# Patient Record
Sex: MALE | Race: WHITE | Employment: OTHER | URBAN - METROPOLITAN AREA
[De-identification: names, ages, dates, MRNs, and addresses within clinical notes are randomized per-mention and may not be internally consistent; named-entity substitution may affect disease eponyms.]

---

## 2017-01-17 ENCOUNTER — MYC MEDICAL ADVICE (OUTPATIENT)
Dept: FAMILY MEDICINE | Facility: CLINIC | Age: 62
End: 2017-01-17

## 2017-01-17 ENCOUNTER — TELEPHONE (OUTPATIENT)
Dept: FAMILY MEDICINE | Facility: CLINIC | Age: 62
End: 2017-01-17

## 2017-01-17 DIAGNOSIS — I10 ESSENTIAL HYPERTENSION, BENIGN: Primary | ICD-10-CM

## 2017-01-17 DIAGNOSIS — E78.2 MIXED HYPERLIPIDEMIA: ICD-10-CM

## 2017-01-17 RX ORDER — PROPRANOLOL HYDROCHLORIDE 20 MG/1
TABLET ORAL
Qty: 30 TABLET | Refills: 0 | COMMUNITY
Start: 2017-01-17 | End: 2017-02-16

## 2017-01-17 RX ORDER — FENOFIBRATE 54 MG/1
54 TABLET ORAL DAILY
Qty: 30 TABLET | Refills: 0 | COMMUNITY
Start: 2017-01-17 | End: 2017-02-16

## 2017-01-17 NOTE — TELEPHONE ENCOUNTER
Patient called just to clarify the process of the 30 day refills with a request for fasting labs.  I informed him that depending on the medication and how well controlled the condition plays in to how long they will approve medications for and how long in between fasting lab appointments.  He understands and will call us as soon as he knows his schedule to schedule that fasting med check appointment with Dr. Cintron.    Carolee Cantu CMA

## 2017-01-17 NOTE — TELEPHONE ENCOUNTER
Per our refill protocol I faxed in an ok for #30 on the following 2 medications.    Signed Prescriptions:                        Disp   Refills    propranolol (INDERAL) 20 MG tablet         30 tab*0        Sig: TAKE 1 TABLET (20 MG) BY MOUTH DAILY AS NEEDED  Authorizing Provider: BRENDA GIBSON  Ordering User: NAVI GIL    fenofibrate 54 MG tablet                   30 tab*0        Sig: Take 1 tablet (54 mg) by mouth daily  Authorizing Provider: BRENDA GIBSON  Ordering User: NAVI GIL    Faxed - also sent patient a my chart msg

## 2017-01-18 DIAGNOSIS — F41.1 ANXIETY STATE: Primary | ICD-10-CM

## 2017-01-18 RX ORDER — SERTRALINE HYDROCHLORIDE 100 MG/1
100 TABLET, FILM COATED ORAL DAILY
Qty: 30 TABLET | Refills: 0 | COMMUNITY
Start: 2017-01-18 | End: 2017-02-16

## 2017-01-18 NOTE — TELEPHONE ENCOUNTER
Received a refill request from [facility] for patient's Sertraline.   I spoke with patient yesterday regarding the reasoning for only the 30 day extensions and the need for fasting labs.  He understands that he needs to schedule an appointment and no further extensions will be granted.    Signed Prescriptions:                        Disp   Refills    sertraline (ZOLOFT) 100 MG tablet          30 tab*0        Sig: Take 1 tablet (100 mg) by mouth daily  Authorizing Provider: BRENDA GIBSON  Ordering User: MAEGAN PISANO    Last 30 day extension, no further refills until patient comes in for Fasting OV.    Maegan Pisano, [facility]

## 2017-02-14 ENCOUNTER — TELEPHONE (OUTPATIENT)
Dept: FAMILY MEDICINE | Facility: CLINIC | Age: 62
End: 2017-02-14

## 2017-02-14 NOTE — TELEPHONE ENCOUNTER
Patient requested a refill of Propanolol and Fenofibrate.    Denied patient already given a 30 day, needs ov.    Faxed back to the pharmacy listed.

## 2017-02-16 ENCOUNTER — OFFICE VISIT (OUTPATIENT)
Dept: FAMILY MEDICINE | Facility: CLINIC | Age: 62
End: 2017-02-16

## 2017-02-16 VITALS
BODY MASS INDEX: 32.8 KG/M2 | WEIGHT: 209 LBS | HEIGHT: 67 IN | DIASTOLIC BLOOD PRESSURE: 84 MMHG | TEMPERATURE: 100.1 F | OXYGEN SATURATION: 99 % | SYSTOLIC BLOOD PRESSURE: 124 MMHG | HEART RATE: 76 BPM | RESPIRATION RATE: 16 BRPM

## 2017-02-16 DIAGNOSIS — J10.1 INFLUENZA A: Primary | ICD-10-CM

## 2017-02-16 DIAGNOSIS — F41.1 ANXIETY STATE: ICD-10-CM

## 2017-02-16 DIAGNOSIS — I10 ESSENTIAL HYPERTENSION, BENIGN: ICD-10-CM

## 2017-02-16 DIAGNOSIS — Z76.0 ENCOUNTER FOR MEDICATION REFILL: ICD-10-CM

## 2017-02-16 DIAGNOSIS — K22.719 BARRETT'S ESOPHAGUS WITH DYSPLASIA: ICD-10-CM

## 2017-02-16 DIAGNOSIS — E78.2 MIXED HYPERLIPIDEMIA: ICD-10-CM

## 2017-02-16 LAB
% GRANULOCYTES: 80.1 %
FLUAV AG UPPER RESP QL IA.RAPID: ABNORMAL
FLUBV AG UPPER RESP QL IA.RAPID: ABNORMAL
HCT VFR BLD AUTO: 42.7 % (ref 40–53)
HEMOGLOBIN: 13.7 G/DL (ref 13.3–17.7)
LYMPHOCYTES NFR BLD AUTO: 9.8 %
MCH RBC QN AUTO: 31.5 PG (ref 26–33)
MCHC RBC AUTO-ENTMCNC: 32.1 G/DL (ref 31–36)
MCV RBC AUTO: 98.1 FL (ref 78–100)
MONOCYTES NFR BLD AUTO: 10.1 %
PLATELET COUNT - QUEST: 241 10^9/L (ref 150–375)
RBC # BLD AUTO: 4.35 10*12/L (ref 4.4–5.9)
WBC # BLD AUTO: 11.2 10*9/L (ref 4–11)

## 2017-02-16 PROCEDURE — 85025 COMPLETE CBC W/AUTO DIFF WBC: CPT | Performed by: FAMILY MEDICINE

## 2017-02-16 PROCEDURE — 80061 LIPID PANEL: CPT | Mod: 90 | Performed by: FAMILY MEDICINE

## 2017-02-16 PROCEDURE — 99215 OFFICE O/P EST HI 40 MIN: CPT | Performed by: FAMILY MEDICINE

## 2017-02-16 PROCEDURE — 87804 INFLUENZA ASSAY W/OPTIC: CPT | Performed by: FAMILY MEDICINE

## 2017-02-16 PROCEDURE — 80053 COMPREHEN METABOLIC PANEL: CPT | Mod: 90 | Performed by: FAMILY MEDICINE

## 2017-02-16 PROCEDURE — 36415 COLL VENOUS BLD VENIPUNCTURE: CPT | Performed by: FAMILY MEDICINE

## 2017-02-16 RX ORDER — OSELTAMIVIR PHOSPHATE 75 MG/1
75 CAPSULE ORAL 2 TIMES DAILY
Qty: 10 CAPSULE | Refills: 0 | Status: SHIPPED | OUTPATIENT
Start: 2017-02-16 | End: 2017-03-02

## 2017-02-16 RX ORDER — FENOFIBRATE 54 MG/1
54 TABLET ORAL DAILY
Qty: 90 TABLET | Refills: 1 | Status: SHIPPED | OUTPATIENT
Start: 2017-02-16 | End: 2017-08-18

## 2017-02-16 RX ORDER — SERTRALINE HYDROCHLORIDE 100 MG/1
100 TABLET, FILM COATED ORAL DAILY
Qty: 90 TABLET | Refills: 3 | Status: SHIPPED | OUTPATIENT
Start: 2017-02-16 | End: 2017-11-17

## 2017-02-16 RX ORDER — PROPRANOLOL HYDROCHLORIDE 20 MG/1
TABLET ORAL
Qty: 90 TABLET | Refills: 1 | Status: SHIPPED | OUTPATIENT
Start: 2017-02-16 | End: 2017-09-18

## 2017-02-16 RX ORDER — ATORVASTATIN CALCIUM 80 MG/1
40 TABLET, FILM COATED ORAL DAILY
Qty: 45 TABLET | Refills: 1 | Status: SHIPPED | OUTPATIENT
Start: 2017-02-16 | End: 2017-11-17

## 2017-02-16 ASSESSMENT — PATIENT HEALTH QUESTIONNAIRE - PHQ9: 5. POOR APPETITE OR OVEREATING: SEVERAL DAYS

## 2017-02-16 ASSESSMENT — ANXIETY QUESTIONNAIRES
6. BECOMING EASILY ANNOYED OR IRRITABLE: NOT AT ALL
IF YOU CHECKED OFF ANY PROBLEMS ON THIS QUESTIONNAIRE, HOW DIFFICULT HAVE THESE PROBLEMS MADE IT FOR YOU TO DO YOUR WORK, TAKE CARE OF THINGS AT HOME, OR GET ALONG WITH OTHER PEOPLE: NOT DIFFICULT AT ALL
5. BEING SO RESTLESS THAT IT IS HARD TO SIT STILL: NOT AT ALL
2. NOT BEING ABLE TO STOP OR CONTROL WORRYING: SEVERAL DAYS
3. WORRYING TOO MUCH ABOUT DIFFERENT THINGS: SEVERAL DAYS
GAD7 TOTAL SCORE: 3
7. FEELING AFRAID AS IF SOMETHING AWFUL MIGHT HAPPEN: NOT AT ALL
1. FEELING NERVOUS, ANXIOUS, OR ON EDGE: NOT AT ALL

## 2017-02-16 NOTE — NURSING NOTE
Patient is here for for a med check - he is fasting for the blood work.  On Monday he started to get ill on Monday and is just getting worse - cough, chest congestion and fever  Pre-Visit Screening :  Immunizations : up to date  Colon Screening : is up to date  Asthma Action Test/Plan : na  PHQ9/GAD7 :  GAD7  BP done on the right arm, with a lg sized cuff.  Pulse - regular  My Chart - accepts    CLASSIFICATION OF OVERWEIGHT AND OBESITY BY BMI                         Obesity Class           BMI(kg/m2)  Underweight                                    < 18.5  Normal                                         18.5-24.9  Overweight                                     25.0-29.9  OBESITY                     I                  30.0-34.9                              II                 35.0-39.9  EXTREME OBESITY             III                >40                             Patient's  BMI Body mass index is 32.49 kg/(m^2).  http://hin.nhlbi.nih.gov/menuplanner/menu.cgi  Questioned patient about current smoking habits.  Pt. has never smoked.

## 2017-02-16 NOTE — PATIENT INSTRUCTIONS
Follow flu handout. Contagious until fever gone. Potential medication side effects were discussed with the patient; let me know if any occur.    I've explained to him that drugs of the SSRI class can have side effects such as weight gain, sexual dysfunction, insomnia, headache, nausea. These medications are generally effective at alleviating symptoms of anxiety and/or depression. Let me know if significant side effects do occur.  Refilled one year    1)  Medication: continue current medication regimen unchanged  2)  Low fat, low cholesterol diet and low salt  3)  Regular aerobic exercise and weight loss  4)  Recheck in 6 months, sooner should new symptoms or   problems arise.    Patient Education: Reviewed risks of elevated lipids and principles   of treatment.

## 2017-02-16 NOTE — PROGRESS NOTES
3 issues: see 3 separate notes    1.SUBJECTIVE: 62 year old male complaining of sore throat with chills and body aches for 2 day(s).   The patient describes nasal congestion, sinus pressure and deep cough. Fever to 103  The patient denies a history of SOB, Gi symptoms or rash.   Smoking history: No.   Relevant past medical history: positive for traveling.    OBJECTIVE: The patient appears healthy, alert, no distress, cooperative and over weight.   EARS: negative  NOSE/SINUS: positive findings: mucosa erythematous and swollen, clear rhinorrhea   THROAT: normal and post nasal drainage   NECK:Neck supple. No adenopathy. Thyroid symmetric, normal size,, Carotids without bruits.   CHEST: Clear    FLU: positive A    ASSESSMENT: (J10.1) Influenza A  (primary encounter diagnosis)  Plan: Influenza A and B (BFP), oseltamivir (TAMIFLU)         75 MG capsule        Read handout/ symptomatic care. Potential medication side effects were discussed with the patient; let me know if any occur.      (E78.2) Mixed hyperlipidemia  Plan: fenofibrate 54 MG tablet, atorvastatin         (LIPITOR) 80 MG tablet, Lipid Profile (QUEST),         VENOUS COLLECTION        1)  Medication: continue current medication regimen unchanged  2)  Low fat, low cholesterol diet  3)  Regular aerobic exercise  4)  Recheck in 6 months, sooner should new symptoms or   problems arise.    Patient Education: Reviewed risks of elevated lipids and principles   of treatment.        (I10) Essential hypertension, benign (HTN)  Plan: propranolol (INDERAL) 20 MG tablet,         COMPREHENSIVE METABOLIC PANEL (QUEST) XCMP,         VENOUS COLLECTION        1)  Medication: continue current medication regimen unchanged  2)  Dietary sodium restriction  3)  Regular aerobic exercise  4)  Recheck in 6 months, sooner should new symptoms or   problems arise.    Patient Education: Reviewed risks of hypertension and principles of   treatment.        (F41.1) Anxiety state  Plan:  sertraline (ZOLOFT) 100 MG tablet        I've explained to him that drugs of the SSRI class can have side effects such as weight gain, sexual dysfunction, insomnia, headache, nausea. These medications are generally effective at alleviating symptoms of anxiety and/or depression. Let me know if significant side effects do occur.  Recheck one year    (K22.489) Natarajan's esophagus with dysplasia  Plan: VENOUS COLLECTION, CL AFF HEMOGRAM/PLATE/DIFF         (BFP)        I have reviewed the patient's medical history in detail and updated the computerized patient record.      (Z76.0) Encounter for medication refill  Plan: COMPREHENSIVE METABOLIC PANEL (QUEST) XCMP,         Lipid Profile (QUEST), VENOUS COLLECTION, CL         AFF HEMOGRAM/PLATE/DIFF (BFP)                      2.   SUBJECTIVE:  Edvin Norton is an 62 year old male who presents for evaluation of   hyperlipidemia. He has been diagnosed in the past as having   combined hyperlipidemia. He indicates that he is feeling well   and denies any symptoms of cardiovascular disease. Specifically   denies chest pain, palpitations, dyspnea, orthopnea, PND,   claudication or peripheral edema. Treatment modalities employed to   this point include diet, regular aerobic exercise, Lipitor and TRICOR. Current medication   regimen is as listed below. Patient denies any side effects of   medication.    Family history: positive for hypertension, cardiovascular disease and thyroid  Age at diagnosis of hyperlipidemia: 40's and hypertension age 51  Cardiovascular risk factors: family history, lipids and hypertension    Current Outpatient Prescriptions   Medication     sertraline (ZOLOFT) 100 MG tablet     propranolol (INDERAL) 20 MG tablet     fenofibrate 54 MG tablet     atorvastatin (LIPITOR) 80 MG tablet     Omega-3 Fatty Acids (OMEGA-3 FISH OIL PO)     ASPIRIN PO     Omeprazole (PRILOSEC PO)     No current facility-administered medications for this visit.      No Known  "Allergies    Social History   Substance Use Topics     Smoking status: Never Smoker     Smokeless tobacco: Former User     Alcohol use 9.0 oz/week     15 Standard drinks or equivalent per week       OBJECTIVE:  /84 (BP Location: Right arm, Patient Position: Chair, Cuff Size: Adult Large)  Pulse 76  Temp 98  F (36.7  C) (Oral)  Resp 16  Ht 1.708 m (5' 7.25\")  Wt 94.8 kg (209 lb)  SpO2 99%  BMI 32.49 kg/m2  Repeat BP R arm seated = 124/84 with regular size cuff.  Skin: negative  Fundi: deferred  Lungs: negative, Percussion normal. Good diaphragmatic excursion. Lungs clear  Heart: negative, PMI normal. No lifts, heaves, or thrills. RRR. No murmurs, clicks gallops or rub  Peripheral pulses: radial=4/4, femoral=4/4, popliteal=4/4, dorsalis pedis=4/4,    3.   SUBJECTIVE:  Edvin Norton is an 62 year old male who presents for follow-up   of anxiety with mild depressive symptoms.  Initially evaluated 2013.  Notes from that visit reviewed.  Current symptoms include none. Symptoms that have subjectively improved include depressed mood, hopelessness, diminished interest or pleasure in activities, insomnia, feelings of guilt, anxiety, irritablility, sleep disturbance, difficulty falling asleep.  Previous and current treatment modalities   employed include individual therapy and medication(s) Zoloft (sertraline).     Organic causes of depression present: strong family history    Current Outpatient Prescriptions   Medication     sertraline (ZOLOFT) 100 MG tablet     propranolol (INDERAL) 20 MG tablet     fenofibrate 54 MG tablet     atorvastatin (LIPITOR) 80 MG tablet     Omega-3 Fatty Acids (OMEGA-3 FISH OIL PO)     ASPIRIN PO     Omeprazole (PRILOSEC PO)     No current facility-administered medications for this visit.      No Known Allergies  Side effects of medication: none    Social History   Substance Use Topics     Smoking status: Never Smoker     Smokeless tobacco: Former User     Alcohol use 9.0 oz/week     15 " "Standard drinks or equivalent per week         Neurologic: negative  Psychiatric: excessive stress-work  Endocrine: negative    OBJECTIVE:  /84 (BP Location: Right arm, Patient Position: Chair, Cuff Size: Adult Large)  Pulse 76  Temp 98  F (36.7  C) (Oral)  Resp 16  Ht 1.708 m (5' 7.25\")  Wt 94.8 kg (209 lb)  SpO2 99%  BMI 32.49 kg/m2  Mental Status Examination  Posture and motor behavior: negative  Dress, grooming, personal hygiene: negative  Facial expression: negative  Speech: negative  Mood: negative  Coherency and relevance of thought: negative  Thought content: negative  Perceptions: negative  Orientation: negative  Attention and concentration: negative  Memory: : negative  Information: negative  Vocabulary: negative  Abstract reasoning: negative  Judgment: negative    General appearance: healthy, alert, no distress, cooperative and over weight  Eyes: conjunctivae/corneas clear. PERRL, EOM's intact. Fundi benign  Ears: negative  Oropharynx: Lips, mucosa, and tongue normal. Teeth and gums normal.  Neck: Neck supple. No adenopathy. Thyroid symmetric, normal size,, Carotids without bruits.  Lungs: negative, Percussion normal. Good diaphragmatic excursion. Lungs clear  Heart: negative, PMI normal. No lifts, heaves, or thrills. RRR. No murmurs, clicks gallops or rub  Abdomen: Abdomen soft, non-tender. BS normal. No masses, organomegaly  Neuro: Gait normal. Reflexes normal and symmetric. Sensation grossly WNL.      "

## 2017-02-16 NOTE — MR AVS SNAPSHOT
After Visit Summary   2/16/2017    Edvin Norton    MRN: [id number]           Patient Information     Date Of Birth          1955        Visit Information        Provider Department      2/16/2017 12:15 PM Tammy Sinha MD [facility]        Today's Diagnoses     Influenza A    -  1    Mixed hyperlipidemia        Essential hypertension, benign (HTN)        Anxiety state        Natarajan's esophagus with dysplasia        Encounter for medication refill          Care Instructions    Follow flu handout. Contagious until fever gone. Potential medication side effects were discussed with the patient; let me know if any occur.    I've explained to him that drugs of the SSRI class can have side effects such as weight gain, sexual dysfunction, insomnia, headache, nausea. These medications are generally effective at alleviating symptoms of anxiety and/or depression. Let me know if significant side effects do occur.  Refilled one year    1)  Medication: continue current medication regimen unchanged  2)  Low fat, low cholesterol diet and low salt  3)  Regular aerobic exercise and weight loss  4)  Recheck in 6 months, sooner should new symptoms or   problems arise.    Patient Education: Reviewed risks of elevated lipids and principles   of treatment.            Follow-ups after your visit        Follow-up notes from your care team     Return in about 6 months (around 8/16/2017).      Who to contact     If you have questions or need follow up information about today's clinic visit or your schedule please contact [facility] directly at [phone].  Normal or non-critical lab and imaging results will be communicated to you by MyChart, letter or phone within 4 business days after the clinic has received the results. If you do not hear from us within 7 days, please contact the clinic through MyChart or phone. If you have a critical or abnormal lab result, we will  "notify you by phone as soon as possible.  Submit refill requests through [employer] or call your pharmacy and they will forward the refill request to us. Please allow 3 business days for your refill to be completed.          Additional Information About Your Visit        [employer]WALLET SystemsharXMPie Information     [facility] gives you secure access to your electronic health record. If you see a primary care provider, you can also send messages to your care team and make appointments. If you have questions, please call your primary care clinic.  If you do not have a primary care provider, please call [phone] and they will assist you.        Care EveryWhere ID     This is your Care EveryWhere ID. This could be used by other organizations to access your [place] medical records  [id number]        Your Vitals Were     Pulse Temperature Respirations Height Pulse Oximetry BMI (Body Mass Index)    76 100.1  F (37.8  C) (Oral) 16 1.708 m (5' 7.25\") 99% 32.49 kg/m2       Blood Pressure from Last 3 Encounters:   02/16/17 124/84   09/30/16 (!) 142/98   08/01/16 120/80    Weight from Last 3 Encounters:   02/16/17 94.8 kg (209 lb)   09/30/16 92.4 kg (203 lb 9.6 oz)   08/01/16 93.4 kg (206 lb)              We Performed the Following     CL AFF HEMOGRAM/PLATE/DIFF (BFP)     COMPREHENSIVE METABOLIC PANEL (QUEST) XCMP     Influenza A and B (BFP)     Lipid Profile (QUEST)     VENOUS COLLECTION          Today's Medication Changes          These changes are accurate as of: 2/16/17  2:01 PM.  If you have any questions, ask your nurse or doctor.               Start taking these medicines.        Dose/Directions    oseltamivir 75 MG capsule   Commonly known as:  TAMIFLU   Used for:  Influenza A   Started by:  Tammy Sinha MD        Dose:  75 mg   Take 1 capsule (75 mg) by mouth 2 times daily   Quantity:  10 capsule   Refills:  0            Where to get your medicines      These medications were sent to [id number] - [facility] [address]  [address] [address] [facility] [address]     Phone:  [phone]     atorvastatin 80 MG tablet    fenofibrate 54 MG tablet    oseltamivir 75 MG capsule    propranolol 20 MG tablet    sertraline 100 MG tablet                Primary Care Provider Office Phone # Fax #    Woody Cintron [phone]       [facility] [address] [facility] [address]  [facility] [address]        Thank you!     Thank you for choosing [facility]  for your care. Our goal is always to provide you with excellent care. Hearing back from our patients is one way we can continue to improve our services. Please take a few minutes to complete the written survey that you may receive in the mail after your visit with us. Thank you!             Your Updated Medication List - Protect others around you: Learn how to safely use, store and throw away your medicines at www.disposemymeds.org.          This list is accurate as of: 2/16/17  2:01 PM.  Always use your most recent med list.                   Brand Name Dispense Instructions for use    ASPIRIN PO      Take  by mouth.       atorvastatin 80 MG tablet    LIPITOR    45 tablet    Take 0.5 tablets (40 mg) by mouth daily       fenofibrate 54 MG tablet     90 tablet    Take 1 tablet (54 mg) by mouth daily       OMEGA-3 FISH OIL PO      Take  by mouth.       oseltamivir 75 MG capsule    TAMIFLU    10 capsule    Take 1 capsule (75 mg) by mouth 2 times daily       PRILOSEC PO      Take  by mouth.       propranolol 20 MG tablet    INDERAL    90 tablet    TAKE 1 TABLET (20 MG) BY MOUTH DAILY AS NEEDED       sertraline 100 MG tablet    ZOLOFT    90 tablet    Take 1 tablet (100 mg) by mouth daily

## 2017-02-17 LAB
ALBUMIN SERPL-MCNC: 4.6 G/DL (ref 3.6–5.1)
ALBUMIN/GLOB SERPL: 1.8 (CALC) (ref 1–2.5)
ALP SERPL-CCNC: 55 U/L (ref 40–115)
ALT SERPL-CCNC: 48 U/L (ref 9–46)
AST SERPL-CCNC: 72 U/L (ref 10–35)
BILIRUB SERPL-MCNC: 1 MG/DL (ref 0.2–1.2)
BUN SERPL-MCNC: 14 MG/DL (ref 7–25)
BUN/CREATININE RATIO: ABNORMAL (CALC) (ref 6–22)
CALCIUM SERPL-MCNC: 9.4 MG/DL (ref 8.6–10.3)
CHLORIDE SERPLBLD-SCNC: 101 MMOL/L (ref 98–110)
CHOLEST SERPL-MCNC: 189 MG/DL (ref 125–200)
CHOLEST/HDLC SERPL: 3.1 (CALC)
CO2 SERPL-SCNC: 23 MMOL/L (ref 20–31)
CREAT SERPL-MCNC: 1.19 MG/DL (ref 0.7–1.25)
EGFR AFRICAN AMERICAN - QUEST: 76 ML/MIN/1.73M2
GFR SERPL CREATININE-BSD FRML MDRD: 66 ML/MIN/1.73M2
GLOBULIN, CALCULATED - QUEST: 2.6 G/DL (CALC) (ref 1.9–3.7)
GLUCOSE - QUEST: 116 MG/DL (ref 65–99)
HDLC SERPL-MCNC: 61 MG/DL
LDLC SERPL CALC-MCNC: 96 MG/DL (CALC)
NONHDLC SERPL-MCNC: 128 MG/DL (CALC)
POTASSIUM SERPL-SCNC: 4.1 MMOL/L (ref 3.5–5.3)
PROT SERPL-MCNC: 7.2 G/DL (ref 6.1–8.1)
SODIUM SERPL-SCNC: 137 MMOL/L (ref 135–146)
TRIGL SERPL-MCNC: 160 MG/DL

## 2017-02-17 ASSESSMENT — ANXIETY QUESTIONNAIRES: GAD7 TOTAL SCORE: 3

## 2017-03-02 ENCOUNTER — OFFICE VISIT (OUTPATIENT)
Dept: PODIATRY | Facility: CLINIC | Age: 62
End: 2017-03-02
Payer: COMMERCIAL

## 2017-03-02 VITALS
BODY MASS INDEX: 32.8 KG/M2 | SYSTOLIC BLOOD PRESSURE: 125 MMHG | DIASTOLIC BLOOD PRESSURE: 86 MMHG | WEIGHT: 209 LBS | HEART RATE: 79 BPM | HEIGHT: 67 IN

## 2017-03-02 DIAGNOSIS — L08.9 TOE INFECTION: Primary | ICD-10-CM

## 2017-03-02 LAB
CRP SERPL-MCNC: 6.9 MG/L (ref 0–8)
ERYTHROCYTE [DISTWIDTH] IN BLOOD BY AUTOMATED COUNT: 13.5 % (ref 10–15)
ERYTHROCYTE [SEDIMENTATION RATE] IN BLOOD BY WESTERGREN METHOD: 31 MM/H (ref 0–20)
HCT VFR BLD AUTO: 38.7 % (ref 40–53)
HGB BLD-MCNC: 12.6 G/DL (ref 13.3–17.7)
MCH RBC QN AUTO: 31.5 PG (ref 26.5–33)
MCHC RBC AUTO-ENTMCNC: 32.6 G/DL (ref 31.5–36.5)
MCV RBC AUTO: 97 FL (ref 78–100)
PLATELET # BLD AUTO: 329 10E9/L (ref 150–450)
RBC # BLD AUTO: 4 10E12/L (ref 4.4–5.9)
WBC # BLD AUTO: 7.9 10E9/L (ref 4–11)

## 2017-03-02 PROCEDURE — 99203 OFFICE O/P NEW LOW 30 MIN: CPT | Performed by: PODIATRIST

## 2017-03-02 PROCEDURE — 36415 COLL VENOUS BLD VENIPUNCTURE: CPT | Performed by: PODIATRIST

## 2017-03-02 PROCEDURE — 86140 C-REACTIVE PROTEIN: CPT | Performed by: PODIATRIST

## 2017-03-02 PROCEDURE — 85652 RBC SED RATE AUTOMATED: CPT | Performed by: PODIATRIST

## 2017-03-02 PROCEDURE — 85027 COMPLETE CBC AUTOMATED: CPT | Performed by: PODIATRIST

## 2017-03-02 NOTE — PROGRESS NOTES
"Foot & Ankle Surgery  March 2, 2017    CC: L hallux infection    I was asked to see Edvin Norton regarding the chief complaint by:  [facility]    HPI:  Pt is a 62 year old male who presents with above complaint.  Was in [place], doing some walking, and was then in [place], and noticed some redness/swelling L hallux.  Went to  this past Sunday, given Rx for keflex and bactrim.  No culture results obtained.  The following day, he wasn't much better, and was given IV/IM Vancomycin and Rocephin.  The toe is better but still swollen and mildly red.  He noticed drainage initially but no current drainage.      ROS:   Pos for CC.  The patient denies current nausea, vomiting, chills, fevers, belly pain, calf pain, chest pain or SOB.  Complete remainder of ROS is otherwise neg.    VITALS:    Vitals:    03/02/17 0938   BP: 125/86   BP Location: Left arm   Patient Position: Chair   Cuff Size: Adult Large   Pulse: 79   Weight: 209 lb (94.8 kg)   Height: 5' 7.25\" (1.708 m)       PMH:  No past medical history on file.    SXHX:    Past Surgical History   Procedure Laterality Date     Finger surgery       tendon     Cataract iol, rt/lt       Esophagus surgery       Halo procedure        MEDS:    Current Outpatient Prescriptions   Medication     Cephalexin (KEFLEX PO)     Sulfamethoxazole-Trimethoprim (BACTRIM DS PO)     sertraline (ZOLOFT) 100 MG tablet     propranolol (INDERAL) 20 MG tablet     fenofibrate 54 MG tablet     atorvastatin (LIPITOR) 80 MG tablet     Omega-3 Fatty Acids (OMEGA-3 FISH OIL PO)     ASPIRIN PO     Omeprazole (PRILOSEC PO)     No current facility-administered medications for this visit.        ALL:   No Known Allergies    FMH:    Family History   Problem Relation Age of Onset     Hypertension Mother      Psychotic Disorder Mother      anxiety     Psychotic Disorder Father      anxiety     CEREBROVASCULAR DISEASE Sister      Psychotic Disorder Sister      anxiety     Psychotic " Disorder Brother      anxiety     Thyroid Disease Sister      Thyroid Disease Brother      Thyroid Disease Sister      C.A.D. No family hx of      DIABETES No family hx of      Breast Cancer No family hx of      Cancer - colorectal No family hx of      Prostate Cancer No family hx of        SocHx:    Social History     Social History     Marital status:      Spouse name: Eileen     Number of children: N/A     Years of education: N/A     Occupational History      insurance [employer]     Social History Main Topics     Smoking status: Never Smoker     Smokeless tobacco: Former User     Alcohol use 9.0 oz/week     15 Standard drinks or equivalent per week     Drug use: No     Sexual activity: Yes     Partners: Female     Other Topics Concern      Service No     Blood Transfusions No     Caffeine Concern Yes     Occupational Exposure Yes     travels     Hobby Hazards No     Sleep Concern No     Stress Concern Yes     travels for work     Weight Concern Yes     Special Diet No     Exercise Yes     Seat Belt Yes     Social History Narrative           EXAMINATION:  Gen:   No apparent distress  Neuro:   A&Ox3, no deficits  Psych:    Answering questions appropriately for age and situation with normal affect  Head:    NCAT  Eye:    Visual scanning without deficit  Ear:    Response to auditory stimuli wnl  Lung:    Non-labored breathing on RA noted  Abd:    NTND per patient report  Lymph:  Edema/erythema L hallux, improved per patient/wife report  Vasc:    Pulses palpable, CFT minimally delayed  Neuro:    Light touch sensation intact to all sensory nerve distributions without paresthesias  Derm:   Superficial abrasion/eschar dorsal L hallux, but no drainage to culture  MSK:    ROM, strength wnl without limitation, no pain on palpation noted.  Calf:    Neg for redness, swelling or tenderness    Labs:  ESR, CRP, CBC ordered today    Assessment:  62 year old male with abrasioni/infection L hallux      Plan:   Discussed etiologies and options  1.  Infection L hallux  -labs as above, will call with very elevated results  -finish PO abx course  -daily cares - wash/dry, abx ointment/bandage, accommodative shoe    Follow up:  1 week.  Ok to cancel if resolved.  Consider MRI if no further improvement.      Patient's medical history was reviewed today    Body mass index is 32.49 kg/(m^2).  Weight management plan: Patient was referred to their PCP to discuss a diet and exercise plan.        Dwayne Garcia DPM   Podiatric Foot & Ankle Surgeon  [facility]  [phone]

## 2017-03-02 NOTE — MR AVS SNAPSHOT
After Visit Summary   3/2/2017    Edvin Norton    MRN: [id number]           Patient Information     Date Of Birth          1955        Visit Information        Provider Department      3/2/2017 9:45 AM Dwayne Garcia DPM [facility]'s Diagnoses     Toe infection    -  1      Care Instructions      Dr. Garcia's Clinic Locations:         Monday Tuesday   [facility]   [address] [facility] [address] [facility], [address]   [place] [address] [place] [address]   [phone]       Wednesday:  Surgery Day    Surgery Scheduling Line - [phone]       Thursday Morning Thursday Afternoon   [facility]   [address] [facility], [address]   [place] [address] [place], [place] [address] [phone]       Friday Morning To Schedule an Appointment    [facility] Call: [phone] [address] [place] [address]  [phone] PLEASE FAX ALL FORMS TO: [phone]     Follow up: in 1 week    Dr Garcia will call with the lab results    Body Mass Index (BMI)    Many things can cause foot and ankle problems. Foot structure, activity level, foot mechanics and injuries are common causes of pain.    One very important issue that often goes unmentioned, is body weight.  Extra weight can cause increased stress on muscles, ligaments, bones and tendons. Sometimes just a few extra pounds is all it takes to put one over her/his threshold. Without reducing that stress, it can be difficult to alleviate pain.      Some people are uncomfortable addressing this issue, but we feel it is important for you to think about it. As Foot & Ankle specialists, our job is addressing the lower extremity problem and possible causes.     Regarding extra body weight, we encourage patients to discuss diet and weight management plans with their  "primary care doctors. It is this team approach that gives you the best opportunity for pain relief and getting you back on your feet.                Follow-ups after your visit        Who to contact     If you have questions or need follow up information about today's clinic visit or your schedule please contact [facility] directly at [phone].  Normal or non-critical lab and imaging results will be communicated to you by MyChart, letter or phone within 4 business days after the clinic has received the results. If you do not hear from us within 7 days, please contact the clinic through Visionarityhart or phone. If you have a critical or abnormal lab result, we will notify you by phone as soon as possible.  Submit refill requests through GrabTaxi or call your pharmacy and they will forward the refill request to us. Please allow 3 business days for your refill to be completed.          Additional Information About Your Visit        MyChart Information     GrabTaxi gives you secure access to your electronic health record. If you see a primary care provider, you can also send messages to your care team and make appointments. If you have questions, please call your primary care clinic.  If you do not have a primary care provider, please call [phone] and they will assist you.        Care EveryWhere ID     This is your Care EveryWhere ID. This could be used by other organizations to access your [place] medical records  [id number]        Your Vitals Were     Pulse Height BMI (Body Mass Index)             79 5' 7.25\" (1.708 m) 32.49 kg/m2          Blood Pressure from Last 3 Encounters:   03/02/17 125/86   02/16/17 124/84   09/30/16 (!) 142/98    Weight from Last 3 Encounters:   03/02/17 209 lb (94.8 kg)   02/16/17 209 lb (94.8 kg)   09/30/16 203 lb 9.6 oz (92.4 kg)              We Performed the Following     CBC with platelets     CRP inflammation     Erythrocyte sedimentation rate auto        Primary Care " Provider Office Phone # Fax #    Woody Cintron [phone]       [facility] [address] [facility] [address]  [facility] [address]        Thank you!     Thank you for choosing [facility]  for your care. Our goal is always to provide you with excellent care. Hearing back from our patients is one way we can continue to improve our services. Please take a few minutes to complete the written survey that you may receive in the mail after your visit with us. Thank you!             Your Updated Medication List - Protect others around you: Learn how to safely use, store and throw away your medicines at www.disposemymeds.org.          This list is accurate as of: 3/2/17 10:08 AM.  Always use your most recent med list.                   Brand Name Dispense Instructions for use    ASPIRIN PO      Take 81 mg by mouth daily       atorvastatin 80 MG tablet    LIPITOR    45 tablet    Take 0.5 tablets (40 mg) by mouth daily       BACTRIM DS PO      Take 1 tablet by mouth 2 times daily       fenofibrate 54 MG tablet     90 tablet    Take 1 tablet (54 mg) by mouth daily       KEFLEX PO      Take 500 mg by mouth 4 times daily       OMEGA-3 FISH OIL PO      Take  by mouth.       PRILOSEC PO      Take  by mouth.       propranolol 20 MG tablet    INDERAL    90 tablet    TAKE 1 TABLET (20 MG) BY MOUTH DAILY AS NEEDED       sertraline 100 MG tablet    ZOLOFT    90 tablet    Take 1 tablet (100 mg) by mouth daily

## 2017-03-02 NOTE — PATIENT INSTRUCTIONS
Dr. Garcia's Clinic Locations:         Monday Tuesday   [facility] [place]   [address] [facility] [address] [facility], [address]   [place] [address] [place] [address]   [phone]       Wednesday:  Surgery Day    Surgery Scheduling Line - [phone]       Thursday Morning Thursday Afternoon   [facility]   [address] [facility], [address]   [place] [address] [place] [address]   [phone]       Friday Morning To Schedule an Appointment    [facility] Call: [phone] [address]    [place], [place] [address] [phone] PLEASE FAX ALL FORMS TO: [phone]     Follow up: in 1 week    Dr Garcia will call with the lab results    Body Mass Index (BMI)    Many things can cause foot and ankle problems. Foot structure, activity level, foot mechanics and injuries are common causes of pain.    One very important issue that often goes unmentioned, is body weight.  Extra weight can cause increased stress on muscles, ligaments, bones and tendons. Sometimes just a few extra pounds is all it takes to put one over her/his threshold. Without reducing that stress, it can be difficult to alleviate pain.      Some people are uncomfortable addressing this issue, but we feel it is important for you to think about it. As Foot & Ankle specialists, our job is addressing the lower extremity problem and possible causes.     Regarding extra body weight, we encourage patients to discuss diet and weight management plans with their primary care doctors. It is this team approach that gives you the best opportunity for pain relief and getting you back on your feet.

## 2017-03-02 NOTE — NURSING NOTE
"Chief Complaint   Patient presents with     Foot Problems     Left hallux - toe infection       Initial /86 (BP Location: Left arm, Patient Position: Chair, Cuff Size: Adult Large)  Pulse 79  Ht 5' 7.25\" (1.708 m)  Wt 209 lb (94.8 kg)  BMI 32.49 kg/m2 Estimated body mass index is 32.49 kg/(m^2) as calculated from the following:    Height as of this encounter: 5' 7.25\" (1.708 m).    Weight as of this encounter: 209 lb (94.8 kg).  Medication Reconciliation: complete    "

## 2017-03-31 ENCOUNTER — OFFICE VISIT (OUTPATIENT)
Dept: PODIATRY | Facility: CLINIC | Age: 62
End: 2017-03-31
Payer: COMMERCIAL

## 2017-03-31 ENCOUNTER — RADIANT APPOINTMENT (OUTPATIENT)
Dept: GENERAL RADIOLOGY | Facility: CLINIC | Age: 62
End: 2017-03-31
Attending: PODIATRIST
Payer: COMMERCIAL

## 2017-03-31 VITALS — BODY MASS INDEX: 32.8 KG/M2 | WEIGHT: 209 LBS | HEIGHT: 67 IN

## 2017-03-31 DIAGNOSIS — M79.675 PAIN OF TOE OF LEFT FOOT: Primary | ICD-10-CM

## 2017-03-31 DIAGNOSIS — M79.675 PAIN OF TOE OF LEFT FOOT: ICD-10-CM

## 2017-03-31 PROCEDURE — 36415 COLL VENOUS BLD VENIPUNCTURE: CPT | Performed by: PODIATRIST

## 2017-03-31 PROCEDURE — 84550 ASSAY OF BLOOD/URIC ACID: CPT | Performed by: PODIATRIST

## 2017-03-31 PROCEDURE — 99213 OFFICE O/P EST LOW 20 MIN: CPT | Performed by: PODIATRIST

## 2017-03-31 PROCEDURE — 73660 X-RAY EXAM OF TOE(S): CPT | Mod: LT

## 2017-03-31 NOTE — MR AVS SNAPSHOT
After Visit Summary   3/31/2017    Edvin Norton    MRN: [id number]           Patient Information     Date Of Birth          1955        Visit Information        Provider Department      3/31/2017 11:15 AM Dwayne Robison DPM [facility]        Today's Diagnoses     Pain of toe of left foot    -  1      Care Instructions      DR. ROBISON'S CLINIC LOCATIONS:       MONDAY - [place] TUESDAY - [place]   [address] [facility] [address] [place] [address]   [place] [address] [place] [address]   [phone]       WEDNESDAY - SURGERY THURSDAY AM - [place]   [phone] [address]    [place], [place] [address] [phone]       THURSDAY PM - [place] FRIDAY AM - [place]   [address] [facility] [address] [place] [address] [place], [place] [address] [phone]       APPT SCHEDULING: SEND FAXES TO:   [phone]     Follow Up:     [place] [facility] SCHEDULING  They should be calling you within 24 hours to schedule your scan.  If not, please call the location discussed at your appointment.    1) [facility]:       [phone]      [address] [place] [address]    2) [facility]:      [phone] [address] [place] [address]    3) [facility]:       [phone]      [facility] [place] [address]    * We will call you with the results. Please allow 24-48 hours for the results to be read and final.        BODY MASS INDEX (BMI)  Many things can cause foot and ankle problems. Foot structure, activity level, foot mechanics and injuries are common causes of pain.    One very important issue that often goes unmentioned, is body weight.  Extra weight can cause increased stress on muscles, ligaments, bones and tendons. Sometimes just a few extra pounds is all it takes to put one over her/his threshold. Without reducing that  stress, it can be difficult to alleviate pain.      Some people are uncomfortable addressing this issue, but we feel it is important for you to think about it. As Foot & Ankle specialists, our job is addressing the lower extremity problem and possible causes.     Regarding extra body weight, we encourage patients to discuss diet and weight management plans with their primary care doctors. It is this team approach that gives you the best opportunity for pain relief and getting you back on your feet.                Follow-ups after your visit        Future tests that were ordered for you today     Open Future Orders        Priority Expected Expires Ordered    XR Toe Left G/E 2 Views Routine 3/31/2017 3/31/2018 3/31/2017    MR Foot Left w/o Contrast Routine  3/31/2018 3/31/2017            Who to contact     If you have questions or need follow up information about today's clinic visit or your schedule please contact [facility] directly at [phone].  Normal or non-critical lab and imaging results will be communicated to you by Compliance Assurancehart, letter or phone within 4 business days after the clinic has received the results. If you do not hear from us within 7 days, please contact the clinic through Compliance Assurancehart or phone. If you have a critical or abnormal lab result, we will notify you by phone as soon as possible.  Submit refill requests through Trochet or call your pharmacy and they will forward the refill request to us. Please allow 3 business days for your refill to be completed.          Additional Information About Your Visit        Compliance AssuranceharKueski Information     Trochet gives you secure access to your electronic health record. If you see a primary care provider, you can also send messages to your care team and make appointments. If you have questions, please call your primary care clinic.  If you do not have a primary care provider, please call [phone] and they will assist you.        Care EveryWhere ID      "This is your Care EveryWhere ID. This could be used by other organizations to access your [place] medical records  [id number]        Your Vitals Were     Height BMI (Body Mass Index)                5' 7.25\" (1.708 m) 32.49 kg/m2           Blood Pressure from Last 3 Encounters:   03/02/17 125/86   02/16/17 124/84   09/30/16 (!) 142/98    Weight from Last 3 Encounters:   03/31/17 209 lb (94.8 kg)   03/02/17 209 lb (94.8 kg)   02/16/17 209 lb (94.8 kg)              We Performed the Following     Uric acid        Primary Care Provider Office Phone # Fax #    Woody Cintron [phone]       [facility] [address] [facility] [address]  [facility] [address]        Thank you!     Thank you for choosing [facility]  for your care. Our goal is always to provide you with excellent care. Hearing back from our patients is one way we can continue to improve our services. Please take a few minutes to complete the written survey that you may receive in the mail after your visit with us. Thank you!             Your Updated Medication List - Protect others around you: Learn how to safely use, store and throw away your medicines at www.disposemymeds.org.          This list is accurate as of: 3/31/17 11:32 AM.  Always use your most recent med list.                   Brand Name Dispense Instructions for use    ASPIRIN PO      Take 81 mg by mouth daily       atorvastatin 80 MG tablet    LIPITOR    45 tablet    Take 0.5 tablets (40 mg) by mouth daily       fenofibrate 54 MG tablet     90 tablet    Take 1 tablet (54 mg) by mouth daily       OMEGA-3 FISH OIL PO      Take  by mouth.       PRILOSEC PO      Take  by mouth.       propranolol 20 MG tablet    INDERAL    90 tablet    TAKE 1 TABLET (20 MG) BY MOUTH DAILY AS NEEDED       sertraline 100 MG tablet    ZOLOFT    90 tablet    Take 1 tablet (100 mg) by mouth daily         "

## 2017-03-31 NOTE — PROGRESS NOTES
"Foot & Ankle Surgery   March 31, 2017    S:  Pt is seen today for evaluation of L hallux redness/swelling.  No imrpovement noted.  Can be tender.  Previous labs, CRP and WBC unremarkable, ESR slightly elevated at 31.  Was previously on Rocephin and Vancomycin.    Vitals:    03/31/17 1111   Weight: 209 lb (94.8 kg)   Height: 5' 7.25\" (1.708 m)   '      ROS - Pos for CC.  Patient denies current nausea, vomiting, chills, fevers, belly pain, calf pain, chest pain or SOB.  Complete remainder of ROS it otherwise neg.      PE:  Gen:   No apparent distress  Neuro:   A&Ox3, no deficits  Psych:    Answering questions appropriately for age and situation with normal affect  Head:    NCAT  Eye:    Visual scanning without deficit  Ear:    Response to auditory stimuli wnl  Lung:    Non-labored breathing on RA noted  Abd:    NTND per patient report  Lymph:    L hallux is red and swollen  Vasc:    Pulses palpable, CFT minimally delayed  Neuro:    Light touch sensation intact to all sensory nerve distributions without paresthesias  Derm:    Small scab centrally over IPJ L hallux.  Peeled off, no underyling wound noted.    MSK:    Mild discomfort with PROM 1st MPJ without crepitus  Calf:    Neg for redness, swelling or tenderness    Imaging:  xrays of the toe and MRI of the foot ordered today; will call with MRI/xray results    Labs:    Component      Latest Ref Rng & Units 3/2/2017   WBC      4.0 - 11.0 10e9/L 7.9   RBC Count      4.4 - 5.9 10e12/L 4.00 (L)   Hemoglobin      13.3 - 17.7 g/dL 12.6 (L)   Hematocrit      40.0 - 53.0 % 38.7 (L)   MCV      78 - 100 fl 97   MCH      26.5 - 33.0 pg 31.5   MCHC      31.5 - 36.5 g/dL 32.6   RDW      10.0 - 15.0 % 13.5   Platelet Count      150 - 450 10e9/L 329   Sed Rate      0 - 20 mm/h 31 (H)   CRP Inflammation      0.0 - 8.0 mg/L 6.9       Cultures:  No cultures available      Assessment:  62 year old male with persistent redness/swelling L hallux      Plan:  Discussed " etiologies/options  1.  Persistent redness/swellign L hallux  -labs reviewed  -xray/MRI ordered, will call with results  -reviewed infection, trauma and inflammatory arthritic conditions.      Follow up:  Will call with results      Body mass index is 32.49 kg/(m^2).  Weight management plan: Patient was referred to their PCP to discuss a diet and exercise plan.         Dwayne Garcia DPM   Podiatric Foot & Ankle Surgeon  [facility]  [phone]

## 2017-03-31 NOTE — LETTER
"  3/31/2017       RE: Edvin Norton  [address] [place] [address]           Dear Colleague,    Thank you for referring your patient, Edvin Norton, to the [facility]. Please see a copy of my visit note below.    Foot & Ankle Surgery   March 31, 2017    S:  Pt is seen today for evaluation of L hallux redness/swelling.  No imrpovement noted.  Can be tender.  Previous labs, CRP and WBC unremarkable, ESR slightly elevated at 31.  Was previously on Rocephin and Vancomycin.    Vitals:    03/31/17 1111   Weight: 209 lb (94.8 kg)   Height: 5' 7.25\" (1.708 m)   '      ROS - Pos for CC.  Patient denies current nausea, vomiting, chills, fevers, belly pain, calf pain, chest pain or SOB.  Complete remainder of ROS it otherwise neg.      PE:  Gen:   No apparent distress  Neuro:   A&Ox3, no deficits  Psych:    Answering questions appropriately for age and situation with normal affect  Head:    NCAT  Eye:    Visual scanning without deficit  Ear:    Response to auditory stimuli wnl  Lung:    Non-labored breathing on RA noted  Abd:    NTND per patient report  Lymph:    L hallux is red and swollen  Vasc:    Pulses palpable, CFT minimally delayed  Neuro:    Light touch sensation intact to all sensory nerve distributions without paresthesias  Derm:    Small scab centrally over IPJ L hallux.  Peeled off, no underyling wound noted.    MSK:    Mild discomfort with PROM 1st MPJ without crepitus  Calf:    Neg for redness, swelling or tenderness    Imaging:  ***    Labs:  ***    Cultures:  ***      Assessment:  62 year old male with ***      Plan:  Discussed etiologies/options  1.  ***  -***    2.  ***  -***    Follow up:  ***      Body mass index is 32.49 kg/(m^2).  Weight management plan: Patient was referred to their PCP to discuss a diet and exercise plan.         Dwayne Garcia DPM   Podiatric Foot & Ankle Surgeon  [facility]  [phone]    Again, thank you for allowing me to " participate in the care of your patient.        Sincerely,              Dwayne Garcia DPM, [place]

## 2017-03-31 NOTE — NURSING NOTE
"Chief Complaint   Patient presents with     RECHECK     Left hallux infection, no change in last few weeks       Initial Ht 5' 7.25\" (1.708 m)  Wt 209 lb (94.8 kg)  BMI 32.49 kg/m2 Estimated body mass index is 32.49 kg/(m^2) as calculated from the following:    Height as of this encounter: 5' 7.25\" (1.708 m).    Weight as of this encounter: 209 lb (94.8 kg).  Medication Reconciliation: complete  "

## 2017-03-31 NOTE — PATIENT INSTRUCTIONS
DR. ROBISON'S CLINIC LOCATIONS:       MONDAY - [place]  TUESDAY - [place]   [address] [facility] [address]   [place] [address] [place] [address]   [phone]       WEDNESDAY - SURGERY THURSDAY AM - [place]   [phone] [address]    [place] [address]    [phone]       THURSDAY PM - [place] FRIDAY AM - [place]   [address] [facility] [address] [place] [address] [place] [address]   [phone]       APPT SCHEDULING: SEND FAXES TO:   [phone]     Follow Up:     [place] [facility] SCHEDULING  They should be calling you within 24 hours to schedule your scan.  If not, please call the location discussed at your appointment.    1) [facility]:       [phone]      [address] [place] [place] [address]    2) [facility]:      [phone]      [address] [place] [address]    3) [facility]:       [phone]      [facility] [address]    * We will call you with the results. Please allow 24-48 hours for the results to be read and final.        BODY MASS INDEX (BMI)  Many things can cause foot and ankle problems. Foot structure, activity level, foot mechanics and injuries are common causes of pain.    One very important issue that often goes unmentioned, is body weight.  Extra weight can cause increased stress on muscles, ligaments, bones and tendons. Sometimes just a few extra pounds is all it takes to put one over her/his threshold. Without reducing that stress, it can be difficult to alleviate pain.      Some people are uncomfortable addressing this issue, but we feel it is important for you to think about it. As Foot & Ankle specialists, our job is addressing the lower extremity problem and possible causes.     Regarding extra body weight, we encourage patients to discuss diet and weight management plans with their  primary care doctors. It is this team approach that gives you the best opportunity for pain relief and getting you back on your feet.

## 2017-04-01 LAB — URATE SERPL-MCNC: 5.9 MG/DL (ref 3.5–7.2)

## 2017-04-03 ENCOUNTER — HOSPITAL ENCOUNTER (OUTPATIENT)
Dept: MRI IMAGING | Facility: CLINIC | Age: 62
Discharge: HOME | End: 2017-04-03
Attending: PODIATRIST | Admitting: PODIATRIST
Payer: COMMERCIAL

## 2017-04-03 DIAGNOSIS — M79.675 PAIN OF TOE OF LEFT FOOT: ICD-10-CM

## 2017-04-03 PROCEDURE — 73718 MRI LOWER EXTREMITY W/O DYE: CPT | Mod: LT

## 2017-04-10 ENCOUNTER — TELEPHONE (OUTPATIENT)
Dept: PEDIATRICS | Facility: CLINIC | Age: 62
End: 2017-04-10

## 2017-04-10 DIAGNOSIS — L08.9 INFECTION OF TOE: Primary | ICD-10-CM

## 2017-04-10 DIAGNOSIS — M10.9 GOUT INVOLVING TOE OF LEFT FOOT, UNSPECIFIED CAUSE, UNSPECIFIED CHRONICITY: ICD-10-CM

## 2017-04-10 RX ORDER — INDOMETHACIN 25 MG/1
25 CAPSULE ORAL
Qty: 30 CAPSULE | Refills: 0 | Status: SHIPPED | OUTPATIENT
Start: 2017-04-10 | End: 2017-11-17

## 2017-04-10 NOTE — TELEPHONE ENCOUNTER
I called and discussed MRI results with Edvin:    IMPRESSION:  1. Nonspecific great toe and dorsal forefoot soft tissue edema.  2. At the great toe IP joint, soft tissue thickening is noted along  the dorsal medial aspect of the joint capsule. This could be related  to capsular sprain. Foreign body reaction might also have a similar  appearance if there has been a penetrating injury. However, there is  no apparent joint effusion and the medial collateral ligament/extensor  tendon appear to be grossly intact.  3. First metatarsophalangeal joint nonspecific effusion and small  focus of marrow edema or early subchondral cyst formation in the  distal medial aspect of the first metatarsal head. These findings are  nonspecific, but likely degenerative.    No MRI evidence of osteo, septic arthritis or infectious tenosynovitis.  Rx for augmentin and indocin sent to his pharmacy, follow up prn.  Advised he call with any further questions.      All questions answered, patient happy with plan      Dwayne Garcia DPM   Podiatric Foot & Ankle Surgeon  [facility]

## 2017-04-10 NOTE — TELEPHONE ENCOUNTER
Pt is requesting call back from  to go over the labs & MRI result. Please call him at the number below. Thanks.    Pt can be reached at [phone](OK to LM).    Ke, RN  Triage Nurse

## 2017-08-18 DIAGNOSIS — E78.2 MIXED HYPERLIPIDEMIA: ICD-10-CM

## 2017-08-18 NOTE — TELEPHONE ENCOUNTER
LES, the patient is due for an office visit. I have changed the quantity to #30 and put in a note that the patient is due for an ov.    Pending Prescriptions:                       Disp   Refills    fenofibrate 54 MG tablet                  30 tab*0            Sig: Take 1 tablet (54 mg) by mouth daily      Ready to be faxed when Rx is approved.    Please forward to the  so the can call the patient and help them set up an appointment.      Telephone Information:   Mobile [phone]         Thank-You,  Fadia

## 2017-08-19 RX ORDER — FENOFIBRATE 54 MG/1
54 TABLET ORAL DAILY
Qty: 30 TABLET | Refills: 0 | Status: SHIPPED | OUTPATIENT
Start: 2017-08-19 | End: 2017-11-17

## 2017-09-18 DIAGNOSIS — I10 ESSENTIAL HYPERTENSION, BENIGN: ICD-10-CM

## 2017-09-18 RX ORDER — PROPRANOLOL HYDROCHLORIDE 20 MG/1
TABLET ORAL
Qty: 30 TABLET | Refills: 0 | Status: SHIPPED | OUTPATIENT
Start: 2017-09-18 | End: 2017-11-17

## 2017-09-18 NOTE — TELEPHONE ENCOUNTER
LES, the patient is due for an office visit. I have changed the quantity to #30 and put in a note that the patient is due for an ov.    Pending Prescriptions:                       Disp   Refills    propranolol (INDERAL) 20 MG tablet        30 tab*0            Sig: TAKE 1 TABLET (20 MG) BY MOUTH DAILY AS NEEDED      Ready to be faxed when Rx is approved.    Please forward to the  so the can call the patient and help them set up an appointment.      Telephone Information:   Mobile [phone]         Thank-You,  Fadia

## 2017-11-17 ENCOUNTER — OFFICE VISIT (OUTPATIENT)
Dept: FAMILY MEDICINE | Facility: CLINIC | Age: 62
End: 2017-11-17

## 2017-11-17 VITALS
OXYGEN SATURATION: 96 % | SYSTOLIC BLOOD PRESSURE: 160 MMHG | DIASTOLIC BLOOD PRESSURE: 100 MMHG | HEIGHT: 67 IN | BODY MASS INDEX: 33.46 KG/M2 | HEART RATE: 84 BPM | TEMPERATURE: 98.8 F | WEIGHT: 213.2 LBS

## 2017-11-17 DIAGNOSIS — I10 ESSENTIAL HYPERTENSION, BENIGN: ICD-10-CM

## 2017-11-17 DIAGNOSIS — Z23 NEED FOR VACCINATION: ICD-10-CM

## 2017-11-17 DIAGNOSIS — F41.9 ANXIETY: Primary | ICD-10-CM

## 2017-11-17 DIAGNOSIS — E66.811 CLASS 1 OBESITY WITHOUT SERIOUS COMORBIDITY WITH BODY MASS INDEX (BMI) OF 33.0 TO 33.9 IN ADULT, UNSPECIFIED OBESITY TYPE: ICD-10-CM

## 2017-11-17 DIAGNOSIS — Z11.59 NEED FOR HEPATITIS C SCREENING TEST: ICD-10-CM

## 2017-11-17 DIAGNOSIS — E78.2 MIXED HYPERLIPIDEMIA: ICD-10-CM

## 2017-11-17 PROCEDURE — 36415 COLL VENOUS BLD VENIPUNCTURE: CPT | Performed by: PHYSICIAN ASSISTANT

## 2017-11-17 PROCEDURE — 86803 HEPATITIS C AB TEST: CPT | Mod: 90 | Performed by: PHYSICIAN ASSISTANT

## 2017-11-17 PROCEDURE — 80053 COMPREHEN METABOLIC PANEL: CPT | Mod: 90 | Performed by: PHYSICIAN ASSISTANT

## 2017-11-17 PROCEDURE — 90736 HZV VACCINE LIVE SUBQ: CPT | Performed by: PHYSICIAN ASSISTANT

## 2017-11-17 PROCEDURE — 90471 IMMUNIZATION ADMIN: CPT | Performed by: PHYSICIAN ASSISTANT

## 2017-11-17 PROCEDURE — 99213 OFFICE O/P EST LOW 20 MIN: CPT | Mod: 25 | Performed by: PHYSICIAN ASSISTANT

## 2017-11-17 PROCEDURE — 80061 LIPID PANEL: CPT | Mod: 90 | Performed by: PHYSICIAN ASSISTANT

## 2017-11-17 RX ORDER — PROPRANOLOL HYDROCHLORIDE 20 MG/1
TABLET ORAL
Qty: 90 TABLET | Refills: 1 | Status: SHIPPED | OUTPATIENT
Start: 2017-11-17 | End: 2018-05-10

## 2017-11-17 RX ORDER — FENOFIBRATE 54 MG/1
54 TABLET ORAL DAILY
Qty: 90 TABLET | Refills: 1 | Status: SHIPPED | OUTPATIENT
Start: 2017-11-17 | End: 2018-05-10

## 2017-11-17 RX ORDER — ATORVASTATIN CALCIUM 80 MG/1
40 TABLET, FILM COATED ORAL DAILY
Qty: 45 TABLET | Refills: 1 | Status: CANCELLED | OUTPATIENT
Start: 2017-11-17

## 2017-11-17 RX ORDER — ATORVASTATIN CALCIUM 40 MG/1
40 TABLET, FILM COATED ORAL DAILY
Qty: 90 TABLET | Refills: 1 | Status: SHIPPED | OUTPATIENT
Start: 2017-11-17 | End: 2018-05-10

## 2017-11-17 RX ORDER — SERTRALINE HYDROCHLORIDE 100 MG/1
100 TABLET, FILM COATED ORAL DAILY
Qty: 90 TABLET | Refills: 1 | Status: SHIPPED | OUTPATIENT
Start: 2017-11-17 | End: 2018-06-12

## 2017-11-17 ASSESSMENT — ANXIETY QUESTIONNAIRES
3. WORRYING TOO MUCH ABOUT DIFFERENT THINGS: SEVERAL DAYS
2. NOT BEING ABLE TO STOP OR CONTROL WORRYING: SEVERAL DAYS
IF YOU CHECKED OFF ANY PROBLEMS ON THIS QUESTIONNAIRE, HOW DIFFICULT HAVE THESE PROBLEMS MADE IT FOR YOU TO DO YOUR WORK, TAKE CARE OF THINGS AT HOME, OR GET ALONG WITH OTHER PEOPLE: SOMEWHAT DIFFICULT
1. FEELING NERVOUS, ANXIOUS, OR ON EDGE: SEVERAL DAYS
GAD7 TOTAL SCORE: 3
6. BECOMING EASILY ANNOYED OR IRRITABLE: NOT AT ALL
7. FEELING AFRAID AS IF SOMETHING AWFUL MIGHT HAPPEN: NOT AT ALL
5. BEING SO RESTLESS THAT IT IS HARD TO SIT STILL: NOT AT ALL

## 2017-11-17 ASSESSMENT — PATIENT HEALTH QUESTIONNAIRE - PHQ9: 5. POOR APPETITE OR OVEREATING: NOT AT ALL

## 2017-11-17 NOTE — PROGRESS NOTES
"CC: Medication Check    History:   Essential hypertension, benign (HTN)  Has been out of medication for 3-4 weeks. Has not been exercising. Does not check BP at home. NO chest pain, palpitations, SOB, HA, dizziness, vision changes. Gets eye exams annually. Denies side effects.   - propranolol (INDERAL) 20 MG tablet; TAKE 1 TABLET (20 MG) BY MOUTH DAILY    Mixed hyperlipidemia  Has been out of atorvastatin for 3 weeks, and out of fenofibrate for even longer. Lipids levels last checked 2/2017 with some abnormalities. Mildly elevated liver enzymes at that time. Denies side effects. Used to take 80 mg but noticed side effects, but these have not been an issue on 40 mg.   - fenofibrate 54 MG tablet; Take 1 tablet (54 mg) by mouth daily  - atorvastatin (LIPITOR) 40 MG tablet; Take 1 tablet (40 mg) by mouth daily      Anxiety  Happy with the effects of the sertraline. Would like to stay on current dose. Denies side effects.   - sertraline (ZOLOFT) 100 MG tablet; Take 1 tablet (100 mg) by mouth daily  Dispense: 90 tablet; Refill: 1  - VENOUS COLLECTION      PMH, MEDICATIONS, ALLERGIES, SOCIAL AND FAMILY HISTORY in [facility] and reviewed by me personally.      ROS negative other than the symptoms noted above in the HPI.        Examination   BP (!) 160/100 (BP Location: Left arm, Patient Position: Chair, Cuff Size: Adult Large)  Pulse 84  Temp 98.8  F (37.1  C) (Oral)  Ht 1.708 m (5' 7.25\")  Wt 96.7 kg (213 lb 3.2 oz)  SpO2 96%  BMI 33.14 kg/m2       Constitutional: Sitting comfortably, in no acute distress. Vital signs noted  Eyes: pupils equal round reactive to light and accomodation, extra ocular movements intact  Neck:  no adenopathy, trachea midline and normal to palpation  Cardiovascular:  regular rate and rhythm, no murmurs, clicks, or gallops  Respiratory:  normal respiratory rate and rhythm, lungs clear to auscultation  SKIN: No jaundice/pallor/rash.   Psychiatric: mentation appears normal and affect " normal/bright        A/P    ICD-10-CM    1. Anxiety F41.9 sertraline (ZOLOFT) 100 MG tablet     VENOUS COLLECTION   2. Essential hypertension, benign (HTN) I10 propranolol (INDERAL) 20 MG tablet     Comprehensive metabolic panel     VENOUS COLLECTION   3. Mixed hyperlipidemia E78.2 fenofibrate 54 MG tablet     atorvastatin (LIPITOR) 40 MG tablet     Lipid Profile (QUEST)     Comprehensive metabolic panel     VENOUS COLLECTION   4. Need for hepatitis C screening test Z11.59 Hepatits C antibody (QUEST)   5. Need for vaccination Z23 ZOSTER VACC LIVE SUBQ NJX     VACCINE ADMINISTRATION, INITIAL   6. Class 1 obesity without serious comorbidity with body mass index (BMI) of 33.0 to 33.9 in adult, unspecified obesity type E66.9     Z68.33        DISCUSSION:  1. Essential hypertension, benign (HTN)  BP elevated today given that he has been off medication for several weeks. Recommended restart today, and monitor BP 1-2 x weekly in December and contact me if >140/90.   - propranolol (INDERAL) 20 MG tablet; TAKE 1 TABLET (20 MG) BY MOUTH DAILY AS NEEDED  Dispense: 90 tablet; Refill: 1  - Comprehensive metabolic panel  - VENOUS COLLECTION    2. Mixed hyperlipidemia  Has been off medication for 2-3 weeks, so will recheck labs to see if levels have worsened off medications. Will reorder 6 months, and needs to return in 6 months while on medication to recheck labs.   - fenofibrate 54 MG tablet; Take 1 tablet (54 mg) by mouth daily  Dispense: 90 tablet; Refill: 1  - atorvastatin (LIPITOR) 40 MG tablet; Take 1 tablet (40 mg) by mouth daily  Dispense: 90 tablet; Refill: 1  - Lipid Profile (QUEST)  - Comprehensive metabolic panel  - VENOUS COLLECTION    3. Anxiety  Doing well on medication. No concerns. Will refill for 6 months so on same schedule as other medications.   - sertraline (ZOLOFT) 100 MG tablet; Take 1 tablet (100 mg) by mouth daily  Dispense: 90 tablet; Refill: 1  - VENOUS COLLECTION    4. Need for hepatitis C screening  test  - Hepatits C antibody (QUEST)    5. Need for vaccination  - ZOSTER VACC LIVE SUBQ NJX  - VACCINE ADMINISTRATION, INITIAL    6. Obesity  Work on diet and exercise, with goal of weight loss.       follow up visit: 6 months, May 2018    Rossana Bailey PA-C  [facility]

## 2017-11-17 NOTE — MR AVS SNAPSHOT
After Visit Summary   11/17/2017    Edvin Norton    MRN: [id number]           Patient Information     Date Of Birth          1955        Visit Information        Provider Department      11/17/2017 10:00 AM Rossana Bailey PA-C [facility]        Today's Diagnoses     Anxiety    -  1    Essential hypertension, benign (HTN)        Mixed hyperlipidemia        Need for hepatitis C screening test        Need for vaccination        Class 1 obesity without serious comorbidity with body mass index (BMI) of 33.0 to 33.9 in adult, unspecified obesity type           Follow-ups after your visit        Follow-up notes from your care team     Return in about 6 months (around 5/17/2018) for Routine Visit, Lab Work, BP Recheck.      Who to contact     If you have questions or need follow up information about today's clinic visit or your schedule please contact [facility] directly at [phone].  Normal or non-critical lab and imaging results will be communicated to you by MyChart, letter or phone within 4 business days after the clinic has received the results. If you do not hear from us within 7 days, please contact the clinic through [employer] or phone. If you have a critical or abnormal lab result, we will notify you by phone as soon as possible.  Submit refill requests through [employer] or call your pharmacy and they will forward the refill request to us. Please allow 3 business days for your refill to be completed.          Additional Information About Your Visit        MyChart Information     [employer] gives you secure access to your electronic health record. If you see a primary care provider, you can also send messages to your care team and make appointments. If you have questions, please call your primary care clinic.  If you do not have a primary care provider, please call [phone] and they will assist you.        Care EveryWhere ID     This is  "your Care EveryWhere ID. This could be used by other organizations to access your [place] medical records  [id number]        Your Vitals Were     Pulse Temperature Height Pulse Oximetry BMI (Body Mass Index)       84 98.8  F (37.1  C) (Oral) 1.708 m (5' 7.25\") 96% 33.14 kg/m2        Blood Pressure from Last 3 Encounters:   11/17/17 (!) 160/100   03/02/17 125/86   02/16/17 124/84    Weight from Last 3 Encounters:   11/17/17 96.7 kg (213 lb 3.2 oz)   03/31/17 94.8 kg (209 lb)   03/02/17 94.8 kg (209 lb)              We Performed the Following     Comprehensive metabolic panel     Hepatits C antibody (QUEST)     Lipid Profile (QUEST)     VACCINE ADMINISTRATION, INITIAL     VENOUS COLLECTION     ZOSTER VACC LIVE SUBQ NJX          Today's Medication Changes          These changes are accurate as of: 11/17/17 11:11 AM.  If you have any questions, ask your nurse or doctor.               Start taking these medicines.        Dose/Directions    atorvastatin 40 MG tablet   Commonly known as:  LIPITOR   Used for:  Mixed hyperlipidemia   Started by:  Rossana Bailey PA-C        Dose:  40 mg   Take 1 tablet (40 mg) by mouth daily   Quantity:  90 tablet   Refills:  1       sertraline 100 MG tablet   Commonly known as:  ZOLOFT   Used for:  Anxiety   Started by:  Rossana Bailey PA-C        Dose:  100 mg   Take 1 tablet (100 mg) by mouth daily   Quantity:  90 tablet   Refills:  1            Where to get your medicines      These medications were sent to [facility]/pharmacy #[id number] - [facility] [address] [address] [facility]  [address] [facility] [address]     Phone:  [phone]     atorvastatin 40 MG tablet    fenofibrate 54 MG tablet    propranolol 20 MG tablet    sertraline 100 MG tablet                Primary Care Provider Office Phone # Fax #    Woody Cintron [phone] [address] [address]        Equal Access to Services     HELADIO LEBRON AH: Hadii estuardo harris " dominick Russell, wakathrynda daryadaha, qaybta kanoe oliva, merrick bridgettein hayaan rosariolakshmi belgicajeff lasotoshakira matt. So [facility] [phone].    ATENCIÓN: Si habla español, tiene a laureano disposición servicios gratuitos de asistencia lingüística. [place] al [phone].    We comply with applicable federal civil rights laws and [place] laws. We do not discriminate on the basis of race, color, national origin, age, disability, sex, sexual orientation, or gender identity.            Thank you!     Thank you for choosing [facility]  for your care. Our goal is always to provide you with excellent care. Hearing back from our patients is one way we can continue to improve our services. Please take a few minutes to complete the written survey that you may receive in the mail after your visit with us. Thank you!             Your Updated Medication List - Protect others around you: Learn how to safely use, store and throw away your medicines at www.disposemymeds.org.          This list is accurate as of: 11/17/17 11:11 AM.  Always use your most recent med list.                   Brand Name Dispense Instructions for use Diagnosis    ASPIRIN PO      Take 81 mg by mouth daily        atorvastatin 40 MG tablet    LIPITOR    90 tablet    Take 1 tablet (40 mg) by mouth daily    Mixed hyperlipidemia       fenofibrate 54 MG tablet     90 tablet    Take 1 tablet (54 mg) by mouth daily    Mixed hyperlipidemia       OMEGA-3 FISH OIL PO      Take  by mouth.        PRILOSEC PO      Take  by mouth.        propranolol 20 MG tablet    INDERAL    90 tablet    TAKE 1 TABLET (20 MG) BY MOUTH DAILY AS NEEDED    Essential hypertension, benign       sertraline 100 MG tablet    ZOLOFT    90 tablet    Take 1 tablet (100 mg) by mouth daily    Anxiety

## 2017-11-17 NOTE — NURSING NOTE
Edvin is here for a medication recheck.     Pre-Visit Screening :  Immunizations : up to date  Colon Screening : is up to date  Asthma Action Test/Plan : NA  PHQ9/GAD7 :  Done today      Pulse - regular  My Chart - accepts    CLASSIFICATION OF OVERWEIGHT AND OBESITY BY BMI                         Obesity Class           BMI(kg/m2)  Underweight                                    < 18.5  Normal                                         18.5-24.9  Overweight                                     25.0-29.9  OBESITY                     I                  30.0-34.9                              II                 35.0-39.9  EXTREME OBESITY             III                >40                             Patient's  BMI Body mass index is 33.14 kg/(m^2).  http://hin.nhlbi.nih.gov/menuplanner/menu.cgi  Questioned patient about current smoking habits.  Pt. has never smoked.  The patient has verbalized that it is ok to leave a detailed voice message on the patient's cell phone with results/recommendations from this visit.   Verified Cell number.     MIKEY Kelly ([facility])

## 2017-11-18 LAB
ALBUMIN SERPL-MCNC: 4.5 G/DL (ref 3.6–5.1)
ALBUMIN/GLOB SERPL: 1.7 (CALC) (ref 1–2.5)
ALP SERPL-CCNC: 72 U/L (ref 40–115)
ALT SERPL-CCNC: 115 U/L (ref 9–46)
AST SERPL-CCNC: 109 U/L (ref 10–35)
BILIRUB SERPL-MCNC: 0.5 MG/DL (ref 0.2–1.2)
BUN SERPL-MCNC: 17 MG/DL (ref 7–25)
BUN/CREATININE RATIO: ABNORMAL (CALC) (ref 6–22)
CALCIUM SERPL-MCNC: 9.6 MG/DL (ref 8.6–10.3)
CHLORIDE SERPLBLD-SCNC: 106 MMOL/L (ref 98–110)
CHOLEST SERPL-MCNC: 348 MG/DL
CHOLEST/HDLC SERPL: 6.8 (CALC)
CO2 SERPL-SCNC: 21 MMOL/L (ref 20–31)
CREAT SERPL-MCNC: 0.87 MG/DL (ref 0.7–1.25)
EGFR AFRICAN AMERICAN - QUEST: 108 ML/MIN/1.73M2
GFR SERPL CREATININE-BSD FRML MDRD: 93 ML/MIN/1.73M2
GLOBULIN, CALCULATED - QUEST: 2.6 G/DL (CALC) (ref 1.9–3.7)
GLUCOSE - QUEST: 120 MG/DL (ref 65–99)
HCV AB - QUEST: NORMAL
HDLC SERPL-MCNC: 51 MG/DL
LDLC SERPL CALC-MCNC: ABNORMAL MG/DL (CALC)
NONHDLC SERPL-MCNC: 297 MG/DL (CALC)
POTASSIUM SERPL-SCNC: 4.9 MMOL/L (ref 3.5–5.3)
PROT SERPL-MCNC: 7.1 G/DL (ref 6.1–8.1)
SIGNAL TO CUT OFF - QUEST: 0.02
SODIUM SERPL-SCNC: 140 MMOL/L (ref 135–146)
TRIGL SERPL-MCNC: 444 MG/DL

## 2017-11-18 ASSESSMENT — ANXIETY QUESTIONNAIRES: GAD7 TOTAL SCORE: 3

## 2017-12-26 ENCOUNTER — OFFICE VISIT (OUTPATIENT)
Dept: FAMILY MEDICINE | Facility: CLINIC | Age: 62
End: 2017-12-26

## 2017-12-26 VITALS
TEMPERATURE: 98.6 F | SYSTOLIC BLOOD PRESSURE: 138 MMHG | HEART RATE: 95 BPM | DIASTOLIC BLOOD PRESSURE: 84 MMHG | RESPIRATION RATE: 24 BRPM | OXYGEN SATURATION: 96 %

## 2017-12-26 DIAGNOSIS — R05.9 COUGH: Primary | ICD-10-CM

## 2017-12-26 PROCEDURE — 71020 XR CHEST 2 VW: CPT | Performed by: FAMILY MEDICINE

## 2017-12-26 PROCEDURE — 99213 OFFICE O/P EST LOW 20 MIN: CPT | Performed by: FAMILY MEDICINE

## 2017-12-26 RX ORDER — AZITHROMYCIN 250 MG/1
TABLET, FILM COATED ORAL
Qty: 6 TABLET | Refills: 0 | Status: SHIPPED | OUTPATIENT
Start: 2017-12-26 | End: 2018-06-01

## 2017-12-26 NOTE — PROGRESS NOTES
SUBJECTIVE:   Edvin Norton is a 62 year old male who complains of nasal congestion, cough, chest tightness and fatigue for 5 days. He denies a history of sweats, chills and chest pain and denies a history of asthma. Patient does not smoke cigarettes.    Pt had cold and cough 5 weeks ago-lasted 3 weeks, seemed a bit better but back and worsening in last 5 days-this is worst of the full 5 weeks    Pt has not tried cough meds    Patient Active Problem List   Diagnosis     Health Care Home     Natarajan esophagus     Mixed hyperlipidemia     Vitamin D deficiency     Performance anxiety     Anxiety state     Esophageal reflux     ACP (advance care planning)     Essential hypertension, benign (HTN)     Obesity due to excess calories, unspecified obesity severity     Natarajan's esophagus with dysplasia     No past medical history on file.  Family History   Problem Relation Age of Onset     Hypertension Mother      Psychotic Disorder Mother      anxiety     Psychotic Disorder Father      anxiety     CEREBROVASCULAR DISEASE Sister      Psychotic Disorder Sister      anxiety     Psychotic Disorder Brother      anxiety     Thyroid Disease Sister      Thyroid Disease Brother      Thyroid Disease Sister      C.A.D. No family hx of      DIABETES No family hx of      Breast Cancer No family hx of      Cancer - colorectal No family hx of      Prostate Cancer No family hx of      Social History     Social History     Marital status:      Spouse name: Eileen     Number of children: N/A     Years of education: N/A     Occupational History      insurance [employer]     Social History Main Topics     Smoking status: Never Smoker     Smokeless tobacco: Former User     Alcohol use 9.0 oz/week     15 Standard drinks or equivalent per week     Drug use: No     Sexual activity: Yes     Partners: Female     Other Topics Concern      Service No     Blood Transfusions No     Caffeine Concern Yes     Occupational Exposure  Yes     travels     Hobby Hazards No     Sleep Concern No     Stress Concern Yes     travels for work     Weight Concern Yes     Special Diet No     Exercise Yes     Seat Belt Yes     Social History Narrative     Past Surgical History:   Procedure Laterality Date     CATARACT IOL, RT/LT       ESOPHAGUS SURGERY      Halo procedure     FINGER SURGERY      tendon       Current Outpatient Prescriptions on File Prior to Visit:  propranolol (INDERAL) 20 MG tablet TAKE 1 TABLET (20 MG) BY MOUTH DAILY AS NEEDED   fenofibrate 54 MG tablet Take 1 tablet (54 mg) by mouth daily   atorvastatin (LIPITOR) 40 MG tablet Take 1 tablet (40 mg) by mouth daily   sertraline (ZOLOFT) 100 MG tablet Take 1 tablet (100 mg) by mouth daily   Omega-3 Fatty Acids (OMEGA-3 FISH OIL PO) Take  by mouth.   ASPIRIN PO Take 81 mg by mouth daily    Omeprazole (PRILOSEC PO) Take  by mouth.     No current facility-administered medications on file prior to visit.      Allergies: Review of patient's allergies indicates no known allergies.    Immunization History   Administered Date(s) Administered     Influenza Vaccine IM 3yrs+ 4 Valent IIV4 09/26/2013, 09/29/2014, 08/31/2015     TDAP Vaccine (Boostrix) 06/23/2014     Zoster vaccine, live 11/17/2017         OBJECTIVE:/84 (BP Location: Right arm, Patient Position: Chair, Cuff Size: Adult Large)  Pulse 95  Temp 98.6  F (37  C) (Oral)  Resp 24  SpO2 96%   He appears mildly ill/fatigued- constant bronchial sounding cough vital signs are as noted by the nurse. Ears normal.  Throat and pharynx normal.  Neck supple. No adenopathy in the neck. Nose is congested. Sinuses non tender. The chest is mainly clear, without wheezes or rales. Coarse upper airway sounds are noted that clear to some extent with cough.     CXR: negative for infiltrate or nodules ,   Will await radiology over-read and contact patient if any discrepancies     ASSESSMENT:   Cough-sound bronchial in room, has been coughing for 5 weeks  now and worse of late-discussed options-recommend abx given length of symptoms to cover potential mycoplasma, also trial breo for symptoms     PLAN:Michael Duran, I reviewed the risks, benefits, and possible side effects of the medication.  The patient had an opportunity to ask any questions regarding the treatment plan. The patient was encouraged to call my office if any problems.   Symptomatic therapy suggested: push fluids, rest and use cough suppressant of choice as needed. Call or return to clinic prn if these symptoms worsen or fail to improve as anticipated.

## 2017-12-26 NOTE — NURSING NOTE
Edvin Norton is here for a cough and fatigue for the past few days. Had a cough a few weeks ago, started to get better. A few days ago cough came back and feeling worse.    Questioned patient about current smoking habits.  Pt. has never smoked.  PULSE regular  My Chart: active  CLASSIFICATION OF OVERWEIGHT AND OBESITY BY BMI                        Obesity Class           BMI(kg/m2)  Underweight                                    < 18.5  Normal                                         18.5-24.9  Overweight                                     25.0-29.9  OBESITY                     I                  30.0-34.9                             II                 35.0-39.9  EXTREME OBESITY             III                >40                            Patient's  BMI There is no height or weight on file to calculate BMI.  http://hin.nhlbi.nih.gov/menuplanner/menu.cgi  Pre-visit planning  Immunizations - up to date  Colonoscopy - is up to date  Mammogram -   Asthma -   PHQ9 -    JROGE-7 -

## 2017-12-26 NOTE — MR AVS SNAPSHOT
After Visit Summary   12/26/2017    Edvin Norton    MRN: [id number]           Patient Information     Date Of Birth          1955        Visit Information        Provider Department      12/26/2017 12:15 PM Woody Cintron MD [facility]        Today's Diagnoses     Cough    -  1       Follow-ups after your visit        Who to contact     If you have questions or need follow up information about today's clinic visit or your schedule please contact [facility] directly at [phone].  Normal or non-critical lab and imaging results will be communicated to you by [employer], letter or phone within 4 business days after the clinic has received the results. If you do not hear from us within 7 days, please contact the clinic through [employer] or phone. If you have a critical or abnormal lab result, we will notify you by phone as soon as possible.  Submit refill requests through [employer] or call your pharmacy and they will forward the refill request to us. Please allow 3 business days for your refill to be completed.          Additional Information About Your Visit        MyChart Information     [employer] [facility] gives you secure access to your electronic health record. If you see a primary care provider, you can also send messages to your care team and make appointments. If you have questions, please call your primary care clinic.  If you do not have a primary care provider, please call [phone] and they will assist you.        Care EveryWhere ID     This is your Care EveryWhere ID. This could be used by other organizations to access your [place] medical records  [id number]        Your Vitals Were     Pulse Temperature Respirations Pulse Oximetry          95 98.6  F (37  C) (Oral) 24 96%         Blood Pressure from Last 3 Encounters:   12/26/17 138/84   11/17/17 (!) 160/100   03/02/17 125/86    Weight from Last 3 Encounters:   11/17/17 96.7 kg (213 lb  3.2 oz)   03/31/17 94.8 kg (209 lb)   03/02/17 94.8 kg (209 lb)              We Performed the Following     XR Chest 2 Views          Today's Medication Changes          These changes are accurate as of: 12/26/17 12:36 PM.  If you have any questions, ask your nurse or doctor.               Start taking these medicines.        Dose/Directions    azithromycin 250 MG tablet   Commonly known as:  ZITHROMAX   Used for:  Cough   Started by:  Woody Cintron MD        Two tablets first day, then one tablet daily for four days.   Quantity:  6 tablet   Refills:  0       fluticasone-vilanterol 100-25 MCG/INH oral inhaler   Commonly known as:  BREO ELLIPTA   Used for:  Cough   Started by:  Woody Cintron MD        Dose:  1 puff   Inhale 1 puff into the lungs daily   Quantity:  1 Inhaler   Refills:  0            Where to get your medicines      These medications were sent to [facility]/pharmacy #[id number] - [facility] [address] [address]  [address] [facility] [address]     Phone:  [phone]     azithromycin 250 MG tablet         Some of these will need a paper prescription and others can be bought over the counter.  Ask your nurse if you have questions.     You don't need a prescription for these medications     fluticasone-vilanterol 100-25 MCG/INH oral inhaler                Primary Care Provider Office Phone # Fax #    Woody Cintron -[phone] [address] [facility] [address]  [facility] [address]        Equal Access to Services     [facility] AH: Hadii estuardo ku hadasho Soomaali, waaxda luqadaha, qaybta kaalmada adeegyada, merrick conley . So [facility] [phone].    ATENCIÓN: Si habla español, tiene a laureano disposición servicios gratuitos de asistencia lingüística. [place] al [phone].    We comply with applicable federal civil rights laws and [place] laws. We do not discriminate on the basis of race, color, national origin, age, disability, sex,  sexual orientation, or gender identity.            Thank you!     Thank you for choosing [facility]  for your care. Our goal is always to provide you with excellent care. Hearing back from our patients is one way we can continue to improve our services. Please take a few minutes to complete the written survey that you may receive in the mail after your visit with us. Thank you!             Your Updated Medication List - Protect others around you: Learn how to safely use, store and throw away your medicines at www.disposemymeds.org.          This list is accurate as of: 12/26/17 12:36 PM.  Always use your most recent med list.                   Brand Name Dispense Instructions for use Diagnosis    ASPIRIN PO      Take 81 mg by mouth daily        atorvastatin 40 MG tablet    LIPITOR    90 tablet    Take 1 tablet (40 mg) by mouth daily    Mixed hyperlipidemia       azithromycin 250 MG tablet    ZITHROMAX    6 tablet    Two tablets first day, then one tablet daily for four days.    Cough       fenofibrate 54 MG tablet     90 tablet    Take 1 tablet (54 mg) by mouth daily    Mixed hyperlipidemia       fluticasone-vilanterol 100-25 MCG/INH oral inhaler    BREO ELLIPTA    1 Inhaler    Inhale 1 puff into the lungs daily    Cough       OMEGA-3 FISH OIL PO      Take  by mouth.        PRILOSEC PO      Take  by mouth.        propranolol 20 MG tablet    INDERAL    90 tablet    TAKE 1 TABLET (20 MG) BY MOUTH DAILY AS NEEDED    Essential hypertension, benign       sertraline 100 MG tablet    ZOLOFT    90 tablet    Take 1 tablet (100 mg) by mouth daily    Anxiety

## 2018-05-10 ENCOUNTER — TELEPHONE (OUTPATIENT)
Dept: FAMILY MEDICINE | Facility: CLINIC | Age: 63
End: 2018-05-10

## 2018-05-10 DIAGNOSIS — I10 ESSENTIAL HYPERTENSION, BENIGN: ICD-10-CM

## 2018-05-10 DIAGNOSIS — E78.2 MIXED HYPERLIPIDEMIA: ICD-10-CM

## 2018-05-10 RX ORDER — FENOFIBRATE 54 MG/1
54 TABLET ORAL DAILY
Qty: 30 TABLET | Refills: 0 | COMMUNITY
Start: 2018-05-10 | End: 2018-06-12

## 2018-05-10 RX ORDER — ATORVASTATIN CALCIUM 40 MG/1
40 TABLET, FILM COATED ORAL DAILY
Qty: 30 TABLET | Refills: 0 | COMMUNITY
Start: 2018-05-10 | End: 2018-06-12

## 2018-05-10 RX ORDER — PROPRANOLOL HYDROCHLORIDE 20 MG/1
TABLET ORAL
Qty: 30 TABLET | Refills: 0 | COMMUNITY
Start: 2018-05-10 | End: 2018-06-12

## 2018-05-10 NOTE — TELEPHONE ENCOUNTER
Ok refill of propranolol, atorvastatin and fenofibrate for one month only called into [facility]. Pt needs fasting OV for further refills.     Thanks,Gita    [phone]

## 2018-06-01 ENCOUNTER — OFFICE VISIT (OUTPATIENT)
Dept: FAMILY MEDICINE | Facility: CLINIC | Age: 63
End: 2018-06-01

## 2018-06-01 VITALS
RESPIRATION RATE: 20 BRPM | BODY MASS INDEX: 33.43 KG/M2 | WEIGHT: 213 LBS | SYSTOLIC BLOOD PRESSURE: 142 MMHG | HEIGHT: 67 IN | DIASTOLIC BLOOD PRESSURE: 88 MMHG | TEMPERATURE: 99.6 F | HEART RATE: 84 BPM

## 2018-06-01 DIAGNOSIS — J30.1 ACUTE SEASONAL ALLERGIC RHINITIS DUE TO POLLEN: Primary | ICD-10-CM

## 2018-06-01 DIAGNOSIS — R09.82 POST-NASAL DRAINAGE: ICD-10-CM

## 2018-06-01 DIAGNOSIS — R05.9 COUGH: ICD-10-CM

## 2018-06-01 PROCEDURE — 99213 OFFICE O/P EST LOW 20 MIN: CPT | Performed by: FAMILY MEDICINE

## 2018-06-01 RX ORDER — MONTELUKAST SODIUM 10 MG/1
10 TABLET ORAL AT BEDTIME
Qty: 30 TABLET | Refills: 0 | Status: SHIPPED | OUTPATIENT
Start: 2018-06-01 | End: 2018-06-12

## 2018-06-01 RX ORDER — FLUTICASONE PROPIONATE 50 MCG
1-2 SPRAY, SUSPENSION (ML) NASAL DAILY
Qty: 1 BOTTLE | Refills: 11 | Status: SHIPPED | OUTPATIENT
Start: 2018-06-01 | End: 2019-09-03

## 2018-06-01 NOTE — PROGRESS NOTES
SUBJECTIVE: 63 year old male complaining of sneezing, clear drainage and coughing for 6 week(s).   The patient describes comes and goes. Cough is dry. Nose runs like a faucet  The patient denies a history of fever, SOB or Gi symptoms.   Smoking history: No.   Relevant past medical history: positive for elevated cholesterol, anxiety and previous asthma possible diagnosis/ GERD.     ROS: 10 point ROS neg other than the symptoms noted above in the HPI.  Patient Active Problem List   Diagnosis     Health Care Home     Natarajan esophagus     Mixed hyperlipidemia     Vitamin D deficiency     Performance anxiety     Anxiety state     Esophageal reflux     ACP (advance care planning)     Essential hypertension, benign (HTN)     Obesity due to excess calories, unspecified obesity severity     Antarajan's esophagus with dysplasia         OBJECTIVE: The patient appears healthy, alert, no distress, smiling and over weight.   EARS: negative  NOSE/SINUS: positive findings: mucosa swollen, pale, and boggy, clear rhinorrhea   THROAT: normal and post nasal drainage   NECK:Neck supple. No adenopathy. Thyroid symmetric, normal size,, Carotids without bruits.   CHEST: Clear with dry cough    ASSESSMENT: (J30.1) Acute seasonal allergic rhinitis due to pollen  (primary encounter diagnosis)  Comment: Potential medication side effects were discussed with the patient; let me know if any occur.    Plan: montelukast (SINGULAIR) 10 MG tablet,         fluticasone (FLONASE) 50 MCG/ACT spray        Monitor response/ add Claritin or allegra as needed  Recheck 2-3 weeks fasting for medication and update your progress.      (R09.82) Post-nasal drainage  Plan: montelukast (SINGULAIR) 10 MG tablet            (R05) Cough  Plan: montelukast (SINGULAIR) 10 MG tablet

## 2018-06-01 NOTE — NURSING NOTE
Questioned patient about current smoking habits.  Pt. has never smoked.  PULSE regular  My Chart: active  CLASSIFICATION OF OVERWEIGHT AND OBESITY BY BMI                        Obesity Class           BMI(kg/m2)  Underweight                                    < 18.5  Normal                                         18.5-24.9  Overweight                                     25.0-29.9  OBESITY                     I                  30.0-34.9                             II                 35.0-39.9  EXTREME OBESITY             III                >40                            Patient's  BMI Body mass index is 33.36 kg/(m^2).  http://hin.nhlbi.nih.gov/menuplanner/menu.cgi  Pre-visit planning  Immunizations - up to date  Colonoscopy - is up to date  Mammogram -   Asthma -   PHQ9 -    JORGE-7 -

## 2018-06-01 NOTE — MR AVS SNAPSHOT
After Visit Summary   6/1/2018    Edvin Norton    MRN: [id number]           Patient Information     Date Of Birth          1955        Visit Information        Provider Department      6/1/2018 12:15 PM Tammy Sinha MD [facility]        Today's Diagnoses     Acute seasonal allergic rhinitis due to pollen    -  1    Post-nasal drainage        Cough          Care Instructions    Acute seasonal allergic rhinitis due to pollen  (primary encounter diagnosis)  Comment: Potential medication side effects were discussed with the patient; let me know if any occur.    Plan: montelukast (SINGULAIR) 10 MG tablet,         fluticasone (FLONASE) 50 MCG/ACT spray        Monitor response/ add Claritin or allegra as needed  Recheck 2-3 weeks fasting for medication and update your progress.              Follow-ups after your visit        Follow-up notes from your care team     Return in about 3 weeks (around 6/22/2018), or if symptoms worsen or fail to improve.      Who to contact     If you have questions or need follow up information about today's clinic visit or your schedule please contact [facility] directly at [phone].  Normal or non-critical lab and imaging results will be communicated to you by Zero9hart, letter or phone within 4 business days after the clinic has received the results. If you do not hear from us within 7 days, please contact the clinic through Zero9hart or phone. If you have a critical or abnormal lab result, we will notify you by phone as soon as possible.  Submit refill requests through [employer] or call your pharmacy and they will forward the refill request to us. Please allow 3 business days for your refill to be completed.          Additional Information About Your Visit        Zero9hart Information     [employer] gives you secure access to your electronic health record. If you see a primary care provider, you can also send messages to  "your care team and make appointments. If you have questions, please call your primary care clinic.  If you do not have a primary care provider, please call [phone] and they will assist you.        Care EveryWhere ID     This is your Care EveryWhere ID. This could be used by other organizations to access your [place] medical records  [id number]        Your Vitals Were     Pulse Temperature Respirations Height BMI (Body Mass Index)       84 99.6  F (37.6  C) (Oral) 20 1.702 m (5' 7\") 33.36 kg/m2        Blood Pressure from Last 3 Encounters:   06/01/18 142/88   12/26/17 138/84   11/17/17 (!) 160/100    Weight from Last 3 Encounters:   06/01/18 96.6 kg (213 lb)   11/17/17 96.7 kg (213 lb 3.2 oz)   03/31/17 94.8 kg (209 lb)              Today, you had the following     No orders found for display         Today's Medication Changes          These changes are accurate as of 6/1/18 12:47 PM.  If you have any questions, ask your nurse or doctor.               Start taking these medicines.        Dose/Directions    fluticasone 50 MCG/ACT spray   Commonly known as:  FLONASE   Used for:  Acute seasonal allergic rhinitis due to pollen   Started by:  Tammy Sinha MD        Dose:  1-2 spray   Spray 1-2 sprays into both nostrils daily   Quantity:  1 Bottle   Refills:  11       montelukast 10 MG tablet   Commonly known as:  SINGULAIR   Used for:  Acute seasonal allergic rhinitis due to pollen, Post-nasal drainage, Cough   Started by:  Tammy Sinha MD        Dose:  10 mg   Take 1 tablet (10 mg) by mouth At Bedtime   Quantity:  30 tablet   Refills:  0            Where to get your medicines      These medications were sent to [facility]/pharmacy #[id number] - [facility] [address]  [address] [facility] [address]     Phone:  [phone]     fluticasone 50 MCG/ACT spray    montelukast 10 MG tablet                Primary Care Provider Office Phone # Fax #    Woody Cintron [phone] "       [address] [facility] [address]  [facility] [address]        Equal Access to Services     SANDYJARETT BERNARDINO : Hadii estuardo harris hadlelando Soasherali, waaxda luqadaha, qaybta kajordynbell oliva, merrick lindobhavanaantonino conley . So [facility] [phone].    ATENCIÓN: Si habla español, tiene a laureano disposición servicios gratuitos de asistencia lingüística. Llame al [phone].    We comply with applicable federal civil rights laws and [place] laws. We do not discriminate on the basis of race, color, national origin, age, disability, sex, sexual orientation, or gender identity.            Thank you!     Thank you for choosing [facility]  for your care. Our goal is always to provide you with excellent care. Hearing back from our patients is one way we can continue to improve our services. Please take a few minutes to complete the written survey that you may receive in the mail after your visit with us. Thank you!             Your Updated Medication List - Protect others around you: Learn how to safely use, store and throw away your medicines at www.disposemymeds.org.          This list is accurate as of 6/1/18 12:47 PM.  Always use your most recent med list.                   Brand Name Dispense Instructions for use Diagnosis    ASPIRIN PO      Take 81 mg by mouth daily        fenofibrate 54 MG tablet     30 tablet    Take 1 tablet (54 mg) by mouth daily    Mixed hyperlipidemia       fluticasone 50 MCG/ACT spray    FLONASE    1 Bottle    Spray 1-2 sprays into both nostrils daily    Acute seasonal allergic rhinitis due to pollen       LIPITOR 40 MG tablet   Generic drug:  atorvastatin     30 tablet    Take 1 tablet (40 mg) by mouth daily    Mixed hyperlipidemia       montelukast 10 MG tablet    SINGULAIR    30 tablet    Take 1 tablet (10 mg) by mouth At Bedtime    Acute seasonal allergic rhinitis due to pollen, Post-nasal drainage, Cough       OMEGA-3 FISH OIL PO      Take  by mouth.        PRILOSEC PO       Take  by mouth.        propranolol 20 MG tablet    INDERAL    30 tablet    TAKE 1 TABLET (20 MG) BY MOUTH DAILY AS NEEDED    Essential hypertension, benign       sertraline 100 MG tablet    ZOLOFT    90 tablet    Take 1 tablet (100 mg) by mouth daily    Anxiety

## 2018-06-01 NOTE — PATIENT INSTRUCTIONS
Acute seasonal allergic rhinitis due to pollen  (primary encounter diagnosis)  Comment: Potential medication side effects were discussed with the patient; let me know if any occur.    Plan: montelukast (SINGULAIR) 10 MG tablet,         fluticasone (FLONASE) 50 MCG/ACT spray        Monitor response/ add Claritin or allegra as needed  Recheck 2-3 weeks fasting for medication and update your progress.

## 2018-06-05 ENCOUNTER — TRANSFERRED RECORDS (OUTPATIENT)
Dept: FAMILY MEDICINE | Facility: CLINIC | Age: 63
End: 2018-06-05

## 2018-06-11 DIAGNOSIS — I10 ESSENTIAL HYPERTENSION, BENIGN: ICD-10-CM

## 2018-06-11 DIAGNOSIS — E78.2 MIXED HYPERLIPIDEMIA: ICD-10-CM

## 2018-06-11 RX ORDER — FENOFIBRATE 54 MG/1
54 TABLET ORAL DAILY
Qty: 30 TABLET | OUTPATIENT
Start: 2018-06-11

## 2018-06-11 RX ORDER — PROPRANOLOL HYDROCHLORIDE 20 MG/1
TABLET ORAL
Qty: 30 TABLET | Refills: 0 | OUTPATIENT
Start: 2018-06-11

## 2018-06-11 NOTE — TELEPHONE ENCOUNTER
Last OV for medication was 11/17/17. 30 day given on 5/10/18. Pt does not have an OV    Please advise    Edvin Norton is requesting a refill of:    Pending Prescriptions:                       Disp   Refills    propranolol (INDERAL) 20 MG tablet        30 tab*0            Sig: TAKE 1 TABLET (20 MG) BY MOUTH DAILY AS NEEDED    fenofibrate 54 MG tablet                  30 tab*             Sig: Take 1 tablet (54 mg) by mouth daily

## 2018-06-12 ENCOUNTER — OFFICE VISIT (OUTPATIENT)
Dept: FAMILY MEDICINE | Facility: CLINIC | Age: 63
End: 2018-06-12

## 2018-06-12 VITALS
BODY MASS INDEX: 33.46 KG/M2 | SYSTOLIC BLOOD PRESSURE: 126 MMHG | HEIGHT: 67 IN | OXYGEN SATURATION: 95 % | WEIGHT: 213.2 LBS | HEART RATE: 85 BPM | TEMPERATURE: 98.7 F | DIASTOLIC BLOOD PRESSURE: 86 MMHG

## 2018-06-12 DIAGNOSIS — R09.82 POST-NASAL DRAINAGE: ICD-10-CM

## 2018-06-12 DIAGNOSIS — R05.9 COUGH: ICD-10-CM

## 2018-06-12 DIAGNOSIS — E78.2 MIXED HYPERLIPIDEMIA: Primary | ICD-10-CM

## 2018-06-12 DIAGNOSIS — I10 ESSENTIAL HYPERTENSION, BENIGN: ICD-10-CM

## 2018-06-12 DIAGNOSIS — Z12.5 SPECIAL SCREENING FOR MALIGNANT NEOPLASM OF PROSTATE: ICD-10-CM

## 2018-06-12 DIAGNOSIS — F41.9 ANXIETY: ICD-10-CM

## 2018-06-12 DIAGNOSIS — J31.0 NONALLERGIC RHINITIS: ICD-10-CM

## 2018-06-12 PROCEDURE — 80061 LIPID PANEL: CPT | Mod: 90 | Performed by: FAMILY MEDICINE

## 2018-06-12 PROCEDURE — 80053 COMPREHEN METABOLIC PANEL: CPT | Mod: 90 | Performed by: FAMILY MEDICINE

## 2018-06-12 PROCEDURE — 84153 ASSAY OF PSA TOTAL: CPT | Mod: 90 | Performed by: FAMILY MEDICINE

## 2018-06-12 PROCEDURE — 36415 COLL VENOUS BLD VENIPUNCTURE: CPT | Performed by: FAMILY MEDICINE

## 2018-06-12 PROCEDURE — 99214 OFFICE O/P EST MOD 30 MIN: CPT | Performed by: FAMILY MEDICINE

## 2018-06-12 RX ORDER — PROPRANOLOL HYDROCHLORIDE 20 MG/1
TABLET ORAL
Qty: 90 TABLET | Refills: 1 | Status: SHIPPED | OUTPATIENT
Start: 2018-06-12 | End: 2018-12-10

## 2018-06-12 RX ORDER — ATORVASTATIN CALCIUM 40 MG/1
40 TABLET, FILM COATED ORAL DAILY
Qty: 90 TABLET | Refills: 1 | Status: SHIPPED | OUTPATIENT
Start: 2018-06-12 | End: 2018-12-10

## 2018-06-12 RX ORDER — SERTRALINE HYDROCHLORIDE 100 MG/1
100 TABLET, FILM COATED ORAL DAILY
Qty: 90 TABLET | Refills: 1 | Status: SHIPPED | OUTPATIENT
Start: 2018-06-12 | End: 2018-12-18

## 2018-06-12 RX ORDER — FENOFIBRATE 54 MG/1
54 TABLET ORAL DAILY
Qty: 90 TABLET | Refills: 1 | Status: SHIPPED | OUTPATIENT
Start: 2018-06-12 | End: 2018-12-10

## 2018-06-12 RX ORDER — MONTELUKAST SODIUM 10 MG/1
10 TABLET ORAL AT BEDTIME
Qty: 90 TABLET | Refills: 1 | Status: SHIPPED | OUTPATIENT
Start: 2018-06-12 | End: 2019-09-03

## 2018-06-12 ASSESSMENT — ANXIETY QUESTIONNAIRES
6. BECOMING EASILY ANNOYED OR IRRITABLE: NOT AT ALL
3. WORRYING TOO MUCH ABOUT DIFFERENT THINGS: NOT AT ALL
5. BEING SO RESTLESS THAT IT IS HARD TO SIT STILL: NOT AT ALL
IF YOU CHECKED OFF ANY PROBLEMS ON THIS QUESTIONNAIRE, HOW DIFFICULT HAVE THESE PROBLEMS MADE IT FOR YOU TO DO YOUR WORK, TAKE CARE OF THINGS AT HOME, OR GET ALONG WITH OTHER PEOPLE: NOT DIFFICULT AT ALL
1. FEELING NERVOUS, ANXIOUS, OR ON EDGE: SEVERAL DAYS
2. NOT BEING ABLE TO STOP OR CONTROL WORRYING: NOT AT ALL
GAD7 TOTAL SCORE: 1
7. FEELING AFRAID AS IF SOMETHING AWFUL MIGHT HAPPEN: NOT AT ALL

## 2018-06-12 ASSESSMENT — PATIENT HEALTH QUESTIONNAIRE - PHQ9: 5. POOR APPETITE OR OVEREATING: NOT AT ALL

## 2018-06-12 NOTE — MR AVS SNAPSHOT
After Visit Summary   6/12/2018    Edvin Norton    MRN: [id number]           Patient Information     Date Of Birth          1955        Visit Information        Provider Department      6/12/2018 10:30 AM Woody Cintron MD [facility]        Today's Diagnoses     Mixed hyperlipidemia    -  1    Essential hypertension, benign (HTN)        Post-nasal drainage        Cough        Anxiety        Nonallergic rhinitis        Special screening for malignant neoplasm of prostate           Follow-ups after your visit        Follow-up notes from your care team     Return in about 6 months (around 12/12/2018).      Who to contact     If you have questions or need follow up information about today's clinic visit or your schedule please contact [facility] directly at [phone].  Normal or non-critical lab and imaging results will be communicated to you by MyChart, letter or phone within 4 business days after the clinic has received the results. If you do not hear from us within 7 days, please contact the clinic through hakuhart or phone. If you have a critical or abnormal lab result, we will notify you by phone as soon as possible.  Submit refill requests through [address] or call your pharmacy and they will forward the refill request to us. Please allow 3 business days for your refill to be completed.          Additional Information About Your Visit        MyChart Information     [address] gives you secure access to your electronic health record. If you see a primary care provider, you can also send messages to your care team and make appointments. If you have questions, please call your primary care clinic.  If you do not have a primary care provider, please call [phone] and they will assist you.        Care EveryWhere ID     This is your Care EveryWhere ID. This could be used by other organizations to access your [facility]  "records  [id number]        Your Vitals Were     Pulse Temperature Height Pulse Oximetry BMI (Body Mass Index)       85 98.7  F (37.1  C) (Oral) 1.702 m (5' 7\") 95% 33.39 kg/m2        Blood Pressure from Last 3 Encounters:   06/12/18 126/86   06/01/18 142/88   12/26/17 138/84    Weight from Last 3 Encounters:   06/12/18 96.7 kg (213 lb 3.2 oz)   06/01/18 96.6 kg (213 lb)   11/17/17 96.7 kg (213 lb 3.2 oz)              We Performed the Following     COMPREHENSIVE METABOLIC PANEL (QUEST) XCMP     HCL PSA, SCREENING (QUEST)     Lipid Profile (QUEST)     VENOUS COLLECTION          Where to get your medicines      These medications were sent to [facility]/pharmacy #[id number] - [place], [place] - [address]  [address] [place], [facility] [address]     Phone:  [phone]     atorvastatin 40 MG tablet    fenofibrate 54 MG tablet    montelukast 10 MG tablet    propranolol 20 MG tablet    sertraline 100 MG tablet          Primary Care Provider Office Phone # Fax #    Woody Cintron [phone] [address] [facility] [address]  [facility] [address]        Equal Access to Services     HELADIO LEBRON AH: Hadii estuardo ku hadasho Soomaali, waaxda luqadaha, qaybta kaalmada adeegyada, waxay brandin hayscot conley . So [facility] [phone].    ATENCIÓN: Si habla español, tiene a laureano disposición servicios gratuitos de asistencia lingüística. Llame al [phone].    We comply with applicable federal civil rights laws and [place] laws. We do not discriminate on the basis of race, color, national origin, age, disability, sex, sexual orientation, or gender identity.            Thank you!     Thank you for choosing [facility]  for your care. Our goal is always to provide you with excellent care. Hearing back from our patients is one way we can continue to improve our services. Please take a few minutes to complete the written survey that you may receive in the mail after your visit with " us. Thank you!             Your Updated Medication List - Protect others around you: Learn how to safely use, store and throw away your medicines at www.disposemymeds.org.          This list is accurate as of 6/12/18 11:09 AM.  Always use your most recent med list.                   Brand Name Dispense Instructions for use Diagnosis    ASPIRIN PO      Take 81 mg by mouth daily        atorvastatin 40 MG tablet    LIPITOR    90 tablet    Take 1 tablet (40 mg) by mouth daily    Mixed hyperlipidemia       fenofibrate 54 MG tablet     90 tablet    Take 1 tablet (54 mg) by mouth daily    Mixed hyperlipidemia       fluticasone 50 MCG/ACT spray    FLONASE    1 Bottle    Spray 1-2 sprays into both nostrils daily    Acute seasonal allergic rhinitis due to pollen       montelukast 10 MG tablet    SINGULAIR    90 tablet    Take 1 tablet (10 mg) by mouth At Bedtime    Post-nasal drainage, Cough, Nonallergic rhinitis       OMEGA-3 FISH OIL PO      Take  by mouth.        PRILOSEC PO      Take  by mouth.        propranolol 20 MG tablet    INDERAL    90 tablet    TAKE 1 TABLET (20 MG) BY MOUTH DAILY AS NEEDED    Essential hypertension, benign       sertraline 100 MG tablet    ZOLOFT    90 tablet    Take 1 tablet (100 mg) by mouth daily    Anxiety

## 2018-06-12 NOTE — NURSING NOTE
Edvin is here for a fasting med check    Pre-Visit Screening :  Immunizations : up to date  Colon Screening : is up to date  Asthma Action Test/Plan : emily  PHQ9/GAD7 :  Na    Pulse - regular  My Chart - accepts    CLASSIFICATION OF OVERWEIGHT AND OBESITY BY BMI                         Obesity Class           BMI(kg/m2)  Underweight                                    < 18.5  Normal                                         18.5-24.9  Overweight                                     25.0-29.9  OBESITY                     I                  30.0-34.9                              II                 35.0-39.9  EXTREME OBESITY             III                >40                             Patient's  BMI Body mass index is 22.15 kg/(m^2).  http://hin.nhlbi.nih.gov/menuplanner/menu.cgi  Questioned patient about current smoking habits.  Pt. has never smoked.  The patient has verbalized that it is ok to leave a detailed voice message on the patient's cell phone with results/recommendations from this visit.       Verified [phone]  phone number:

## 2018-06-12 NOTE — PROGRESS NOTES
SUBJECTIVE:                                                    Edvin Norton is a 63 year old male who presents to clinic today for the following health issues:      Hyperlipidemia Follow-Up      Rate your low fat/cholesterol diet?: good    Taking statin?  Yes, no muscle aches from statin    Other lipid medications/supplements?:  fibrate    Hypertension Follow-up      Outpatient blood pressures are not being checked.    Low Salt Diet: not monitoring salt    Anxiety Follow-Up    Status since last visit: No change    Other associated symptoms:None    Complicating factors:   Significant life event: No   Current substance abuse: None  Depression symptoms: No  JORGE-7 SCORE 3/24/2016 2/16/2017 11/17/2017   Total Score - - -   Total Score 0 3 3       JORGE-7    Amount of exercise or physical activity: 4-5 days/week for an average of 30-45 minutes    Problems taking medications regularly: No    Medication side effects: none    Diet: regular (no restrictions)        Allergies well controlled now after allergy eval-dx as nonallergic rhinitis    Problem list and histories reviewed & adjusted, as indicated.  Additional history: as documented    Patient Active Problem List   Diagnosis     Health Care Home     Natarajan esophagus     Mixed hyperlipidemia     Vitamin D deficiency     Performance anxiety     Anxiety state     Esophageal reflux     ACP (advance care planning)     Essential hypertension, benign (HTN)     Obesity due to excess calories, unspecified obesity severity     Natarajan's esophagus with dysplasia     Nonallergic rhinitis     Past Surgical History:   Procedure Laterality Date     CATARACT IOL, RT/LT       ESOPHAGUS SURGERY      Halo procedure     FINGER SURGERY      tendon       Social History   Substance Use Topics     Smoking status: Never Smoker     Smokeless tobacco: Former User     Alcohol use 9.0 oz/week     15 Standard drinks or equivalent per week     Family History   Problem Relation Age of Onset      Hypertension Mother      Psychotic Disorder Mother      anxiety     Psychotic Disorder Father      anxiety     CEREBROVASCULAR DISEASE Sister      Psychotic Disorder Sister      anxiety     Psychotic Disorder Brother      anxiety     Thyroid Disease Sister      Thyroid Disease Brother      Thyroid Disease Sister      C.A.D. No family hx of      DIABETES No family hx of      Breast Cancer No family hx of      Cancer - colorectal No family hx of      Prostate Cancer No family hx of          Current Outpatient Prescriptions   Medication Sig Dispense Refill     ASPIRIN PO Take 81 mg by mouth daily        atorvastatin (LIPITOR) 40 MG tablet Take 1 tablet (40 mg) by mouth daily 90 tablet 1     fenofibrate 54 MG tablet Take 1 tablet (54 mg) by mouth daily 90 tablet 1     fluticasone (FLONASE) 50 MCG/ACT spray Spray 1-2 sprays into both nostrils daily 1 Bottle 11     montelukast (SINGULAIR) 10 MG tablet Take 1 tablet (10 mg) by mouth At Bedtime 90 tablet 1     Omega-3 Fatty Acids (OMEGA-3 FISH OIL PO) Take  by mouth.       Omeprazole (PRILOSEC PO) Take  by mouth.       propranolol (INDERAL) 20 MG tablet TAKE 1 TABLET (20 MG) BY MOUTH DAILY AS NEEDED 90 tablet 1     sertraline (ZOLOFT) 100 MG tablet Take 1 tablet (100 mg) by mouth daily 90 tablet 1     [DISCONTINUED] atorvastatin (LIPITOR) 40 MG tablet Take 1 tablet (40 mg) by mouth daily 30 tablet 0     [DISCONTINUED] fenofibrate 54 MG tablet Take 1 tablet (54 mg) by mouth daily 30 tablet 0     [DISCONTINUED] montelukast (SINGULAIR) 10 MG tablet Take 1 tablet (10 mg) by mouth At Bedtime 30 tablet 0     [DISCONTINUED] propranolol (INDERAL) 20 MG tablet TAKE 1 TABLET (20 MG) BY MOUTH DAILY AS NEEDED 30 tablet 0     [DISCONTINUED] sertraline (ZOLOFT) 100 MG tablet Take 1 tablet (100 mg) by mouth daily 90 tablet 1     No Known Allergies  Recent Labs   Lab Test  11/17/17   1033  02/16/17   1343  08/01/16   0817  05/31/12   LDL  SEE COMMENT  96  94   < >  66   HDL  51  61  44   <  ">  52   TRIG  444*  160*  367*   < >  319*   ALT  115*  48*  32   < >  47   CR  0.87  1.19  1.08   < >  1.08   GFRESTIMATED  93  66  74   < >   --    POTASSIUM  4.9  4.1  4.6   < >  5.0   TSH   --    --   2.71   --   3.31    < > = values in this interval not displayed.      BP Readings from Last 3 Encounters:   06/12/18 126/86   06/01/18 142/88   12/26/17 138/84    Wt Readings from Last 3 Encounters:   06/12/18 96.7 kg (213 lb 3.2 oz)   06/01/18 96.6 kg (213 lb)   11/17/17 96.7 kg (213 lb 3.2 oz)                    ROS:  Constitutional, HEENT, cardiovascular, pulmonary, gi and gu systems are negative, except as otherwise noted.    OBJECTIVE:     /86 (BP Location: Left arm, Patient Position: Chair, Cuff Size: Adult Large)  Pulse 85  Temp 98.7  F (37.1  C) (Oral)  Ht 1.702 m (5' 7\")  Wt 96.7 kg (213 lb 3.2 oz)  SpO2 95%  BMI 33.39 kg/m2  Body mass index is 33.39 kg/(m^2).   GENERAL: healthy, alert and no distress  EYES: Eyes grossly normal to inspection, PERRL and conjunctivae and sclerae normal  HENT: ear canals and TM's normal, nose and mouth without ulcers or lesions  NECK: no adenopathy, no asymmetry, masses, or scars and thyroid normal to palpation  RESP: lungs clear to auscultation - no rales, rhonchi or wheezes  CV: regular rate and rhythm, normal S1 S2, no S3 or S4, no murmur, click or rub, no peripheral edema and peripheral pulses strong  ABDOMEN: soft, nontender, no hepatosplenomegaly, no masses and bowel sounds normal  MS: no gross musculoskeletal defects noted, no edema  NEURO: Normal strength and tone, mentation intact and speech normal  PSYCH: mentation appears normal, affect normal/bright        ASSESSMENT:       PLAN:   (E78.2) Mixed hyperlipidemia  (primary encounter diagnosis)  Comment: controlled  Plan: atorvastatin (LIPITOR) 40 MG tablet,         fenofibrate 54 MG tablet, Lipid Profile         (QUEST), VENOUS COLLECTION        continue current medications at current doses " "pendinglabs    (I10) Essential hypertension, benign (HTN)  Comment: well controlled  Plan: propranolol (INDERAL) 20 MG tablet,         COMPREHENSIVE METABOLIC PANEL (QUEST) XCMP,         VENOUS COLLECTION        continue current medications at current doses     (R09.82) Post-nasal drainage  Comment: resolved  Plan: montelukast (SINGULAIR) 10 MG tablet            (R05) Cough  Comment: resolved-dx with nonallergic rhinitis at allergy  Plan: montelukast (SINGULAIR) 10 MG tablet            (F41.9) Anxiety  Comment: well controlled  Plan: sertraline (ZOLOFT) 100 MG tablet        continue current medications at current doses     (J31.0) Nonallergic rhinitis  Comment: new diagnosis after allergy eval-doing well  Plan: montelukast (SINGULAIR) 10 MG tablet            (Z12.5) Special screening for malignant neoplasm of prostate  Comment:   Plan: HCL PSA, SCREENING (QUEST), VENOUS COLLECTION              BMI:   Estimated body mass index is 33.39 kg/(m^2) as calculated from the following:    Height as of this encounter: 1.702 m (5' 7\").    Weight as of this encounter: 96.7 kg (213 lb 3.2 oz).   Weight management plan: Discussed healthy diet and exercise guidelines and patient will follow up in 6 months in clinic to re-evaluate.      FUTURE APPOINTMENTS:       - Follow-up visit in 6 mo  Work on weight loss  Regular exercise    Woody Cintron MD  [facility]      "

## 2018-06-13 LAB
ABBOTT PSA - QUEST: 0.7 NG/ML
ALBUMIN SERPL-MCNC: 4.4 G/DL (ref 3.6–5.1)
ALBUMIN/GLOB SERPL: 1.8 (CALC) (ref 1–2.5)
ALP SERPL-CCNC: 59 U/L (ref 40–115)
ALT SERPL-CCNC: 45 U/L (ref 9–46)
AST SERPL-CCNC: 57 U/L (ref 10–35)
BILIRUB SERPL-MCNC: 0.5 MG/DL (ref 0.2–1.2)
BUN SERPL-MCNC: 25 MG/DL (ref 7–25)
BUN/CREATININE RATIO: ABNORMAL (CALC) (ref 6–22)
CALCIUM SERPL-MCNC: 9.2 MG/DL (ref 8.6–10.3)
CHLORIDE SERPLBLD-SCNC: 106 MMOL/L (ref 98–110)
CHOLEST SERPL-MCNC: 203 MG/DL
CHOLEST/HDLC SERPL: 3.8 (CALC)
CO2 SERPL-SCNC: 21 MMOL/L (ref 20–31)
CREAT SERPL-MCNC: 1.06 MG/DL (ref 0.7–1.25)
EGFR AFRICAN AMERICAN - QUEST: 87 ML/MIN/1.73M2
GFR SERPL CREATININE-BSD FRML MDRD: 75 ML/MIN/1.73M2
GLOBULIN, CALCULATED - QUEST: 2.5 G/DL (CALC) (ref 1.9–3.7)
GLUCOSE - QUEST: 97 MG/DL (ref 65–99)
HDLC SERPL-MCNC: 54 MG/DL
LDLC SERPL CALC-MCNC: ABNORMAL MG/DL (CALC)
NONHDLC SERPL-MCNC: 149 MG/DL (CALC)
POTASSIUM SERPL-SCNC: 4.2 MMOL/L (ref 3.5–5.3)
PROT SERPL-MCNC: 6.9 G/DL (ref 6.1–8.1)
SODIUM SERPL-SCNC: 140 MMOL/L (ref 135–146)
TRIGL SERPL-MCNC: 420 MG/DL

## 2018-06-13 ASSESSMENT — ANXIETY QUESTIONNAIRES: GAD7 TOTAL SCORE: 1

## 2018-07-17 ENCOUNTER — TRANSFERRED RECORDS (OUTPATIENT)
Dept: FAMILY MEDICINE | Facility: CLINIC | Age: 63
End: 2018-07-17

## 2018-07-26 ENCOUNTER — MYC MEDICAL ADVICE (OUTPATIENT)
Dept: FAMILY MEDICINE | Facility: CLINIC | Age: 63
End: 2018-07-26

## 2018-07-26 DIAGNOSIS — N52.9 ERECTILE DYSFUNCTION, UNSPECIFIED ERECTILE DYSFUNCTION TYPE: Primary | ICD-10-CM

## 2018-07-26 NOTE — TELEPHONE ENCOUNTER
From: Edvin Norton  To: Woody Cintron MD  Sent: 7/26/2018 8:57 AM CDT  Subject: Question about medications    Dr. Cintron,  Can I get a prescription for Livitra (refill)?  I'm going to [place] and was hoping to get for a lot less $$.  Can this be sent electronically? Or I can stop by and get it.  How large a quanity can I get?    Thanks    isidra

## 2018-07-27 RX ORDER — SILDENAFIL CITRATE 20 MG/1
TABLET ORAL
Qty: 30 TABLET | Refills: 0 | Status: SHIPPED | OUTPATIENT
Start: 2018-07-27 | End: 2018-12-18

## 2018-07-27 RX ORDER — VARDENAFIL HYDROCHLORIDE 20 MG/1
20 TABLET ORAL DAILY PRN
Qty: 30 TABLET | Refills: 0 | Status: SHIPPED | OUTPATIENT
Start: 2018-07-27 | End: 2018-12-18

## 2018-07-27 NOTE — TELEPHONE ENCOUNTER
Pt stopped in clinic.    I educated the patient that Revatio is also sildenafil but in 20 mg tablets. We discussed the FDA has not approved Revatio for ED.  We discussed that he will be using Revatio off-label for ED.  He is informed that sildenafil prescribed as Revatio is usually not covered by insurance.  The side effects of possible headache, flushing, and sweating were discussed. Start 20 mg 30-60 min prior to sexual activity - may take up to 100 mg or 5 of the 20 mg sildenafil tablets.  I have counseled him that taking sildenafil with nitrates of any form can cause death. Additionally, Viagra serum concentrations can be increased by antifungals. This patient does not take these drugs, but I have counseled him to avoid sildenafil if he does take any of these.    Gave him Good Rx coupon for this at [facility] $16 for 30 tablets.       I did also give him rx for Levitra in case the Revatio doesn't work - DO NOT TAKE THESE TOGETHER    Marcia Christine PA-C  7/27/2018

## 2018-09-12 ENCOUNTER — ALLIED HEALTH/NURSE VISIT (OUTPATIENT)
Dept: FAMILY MEDICINE | Facility: CLINIC | Age: 63
End: 2018-09-12

## 2018-09-12 DIAGNOSIS — Z23 NEED FOR VACCINATION: Primary | ICD-10-CM

## 2018-09-12 PROCEDURE — 90471 IMMUNIZATION ADMIN: CPT | Performed by: FAMILY MEDICINE

## 2018-09-12 PROCEDURE — 90686 IIV4 VACC NO PRSV 0.5 ML IM: CPT | Performed by: FAMILY MEDICINE

## 2018-11-13 ENCOUNTER — OFFICE VISIT (OUTPATIENT)
Dept: FAMILY MEDICINE | Facility: CLINIC | Age: 63
End: 2018-11-13

## 2018-11-13 VITALS
OXYGEN SATURATION: 94 % | SYSTOLIC BLOOD PRESSURE: 144 MMHG | HEART RATE: 81 BPM | TEMPERATURE: 99 F | DIASTOLIC BLOOD PRESSURE: 88 MMHG

## 2018-11-13 DIAGNOSIS — R05.9 COUGH: Primary | ICD-10-CM

## 2018-11-13 PROCEDURE — 99213 OFFICE O/P EST LOW 20 MIN: CPT | Performed by: FAMILY MEDICINE

## 2018-11-13 PROCEDURE — 71046 X-RAY EXAM CHEST 2 VIEWS: CPT | Performed by: FAMILY MEDICINE

## 2018-11-13 RX ORDER — AZITHROMYCIN 250 MG/1
TABLET, FILM COATED ORAL
Qty: 6 TABLET | Refills: 0 | Status: SHIPPED | OUTPATIENT
Start: 2018-11-13 | End: 2018-12-18

## 2018-11-13 RX ORDER — CODEINE PHOSPHATE AND GUAIFENESIN 10; 100 MG/5ML; MG/5ML
1 SOLUTION ORAL EVERY 4 HOURS PRN
Qty: 120 ML | Refills: 0 | Status: SHIPPED | OUTPATIENT
Start: 2018-11-13 | End: 2018-12-18

## 2018-11-13 NOTE — MR AVS SNAPSHOT
After Visit Summary   11/13/2018    Edvin Norton    MRN: [id number]           Patient Information     Date Of Birth          1955        Visit Information        Provider Department      11/13/2018 12:00 PM Woody Cintron MD [facility]        Today's Diagnoses     Cough    -  1       Follow-ups after your visit        Who to contact     If you have questions or need follow up information about today's clinic visit or your schedule please contact [facility] directly at [phone].  Normal or non-critical lab and imaging results will be communicated to you by Mixpanelhart, letter or phone within 4 business days after the clinic has received the results. If you do not hear from us within 7 days, please contact the clinic through VocalIQt or phone. If you have a critical or abnormal lab result, we will notify you by phone as soon as possible.  Submit refill requests through Power Liens or call your pharmacy and they will forward the refill request to us. Please allow 3 business days for your refill to be completed.          Additional Information About Your Visit        MyChart Information     Power Liens gives you secure access to your electronic health record. If you see a primary care provider, you can also send messages to your care team and make appointments. If you have questions, please call your primary care clinic.  If you do not have a primary care provider, please call [phone] and they will assist you.        Care EveryWhere ID     This is your Care EveryWhere ID. This could be used by other organizations to access your [facility] medical records  [id number]        Your Vitals Were     Pulse Temperature Pulse Oximetry             81 99  F (37.2  C) (Oral) 94%          Blood Pressure from Last 3 Encounters:   11/13/18 144/88   06/12/18 126/86   06/01/18 142/88    Weight from Last 3 Encounters:   06/12/18 96.7 kg (213 lb 3.2 oz)    06/01/18 96.6 kg (213 lb)   11/17/17 96.7 kg (213 lb 3.2 oz)              We Performed the Following     HC XRAY CHEST, 2 VIEWS          Today's Medication Changes          These changes are accurate as of 11/13/18 12:36 PM.  If you have any questions, ask your nurse or doctor.               Start taking these medicines.        Dose/Directions    azithromycin 250 MG tablet   Commonly known as:  ZITHROMAX   Used for:  Cough   Started by:  Woody Cintron MD        Two tablets first day, then one tablet daily for four days.   Quantity:  6 tablet   Refills:  0       guaiFENesin-codeine 100-10 MG/5ML Soln solution   Commonly known as:  ROBITUSSIN AC   Used for:  Cough   Started by:  Woody Cintron MD        Dose:  1 tsp.   Take 5 mLs by mouth every 4 hours as needed for cough   Quantity:  120 mL   Refills:  0            Where to get your medicines      These medications were sent to [facility]/pharmacy #[id number] - [facility] [address] [address]  [address] [address] [address]     Phone:  [phone]     azithromycin 250 MG tablet         Some of these will need a paper prescription and others can be bought over the counter.  Ask your nurse if you have questions.     Bring a paper prescription for each of these medications     guaiFENesin-codeine 100-10 MG/5ML Soln solution                Primary Care Provider Office Phone # Fax #    Woody Cintron [phone] [address] [facility] [address]  [facility] [address]        Equal Access to Services     [facility] Center[facility]ESUS : Hadii aad ku hadasho Soomaali, waaxda luqadaha, qaybta kaalmada adeegyada, waxay idiin haysimonen rodolfo kharaantonino conley . So [facility] [phone].    ATENCIÓN: Si habla español, tiene a laureano disposición servicios gratuitos de asistencia lingüística. [place] al [phone].    We comply with applicable federal civil rights laws and [place] laws. We do not discriminate on the basis of race, color, national origin,  age, disability, sex, sexual orientation, or gender identity.            Thank you!     Thank you for choosing [facility]  for your care. Our goal is always to provide you with excellent care. Hearing back from our patients is one way we can continue to improve our services. Please take a few minutes to complete the written survey that you may receive in the mail after your visit with us. Thank you!             Your Updated Medication List - Protect others around you: Learn how to safely use, store and throw away your medicines at www.disposemymeds.org.          This list is accurate as of 11/13/18 12:36 PM.  Always use your most recent med list.                   Brand Name Dispense Instructions for use Diagnosis    ASPIRIN PO      Take 81 mg by mouth daily        atorvastatin 40 MG tablet    LIPITOR    90 tablet    Take 1 tablet (40 mg) by mouth daily    Mixed hyperlipidemia       azithromycin 250 MG tablet    ZITHROMAX    6 tablet    Two tablets first day, then one tablet daily for four days.    Cough       fenofibrate 54 MG tablet     90 tablet    Take 1 tablet (54 mg) by mouth daily    Mixed hyperlipidemia       fluticasone 50 MCG/ACT spray    FLONASE    1 Bottle    Spray 1-2 sprays into both nostrils daily    Acute seasonal allergic rhinitis due to pollen       guaiFENesin-codeine 100-10 MG/5ML Soln solution    ROBITUSSIN AC    120 mL    Take 5 mLs by mouth every 4 hours as needed for cough    Cough       montelukast 10 MG tablet    SINGULAIR    90 tablet    Take 1 tablet (10 mg) by mouth At Bedtime    Post-nasal drainage, Cough, Nonallergic rhinitis       OMEGA-3 FISH OIL PO      Take  by mouth.        PRILOSEC PO      Take  by mouth.        propranolol 20 MG tablet    INDERAL    90 tablet    TAKE 1 TABLET (20 MG) BY MOUTH DAILY AS NEEDED    Essential hypertension, benign       sertraline 100 MG tablet    ZOLOFT    90 tablet    Take 1 tablet (100 mg) by mouth daily    Anxiety        sildenafil 20 MG tablet    REVATIO    30 tablet    Take 1-3 tablets 30 min prior to intercourse.   Never use with nitroglycerin, terazosin or doxazosin.    Erectile dysfunction, unspecified erectile dysfunction type       vardenafil 20 MG tablet    LEVITRA    30 tablet    Take 1 tablet (20 mg) by mouth daily as needed 60 min prior to sex. Do not use with nitroglycerin, terazosin or doxazosin.    Erectile dysfunction, unspecified erectile dysfunction type

## 2018-11-13 NOTE — NURSING NOTE
Edvin is here for a chest cold and cough for two weeks          Pre-visit Screening:  Immunizations:  up to date  Colonoscopy:  is up to date  Mammogram: NA  Asthma Action Test/Plan:  NA  PHQ9:  none  GAD7:  none  Questioned patient about current smoking habits Pt. has never smoked.  Ok to leave detailed message on voice mail for today's visit only Yes, phone # [phone]

## 2018-11-13 NOTE — PROGRESS NOTES
SUBJECTIVE:   Edvin Norton is a 63 year old male who complains of productive cough, chest congestion and fatigue for 14 days. He denies a history of sweats, myalgias and chest pain and denies a history of asthma. Patient does not smoke cigarettes.    Pt has allergies but is taking nasal flonase and ipratropium    Pt does not tolerate decongestants    Patient Active Problem List   Diagnosis     Health Care Home     Natarajan esophagus     Mixed hyperlipidemia     Vitamin D deficiency     Performance anxiety     Anxiety state     Esophageal reflux     ACP (advance care planning)     Essential hypertension, benign (HTN)     Obesity due to excess calories, unspecified obesity severity     Natarajan's esophagus with dysplasia     Nonallergic rhinitis     Past Medical History:   Diagnosis Date     Anxiety state 9/26/2013     Problem list name updated by automated process. 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     Essential hypertension, benign (HTN) 10/27/2014     Obesity due to excess calories, unspecified obesity severity 3/24/2016     Family History   Problem Relation Age of Onset     Hypertension Mother      Psychotic Disorder Mother      anxiety     Psychotic Disorder Father      anxiety     Cerebrovascular Disease Sister      Psychotic Disorder Sister      anxiety     Psychotic Disorder Brother      anxiety     Thyroid Disease Sister      Thyroid Disease Brother      Thyroid Disease Sister      C.A.D. 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     Social History Main Topics     Smoking status: Never Smoker     Smokeless tobacco: Former User     Alcohol use 9.0 oz/week     15  Standard drinks or equivalent per week     Drug use: No     Sexual activity: Yes     Partners: Female     Birth control/ protection: Post-menopausal     Other Topics Concern      Service No     Blood Transfusions No     Caffeine Concern Yes     Occupational Exposure Yes     travels     Hobby Hazards No     Sleep Concern No     Stress Concern Yes     travels for work     Weight Concern Yes     Special Diet No     Exercise Yes     Seat Belt Yes     Social History Narrative     Past Surgical History:   Procedure Laterality Date     CATARACT IOL, RT/LT       ESOPHAGUS SURGERY      Halo procedure     FINGER SURGERY      tendon       Current Outpatient Prescriptions on File Prior to Visit:  ASPIRIN PO Take 81 mg by mouth daily    atorvastatin (LIPITOR) 40 MG tablet Take 1 tablet (40 mg) by mouth daily   fenofibrate 54 MG tablet Take 1 tablet (54 mg) by mouth daily   fluticasone (FLONASE) 50 MCG/ACT spray Spray 1-2 sprays into both nostrils daily   montelukast (SINGULAIR) 10 MG tablet Take 1 tablet (10 mg) by mouth At Bedtime   Omega-3 Fatty Acids (OMEGA-3 FISH OIL PO) Take  by mouth.   Omeprazole (PRILOSEC PO) Take  by mouth.   propranolol (INDERAL) 20 MG tablet TAKE 1 TABLET (20 MG) BY MOUTH DAILY AS NEEDED   sertraline (ZOLOFT) 100 MG tablet Take 1 tablet (100 mg) by mouth daily   sildenafil (REVATIO) 20 MG tablet Take 1-3 tablets 30 min prior to intercourse.   Never use with nitroglycerin, terazosin or doxazosin.   vardenafil (LEVITRA) 20 MG tablet Take 1 tablet (20 mg) by mouth daily as needed 60 min prior to sex. Do not use with nitroglycerin, terazosin or doxazosin.     No current facility-administered medications on file prior to visit.      Allergies: Review of patient's allergies indicates no known allergies.    Immunization History   Administered Date(s) Administered     Influenza Vaccine IM 3yrs+ 4 Valent IIV4 09/26/2013, 09/29/2014, 08/31/2015, 09/12/2018     TDAP Vaccine (Boostrix) 06/23/2014      "Zoster vaccine, live 11/17/2017         OBJECTIVE:/88 (BP Location: Left arm, Patient Position: Sitting, Cuff Size: Adult Large)  Pulse 81  Temp 99  F (37.2  C) (Oral)  SpO2 94%   He appears well, vital signs are as noted by the nurse. Ears normal.  Throat and pharynx normal.  Neck supple. No adenopathy in the neck. Nose is congested. Sinuses non tender. The chest reveals some scattered rhonchi and possible rales right lower, without wheezes    CXR-neg,   Will await radiology over-read and contact patient if any discrepancies     ASSESSMENT:   Bronchitis-likely viral but over 2 weeks- Discussed viral vs. bacterial infections and need to avoid unnecessary prescribing of antibiotics to ensure that no resistance will develop. Will give prescription for antibiotic (see orders) to fill is symptoms do not improve in the next 2-3 days.     Pt most bothered by nightime cough and does not tolerate Nyquil or decongestants as they make him more \"jazzed\"-we can try Robitussin AC, I reviewed the risks, benefits, and possible side effects of the medication.  The patient had an opportunity to ask any questions regarding the treatment plan. The patient was encouraged to call my office if any problems.     PLAN:  Symptomatic therapy suggested: push fluids, rest and use cough suppressant of choice as needed. Call or return to clinic prn if these symptoms worsen or fail to improve as anticipated.   "

## 2018-12-10 ENCOUNTER — TELEPHONE (OUTPATIENT)
Dept: FAMILY MEDICINE | Facility: CLINIC | Age: 63
End: 2018-12-10

## 2018-12-10 DIAGNOSIS — E78.2 MIXED HYPERLIPIDEMIA: ICD-10-CM

## 2018-12-10 DIAGNOSIS — I10 ESSENTIAL HYPERTENSION, BENIGN: ICD-10-CM

## 2018-12-10 RX ORDER — ATORVASTATIN CALCIUM 40 MG/1
40 TABLET, FILM COATED ORAL DAILY
Qty: 30 TABLET | Refills: 0 | COMMUNITY
Start: 2018-12-10 | End: 2018-12-18

## 2018-12-10 RX ORDER — PROPRANOLOL HYDROCHLORIDE 20 MG/1
TABLET ORAL
Qty: 30 TABLET | Refills: 0 | COMMUNITY
Start: 2018-12-10 | End: 2018-12-18

## 2018-12-10 RX ORDER — FENOFIBRATE 54 MG/1
54 TABLET ORAL DAILY
Qty: 30 TABLET | Refills: 0 | COMMUNITY
Start: 2018-12-10 | End: 2018-12-18

## 2018-12-10 NOTE — TELEPHONE ENCOUNTER
Ok refill of propranolol, atorvastatin and fenofibrate for one month only called into [facility]. Pt needs fasting OV for further refills.     Thanks,Gita    [phone]

## 2018-12-18 ENCOUNTER — OFFICE VISIT (OUTPATIENT)
Dept: FAMILY MEDICINE | Facility: CLINIC | Age: 63
End: 2018-12-18

## 2018-12-18 VITALS
TEMPERATURE: 98.7 F | HEIGHT: 67 IN | WEIGHT: 204.8 LBS | SYSTOLIC BLOOD PRESSURE: 134 MMHG | RESPIRATION RATE: 20 BRPM | DIASTOLIC BLOOD PRESSURE: 88 MMHG | HEART RATE: 64 BPM | BODY MASS INDEX: 32.15 KG/M2

## 2018-12-18 DIAGNOSIS — R20.2 PARESTHESIAS: ICD-10-CM

## 2018-12-18 DIAGNOSIS — I10 ESSENTIAL HYPERTENSION, BENIGN: ICD-10-CM

## 2018-12-18 DIAGNOSIS — K22.719 BARRETT'S ESOPHAGUS WITH DYSPLASIA: Primary | ICD-10-CM

## 2018-12-18 DIAGNOSIS — E78.2 MIXED HYPERLIPIDEMIA: ICD-10-CM

## 2018-12-18 DIAGNOSIS — F41.9 ANXIETY: ICD-10-CM

## 2018-12-18 PROCEDURE — 80061 LIPID PANEL: CPT | Mod: 90 | Performed by: FAMILY MEDICINE

## 2018-12-18 PROCEDURE — 80053 COMPREHEN METABOLIC PANEL: CPT | Mod: 90 | Performed by: FAMILY MEDICINE

## 2018-12-18 PROCEDURE — 36415 COLL VENOUS BLD VENIPUNCTURE: CPT | Performed by: FAMILY MEDICINE

## 2018-12-18 PROCEDURE — 99214 OFFICE O/P EST MOD 30 MIN: CPT | Performed by: FAMILY MEDICINE

## 2018-12-18 RX ORDER — ATORVASTATIN CALCIUM 40 MG/1
40 TABLET, FILM COATED ORAL DAILY
Qty: 90 TABLET | Refills: 1 | Status: SHIPPED | OUTPATIENT
Start: 2018-12-18 | End: 2019-07-16

## 2018-12-18 RX ORDER — FENOFIBRATE 54 MG/1
54 TABLET ORAL DAILY
Qty: 90 TABLET | Refills: 1 | Status: SHIPPED | OUTPATIENT
Start: 2018-12-18 | End: 2019-07-16

## 2018-12-18 RX ORDER — SERTRALINE HYDROCHLORIDE 100 MG/1
100 TABLET, FILM COATED ORAL DAILY
Qty: 90 TABLET | Refills: 1 | Status: SHIPPED | OUTPATIENT
Start: 2018-12-18 | End: 2019-07-16

## 2018-12-18 RX ORDER — PROPRANOLOL HYDROCHLORIDE 20 MG/1
TABLET ORAL
Qty: 90 TABLET | Refills: 1 | Status: SHIPPED | OUTPATIENT
Start: 2018-12-18 | End: 2019-07-16

## 2018-12-18 ASSESSMENT — ANXIETY QUESTIONNAIRES
7. FEELING AFRAID AS IF SOMETHING AWFUL MIGHT HAPPEN: NOT AT ALL
5. BEING SO RESTLESS THAT IT IS HARD TO SIT STILL: NOT AT ALL
GAD7 TOTAL SCORE: 1
3. WORRYING TOO MUCH ABOUT DIFFERENT THINGS: NOT AT ALL
1. FEELING NERVOUS, ANXIOUS, OR ON EDGE: SEVERAL DAYS
2. NOT BEING ABLE TO STOP OR CONTROL WORRYING: NOT AT ALL
6. BECOMING EASILY ANNOYED OR IRRITABLE: NOT AT ALL
IF YOU CHECKED OFF ANY PROBLEMS ON THIS QUESTIONNAIRE, HOW DIFFICULT HAVE THESE PROBLEMS MADE IT FOR YOU TO DO YOUR WORK, TAKE CARE OF THINGS AT HOME, OR GET ALONG WITH OTHER PEOPLE: NOT DIFFICULT AT ALL

## 2018-12-18 ASSESSMENT — PATIENT HEALTH QUESTIONNAIRE - PHQ9: 5. POOR APPETITE OR OVEREATING: NOT AT ALL

## 2018-12-18 ASSESSMENT — MIFFLIN-ST. JEOR: SCORE: 1687.6

## 2018-12-18 NOTE — PROGRESS NOTES
SUBJECTIVE:   Edvin Norton is a 63 year old male who presents to clinic today for the following health issues:      Hyperlipidemia Follow-Up      Rate your low fat/cholesterol diet?: good    Taking statin?  Yes, no muscle aches from statin    Other lipid medications/supplements?:  none    Hypertension Follow-up      Outpatient blood pressures are not being checked.    Low Salt Diet: no added salt    Anxiety Follow-Up    Status since last visit: No change    Other associated symptoms:None    Complicating factors:   Significant life event: No   Current substance abuse: None  Depression symptoms: No  JORGE-7 SCORE 2/16/2017 11/17/2017 6/12/2018   Total Score - - -   Total Score 3 3 1       JORGE-7    Amount of exercise or physical activity: 4-5 days/week for an average of 30-45 minutes    Problems taking medications regularly: No    Medication side effects: none    Diet: regular (no restrictions)        GERD, hx Barretts-using daily PPI and no symptoms , had EGD 4/18    Numbness in feet-seems a bit worse    Problem list and histories reviewed & adjusted, as indicated.  Additional history: as documented    Patient Active Problem List   Diagnosis     Health Care Home     Natarajan esophagus     Mixed hyperlipidemia     Vitamin D deficiency     Performance anxiety     Anxiety state     Esophageal reflux     ACP (advance care planning)     Essential hypertension, benign (HTN)     Obesity due to excess calories, unspecified obesity severity     Natarajan's esophagus with dysplasia     Nonallergic rhinitis     Past Surgical History:   Procedure Laterality Date     CATARACT IOL, RT/LT       ESOPHAGUS SURGERY      Halo procedure     FINGER SURGERY      tendon       Social History     Tobacco Use     Smoking status: Never Smoker     Smokeless tobacco: Former User   Substance Use Topics     Alcohol use: Yes     Alcohol/week: 9.0 oz     Types: 15 Standard drinks or equivalent per week     Family History   Problem Relation Age of  Onset     Hypertension Mother      Psychotic Disorder Mother         anxiety     Psychotic Disorder Father         anxiety     Cerebrovascular Disease Sister      Psychotic Disorder Sister         anxiety     Psychotic Disorder Brother         anxiety     Thyroid Disease Sister      Thyroid Disease Brother      Thyroid Disease Sister      C.A.D. No family hx of      Diabetes No family hx of      Breast Cancer No family hx of      Cancer - colorectal No family hx of      Prostate Cancer No family hx of          Current Outpatient Medications   Medication Sig Dispense Refill     ASPIRIN PO Take 81 mg by mouth daily        atorvastatin (LIPITOR) 40 MG tablet Take 1 tablet (40 mg) by mouth daily 90 tablet 1     fenofibrate (LOFIBRA) 54 MG tablet Take 1 tablet (54 mg) by mouth daily 90 tablet 1     fluticasone (FLONASE) 50 MCG/ACT spray Spray 1-2 sprays into both nostrils daily 1 Bottle 11     Omega-3 Fatty Acids (OMEGA-3 FISH OIL PO) Take  by mouth.       Omeprazole (PRILOSEC PO) Take  by mouth.       propranolol (INDERAL) 20 MG tablet TAKE 1 TABLET (20 MG) BY MOUTH DAILY AS NEEDED 90 tablet 1     sertraline (ZOLOFT) 100 MG tablet Take 1 tablet (100 mg) by mouth daily 90 tablet 1     montelukast (SINGULAIR) 10 MG tablet Take 1 tablet (10 mg) by mouth At Bedtime 90 tablet 1     No Known Allergies  Recent Labs   Lab Test 06/12/18  1126 11/17/17  1033 02/16/17  1343 08/01/16  0817  05/31/12   LDL SEE COMMENT SEE COMMENT 96 94   < > 66   HDL 54 51 61 44   < > 52   TRIG 420* 444* 160* 367*   < > 319*   ALT 45 115* 48* 32   < > 47   CR 1.06 0.87 1.19 1.08   < > 1.08   GFRESTIMATED 75 93 66 74   < >  --    POTASSIUM 4.2 4.9 4.1 4.6   < > 5.0   TSH  --   --   --  2.71  --  3.31    < > = values in this interval not displayed.      BP Readings from Last 3 Encounters:   12/18/18 134/88   11/13/18 144/88   06/12/18 126/86    Wt Readings from Last 3 Encounters:   12/18/18 92.9 kg (204 lb 12.8 oz)   06/12/18 96.7 kg (213 lb 3.2 oz)  "  06/01/18 96.6 kg (213 lb)                    Reviewed and updated as needed this visit by clinical staff  Tobacco       Reviewed and updated as needed this visit by Provider         ROS:  Constitutional, HEENT, cardiovascular, pulmonary, gi and gu systems are negative, except as otherwise noted.    OBJECTIVE:     /88 (BP Location: Left arm, Patient Position: Chair, Cuff Size: Adult Large)   Pulse 64   Temp 98.7  F (37.1  C) (Oral)   Resp 20   Ht 1.702 m (5' 7\")   Wt 92.9 kg (204 lb 12.8 oz)   BMI 32.08 kg/m    Body mass index is 32.08 kg/m .   GENERAL: healthy, alert and no distress  EYES: Eyes grossly normal to inspection, PERRL and conjunctivae and sclerae normal  HENT: ear canals and TM's normal, nose and mouth without ulcers or lesions  NECK: no adenopathy, no asymmetry, masses, or scars and thyroid normal to palpation  RESP: lungs clear to auscultation - no rales, rhonchi or wheezes  CV: regular rate and rhythm, normal S1 S2, no S3 or S4, no murmur, click or rub, no peripheral edema and peripheral pulses strong  ABDOMEN: soft, nontender, no hepatosplenomegaly, no masses and bowel sounds normal  MS: no gross musculoskeletal defects noted, no edema  PSYCH: mentation appears normal, affect normal/bright        ASSESSMENT:       PLAN:   (K22.719) Natarajan's esophagus with dysplasia  (primary encounter diagnosis)  Comment: stable, recheck egd 18 months  Plan: continue current medications at current doses     (F41.9) Anxiety  Comment: stable and well controlled  Plan: sertraline (ZOLOFT) 100 MG tablet        continue current medications at current doses     (I10) Essential hypertension, benign (HTN)  Comment: well controlled  Plan: propranolol (INDERAL) 20 MG tablet, VENOUS         COLLECTION, COMPREHENSIVE METABOLIC PANEL         ([facility]) XCMP        continue current medications at current doses     (E78.2) Mixed hyperlipidemia  Comment: control uncertain  Plan: fenofibrate (LOFIBRA) 54 MG tablet,       " "  atorvastatin (LIPITOR) 40 MG tablet, VENOUS         COLLECTION, COMPREHENSIVE METABOLIC PANEL         ([facility]) XCMP, Lipid Profile ([facility])        continue current medications at current doses pending labs    (R20.2) Paresthesias  Comment: present in feet for years-getting worse  Plan: NEUROLOGY ADULT REFERRAL              BMI:   Estimated body mass index is 32.08 kg/m  as calculated from the following:    Height as of this encounter: 1.702 m (5' 7\").    Weight as of this encounter: 92.9 kg (204 lb 12.8 oz).   Weight management plan: Discussed healthy diet and exercise guidelines      Regular exercise    Recheck 6 mo    Woody Cintron MD  [facility]      "

## 2018-12-18 NOTE — NURSING NOTE
Edvin Norton is here for a medication check and refill.  Questioned patient about current smoking habits.  Pt. has never smoked.  PULSE regular  My Chart: active  CLASSIFICATION OF OVERWEIGHT AND OBESITY BY BMI                        Obesity Class           BMI(kg/m2)  Underweight                                    < 18.5  Normal                                         18.5-24.9  Overweight                                     25.0-29.9  OBESITY                     I                  30.0-34.9                             II                 35.0-39.9  EXTREME OBESITY             III                >40                            Patient's  BMI Body mass index is 32.08 kg/m .  http://hin.nhlbi.nih.gov/menuplanner/menu.cgi  Pre-visit planning  Immunizations - up to date  Colonoscopy - is up to date  Mammogram - na  Asthma -   PHQ9 -    JORGE-7 -

## 2018-12-19 LAB
ALBUMIN SERPL-MCNC: 4.7 G/DL (ref 3.6–5.1)
ALBUMIN/GLOB SERPL: 1.9 (CALC) (ref 1–2.5)
ALP SERPL-CCNC: 50 U/L (ref 40–115)
ALT SERPL-CCNC: 29 U/L (ref 9–46)
AST SERPL-CCNC: 33 U/L (ref 10–35)
BILIRUB SERPL-MCNC: 0.6 MG/DL (ref 0.2–1.2)
BUN SERPL-MCNC: 22 MG/DL (ref 7–25)
BUN/CREATININE RATIO: NORMAL (CALC) (ref 6–22)
CALCIUM SERPL-MCNC: 9.7 MG/DL (ref 8.6–10.3)
CHLORIDE SERPLBLD-SCNC: 101 MMOL/L (ref 98–110)
CHOLEST SERPL-MCNC: 233 MG/DL
CHOLEST/HDLC SERPL: 5 (CALC)
CO2 SERPL-SCNC: 26 MMOL/L (ref 20–32)
CREAT SERPL-MCNC: 0.98 MG/DL (ref 0.7–1.25)
EGFR AFRICAN AMERICAN - QUEST: 95 ML/MIN/1.73M2
GFR SERPL CREATININE-BSD FRML MDRD: 82 ML/MIN/1.73M2
GLOBULIN, CALCULATED - QUEST: 2.5 G/DL (CALC) (ref 1.9–3.7)
GLUCOSE - QUEST: 93 MG/DL (ref 65–99)
HDLC SERPL-MCNC: 47 MG/DL
LDLC SERPL CALC-MCNC: ABNORMAL MG/DL (CALC)
NONHDLC SERPL-MCNC: 186 MG/DL (CALC)
POTASSIUM SERPL-SCNC: 5 MMOL/L (ref 3.5–5.3)
PROT SERPL-MCNC: 7.2 G/DL (ref 6.1–8.1)
SODIUM SERPL-SCNC: 137 MMOL/L (ref 135–146)
TRIGL SERPL-MCNC: 436 MG/DL

## 2018-12-19 ASSESSMENT — ANXIETY QUESTIONNAIRES: GAD7 TOTAL SCORE: 1

## 2019-01-09 ENCOUNTER — TRANSFERRED RECORDS (OUTPATIENT)
Dept: HEALTH INFORMATION MANAGEMENT | Facility: CLINIC | Age: 64
End: 2019-01-09

## 2019-01-18 ENCOUNTER — TRANSFERRED RECORDS (OUTPATIENT)
Dept: FAMILY MEDICINE | Facility: CLINIC | Age: 64
End: 2019-01-18

## 2019-02-08 ENCOUNTER — TRANSFERRED RECORDS (OUTPATIENT)
Dept: HEALTH INFORMATION MANAGEMENT | Facility: CLINIC | Age: 64
End: 2019-02-08

## 2019-02-11 ENCOUNTER — MEDICAL CORRESPONDENCE (OUTPATIENT)
Dept: HEALTH INFORMATION MANAGEMENT | Facility: CLINIC | Age: 64
End: 2019-02-11

## 2019-02-13 ENCOUNTER — OFFICE VISIT (OUTPATIENT)
Dept: OPHTHALMOLOGY | Facility: CLINIC | Age: 64
End: 2019-02-13
Attending: OPHTHALMOLOGY
Payer: COMMERCIAL

## 2019-02-13 DIAGNOSIS — H53.10 SUBJECTIVE VISUAL DISTURBANCE: Primary | ICD-10-CM

## 2019-02-13 DIAGNOSIS — H53.40 VISUAL FIELD DEFECT: ICD-10-CM

## 2019-02-13 PROCEDURE — G0463 HOSPITAL OUTPT CLINIC VISIT: HCPCS | Mod: ZF | Performed by: TECHNICIAN/TECHNOLOGIST

## 2019-02-13 PROCEDURE — 92133 CPTRZD OPH DX IMG PST SGM ON: CPT | Mod: ZF | Performed by: OPHTHALMOLOGY

## 2019-02-13 PROCEDURE — 92083 EXTENDED VISUAL FIELD XM: CPT | Mod: ZF | Performed by: OPHTHALMOLOGY

## 2019-02-13 ASSESSMENT — CONF VISUAL FIELD
METHOD: COUNTING FINGERS
OS_NORMAL: 1
OD_NORMAL: 1

## 2019-02-13 ASSESSMENT — REFRACTION_WEARINGRX
OD_SPHERE: +0.25
SPECS_TYPE: PAL
OD_AXIS: 025
OS_CYLINDER: SPHERE
OD_CYLINDER: +0.25
OS_SPHERE: -0.50

## 2019-02-13 ASSESSMENT — VISUAL ACUITY
OS_CC+: -2
METHOD: SNELLEN - LINEAR
OS_CC: 20/20
OD_CC: 20/20
CORRECTION_TYPE: GLASSES

## 2019-02-13 ASSESSMENT — TONOMETRY
IOP_METHOD: ICARE
OD_IOP_MMHG: 12
OS_IOP_MMHG: 11

## 2019-02-13 ASSESSMENT — SLIT LAMP EXAM - LIDS
COMMENTS: NORMAL
COMMENTS: NORMAL

## 2019-02-13 ASSESSMENT — EXTERNAL EXAM - LEFT EYE: OS_EXAM: NORMAL

## 2019-02-13 ASSESSMENT — CUP TO DISC RATIO
OD_RATIO: 0.2
OS_RATIO: 0.2

## 2019-02-13 ASSESSMENT — EXTERNAL EXAM - RIGHT EYE: OD_EXAM: NORMAL

## 2019-02-13 NOTE — PROGRESS NOTES
1. Cataract extraction both eyes  2. S/P YAG capsulotomy both eyes  3. Glaucoma, both eyes    Edvin Norton is a 64 year old male who presents with complaints of vision changes in both eyes. Reports photosensitivity to head lights and lamps. He saw his eye doctor who thought it was cataract and underwent cataract extraction (2012) with intraocular lens placement in both eyes. Reports visual symptoms improved after cataract surgery for a while but decreased again. He went back to his eye doctor who did lasers in both eyes but that did not resolve his symptoms. Patient denies any changes to his vision other wise. Reports he is able to read and see distance without issues. Patient denies any flashes, floaters, pain, redness, discharge, diplopia. Reports no significant photophobia during daylight hours. Patient was recently diagnosed with glaucoma and is Latanoprost. Patient denies any headache, numbness, weakness. Patient has a past medical history significant for hyperlipidemia and hypertension. History of head trauma with a golf ball injury to the right brow medially. FH of esophageal cancer (two brothers). Patient is s/p treatment of pre-cancerous esophageos cells.    Patient denies any past head or orbit imaging.    On exam patient is

## 2019-02-13 NOTE — LETTER
"2019    RE: Edvin Norton  : 1955  MRN: [id number]    Dear Dr. Helm,    Thank you for referring your patient, Edvin Norton, to my neuro-ophthalmology clinic recently.  After a thorough neuro-ophthalmic history and examination, I came to the following conclusions:      1. Cataract extraction both eyes    2. S/P YAG capsulotomy both eyes    3. Thinning of retinal nerve fiber layer in both eyes on OCT- seen by referring provider Dr. Helm.  Exam today shows similar findings.  On exam I felt the patient had normal appearing optic nerve heads without pallor or swelling.  Inquiring with the patient it sounds as if he was very myopic prior to cataract extraction and patient's who are more myopic than -5.00 sph often have artifactual retinal nerve fiber layer thinning on OCT due simply to their myopia.  I'd suggest stopping intraocular pressure lowering treatment given his optic nerve heads do not show much cupping.  Then I'd suggest observing him with serial exams every 6-12 months.  If there is increased thinning consistently on serial OCT or a deterioration in vision then revisit diagnosis but I suspect this is all myopia related retinal nerve fiber layer \"thinning artifact.\"  The patient has intact visual acuity and color vision which argues against nutritional optic neuropathy.  No indication for neuro-imaging on exam today.      Edvin Norton is a 64 year old male who presents with complaints of vision changes in both eyes. Reports photosensitivity to head lights and lamps. He saw his eye doctor who thought it was cataract and underwent cataract extraction () with intraocular lens placement in both eyes. Reports visual symptoms improved after cataract surgery for a while but increased again. He went back to his eye doctor who did lasers in both eyes but that did not resolve his symptoms. Patient denies any changes to his vision other wise. Reports he is able to read and see " distance without issues. Patient denies any flashes, floaters, pain, redness, discharge, diplopia. Reports no significant photophobia during daylight hours. Patient was recently diagnosed with glaucoma and is Latanoprost. Patient denies any headache, numbness, weakness. Patient has a past medical history significant for hyperlipidemia and hypertension. History of head trauma with a golf ball injury to the right brow medially. FH of esophageal cancer (two brothers). Patient is s/p treatment of pre-cancerous esophagus cells.    Patient denies any past head or orbit imaging.    On exam patient is 20/20 both eyes with correction. Pupil exam is normal without APD. Intraocular pressure is 12 right 11 left. Color exam is 10/11 in both eyes. Slit lamp exam is significant for a well centered poster chamber intra ocular lens. Dilated fundus exam is significant for peripapillary atrophy and tilted disc but the optic nerve head has normal appearance (of the neuro-retinal rim). There is no optic disc edema. There is a dot blot heme in the inferotemporal quadrant of the right eye periphery  Left eye macular and peripheral exam is normal.    Visual field is significant for nonspecific scattered deficits in both eyes without a definite pattern. RNFL oct shows mild thinning diffusely in both eyes.    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  Recommend observation with serial OCT and visual fields.  If there is a gradual decline then reconsider work-up but I agree that I dont think this patient has glaucoma given his intraocular pressure and cup-to-disc ratio.    I did not make a follow-up appointment, but I would be happy to see the patient back in the future should any new neuro-ophthalmic concern arise. Patient should follow-up with Dr. Helm for eye care.    Again, thank you for  trusting me with the care of your patient.  For further exam details, please feel free to contact our office for additional records.  If you wish to contact me regarding this patient please email me at [email or web address] or give my clinic a call to arrange a phone conversation.    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

## 2019-02-13 NOTE — PROGRESS NOTES
"   1. Cataract extraction both eyes    2. S/P YAG capsulotomy both eyes    3. Thinning of retinal nerve fiber layer in both eyes on OCT- seen by referring provider Dr. Helm.  Exam today shows similar findings.  On exam I felt the patient had normal appearing optic nerve heads without pallor or swelling.  Inquiring with the patient it sounds as if he was very myopic prior to cataract extraction and patient's who are more myopic than -5.00 sph often have artifactual retinal nerve fiber layer thinning on OCT due simply to their myopia.  I'd suggest stopping intraocular pressure lowering treatment given his optic nerve heads do not show much cupping.  Then I'd suggest observing him with serial exams every 6-12 months.  If there is increased thinning consistently on serial OCT or a deterioration in vision then revisit diagnosis but I suspect this is all myopia related retinal nerve fiber layer \"thinning artifact.\"  The patient has intact visual acuity and color vision which argues against nutritional optic neuropathy.  No indication for neuro-imaging on exam today.      Edvin Norton is a 64 year old male who presents with complaints of vision changes in both eyes. Reports photosensitivity to head lights and lamps. He saw his eye doctor who thought it was cataract and underwent cataract extraction (2012) with intraocular lens placement in both eyes. Reports visual symptoms improved after cataract surgery for a while but increased again. He went back to his eye doctor who did lasers in both eyes but that did not resolve his symptoms. Patient denies any changes to his vision other wise. Reports he is able to read and see distance without issues. Patient denies any flashes, floaters, pain, redness, discharge, diplopia. Reports no significant photophobia during daylight hours. Patient was recently diagnosed with glaucoma and is Latanoprost. Patient denies any headache, numbness, weakness. Patient has a past medical " history significant for hyperlipidemia and hypertension. History of head trauma with a golf ball injury to the right brow medially. FH of esophageal cancer (two brothers). Patient is s/p treatment of pre-cancerous esophagus cells.    Patient denies any past head or orbit imaging.    On exam patient is 20/20 both eyes with correction. Pupil exam is normal without APD. Intraocular pressure is 12 right 11 left. Color exam is 10/11 in both eyes. Slit lamp exam is significant for a well centered poster chamber intra ocular lens. Dilated fundus exam is significant for peripapillary atrophy and tilted disc but the optic nerve head has normal appearance (of the neuro-retinal rim). There is no optic disc edema. There is a dot blot heme in the inferotemporal quadrant of the right eye periphery  Left eye macular and peripheral exam is normal.    Visual field is significant for nonspecific scattered deficits in both eyes without a definite pattern. RNFL oct shows mild thinning diffusely in both eyes.    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  Recommend observation with serial OCT and visual fields.  If there is a gradual decline then reconsider work-up but I agree that I dont think this patient has glaucoma given his intraocular pressure and cup-to-disc ratio.    I did not make a follow-up appointment, but I would be happy to see the patient back in the future should any new neuro-ophthalmic concern arise. Patient should follow-up with Dr. Helm for eye care.     Complete documentation of historical and exam elements from today's encounter can be found in the full encounter summary report (not reduplicated in this progress note).  I personally obtained the chief complaint(s) and history of present illness.  I confirmed and edited as necessary the review of systems, past  medical/surgical history, family history, social history, and examination findings as documented by others; and I examined the patient myself.  I personally reviewed the relevant tests, images, and reports as documented above.  I formulated and edited as necessary the assessment and plan and discussed the findings and management plan with the patient and family.  I personally reviewed the ophthalmic test(s) associated with this encounter, agree with the interpretation(s) as documented by the resident/fellow, and have edited the corresponding report(s) as necessary.     MD uTng Davila MD  PGY-3 Ophthalmology Resident  [phone]

## 2019-02-13 NOTE — NURSING NOTE
Chief Complaint(s) and History of Present Illness(es)     New Patient     In both eyes (Pre referral notes: significantly reduced RNFL with corresponding visual field defects, however has a healthy neuroretinal rim. Suspect retinopathy at play, possible nutritional deficiency).  Charactertized as  blurred vision and change in color vision.  Associated symptoms include glare and haloes.  Negative for flashes and floaters.              Comments     History of cataract extraction 11-26-12 left eye and 12-18-12 right eye. YAG each eye 3-21-18 by Dr. Gallagher.     Blurry vision each eye one year prior to cataract surgery. After cataract surgery c/o blurry vision persists in each eye 2012. YAG each eye (2018), no improvement, vision still blurry.    +glare/haloes at night when driving.    No MRI head or brain.     Nedra Quintero, CO 2/13/2019 2:25 PM

## 2019-04-23 ENCOUNTER — OFFICE VISIT (OUTPATIENT)
Dept: FAMILY MEDICINE | Facility: CLINIC | Age: 64
End: 2019-04-23

## 2019-04-23 VITALS
DIASTOLIC BLOOD PRESSURE: 88 MMHG | BODY MASS INDEX: 32.49 KG/M2 | OXYGEN SATURATION: 96 % | WEIGHT: 207 LBS | TEMPERATURE: 98.2 F | SYSTOLIC BLOOD PRESSURE: 144 MMHG | HEIGHT: 67 IN | HEART RATE: 66 BPM

## 2019-04-23 DIAGNOSIS — A69.20 ERYTHEMA MIGRANS (LYME DISEASE): Primary | ICD-10-CM

## 2019-04-23 PROCEDURE — 99213 OFFICE O/P EST LOW 20 MIN: CPT | Performed by: PHYSICIAN ASSISTANT

## 2019-04-23 RX ORDER — DOXYCYCLINE HYCLATE 100 MG
100 TABLET ORAL 2 TIMES DAILY
Qty: 28 TABLET | Refills: 0 | Status: SHIPPED | OUTPATIENT
Start: 2019-04-23 | End: 2019-09-03

## 2019-04-23 ASSESSMENT — MIFFLIN-ST. JEOR: SCORE: 1692.58

## 2019-04-23 NOTE — NURSING NOTE
Edvin is here today for a tick on his thigh.    Pre-visit Screening:  Immunizations:  up to date  Colonoscopy:  is up to date  Mammogram: NA  Asthma Action Test/Plan:  AMADA  PHQ9:  NA  GAD7:  NA  Questioned patient about current smoking habits Pt. has never smoked.  Ok to leave detailed message on voice mail for today's visit only Yes, phone # [phone]       no

## 2019-04-23 NOTE — PROGRESS NOTES
"CC: Tick    History:  Edvin was in [place] over the weekend. Yesterday got home, and felt itching sensation in upper right thigh/groin. When he looked down he started to notice local redness, and now overnight seems to be worsened where redness is spreading. No longer itching. Not painful. Did try to remove a black area in the center last night with tweezers, with only a small piece removed.    PMH, MEDICATIONS, ALLERGIES, SOCIAL AND FAMILY HISTORY in [facility] and reviewed by me personally.      ROS negative other than the symptoms noted above in the HPI.        Examination   /88 (BP Location: Right arm, Patient Position: Sitting, Cuff Size: Adult Large)   Pulse 66   Temp 98.2  F (36.8  C) (Oral)   Ht 1.702 m (5' 7\")   Wt 93.9 kg (207 lb)   SpO2 96%   BMI 32.42 kg/m         Constitutional: Sitting comfortably, in no acute distress. Vital signs noted  SKIN: No jaundice/pallor. Erythematous annular patch with central clearing approximately 12 cm in diameter.   Psychiatric: mentation appears normal and affect normal/bright    Procedure:  Cleansed lesion with alcohol swab. Made small incision with 11 blade. 2 small pieces of black debris removed. Applied thin layer of bacitracin ointment. Covered with bandage.       A/P    ICD-10-CM    1. Erythema migrans (Lyme disease) A69.20 doxycycline hyclate (VIBRA-TABS) 100 MG tablet       DISCUSSION:  Suspicious for erythema migrans. Wound site appears to be clear of any tick debris, but suspect this was a tick. Recommended Edvin complete 14 day course of doxycycline. Warned of side effects. Take with food. Monitor closely for fever, sweats, chills, headaches, body aches, joint pains. Contact me in 10-12 days if not significantly better, or sooner if worsening.     follow up visit: As needed    Rossana Bailey PA-C  [facility]    "

## 2019-04-24 ENCOUNTER — OFFICE VISIT (OUTPATIENT)
Dept: FAMILY MEDICINE | Facility: CLINIC | Age: 64
End: 2019-04-24

## 2019-04-24 VITALS
WEIGHT: 207 LBS | TEMPERATURE: 99.1 F | HEIGHT: 67 IN | DIASTOLIC BLOOD PRESSURE: 80 MMHG | BODY MASS INDEX: 32.49 KG/M2 | HEART RATE: 84 BPM | SYSTOLIC BLOOD PRESSURE: 142 MMHG | RESPIRATION RATE: 20 BRPM
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
## 2019-04-24 DIAGNOSIS — S30.860A TICK BITE OF BUTTOCK, INITIAL ENCOUNTER: Primary | ICD-10-CM

## 2019-04-24 PROCEDURE — 10120 INC&RMVL FB SUBQ TISS SMPL: CPT | Performed by: FAMILY MEDICINE

## 2019-04-24 SDOH — ECONOMIC STABILITY: INCOME INSECURITY: HOW HARD IS IT FOR YOU TO PAY FOR THE VERY BASICS LIKE FOOD, HOUSING, MEDICAL CARE, AND HEATING?: NOT HARD AT ALL

## 2019-04-24 SDOH — ECONOMIC STABILITY: FOOD INSECURITY: WITHIN THE PAST 12 MONTHS, YOU WORRIED THAT YOUR FOOD WOULD RUN OUT BEFORE YOU GOT MONEY TO BUY MORE.: NEVER TRUE

## 2019-04-24 SDOH — ECONOMIC STABILITY: TRANSPORTATION INSECURITY
IN THE PAST 12 MONTHS, HAS LACK OF TRANSPORTATION KEPT YOU FROM MEETINGS, WORK, OR FROM GETTING THINGS NEEDED FOR DAILY LIVING?: NO

## 2019-04-24 SDOH — ECONOMIC STABILITY: TRANSPORTATION INSECURITY
IN THE PAST 12 MONTHS, HAS THE LACK OF TRANSPORTATION KEPT YOU FROM MEDICAL APPOINTMENTS OR FROM GETTING MEDICATIONS?: NO

## 2019-04-24 ASSESSMENT — MIFFLIN-ST. JEOR: SCORE: 1692.58

## 2019-04-24 NOTE — NURSING NOTE
Edvin Norton is here for a possible tick.    Questioned patient about current smoking habits.  Pt. has never smoked.  PULSE regular  My Chart: active  CLASSIFICATION OF OVERWEIGHT AND OBESITY BY BMI                        Obesity Class           BMI(kg/m2)  Underweight                                    < 18.5  Normal                                         18.5-24.9  Overweight                                     25.0-29.9  OBESITY                     I                  30.0-34.9                             II                 35.0-39.9  EXTREME OBESITY             III                >40                            Patient's  BMI Body mass index is 32.42 kg/m .  http://hin.nhlbi.nih.gov/menuplanner/menu.cgi  Pre-visit planning  Immunizations - up to date  Colonoscopy - is up to date  Mammogram -   Asthma -   PHQ9 -    JORGE-7 -

## 2019-04-24 NOTE — PATIENT INSTRUCTIONS
Tick bite of buttock, initial encounter  (primary encounter diagnosis)  Comment: simple gentle skin care/ bacitracin ointment daily after bathing  Plan: REMOVE FOREIGN BODY SIMPLE        Monitor for resolution of all symptoms and skin changes.  Potential medication side effects were discussed with the patient; let me know if any occur.

## 2019-04-24 NOTE — PROGRESS NOTES
SUBJECTIVE: 64 year old male complaining of finding a second tick on his left buttock this morning. See last visit.  Traveled to [facility] with spring underway this past weekend.  Noted an area of redness yesterday and tried to remove the tick. Last night tried to remove this area which is not symptomatic. He has been on doxycycline for 1 day(s).   The patient describes no side effects of his medications.   The patient denies a history of fever, pain or muscle aches/ chills.   Smoking history: No.   Relevant past medical history: positive for elevated cholesterol, allergies and hypertension/ well controlled anxiety.    Current Outpatient Medications   Medication     ASPIRIN PO     atorvastatin (LIPITOR) 40 MG tablet     doxycycline hyclate (VIBRA-TABS) 100 MG tablet     fenofibrate (LOFIBRA) 54 MG tablet     fluticasone (FLONASE) 50 MCG/ACT spray     montelukast (SINGULAIR) 10 MG tablet     Omega-3 Fatty Acids (OMEGA-3 FISH OIL PO)     Omeprazole (PRILOSEC PO)     propranolol (INDERAL) 20 MG tablet     sertraline (ZOLOFT) 100 MG tablet     No current facility-administered medications for this visit.          OBJECTIVE: The patient appears healthy, alert, no distress, cooperative and smiling.   EXT: The lower extremities are normal and reveal no sign of DVT. Calves and thighs are soft and non tender, color is normal, no swelling or redness. Kelly's sign is negative.  Pedal pulses are normal.  Left lateral buttock puncture wound with black foreign object and minimal ring of surrounding erythema.    Cleaned and removed with pick ups and teasing.  Bacitracin ointment and Band-Aid applied.    Tetanus up to date    ASSESSMENT: (S30.860A,  W57.XXXA) Tick bite of buttock, initial encounter  (primary encounter diagnosis)  Comment: simple gentle skin care/ bacitracin ointment daily after bathing  Plan: REMOVE FOREIGN BODY SIMPLE        Monitor for resolution of all symptoms and skin changes.  Potential medication side  effects were discussed with the patient; let me know if any occur.

## 2019-05-20 ENCOUNTER — TELEPHONE (OUTPATIENT)
Dept: FAMILY MEDICINE | Facility: CLINIC | Age: 64
End: 2019-05-20

## 2019-05-20 ENCOUNTER — OFFICE VISIT (OUTPATIENT)
Dept: FAMILY MEDICINE | Facility: CLINIC | Age: 64
End: 2019-05-20

## 2019-05-20 VITALS
TEMPERATURE: 98.1 F | HEART RATE: 80 BPM | BODY MASS INDEX: 32.42 KG/M2 | WEIGHT: 207 LBS | DIASTOLIC BLOOD PRESSURE: 90 MMHG | OXYGEN SATURATION: 96 % | SYSTOLIC BLOOD PRESSURE: 138 MMHG

## 2019-05-20 DIAGNOSIS — G62.9 PERIPHERAL POLYNEUROPATHY: Primary | ICD-10-CM

## 2019-05-20 PROCEDURE — 99213 OFFICE O/P EST LOW 20 MIN: CPT | Performed by: FAMILY MEDICINE

## 2019-05-20 RX ORDER — OMEPRAZOLE 40 MG/1
40 CAPSULE, DELAYED RELEASE ORAL EVERY MORNING
Refills: 0 | COMMUNITY
Start: 2019-05-03 | End: 2020-09-25

## 2019-05-20 NOTE — PROGRESS NOTES
SUBJECTIVE:  Edvin Norton, a 64 year old male scheduled an appointment to discuss the following issues:  Peripheral polyneuropathy     Pt states he was seen at neurology a few months ago for his bilateral forefoot and large toe pains.  He states he was diagnosed with peripheral neuropathy but really does not know if testing done or any treatment recommended.  I do not have any records from this visit.  He was referred to [facility].    Pt here for plan-states pain occurs every day, seems worse when he is tired.  NO numbness or weakness but he feels this is affecting his balance        Medical, social, surgical, and family histories reviewed.    ROS:  CONSTITUTIONAL: NEGATIVE for fever, chills  EYES: NEGATIVE for vision changes   RESP: NEGATIVE for significant cough or SOB  CV: NEGATIVE for chest pain, palpitations   GI: NEGATIVE for nausea, abdominal pain, heartburn, or change in bowel habits  : NEGATIVE for frequency, dysuria, or hematuria    OBJECTIVE:  /90 (BP Location: Right arm, Patient Position: Sitting, Cuff Size: Adult Large)   Pulse 80   Temp 98.1  F (36.7  C) (Oral)   Wt 93.9 kg (207 lb)   SpO2 96%   BMI 32.42 kg/m    EXAM:  GENERAL APPEARANCE: healthy, alert and no distress  CV: regular rates and rhythm, normal S1 S2, no S3 or S4 and no murmur, click or rub -    ASSESSMENT/PLAN:  (G62.9) Peripheral polyneuropathy  (primary encounter diagnosis)  Comment: pt with apparent neuropathy although no evidence present here that any w/u was done or treatment recommended  Plan: will sign ANGEL and get notes-decide if further eval needed or treatment, mentioned gabapentin

## 2019-05-20 NOTE — NURSING NOTE
Edvin is here for neuropathy in feet     Pre-visit Screening:  Immunizations:  up to date  Colonoscopy:  is up to date  Mammogram: NA  Asthma Action Test/Plan:  AMADA  PHQ9:  NA  GAD7:  NA  Questioned patient about current smoking habits Pt. has never smoked.  Ok to leave detailed message on voice mail for today's visit only Yes, phone # [phone]

## 2019-05-20 NOTE — TELEPHONE ENCOUNTER
Medical records  request to [facility]. Request faxed/scan 5/20/19. Waiting on records

## 2019-06-10 ENCOUNTER — TELEPHONE (OUTPATIENT)
Dept: FAMILY MEDICINE | Facility: CLINIC | Age: 64
End: 2019-06-10

## 2019-06-13 ENCOUNTER — TRANSFERRED RECORDS (OUTPATIENT)
Dept: FAMILY MEDICINE | Facility: CLINIC | Age: 64
End: 2019-06-13

## 2019-06-17 ENCOUNTER — TRANSFERRED RECORDS (OUTPATIENT)
Dept: FAMILY MEDICINE | Facility: CLINIC | Age: 64
End: 2019-06-17

## 2019-06-18 ENCOUNTER — TRANSFERRED RECORDS (OUTPATIENT)
Dept: FAMILY MEDICINE | Facility: CLINIC | Age: 64
End: 2019-06-18

## 2019-06-19 ENCOUNTER — HOSPITAL ENCOUNTER (OUTPATIENT)
Dept: ULTRASOUND IMAGING | Facility: CLINIC | Age: 64
Discharge: HOME | End: 2019-06-19
Attending: INTERNAL MEDICINE | Admitting: INTERNAL MEDICINE
Payer: COMMERCIAL

## 2019-06-19 DIAGNOSIS — Z86.0100 PERSONAL HISTORY OF COLONIC POLYPS: ICD-10-CM

## 2019-06-19 DIAGNOSIS — R10.13 EPIGASTRIC DISCOMFORT: ICD-10-CM

## 2019-06-19 DIAGNOSIS — K22.719 BARRETT'S ESOPHAGUS WITH DYSPLASIA, UNSPECIFIED: ICD-10-CM

## 2019-06-19 PROCEDURE — 76700 US EXAM ABDOM COMPLETE: CPT

## 2019-07-09 ENCOUNTER — TELEPHONE (OUTPATIENT)
Dept: FAMILY MEDICINE | Facility: CLINIC | Age: 64
End: 2019-07-09

## 2019-07-09 NOTE — TELEPHONE ENCOUNTER
Edvin is calling as he has called 5 times back and forth trying to know what the report from the neurologist said so he can find out what to do next and how to proceed as he is still having numbness and terrible balance issues.  He wants to wait to talk to Dr Cintron when he returns next Monday.    Please call pt [phone] when you return Monday

## 2019-07-16 ENCOUNTER — TELEPHONE (OUTPATIENT)
Dept: FAMILY MEDICINE | Facility: CLINIC | Age: 64
End: 2019-07-16

## 2019-07-16 DIAGNOSIS — I10 ESSENTIAL HYPERTENSION, BENIGN: ICD-10-CM

## 2019-07-16 DIAGNOSIS — E78.2 MIXED HYPERLIPIDEMIA: ICD-10-CM

## 2019-07-16 DIAGNOSIS — F41.9 ANXIETY: ICD-10-CM

## 2019-07-16 RX ORDER — ATORVASTATIN CALCIUM 40 MG/1
TABLET, FILM COATED ORAL
Qty: 30 TABLET | Refills: 0 | Status: SHIPPED | OUTPATIENT
Start: 2019-07-16 | End: 2019-09-03

## 2019-07-16 RX ORDER — PROPRANOLOL HYDROCHLORIDE 20 MG/1
TABLET ORAL
Qty: 30 TABLET | Refills: 0 | Status: SHIPPED | OUTPATIENT
Start: 2019-07-16 | End: 2019-08-21

## 2019-07-16 RX ORDER — FENOFIBRATE 54 MG/1
54 TABLET ORAL DAILY
Qty: 30 TABLET | Refills: 0 | Status: SHIPPED | OUTPATIENT
Start: 2019-07-16 | End: 2019-08-21

## 2019-07-16 RX ORDER — SERTRALINE HYDROCHLORIDE 100 MG/1
TABLET, FILM COATED ORAL
Qty: 30 TABLET | Refills: 0 | Status: SHIPPED | OUTPATIENT
Start: 2019-07-16 | End: 2019-07-17

## 2019-07-16 NOTE — TELEPHONE ENCOUNTER
Pending Prescriptions:                       Disp   Refills    fenofibrate (LOFIBRA) 54 MG tablet [Pharm*30 tab*0            Sig: TAKE 1 TABLET (54 MG) BY MOUTH DAILY    propranolol (INDERAL) 20 MG tablet [Pharm*30 tab*0            Sig: TAKE 1 TABLET (20 MG) BY MOUTH DAILY AS NEEDED    sertraline (ZOLOFT) 100 MG tablet [Pharma*30 tab*0            Sig: TAKE 1 TABLET BY MOUTH EVERY DAY    atorvastatin (LIPITOR) 40 MG tablet [Phar*30 tab*0            Sig: TAKE 1 TABLET BY MOUTH EVERY DAY    Pt is due for a fasting six month ov  Fax and send to [facility]  [phone]

## 2019-07-17 ENCOUNTER — OFFICE VISIT (OUTPATIENT)
Dept: FAMILY MEDICINE | Facility: CLINIC | Age: 64
End: 2019-07-17

## 2019-07-17 VITALS
HEART RATE: 85 BPM | DIASTOLIC BLOOD PRESSURE: 88 MMHG | BODY MASS INDEX: 31.58 KG/M2 | WEIGHT: 201.6 LBS | OXYGEN SATURATION: 97 % | TEMPERATURE: 98.8 F | SYSTOLIC BLOOD PRESSURE: 142 MMHG

## 2019-07-17 DIAGNOSIS — R25.8 NOCTURNAL LEG MOVEMENTS: ICD-10-CM

## 2019-07-17 DIAGNOSIS — G60.9 IDIOPATHIC POLYNEUROPATHY: Primary | ICD-10-CM

## 2019-07-17 DIAGNOSIS — R26.89 BALANCE PROBLEMS: ICD-10-CM

## 2019-07-17 DIAGNOSIS — F41.1 GAD (GENERALIZED ANXIETY DISORDER): ICD-10-CM

## 2019-07-17 PROCEDURE — 99214 OFFICE O/P EST MOD 30 MIN: CPT | Performed by: FAMILY MEDICINE

## 2019-07-17 RX ORDER — SERTRALINE HYDROCHLORIDE 100 MG/1
150 TABLET, FILM COATED ORAL DAILY
Qty: 135 TABLET | Refills: 0 | Status: SHIPPED | OUTPATIENT
Start: 2019-07-17 | End: 2019-09-03

## 2019-07-17 NOTE — NURSING NOTE
Edvin is here for recheck of neuroathy    Pre-visit Screening:  Immunizations:  up to date  Colonoscopy:  is up to date  Mammogram: NA  Asthma Action Test/Plan:  NA  PHQ9:  None  GAD7:  None  Questioned patient about current smoking habits Pt. has never smoked.  Ok to leave detailed message on voice mail for today's visit only Yes, phone # [phone]

## 2019-07-17 NOTE — PROGRESS NOTES
SUBJECTIVE:  Edvin Norton, a 64 year old male scheduled an appointment to discuss the following issues:     Idiopathic polyneuropathy  Nocturnal leg movements  JORGE (generalized anxiety disorder)  Balance problems  Pt is here with worsening symptoms of apparent neuropathy in toes- feels an almost constant pain and burning-worsening.  He was seen at [facility] 1/19 and diagnosed with idiopathic polyneuropathy based on symptoms -no testing done.  He was told at that time that treatment with medication likely not helpful due to mild nature of symptoms. They are worse now.    He also is having more and more balance difficulty despite exercising for an hour on a treadmill and 30 minutes of weights daily.  This symptom is very concerning for him.  He denies shuffling gait or tremors.    His wife has noted lots of leg movements when pt sleeps, they keep her from sleeping but he does notice them. He denies an uncontrollable urge to move legs    Pt does have anxiety treated reasonably well with sertraline but he does feel this is worse and would like to consider increasing dose.    Medical, social, surgical, and family histories reviewed.    Patient Active Problem List   Diagnosis     Health Care Home     Natarajan esophagus     Mixed hyperlipidemia     Vitamin D deficiency     Performance anxiety     Anxiety state     Esophageal reflux     ACP (advance care planning)     Essential hypertension, benign (HTN)     Obesity due to excess calories, unspecified obesity severity     Natarajan's esophagus with dysplasia     Nonallergic rhinitis     Past Medical History:   Diagnosis Date     Anxiety state 9/26/2013     Problem list name updated by automated process. 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     Essential hypertension, benign (HTN) 10/27/2014     Obesity due to excess calories, unspecified obesity severity 3/24/2016     Family History   Problem Relation Age of Onset     Hypertension Mother      Psychotic Disorder  Mother         anxiety     Psychotic Disorder Father         anxiety     Cerebrovascular Disease Sister      Psychotic Disorder Sister         anxiety     Psychotic Disorder Brother         anxiety     Thyroid Disease Sister      Thyroid Disease Brother      Thyroid Disease Sister      C.A.D. No family hx of      Diabetes No family hx of      Breast Cancer No family hx of      Cancer - colorectal No family hx of      Prostate Cancer No family hx of      Social History     Socioeconomic History     Marital status:      Spouse name: Eileen     Number of children: Not on file     Years of education: Not on file     Highest education level: Not on file   Occupational History     Occupation: [employer]     Employer: [employer]     Comment: retired   Social Needs     Financial resource strain: Not hard at all     Food insecurity:     Worry: Never true     Inability: Not on file     Transportation needs:     Medical: No     Non-medical: No   Tobacco Use     Smoking status: Never Smoker     Smokeless tobacco: Former User   Substance and Sexual Activity     Alcohol use: Yes     Alcohol/week: 9.0 oz     Types: 15 Standard drinks or equivalent per week     Drug use: No     Sexual activity: Yes     Partners: Female     Birth control/protection: Post-menopausal   Lifestyle     Physical activity:     Days per week: Not on file     Minutes per session: Not on file     Stress: Not on file   Relationships     Social connections:     Talks on phone: Not on file     Gets together: Not on file     Attends Yazdanism service: Not on file     Active member of club or organization: Not on file     Attends meetings of clubs or organizations: Not on file     Relationship status: Not on file     Intimate partner violence:     Fear of current or ex partner: Not on file     Emotionally abused: Not on file     Physically abused: Not on file     Forced sexual activity: Not on file   Other Topics Concern      Service No      Blood Transfusions No     Caffeine Concern Yes     Occupational Exposure Yes     Comment: travels     Hobby Hazards No     Sleep Concern No     Stress Concern Yes     Comment: travels for work     Weight Concern Yes     Special Diet No     Back Care Not Asked     Exercise Yes     Bike Helmet Not Asked     Seat Belt Yes     Self-Exams Not Asked   Social History Narrative     Not on file     Past Surgical History:   Procedure Laterality Date     CATARACT IOL, RT/LT       ESOPHAGUS SURGERY      Halo procedure     FINGER SURGERY      tendon       Current Outpatient Medications on File Prior to Visit:  ASPIRIN PO Take 81 mg by mouth daily    atorvastatin (LIPITOR) 40 MG tablet TAKE 1 TABLET BY MOUTH EVERY DAY   fenofibrate (LOFIBRA) 54 MG tablet TAKE 1 TABLET (54 MG) BY MOUTH DAILY   Omega-3 Fatty Acids (OMEGA-3 FISH OIL PO) Take  by mouth.   omeprazole (PRILOSEC) 40 MG DR capsule TAKE 1 CAPSULE (40MG) BY ORAL ROUTE EVERY DAY BEFORE A MEAL BEFORE A MEAL   propranolol (INDERAL) 20 MG tablet TAKE 1 TABLET (20 MG) BY MOUTH DAILY AS NEEDED   [] doxycycline hyclate (VIBRA-TABS) 100 MG tablet Take 1 tablet (100 mg) by mouth 2 times daily for 14 days   fluticasone (FLONASE) 50 MCG/ACT spray Spray 1-2 sprays into both nostrils daily   montelukast (SINGULAIR) 10 MG tablet Take 1 tablet (10 mg) by mouth At Bedtime     No current facility-administered medications on file prior to visit.      Allergies: Patient has no known allergies.    Immunization History   Administered Date(s) Administered     Influenza Vaccine IM 3yrs+ 4 Valent IIV4 2013, 2014, 2015, 2018     TDAP Vaccine (Boostrix) 2014     Zoster vaccine recombinant adjuvanted (SHINGRIX) 2019     Zoster vaccine, live 2017        ROS:  CONSTITUTIONAL: NEGATIVE for fever, chills  INTEGUMENTARY/SKIN: NEGATIVE for worrisome rashes, moles or lesions  EYES: NEGATIVE for vision changes   ENT/MOUTH: NEGATIVE for ear, mouth and throat  problems  RESP: NEGATIVE for significant cough or SOB  CV: NEGATIVE for chest pain, palpitations   GI: NEGATIVE for nausea, abdominal pain, heartburn, or change in bowel habits  : NEGATIVE for frequency, dysuria, or hematuria  ENDOCRINE: NEGATIVE for temperature intolerance, skin/hair changes    OBJECTIVE:  /88 (BP Location: Right arm, Patient Position: Sitting, Cuff Size: Adult Large)   Pulse 85   Temp 98.8  F (37.1  C) (Oral)   Wt 91.4 kg (201 lb 9.6 oz)   SpO2 97%   BMI 31.58 kg/m    EXAM:  GENERAL APPEARANCE: healthy, alert and no distress  EYES: EOMI,  PERRL  HENT: ear canals and TM's normal and nose and mouth without ulcers or lesions  NECK: no adenopathy, no asymmetry, masses, or scars and thyroid normal to palpation  RESP: lungs clear to auscultation - no rales, rhonchi or wheezes  CV: regular rates and rhythm, normal S1 S2, no S3 or S4 and no murmur, click or rub -  ABDOMEN:  soft, nontender, no HSM or masses and bowel sounds normal  MS: extremities normal- no gross deformities noted, no evidence of inflammation in joints, FROM in all extremities.  SKIN: no suspicious lesions or rashes  NEURO: Normal strength and tone, sensory exam abnormal to light touch in bilateral toes, no tremor, no shuffling gait  PSYCH: mentation appears normal and affect normal/bright    ASSESSMENT/PLAN:  (G60.9) Idiopathic polyneuropathy  (primary encounter diagnosis)  Comment: diagnosis likely but worsening-we discussed option to do PT for balance , start medication such as gabapentin or Lyrica vs another neurology opinion as pt was not satisfied with previous encounter-he prefers to get another opinion before trying treatments  Plan: NEUROLOGY ADULT REFERRAL            (R25.8) Nocturnal leg movements  Comment: does not sound like RLS  Plan: NEUROLOGY ADULT REFERRAL        Will d/w neurology    (F41.1) JORGE (generalized anxiety disorder)  Comment: discussed options  Plan: will increase to 150 mg, I reviewed the risks,  benefits, and possible side effects of the medication.  The patient had an opportunity to ask any questions regarding the treatment plan. The patient was encouraged to call my office if any problems.    (R26.44) Balance problems  Comment: as above  Plan: NEUROLOGY ADULT REFERRAL           m

## 2019-07-26 DIAGNOSIS — E78.2 MIXED HYPERLIPIDEMIA: ICD-10-CM

## 2019-07-26 RX ORDER — ATORVASTATIN CALCIUM 40 MG/1
TABLET, FILM COATED ORAL
Qty: 30 TABLET | Refills: 5 | COMMUNITY
Start: 2019-07-26

## 2019-07-26 NOTE — TELEPHONE ENCOUNTER
Edvin Norton is requesting a refill of:    Refused Prescriptions:                       Disp   Refills    atorvastatin (LIPITOR) 40 MG tablet [Pharm*30 tab*5        Sig: TAKE 1 TABLET BY MOUTH EVERY DAY  Refused By: LIBRADO MURGUIA  Reason for Refusal: Patient needs appointment

## 2019-08-21 DIAGNOSIS — E78.2 MIXED HYPERLIPIDEMIA: ICD-10-CM

## 2019-08-21 DIAGNOSIS — I10 ESSENTIAL HYPERTENSION, BENIGN: ICD-10-CM

## 2019-08-21 RX ORDER — FENOFIBRATE 54 MG/1
54 TABLET ORAL DAILY
Qty: 90 TABLET | Refills: 0 | Status: SHIPPED | OUTPATIENT
Start: 2019-08-21 | End: 2019-09-03

## 2019-08-21 RX ORDER — PROPRANOLOL HYDROCHLORIDE 20 MG/1
TABLET ORAL
Qty: 90 TABLET | Refills: 0 | Status: SHIPPED | OUTPATIENT
Start: 2019-08-21 | End: 2019-11-13

## 2019-08-21 NOTE — TELEPHONE ENCOUNTER
Pending Prescriptions:                       Disp   Refills    fenofibrate (LOFIBRA) 54 MG tablet [Pharm*90 tab*             Sig: TAKE 1 TABLET (54 MG) BY MOUTH DAILY    propranolol (INDERAL) 20 MG tablet [Pharm*90 tab*             Sig: TAKE 1 TABLET (20 MG) BY MOUTH DAILY AS NEEDED    JCC please review     Pt is due for a fasting blood work  Received a 30 day on 7-16  Had a ov 7-17 but no blood work done  Last med check was 12- rtc in six months  CHANGE qty fax deny or send to FD   For a FASTING OV  Tracy  [phone]

## 2019-09-03 ENCOUNTER — OFFICE VISIT (OUTPATIENT)
Dept: FAMILY MEDICINE | Facility: CLINIC | Age: 64
End: 2019-09-03

## 2019-09-03 VITALS
HEIGHT: 67 IN | BODY MASS INDEX: 32.18 KG/M2 | TEMPERATURE: 98.6 F | OXYGEN SATURATION: 97 % | DIASTOLIC BLOOD PRESSURE: 82 MMHG | SYSTOLIC BLOOD PRESSURE: 136 MMHG | WEIGHT: 205 LBS | HEART RATE: 70 BPM

## 2019-09-03 DIAGNOSIS — E78.2 MIXED HYPERLIPIDEMIA: ICD-10-CM

## 2019-09-03 DIAGNOSIS — F41.1 GAD (GENERALIZED ANXIETY DISORDER): ICD-10-CM

## 2019-09-03 LAB
ALBUMIN SERPL-MCNC: 4.6 G/DL (ref 3.6–5.1)
ALBUMIN/GLOB SERPL: 1.8 {RATIO} (ref 1–2.5)
ALP SERPL-CCNC: 67 U/L (ref 33–130)
ALT 1742-6: 54 U/L (ref 5–30)
AST 1920-8: 73 U/L (ref 7–31)
BILIRUB SERPL-MCNC: 0.7 MG/DL (ref 0.2–1.2)
BUN SERPL-MCNC: 25 MG/DL (ref 7–25)
BUN/CREATININE RATIO: 23.4 (ref 6–22)
CALCIUM SERPL-MCNC: 9.5 MG/DL (ref 8.6–10.3)
CHLORIDE SERPLBLD-SCNC: 105.5 MMOL/L (ref 98–110)
CHOLEST SERPL-MCNC: 254 MG/DL (ref 0–199)
CHOLEST/HDLC SERPL: 4 {RATIO} (ref 0–5)
CO2 SERPL-SCNC: 26.1 MMOL/L (ref 20–32)
CREAT SERPL-MCNC: 1.07 MG/DL (ref 0.7–1.18)
GLOBULIN, CALCULATED - QUEST: 2.6 (ref 1.9–3.7)
GLUCOSE SERPL-MCNC: 97 MG/DL (ref 60–99)
HDLC SERPL-MCNC: 59 MG/DL (ref 40–150)
LDLC SERPL CALC-MCNC: 89 MG/DL (ref 0–130)
POTASSIUM SERPL-SCNC: 5.11 MMOL/L (ref 3.5–5.3)
PROT SERPL-MCNC: 7.2 G/DL (ref 6.1–8.1)
SODIUM SERPL-SCNC: 142.7 MMOL/L (ref 135–146)
TRIGL SERPL-MCNC: 531 MG/DL (ref 0–149)

## 2019-09-03 PROCEDURE — 80053 COMPREHEN METABOLIC PANEL: CPT | Performed by: PHYSICIAN ASSISTANT

## 2019-09-03 PROCEDURE — 99214 OFFICE O/P EST MOD 30 MIN: CPT | Performed by: PHYSICIAN ASSISTANT

## 2019-09-03 PROCEDURE — 36415 COLL VENOUS BLD VENIPUNCTURE: CPT | Performed by: PHYSICIAN ASSISTANT

## 2019-09-03 PROCEDURE — 80061 LIPID PANEL: CPT | Performed by: PHYSICIAN ASSISTANT

## 2019-09-03 RX ORDER — FENOFIBRATE 54 MG/1
54 TABLET ORAL DAILY
Qty: 90 TABLET | Refills: 0 | Status: SHIPPED | OUTPATIENT
Start: 2019-09-03 | End: 2019-09-04 | Stop reason: DRUGHIGH

## 2019-09-03 RX ORDER — SERTRALINE HYDROCHLORIDE 100 MG/1
150 TABLET, FILM COATED ORAL DAILY
Qty: 135 TABLET | Refills: 0 | Status: SHIPPED | OUTPATIENT
Start: 2019-09-03 | End: 2020-01-08

## 2019-09-03 RX ORDER — ATORVASTATIN CALCIUM 40 MG/1
40 TABLET, FILM COATED ORAL DAILY
Qty: 90 TABLET | Refills: 1 | Status: SHIPPED | OUTPATIENT
Start: 2019-09-03 | End: 2020-03-26

## 2019-09-03 ASSESSMENT — PATIENT HEALTH QUESTIONNAIRE - PHQ9
5. POOR APPETITE OR OVEREATING: NOT AT ALL
SUM OF ALL RESPONSES TO PHQ QUESTIONS 1-9: 0

## 2019-09-03 ASSESSMENT — ANXIETY QUESTIONNAIRES
5. BEING SO RESTLESS THAT IT IS HARD TO SIT STILL: NOT AT ALL
GAD7 TOTAL SCORE: 0
3. WORRYING TOO MUCH ABOUT DIFFERENT THINGS: NOT AT ALL
2. NOT BEING ABLE TO STOP OR CONTROL WORRYING: NOT AT ALL
7. FEELING AFRAID AS IF SOMETHING AWFUL MIGHT HAPPEN: NOT AT ALL
6. BECOMING EASILY ANNOYED OR IRRITABLE: NOT AT ALL
1. FEELING NERVOUS, ANXIOUS, OR ON EDGE: NOT AT ALL
IF YOU CHECKED OFF ANY PROBLEMS ON THIS QUESTIONNAIRE, HOW DIFFICULT HAVE THESE PROBLEMS MADE IT FOR YOU TO DO YOUR WORK, TAKE CARE OF THINGS AT HOME, OR GET ALONG WITH OTHER PEOPLE: NOT DIFFICULT AT ALL

## 2019-09-03 ASSESSMENT — MIFFLIN-ST. JEOR: SCORE: 1687.46

## 2019-09-03 NOTE — PROGRESS NOTES
"CC: Medication Check    History:  JORGE: Takes sertraline 150 mg daily. This was increased from 100 mg daily 8/2019, and he has seen benefit from this change, and denies any side effects. He even feels like the increased dose is helping with his lower extremity neuropathy that he is seeing neurology for on 10/8/2019.     Mixed hyperlipidemia:   Taking atorvastatin and fenofibrate. He is here fasting, so we can recheck fasting labs. Denies any side effects.     PMH, MEDICATIONS, ALLERGIES, SOCIAL AND FAMILY HISTORY in [facility] and reviewed by me personally.    ROS negative other than the symptoms noted above in the HPI.    Examination   /82 (BP Location: Left arm, Patient Position: Sitting, Cuff Size: Adult Large)   Pulse 70   Temp 98.6  F (37  C) (Oral)   Ht 1.708 m (5' 7.25\")   Wt 93 kg (205 lb)   SpO2 97%   BMI 31.87 kg/m       Constitutional: Sitting comfortably, in no acute distress. Vital signs noted  Neck:  trachea midline and normal to palpation, no jugular venous distention  Cardiovascular:  regular rate and rhythm, no murmurs, clicks, or gallops  Respiratory:  normal respiratory rate and rhythm, lungs clear to auscultation  SKIN: No jaundice/pallor/rash.   Psychiatric: mentation appears normal and affect normal/bright        A/P    ICD-10-CM    1. Mixed hyperlipidemia E78.2 atorvastatin (LIPITOR) 40 MG tablet     VENOUS COLLECTION     Lipid Panel (BFP)     Comprehensive Metobolic Panel (BFP)     fenofibrate (LOFIBRA) 54 MG tablet   2. JORGE (generalized anxiety disorder) F41.1 sertraline (ZOLOFT) 100 MG tablet       DISCUSSION:  1. Mixed hyperlipidemia  [facility] reche[facility] fasting labs today[facility] Refilled at current doses for 6 months, accounting for previous orders that are still open.  *Update* triglycerides still severely elevated- will increase fenofibrate dose to 160 mg daily. Recheck CMP in 3 months.  - atorvastatin (LIPITOR) 40 MG tablet; Take 1 tablet (40 mg) " by mouth daily  Dispense: 90 tablet; Refill: 1  - VENOUS COLLECTION  - Lipid Panel (BFP)  - Comprehensive Metobolic Panel (BFP)  - fenofibrate (LOFIBRA) 54 MG tablet; Take 1 tablet (54 mg) by mouth daily  Dispense: 90 tablet; Refill: 0    2. JORGE (generalized anxiety disorder)  Given that he is doing well on dose change after 2-3 weeks, did approve of longer term extension.   - sertraline (ZOLOFT) 100 MG tablet; Take 1.5 tablets (150 mg) by mouth daily  Dispense: 135 tablet; Refill: 0    follow up visit: 6 months, fasting med check    Rossana Bailey PA-C  [facility]

## 2019-09-03 NOTE — NURSING NOTE
Devin is here for a fasting med check.          Pre-visit Screening:  Immunizations:  up to date  Colonoscopy:  is up to date  Mammogram: NA  Asthma Action Test/Plan:  NA  PHQ9:  Done today  GAD7:  Done today  Questioned patient about current smoking habits Pt. has never smoked.  Ok to leave detailed message on voice mail for today's visit only Yes, phone # [phone]

## 2019-09-04 RX ORDER — FENOFIBRATE 160 MG/1
160 TABLET ORAL DAILY
Qty: 90 TABLET | Refills: 0 | Status: SHIPPED | OUTPATIENT
Start: 2019-09-04 | End: 2019-11-27

## 2019-09-04 ASSESSMENT — ANXIETY QUESTIONNAIRES: GAD7 TOTAL SCORE: 0

## 2019-10-01 ENCOUNTER — HEALTH MAINTENANCE LETTER (OUTPATIENT)
Age: 64
End: 2019-10-01

## 2019-10-08 ENCOUNTER — TRANSFERRED RECORDS (OUTPATIENT)
Dept: FAMILY MEDICINE | Facility: CLINIC | Age: 64
End: 2019-10-08

## 2019-10-15 ENCOUNTER — TRANSFERRED RECORDS (OUTPATIENT)
Dept: HEALTH INFORMATION MANAGEMENT | Facility: CLINIC | Age: 64
End: 2019-10-15

## 2019-10-24 ENCOUNTER — OFFICE VISIT (OUTPATIENT)
Dept: FAMILY MEDICINE | Facility: CLINIC | Age: 64
End: 2019-10-24

## 2019-10-24 VITALS
WEIGHT: 204 LBS | HEART RATE: 78 BPM | HEIGHT: 67 IN | BODY MASS INDEX: 32.02 KG/M2 | TEMPERATURE: 98.4 F | SYSTOLIC BLOOD PRESSURE: 132 MMHG | OXYGEN SATURATION: 97 % | DIASTOLIC BLOOD PRESSURE: 82 MMHG

## 2019-10-24 DIAGNOSIS — E72.12 METHYLENETETRAHYDROFOLATE REDUCTASE (MTHFR) DEFICIENCY (H): ICD-10-CM

## 2019-10-24 DIAGNOSIS — R26.0 ATAXIC GAIT: ICD-10-CM

## 2019-10-24 DIAGNOSIS — R79.89 ELEVATED HOMOCYSTEINE: ICD-10-CM

## 2019-10-24 DIAGNOSIS — G57.53 TARSAL TUNNEL SYNDROME OF BOTH LOWER EXTREMITIES: Primary | ICD-10-CM

## 2019-10-24 DIAGNOSIS — M48.02 CERVICAL STENOSIS OF SPINAL CANAL: ICD-10-CM

## 2019-10-24 PROCEDURE — 99213 OFFICE O/P EST LOW 20 MIN: CPT | Performed by: FAMILY MEDICINE

## 2019-10-24 PROCEDURE — 86618 LYME DISEASE ANTIBODY: CPT | Mod: 90 | Performed by: FAMILY MEDICINE

## 2019-10-24 PROCEDURE — 36415 COLL VENOUS BLD VENIPUNCTURE: CPT | Performed by: FAMILY MEDICINE

## 2019-10-24 ASSESSMENT — MIFFLIN-ST. JEOR: SCORE: 1682.93

## 2019-10-24 NOTE — PROGRESS NOTES
SUBJECTIVE:  Edvin Norton, a 64 year old male scheduled an appointment to discuss the following issues:     Tarsal tunnel syndrome of both lower extremities  Cervical stenosis of spinal canal  Methylenetetrahydrofolate reductase (MTHFR) deficiency (H)  Elevated homocysteine (H)  Pt here to f/u labs and tests done with Dr William Cervantes at [facility]- he had an EMG/ENG showing no neuropathy in feet-rather tarsal tunnel.  Pt has been referred to DR Moore for therapeutic injections     PT had C spine MRI looking into some cervical sensory deficits and gait ataxia-found to have significant cervical stenosis and disc displacement    Pt was also found to have deficiency of methylenetetrahydrofolate and high homocysteine levels, B12 and folate normal.  Pt has started some oral B12 per his report but he was told to f/u here regarding results.    In reviewing records I have it appears DR Cervantes had intended to have pt follow up with him as well-maybe regarding C spine issues only-it is unclear as I do not have notes regarding results etc    Medical, social, surgical, and family histories reviewed.    Patient Active Problem List   Diagnosis     Health Care Home     Natarajan esophagus     Mixed hyperlipidemia     Vitamin D deficiency     Performance anxiety     Anxiety state     Esophageal reflux     ACP (advance care planning)     Essential hypertension, benign (HTN)     Obesity due to excess calories, unspecified obesity severity     Natarajan's esophagus with dysplasia     Nonallergic rhinitis     Past Medical History:   Diagnosis Date     Anxiety state 9/26/2013     Problem list name updated by automated process. 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     Essential hypertension, benign (HTN) 10/27/2014     Obesity due to excess calories, unspecified obesity severity 3/24/2016     Family History   Problem Relation Age of Onset     Hypertension Mother      Psychotic Disorder Mother         anxiety     Psychotic Disorder  Father         anxiety     Cerebrovascular Disease Sister      Psychotic Disorder Sister         anxiety     Psychotic Disorder Brother         anxiety     Thyroid Disease Sister      Thyroid Disease Brother      Thyroid Disease Sister      C.A.D. No family hx of      Diabetes No family hx of      Breast Cancer No family hx of      Cancer - colorectal No family hx of      Prostate Cancer No family hx of      Social History     Socioeconomic History     Marital status:      Spouse name: Eileen     Number of children: Not on file     Years of education: Not on file     Highest education level: Not on file   Occupational History     Occupation: [employer]     Employer: [employer]     Comment: retired   Social Needs     Financial resource strain: Not hard at all     Food insecurity:     Worry: Never true     Inability: Not on file     Transportation needs:     Medical: No     Non-medical: No   Tobacco Use     Smoking status: Never Smoker     Smokeless tobacco: Former User   Substance and Sexual Activity     Alcohol use: Yes     Alcohol/week: 15.0 standard drinks     Types: 15 Standard drinks or equivalent per week     Drug use: No     Sexual activity: Yes     Partners: Female     Birth control/protection: Post-menopausal   Lifestyle     Physical activity:     Days per week: Not on file     Minutes per session: Not on file     Stress: Not on file   Relationships     Social connections:     Talks on phone: Not on file     Gets together: Not on file     Attends Islam service: Not on file     Active member of club or organization: Not on file     Attends meetings of clubs or organizations: Not on file     Relationship status: Not on file     Intimate partner violence:     Fear of current or ex partner: Not on file     Emotionally abused: Not on file     Physically abused: Not on file     Forced sexual activity: Not on file   Other Topics Concern      Service No     Blood Transfusions No      "Caffeine Concern Yes     Occupational Exposure Yes     Comment: travels     Hobby Hazards No     Sleep Concern No     Stress Concern Yes     Comment: travels for work     Weight Concern Yes     Special Diet No     Back Care Not Asked     Exercise Yes     Bike Helmet Not Asked     Seat Belt Yes     Self-Exams Not Asked   Social History Narrative     Not on file     Past Surgical History:   Procedure Laterality Date     CATARACT IOL, RT/LT       ESOPHAGUS SURGERY      Halo procedure     FINGER SURGERY      tendon     ASPIRIN PO, Take 81 mg by mouth daily   atorvastatin (LIPITOR) 40 MG tablet, Take 1 tablet (40 mg) by mouth daily  fenofibrate (TRIGLIDE/LOFIBRA) 160 MG tablet, Take 1 tablet (160 mg) by mouth daily  Omega-3 Fatty Acids (OMEGA-3 FISH OIL PO), Take  by mouth.  omeprazole (PRILOSEC) 40 MG DR capsule, TAKE 1 CAPSULE (40MG) BY ORAL ROUTE EVERY DAY BEFORE A MEAL BEFORE A MEAL  propranolol (INDERAL) 20 MG tablet, TAKE 1 TABLET (20 MG) BY MOUTH DAILY AS NEEDED  sertraline (ZOLOFT) 100 MG tablet, Take 1.5 tablets (150 mg) by mouth daily    No current facility-administered medications on file prior to visit.        Allergies: Patient has no known allergies.    Immunization History   Administered Date(s) Administered     Influenza Vaccine IM > 6 months Valent IIV4 09/26/2013, 09/29/2014, 08/31/2015, 09/12/2018, 08/30/2019     TDAP Vaccine (Boostrix) 06/23/2014     Zoster vaccine recombinant adjuvanted (SHINGRIX) 02/22/2019, 08/30/2019     Zoster vaccine, live 11/17/2017        ROS:  CONSTITUTIONAL: NEGATIVE for fever, chills  EYES: NEGATIVE for vision changes   RESP: NEGATIVE for significant cough or SOB  CV: NEGATIVE for chest pain, palpitations   GI: NEGATIVE for nausea, abdominal pain, heartburn, or change in bowel habits    OBJECTIVE:  /82 (BP Location: Left arm, Patient Position: Sitting, Cuff Size: Adult Large)   Pulse 78   Temp 98.4  F (36.9  C) (Oral)   Ht 1.708 m (5' 7.25\")   Wt 92.5 kg (204 lb)  "  SpO2 97%   BMI 31.71 kg/m    EXAM:  GENERAL APPEARANCE: healthy, alert and no distress  EYES: EOMI,  PERRL  HENT: ear canals and TM's normal and nose and mouth without ulcers or lesions  RESP: lungs clear to auscultation - no rales, rhonchi or wheezes  CV: regular rates and rhythm, normal S1 S2, no S3 or S4 and no murmur, click or rub -    ASSESSMENT/PLAN:      (G57.53) Tarsal tunnel syndrome of both lower extremities  (primary encounter diagnosis)  Comment: pt will be seeing Dr Moore for injections  Plan:     (M48.02) Cervical stenosis of spinal canal  Comment: Dr Cervantes states he will contact pt to manage this  Plan: possible injections vs surgery    (E72.12) Methylenetetrahydrofolate reductase (MTHFR) deficiency (H)  Comment: I did call Dr Cervantes to clarify plans-he recommend I start Foltx supplement to help homocystiene (CV risk if high) and recheck in 3-4 months-if still high needs genetic testing  Plan:     (E72.11) Elevated homocysteine (H)  Comment: as above  Plan:

## 2019-10-24 NOTE — NURSING NOTE
Edvin is here today to discuss results from  his neurologist.    Pre-visit Screening:  Immunizations:  up to date  Colonoscopy:  is up to date  Mammogram: NA  Asthma Action Test/Plan:  NA  PHQ9:  NA  GAD7:  NA  Questioned patient about current smoking habits Pt. has never smoked.  Ok to leave detailed message on voice mail for today's visit only Yes, phone # [phone]

## 2019-10-25 LAB — LYME SCREEN IGG AND IGM: <0.9 INDEX

## 2019-10-25 RX ORDER — B12/LEVOMEFOLATE CALCIUM/B-6 2-1.13-25
1 TABLET ORAL DAILY
Qty: 90 TABLET | Refills: 0 | Status: SHIPPED | OUTPATIENT
Start: 2019-10-25 | End: 2020-08-17

## 2019-10-28 ENCOUNTER — TELEPHONE (OUTPATIENT)
Dept: FAMILY MEDICINE | Facility: CLINIC | Age: 64
End: 2019-10-28

## 2019-10-28 ENCOUNTER — TRANSFERRED RECORDS (OUTPATIENT)
Dept: FAMILY MEDICINE | Facility: CLINIC | Age: 64
End: 2019-10-28

## 2019-10-28 DIAGNOSIS — E72.12 METHYLENETETRAHYDROFOLATE REDUCTASE (MTHFR) DEFICIENCY (H): Primary | ICD-10-CM

## 2019-10-28 DIAGNOSIS — R79.89 ELEVATED HOMOCYSTEINE: ICD-10-CM

## 2019-10-28 NOTE — TELEPHONE ENCOUNTER
Edvin L Enerson called the clinic support line with the following:    States that the Foltx is not covered by his insurance. He is wondering if there is an alternative.    $155.00 on [facility] for 3 months.    Please advise  Thanks,Gita    [phone]

## 2019-10-29 ENCOUNTER — TELEPHONE (OUTPATIENT)
Dept: FAMILY MEDICINE | Facility: CLINIC | Age: 64
End: 2019-10-29

## 2019-10-29 NOTE — TELEPHONE ENCOUNTER
Edvin's daughter, Jocelynn Stevenson, left a voicemail to inform Dr. Cintron that Edvin fell over the weekend and she is concerned for him. He gets numbness in his feet and she hopes that he has shared that with Dr. Cintron. He was seen by Dr. Cintron on 10/24/19 and fell on 10/26/19.      She stated she understands she may not have permission to his medical info but wanted to inform Dr. Cintron.        I called her back and left a message thanking her for informing us and I will let Dr. Cintron know.        His daughter's phone # [phone]

## 2019-11-13 DIAGNOSIS — I10 ESSENTIAL HYPERTENSION, BENIGN: ICD-10-CM

## 2019-11-13 RX ORDER — PROPRANOLOL HYDROCHLORIDE 20 MG/1
TABLET ORAL
Qty: 90 TABLET | Refills: 1 | Status: SHIPPED | OUTPATIENT
Start: 2019-11-13 | End: 2020-03-26

## 2019-11-13 NOTE — TELEPHONE ENCOUNTER
Pending Prescriptions:                       Disp   Refills    propranolol (INDERAL) 20 MG tablet [Pharm*90 tab*             Sig: TAKE 1 TABLET BY MOUTH DAILY AS NEEDED      JCC please review:      Pt here for a med check on 9-3-2019 rtc in 6 months fasting   Last refill was 8-  Fax and close encounter  Tracy  [phone] (home) [phone] (work)

## 2019-11-27 DIAGNOSIS — E78.2 MIXED HYPERLIPIDEMIA: ICD-10-CM

## 2019-11-27 RX ORDER — FENOFIBRATE 160 MG/1
160 TABLET ORAL DAILY
Qty: 90 TABLET | Refills: 0 | Status: SHIPPED | OUTPATIENT
Start: 2019-11-27 | End: 2020-03-26

## 2019-11-27 NOTE — TELEPHONE ENCOUNTER
Pt saw you on 09/03/19. Advised to return in 6 months. Pt will be 3 months short of medication. Please advise.    Edvin Norton is requesting a refill of:    Pending Prescriptions:                       Disp   Refills    fenofibrate (TRIGLIDE/LOFIBRA) 160 MG tab*90 tab*0            Sig: Take 1 tablet (160 mg) by mouth daily

## 2019-12-16 ENCOUNTER — TRANSFERRED RECORDS (OUTPATIENT)
Dept: FAMILY MEDICINE | Facility: CLINIC | Age: 64
End: 2019-12-16

## 2019-12-30 DIAGNOSIS — E78.2 MIXED HYPERLIPIDEMIA: ICD-10-CM

## 2020-01-06 ENCOUNTER — OFFICE VISIT (OUTPATIENT)
Dept: FAMILY MEDICINE | Facility: CLINIC | Age: 65
End: 2020-01-06

## 2020-01-06 VITALS
DIASTOLIC BLOOD PRESSURE: 86 MMHG | HEIGHT: 67 IN | BODY MASS INDEX: 34.37 KG/M2 | TEMPERATURE: 98.5 F | OXYGEN SATURATION: 96 % | SYSTOLIC BLOOD PRESSURE: 128 MMHG | WEIGHT: 219 LBS | HEART RATE: 81 BPM

## 2020-01-06 DIAGNOSIS — H61.22 IMPACTED CERUMEN OF LEFT EAR: Primary | ICD-10-CM

## 2020-01-06 DIAGNOSIS — R26.81 GAIT INSTABILITY: ICD-10-CM

## 2020-01-06 PROCEDURE — 69209 REMOVE IMPACTED EAR WAX UNI: CPT | Performed by: PHYSICIAN ASSISTANT

## 2020-01-06 PROCEDURE — 99213 OFFICE O/P EST LOW 20 MIN: CPT | Mod: 25 | Performed by: PHYSICIAN ASSISTANT

## 2020-01-06 ASSESSMENT — MIFFLIN-ST. JEOR: SCORE: 1745.97

## 2020-01-06 NOTE — PROGRESS NOTES
SUBJECTIVE:                                                    Edvin Norton is a 65 year old male who presents to clinic today for the following health issues:    Chief Complaint   Patient presents with     Ear Problem     both ears feel plugged, more so the left one and it is painful.         Plugged ear for 5 days , has hx of cerumen impaction    Pt does also have concerns about gait instability  Had DJD cervical spine  Seeing Neurologist for Follow-up on Wednesday          BP Readings from Last 3 Encounters:   01/06/20 128/86   10/24/19 132/82   09/03/19 136/82    Wt Readings from Last 3 Encounters:   01/06/20 99.3 kg (219 lb)   10/24/19 92.5 kg (204 lb)   09/03/19 93 kg (205 lb)            Patient Active Problem List   Diagnosis     Health Care Home     Natarajan esophagus     Mixed hyperlipidemia     Vitamin D deficiency     Performance anxiety     Anxiety state     Esophageal reflux     ACP (advance care planning)     Essential hypertension, benign (HTN)     Obesity due to excess calories, unspecified obesity severity     Natarajan's esophagus with dysplasia     Nonallergic rhinitis     Past Surgical History:   Procedure Laterality Date     CATARACT IOL, RT/LT       ESOPHAGUS SURGERY      Halo procedure     FINGER SURGERY      tendon       Social History     Tobacco Use     Smoking status: Never Smoker     Smokeless tobacco: Former User   Substance Use Topics     Alcohol use: Yes     Alcohol/week: 15.0 standard drinks     Types: 15 Standard drinks or equivalent per week     Family History   Problem Relation Age of Onset     Hypertension Mother      Psychotic Disorder Mother         anxiety     Psychotic Disorder Father         anxiety     Cerebrovascular Disease Sister      Psychotic Disorder Sister         anxiety     Psychotic Disorder Brother         anxiety     Thyroid Disease Sister      Thyroid Disease Brother      Thyroid Disease Sister      C.A.D. No family hx of      Diabetes No family hx of       "Breast Cancer No family hx of      Cancer - colorectal No family hx of      Prostate Cancer No family hx of          Current Outpatient Medications   Medication Sig Dispense Refill     ASPIRIN PO Take 81 mg by mouth daily        atorvastatin (LIPITOR) 40 MG tablet Take 1 tablet (40 mg) by mouth daily 90 tablet 1     B Complex-Folic Acid (SUPER B COMPLEX MAXI) TABS Take 1 capsule by mouth daily 90 tablet 0     fenofibrate (TRIGLIDE/LOFIBRA) 160 MG tablet Take 1 tablet (160 mg) by mouth daily 90 tablet 0     L-Methylfolate-B6-B12 (FOLTX) 1.13-25-2 MG TABS Take 1 Dose by mouth daily 90 tablet 0     Omega-3 Fatty Acids (OMEGA-3 FISH OIL PO) Take  by mouth.       omeprazole (PRILOSEC) 40 MG DR capsule TAKE 1 CAPSULE (40MG) BY ORAL ROUTE EVERY DAY BEFORE A MEAL BEFORE A MEAL  0     propranolol (INDERAL) 20 MG tablet TAKE 1 TABLET BY MOUTH DAILY AS NEEDED 90 tablet 1     sertraline (ZOLOFT) 100 MG tablet Take 1.5 tablets (150 mg) by mouth daily 135 tablet 0       No Known Allergies    OBJECTIVE:                                                    /86 (BP Location: Left arm, Patient Position: Sitting, Cuff Size: Adult Large)   Pulse 81   Temp 98.5  F (36.9  C) (Oral)   Ht 1.708 m (5' 7.25\")   Wt 99.3 kg (219 lb)   SpO2 96%   BMI 34.05 kg/m   Body mass index is 34.05 kg/m .     GENERAL: alert and no distress  HEAD: Normocephalic, atraumatic  EYES: Eyes grossly normal to inspection, extraocular movements - intact  EARS:   Right: External ear and canal normal, TM normal  Left: canal impacted  Flushed by MA  Left: External ear normal  After exam there is mild erythema of canal, TM nl    Neuro: CN II - XII intact  Gait:  Unsteady. Able to heel/toe walk  Test strength in the following muscles bilaterally: biceps, Triceps, hip flexors, knee flexor/extensors, ankle dorsiflexors and plantarflexors are all normal.   Normal:  Test finger tapping, nose to finger, and heel-knee-shin performance.   Test for pronator drift is " normal, however pt has difficulty with standing with eyes closed.   Pupils are round, reactive to light, EOM intact in all directions.                     ASSESSMENT/PLAN:                                                      1. Impacted cerumen of left ear  Cerumenosis is noted.  Wax is removed by syringing and manual debridement. Instructions for home care to prevent wax buildup are given.      2. Gait instability  Advised discussion with neurologist - consider PT      Marcia Christine PA-C  [facility]

## 2020-01-06 NOTE — NURSING NOTE
Edvin is here because both ears feel plugged, more so the left one and it is painful.        Pre-visit Screening:  Immunizations:  up to date  Colonoscopy:  is up to date  Mammogram: NA  Asthma Action Test/Plan:  NA  PHQ9:  None  GAD7:  None  Questioned patient about current smoking habits Pt. has never smoked.  Ok to leave detailed message on voice mail for today's visit only Yes, phone # [phone]

## 2020-01-08 ENCOUNTER — TELEPHONE (OUTPATIENT)
Dept: FAMILY MEDICINE | Facility: CLINIC | Age: 65
End: 2020-01-08

## 2020-01-08 DIAGNOSIS — F41.1 GAD (GENERALIZED ANXIETY DISORDER): ICD-10-CM

## 2020-01-08 RX ORDER — SERTRALINE HYDROCHLORIDE 100 MG/1
150 TABLET, FILM COATED ORAL DAILY
Qty: 135 TABLET | Refills: 0 | COMMUNITY
Start: 2020-01-08 | End: 2020-03-26

## 2020-01-08 NOTE — TELEPHONE ENCOUNTER
Pt seen on 10/24/19,advised to return in 6 months, only 3 months sent in. Received refill request for pt's Sertraline 100MG, called in 135 tab 0 refills to [facility] [place]. Enough for pt to get to 04/24/19 when due back.

## 2020-01-15 ENCOUNTER — TRANSFERRED RECORDS (OUTPATIENT)
Dept: FAMILY MEDICINE | Facility: CLINIC | Age: 65
End: 2020-01-15

## 2020-01-25 DIAGNOSIS — E72.12 METHYLENETETRAHYDROFOLATE REDUCTASE (MTHFR) DEFICIENCY (H): ICD-10-CM

## 2020-01-25 DIAGNOSIS — R79.89 ELEVATED HOMOCYSTEINE: ICD-10-CM

## 2020-01-25 NOTE — TELEPHONE ENCOUNTER
Edvin Norton is requesting a refill of:    Pending Prescriptions:                       Disp   Refills    B Complex-Folic Acid (SUPER B COMPLEX MAX*30 tab*2            Sig: Take one tablet by mouth daily    Please close encounter if RX was sent. Thanks, Gita

## 2020-01-29 ENCOUNTER — TRANSFERRED RECORDS (OUTPATIENT)
Dept: HEALTH INFORMATION MANAGEMENT | Facility: CLINIC | Age: 65
End: 2020-01-29

## 2020-02-07 ENCOUNTER — TRANSCRIBE ORDERS (OUTPATIENT)
Dept: OTHER | Age: 65
End: 2020-02-07

## 2020-02-07 ENCOUNTER — PRE VISIT (OUTPATIENT)
Dept: ORTHOPEDICS | Facility: CLINIC | Age: 65
End: 2020-02-07

## 2020-02-07 DIAGNOSIS — G95.9 CERVICAL MYELOPATHY (H): Primary | ICD-10-CM

## 2020-02-07 NOTE — TELEPHONE ENCOUNTER
INTAKE QUESTIONS FOR SPINE AND NECK                                                                     REASON FOR VISIT: New Spine-Cervical Myeolpathy-eval for decompression surgery     APPOINTMENT DATE: 2/20/20   HAVE YOU HAD PREVIOUS SURGERIES ON YOUR NECK OR SPINE: Unknown  WHERE?     WHEN?    HAVE YOU HAD RELATED IMAGING?   (BONE SCANS, XRAYS, CAT SCANS, MRIS) Yes, MRI's     WHERE? Unknown    WHEN? Unknown   HAVE YOU HAD INJECTIONS TO THE SPINE? Unknown    WHERE?     WHEN?   HAVE YOU HAD RELATED PHYSICAL THERAPY IN THE LAST YEAR? [facility]    WHERE?    DO YOU HAVE ANY RELATED IMPLANTS OR HARDWARE? [facility]   DO YOU HAVE ANY PATHOLOGY REPORTS RELATED TO YOUR SPINE OR NECK? [facility]     CSS NOTES STATUS DETAILS   OFFICE NOTE from referring provider In process    OFFICE NOTE from other specialist In process    PHYSICAL THERAPY (WITHIN LAST YEAR) In process    DISCHARGE SUMMARY from hospital In process    DISCHARGE REPORT from the ER In process    OPERATIVE REPORT In process    MEDICATION LIST In process    LABS/CBC/DIFF In process    CULTURES In process    IMPLANT RECORD/STICKER In process    IMAGING     INJECTIONS DONE IN RADIOLOGY In process    MRI In process    CT SCAN In process    XRAYS (IMAGES & REPORTS) In process    TUMOR     PATHOLOGY  Slides & report In process

## 2020-02-10 ENCOUNTER — TRANSFERRED RECORDS (OUTPATIENT)
Dept: FAMILY MEDICINE | Facility: CLINIC | Age: 65
End: 2020-02-10

## 2020-02-18 ASSESSMENT — ENCOUNTER SYMPTOMS
NUMBNESS: 1
TINGLING: 0
SEIZURES: 0
MEMORY LOSS: 0
SPEECH CHANGE: 0
WEAKNESS: 0
TREMORS: 0
PARALYSIS: 0
LOSS OF CONSCIOUSNESS: 0
DIZZINESS: 1
HEADACHES: 0

## 2020-02-19 DIAGNOSIS — M54.2 NECK PAIN: Primary | ICD-10-CM

## 2020-02-20 ENCOUNTER — ANCILLARY PROCEDURE (OUTPATIENT)
Dept: GENERAL RADIOLOGY | Facility: CLINIC | Age: 65
End: 2020-02-20
Attending: ORTHOPAEDIC SURGERY
Payer: COMMERCIAL

## 2020-02-20 ENCOUNTER — OFFICE VISIT (OUTPATIENT)
Dept: ORTHOPEDICS | Facility: CLINIC | Age: 65
End: 2020-02-20
Attending: PSYCHIATRY & NEUROLOGY
Payer: COMMERCIAL

## 2020-02-20 ENCOUNTER — MYC MEDICAL ADVICE (OUTPATIENT)
Dept: FAMILY MEDICINE | Facility: CLINIC | Age: 65
End: 2020-02-20

## 2020-02-20 VITALS — BODY MASS INDEX: 28.79 KG/M2 | HEIGHT: 68 IN | WEIGHT: 190 LBS

## 2020-02-20 DIAGNOSIS — M40.12 OTHER SECONDARY KYPHOSIS, CERVICAL REGION: ICD-10-CM

## 2020-02-20 DIAGNOSIS — R26.81 GAIT INSTABILITY: Primary | ICD-10-CM

## 2020-02-20 DIAGNOSIS — M48.02 CERVICAL STENOSIS OF SPINE: ICD-10-CM

## 2020-02-20 DIAGNOSIS — M54.2 NECK PAIN: ICD-10-CM

## 2020-02-20 DIAGNOSIS — M47.12 CERVICAL SPONDYLOSIS WITH MYELOPATHY: Primary | ICD-10-CM

## 2020-02-20 ASSESSMENT — MIFFLIN-ST. JEOR: SCORE: 1626.33

## 2020-02-20 NOTE — NURSING NOTE
"Reason For Visit:   Chief Complaint   Patient presents with     Consult     cervical myelopathy. started with pain in feet. seen and evaluated for neuropathy and was found to have cervical issues.        Primary MD: Woody Cintron      ? No  Occupation Not working  Date of injury: None  Date of surgery: None  Smoker: No    Ht 1.727 m (5' 8\")   Wt 86.2 kg (190 lb)   BMI 28.89 kg/m      Pain Assessment  Patient Currently in Pain: Yes  0-10 Pain Scale: 8  Primary Pain Location: Neck  Pain Descriptors: Discomfort      Neck Disability Index (NDI) Questionnaire    Neck Disability Index (NDI) 2/18/2020   Neck Disability Index: Count 9   NDI: Total Score = SUM (points for all 10 findings) 13   Neck Disability in Percent = (Total Score) / 50 * 100 28.88 (%)   Some recent data might be hidden              Visual Analog Pain Scale  Neck Pain Scale 0-10: 8  Right arm pain: 0  Left arm pain: 0    Promis 10 Assessment    PROMIS 10 2/18/2020   In general, would you say your health is: Very good   In general, would you say your quality of life is: Very good   In general, how would you rate your physical health? Good   In general, how would you rate your mental health, including your mood and your ability to think? Very good   In general, how would you rate your satisfaction with your social activities and relationships? Excellent   In general, please rate how well you carry out your usual social activities and roles Very good   To what extent are you able to carry out your everyday physical activities such as walking, climbing stairs, carrying groceries, or moving a chair? Mostly   How often have you been bothered by emotional problems such as feeling anxious, depressed or irritable? Sometimes   How would you rate your fatigue on average? Mild   How would you rate your pain on average?   0 = No Pain  to  10 = Worst Imaginable Pain 8   In general, would you say your health is: 4   In general, would you say " your quality of life is: 4   In general, how would you rate your physical health? 3   In general, how would you rate your mental health, including your mood and your ability to think? 4   In general, how would you rate your satisfaction with your social activities and relationships? 5   In general, please rate how well you carry out your usual social activities and roles. (This includes activities at home, at work and in your community, and responsibilities as a parent, child, spouse, employee, friend, etc.) 4   To what extent are you able to carry out your everyday physical activities such as walking, climbing stairs, carrying groceries, or moving a chair? 4   In the past 7 days, how often have you been bothered by emotional problems such as feeling anxious, depressed, or irritable? 3   In the past 7 days, how would you rate your fatigue on average? 2   In the past 7 days, how would you rate your pain on average, where 0 means no pain, and 10 means worst imaginable pain? 8   Global Mental Health Score 16   Global Physical Health Score 13   PROMIS TOTAL - SUBSCORES 29   Some recent data might be hidden                Angélica Souza LPN

## 2020-02-20 NOTE — PROGRESS NOTES
REASON FOR CONSULTATION: Consult (cervical myelopathy. started with pain in feet. seen and evaluated for neuropathy and was found to have cervical issues. )     REFERRING PHYSICIAN: William Cervantes   PCP:Woody Cintron    History of Present Illness:  Edvin is a 65 year old RHD man with ~ 2 years of of significant gait instability that has resulted in multiple falls. He first started noticing issues in early 2018 after being worked up for numbness/ tingling of his b/l feet. His balance has gotten progressively worse over the last 2 years. He is now having falls and he is very unsteady on his feet. He denies any issues with weakness, he has no changes in ability to use his hands and no clumsiness. No changes with hand writing or fine motor skills. No problems with bowel/bladder control.  He does endorse some neck pain. This is located in the midline and is described as sharp. It will briefly cause a sharp pain that intermittently comes and goes. Nothing seems to bring the pain on or make it worse.     Neck 100% pain  Worse:  Will randomly start    Previous treament:   12/10/2019: C5 nerve root injection-  with no significant changes in his gait noted.   Oral prednisone taper through January 2020 without improvement with gait issues     Neck Disability Index (NDI) Questionnaire  Neck Disability Index (NDI) 2/18/2020   Neck Disability Index: Count 9   NDI: Total Score = SUM (points for all 10 findings) 13   Neck Disability in Percent = (Total Score) / 50 * 100 28.88 (%)   Some recent data might be hidden      PROMIS-10 Scores  Global Mental Health Score: (P) 16  Global Physical Health Score: (P) 13  PROMIS TOTAL - SUBSCORES: (P) 29    ROS: A 12-point review of systems was completed and is negative except for otherwise noted above in the history of present illness.    Med Hx:  Past Medical History:   Diagnosis Date     Anxiety state 9/26/2013     Problem list name updated by automated process. Provider to  "review     Natarajan esophagus 9/26/2013     Essential hypertension, benign (HTN) 10/27/2014     Obesity due to excess calories, unspecified obesity severity 3/24/2016       Surg Hx:  Past Surgical History:   Procedure Laterality Date     CATARACT IOL, RT/LT       ESOPHAGUS SURGERY      Halo procedure     FINGER SURGERY      tendon       Allergies:  No Known Allergies    Meds:  Current Outpatient Medications   Medication     ASPIRIN PO     atorvastatin (LIPITOR) 40 MG tablet     B Complex-Folic Acid (SUPER B COMPLEX MAXI) TABS     fenofibrate (TRIGLIDE/LOFIBRA) 160 MG tablet     Omega-3 Fatty Acids (OMEGA-3 FISH OIL PO)     omeprazole (PRILOSEC) 40 MG DR capsule     propranolol (INDERAL) 20 MG tablet     sertraline (ZOLOFT) 100 MG tablet     L-Methylfolate-B6-B12 (FOLTX) 1.13-25-2 MG TABS     No current facility-administered medications for this visit.        Fam Hx:  Family History   Problem Relation Age of Onset     Hypertension Mother      Psychotic Disorder Mother         anxiety     Psychotic Disorder Father         anxiety     Cerebrovascular Disease Sister      Psychotic Disorder Sister         anxiety     Psychotic Disorder Brother         anxiety     Thyroid Disease Sister      Thyroid Disease Brother      Thyroid Disease Sister      C.A.D. No family hx of      Diabetes No family hx of      Breast Cancer No family hx of      Cancer - colorectal No family hx of      Prostate Cancer No family hx of        P/S Hx:  Social History     Tobacco Use     Smoking status: Never Smoker     Smokeless tobacco: Former User   Substance Use Topics     Alcohol use: Yes     Alcohol/week: 15.0 standard drinks     Types: 15 Standard drinks or equivalent per week         Physical Exam:  Very pleasant, healthy appearing, alert, oriented x 3, cooperative.  Normal mood and affect.  Not in cardiorespiratory distress.  Ht 1.727 m (5' 8\")   Wt 86.2 kg (190 lb)   BMI 28.89 kg/m    Normal upright posture.    Wide based, unsteady " gait  Cannot attempt to perform a tandem gait without falling  Difficulty with rising from a chair without bracing with arms   Romberg's reveals significant unsteadiness with his eyes closed, but he did not fall. He was not unsteady with eyes open    Neck: normal neck posture, able to maintain horizontal gaze.  No deformity, no skin lesions or surgical scars.  Localizes pain at midline from occiput to T2/3  No neck tenderness.  ROM:   Extremely limited. In both flexion/extension and rotation  (-) Spurling's bilat.  (-) Lhermitte's.     Neuro Exam:  Motor: 5/5 strength for all muscle groups in both UE's and LE's  Sensory:  Intact to light touch in both UE's and LE's    Reflexes:      Biceps 1+ bilat.      Brachioradialis 1+ bilat.      Triceps 1+ bilat.     Patella 2+    (-) Finn bilat.    Upper extremity:  Full pulses, pink nailbeds, good capillary / refill.  (-) atrophy / asymmetry.        Imaging:   Thoracic spine MRI 1/29/2020:      Cervical MRI   - C4/5, C5/6, C6/7 with mild/moderate spinal stenosis  - Spondylolisthesis of C3/4 and C7/T1  - Cervical Kyphosis     Cervical ap-lat x-rays today show normal T1 slope (26 degrees), straightened cervical lordosis (4 degrees), and increased anterior offset or cervical sagittal imbalance (C2 SVA = +5.2cm).  Measurements:  T1 slope 26  C2-T1 lord 4  C2 SVA +5.2cm      Impression:   1.  Multilevel cervical spondylosis with stenosis C4-T1, with myelopathy (significant gait imbalance).  2.  Cervical spondylolisthesis C3-4, C7-T1.  3.  Cervical Kyphosis     Plan:   65/m, with longstanding balance problems, but seems to be getting even worse in recent months.  Previously thought to be 2' to neuropathy.  However, was evaluated by Dr. William Cervantes, neurologist, who did not think that patient had neuropathy, and whose impression was that his balance issues was mainly 2' to cervical myelopathy.  Cervical MRI does show multilevel spondylosis (degeneration) with accompanying  stenosis and loss of lordosis.  However, no myelomalacia appreciated.    While patient's main complaint is imbalance, he also has neck pain.  However, has not arm symptoms (radiculopathy).  More oddly, however, no complaint of clumsiness, ataxia, difficulty with fine motor tasks, etc.  He says he is still able to use small tools, button shirts and  coins with ease, no change in handwriting ('as bad as it's ever been').  Also, no hyperreflexia, negative Finn's.  This absence of other signs and symptoms, in the presence of very severe gait disturbance (markedly wide based gait, cannot perform tandem gait at all) is unusual, and brings up the question as to whether his imbalance may be coming from something else other than his c-spine.  I told him that balance is multifactorial and that many different things may cause balance problems; presumably, however, Dr. eCrvantes had also checked on those, and still felt that his cervical spine is the most likely candidate.    - Repeat Cervical MRI (previous one had poor image quality 2' to motion artifact).  - Cervical CT scan     RTC after above, with EOS full spine ap-lat and cervical flex-ext x-rays, to review and discuss options.  Long discussion regarding why this is not a straight forward case of cervical myelopathy given his lack of upper extremity symptoms.     All questions and concerns were answered to the patient's apparent satisfaction before leaving the clinic.     Total visit time > 45 mins, > 50% counseling and coordination of care.    Respectfully,    Bipin Woodward MD  Orthopedic Surgery PGY1  [phone]    Attestation:  I (Dr. Dane Anderson - Spine Surgeon) have personally evaluated patient with PGY-1 Dr. Woodward, and agree with findings and plan outlined in the note, which I also edited.  I discussed at length with the patient/family, explained the nature of spinal condition, and formulated workup and/or treatment plan together.  All questions were  answered to the best of my ability and to patient's apparent satisfaction.      Dane Anderson MD    Orthopaedic Spine Surgery  Dept Orthopaedic Surgery, [facility]  [phone] office, [phone] pager  [email or web address]       Answers for HPI/ROS submitted by the patient on 2/18/2020   General Symptoms: No  Skin Symptoms: No  HENT Symptoms: No  EYE SYMPTOMS: No  HEART SYMPTOMS: No  LUNG SYMPTOMS: No  INTESTINAL SYMPTOMS: No  URINARY SYMPTOMS: No  REPRODUCTIVE SYMPTOMS: No  SKELETAL SYMPTOMS: No  BLOOD SYMPTOMS: No  NERVOUS SYSTEM SYMPTOMS: Yes  MENTAL HEALTH SYMPTOMS: No  Dizziness or trouble with balance: Yes  Fainting or black-out spells: No  Memory loss: No  Headache: No  Seizures: No  Speech problems: No  Tingling: No  Tremor: No  Weakness: No  Difficulty walking: Yes  Paralysis: No  Numbness: Yes

## 2020-02-20 NOTE — LETTER
2/20/2020     RE: Edvin Norton  [address] [place] [address]     Dear Colleague,    Thank you for referring your patient, Edvin Norton, to the [facility] at [facility]. Please see a copy of my visit note below.    REASON FOR CONSULTATION: Consult (cervical myelopathy. started with pain in feet. seen and evaluated for neuropathy and was found to have cervical issues. )     REFERRING PHYSICIAN: William Cervantes   PCP:Woody Cintron    History of Present Illness:  Edvin is a 65 year old RHD man with ~ 2 years of of significant gait instability that has resulted in multiple falls. He first started noticing issues in early 2018 after being worked up for numbness/ tingling of his b/l feet. His balance has gotten progressively worse over the last 2 years. He is now having falls and he is very unsteady on his feet. He denies any issues with weakness, he has no changes in ability to use his hands and no clumsiness. No changes with hand writing or fine motor skills. No problems with bowel/bladder control.  He does endorse some neck pain. This is located in the midline and is described as sharp. It will briefly cause a sharp pain that intermittently comes and goes. Nothing seems to bring the pain on or make it worse.     Neck 100% pain  Worse:  Will randomly start    Previous treament:   12/10/2019: C5 nerve root injection-  with no significant changes in his gait noted.   Oral prednisone taper through January 2020 without improvement with gait issues     Neck Disability Index (NDI) Questionnaire  Neck Disability Index (NDI) 2/18/2020   Neck Disability Index: Count 9   NDI: Total Score = SUM (points for all 10 findings) 13   Neck Disability in Percent = (Total Score) / 50 * 100 28.88 (%)   Some recent data might be hidden      PROMIS-10 Scores  Global Mental Health Score: (P) 16  Global Physical Health Score: (P) 13  PROMIS TOTAL -  SUBSCORES: (P) 29    ROS: A 12-point review of systems was completed and is negative except for otherwise noted above in the history of present illness.    Med Hx:  Past Medical History:   Diagnosis Date     Anxiety state 9/26/2013     Problem list name updated by automated process. 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     Essential hypertension, benign (HTN) 10/27/2014     Obesity due to excess calories, unspecified obesity severity 3/24/2016       Surg Hx:  Past Surgical History:   Procedure Laterality Date     CATARACT IOL, RT/LT       ESOPHAGUS SURGERY      Halo procedure     FINGER SURGERY      tendon       Allergies:  No Known Allergies    Meds:  Current Outpatient Medications   Medication     ASPIRIN PO     atorvastatin (LIPITOR) 40 MG tablet     B Complex-Folic Acid (SUPER B COMPLEX MAXI) TABS     fenofibrate (TRIGLIDE/LOFIBRA) 160 MG tablet     Omega-3 Fatty Acids (OMEGA-3 FISH OIL PO)     omeprazole (PRILOSEC) 40 MG DR capsule     propranolol (INDERAL) 20 MG tablet     sertraline (ZOLOFT) 100 MG tablet     L-Methylfolate-B6-B12 (FOLTX) 1.13-25-2 MG TABS     No current facility-administered medications for this visit.        Fam Hx:  Family History   Problem Relation Age of Onset     Hypertension Mother      Psychotic Disorder Mother         anxiety     Psychotic Disorder Father         anxiety     Cerebrovascular Disease Sister      Psychotic Disorder Sister         anxiety     Psychotic Disorder Brother         anxiety     Thyroid Disease Sister      Thyroid Disease Brother      Thyroid Disease Sister      C.A.D. No family hx of      Diabetes No family hx of      Breast Cancer No family hx of      Cancer - colorectal No family hx of      Prostate Cancer No family hx of        P/S Hx:  Social History     Tobacco Use     Smoking status: Never Smoker     Smokeless tobacco: Former User   Substance Use Topics     Alcohol use: Yes     Alcohol/week: 15.0 standard drinks     Types: 15 Standard  "drinks or equivalent per week         Physical Exam:  Very pleasant, healthy appearing, alert, oriented x 3, cooperative.  Normal mood and affect.  Not in cardiorespiratory distress.  Ht 1.727 m (5' 8\")   Wt 86.2 kg (190 lb)   BMI 28.89 kg/m     Normal upright posture.    Wide based, unsteady gait  Cannot attempt to perform a tandem gait without falling  Difficulty with rising from a chair without bracing with arms   Romberg's reveals significant unsteadiness with his eyes closed, but he did not fall. He was not unsteady with eyes open    Neck: normal neck posture, able to maintain horizontal gaze.  No deformity, no skin lesions or surgical scars.  Localizes pain at midline from occiput to T2/3  No neck tenderness.  ROM:   Extremely limited. In both flexion/extension and rotation  (-) Spurling's bilat.  (-) Lhermitte's.     Neuro Exam:  Motor: 5/5 strength for all muscle groups in both UE's and LE's  Sensory:  Intact to light touch in both UE's and LE's    Reflexes:      Biceps 1+ bilat.      Brachioradialis 1+ bilat.      Triceps 1+ bilat.     Patella 2+    (-) Finn bilat.    Upper extremity:  Full pulses, pink nailbeds, good capillary / refill.  (-) atrophy / asymmetry.        Imaging:   Thoracic spine MRI 1/29/2020:      Cervical MRI   - C4/5, C5/6, C6/7 with mild/moderate spinal stenosis  - Spondylolisthesis of C3/4 and C7/T1  - Cervical Kyphosis     Cervical ap-lat x-rays today show normal T1 slope (26 degrees), straightened cervical lordosis (4 degrees), and increased anterior offset or cervical sagittal imbalance (C2 SVA = +5.2cm).  Measurements:  T1 slope 26  C2-T1 lord 4  C2 SVA +5.2cm      Impression:   1.  Multilevel cervical spondylosis with stenosis C4-T1, with myelopathy (significant gait imbalance).  2.  Cervical spondylolisthesis C3-4, C7-T1.  3.  Cervical Kyphosis     Plan:   65/m, with longstanding balance problems, but seems to be getting even worse in recent months.  Previously thought to be " 2' to neuropathy.  However, was evaluated by Dr. William Cervantes, neurologist, who did not think that patient had neuropathy, and whose impression was that his balance issues was mainly 2' to cervical myelopathy.  Cervical MRI does show multilevel spondylosis (degeneration) with accompanying stenosis and loss of lordosis.  However, no myelomalacia appreciated.    While patient's main complaint is imbalance, he also has neck pain.  However, has not arm symptoms (radiculopathy).  More oddly, however, no complaint of clumsiness, ataxia, difficulty with fine motor tasks, etc.  He says he is still able to use small tools, button shirts and  coins with ease, no change in handwriting ('as bad as it's ever been').  Also, no hyperreflexia, negative Finn's.  This absence of other signs and symptoms, in the presence of very severe gait disturbance (markedly wide based gait, cannot perform tandem gait at all) is unusual, and brings up the question as to whether his imbalance may be coming from something else other than his c-spine.  I told him that balance is multifactorial and that many different things may cause balance problems; presumably, however, Dr. Cervantes had also checked on those, and still felt that his cervical spine is the most likely candidate.    - Repeat Cervical MRI (previous one had poor image quality 2' to motion artifact).  - Cervical CT scan     RTC after above, with EOS full spine ap-lat and cervical flex-ext x-rays, to review and discuss options.  Long discussion regarding why this is not a straight forward case of cervical myelopathy given his lack of upper extremity symptoms.     All questions and concerns were answered to the patient's apparent satisfaction before leaving the clinic.     Total visit time > 45 mins, > 50% counseling and coordination of care.    Respectfully,    Bipin Woodward MD  Orthopedic Surgery PGY1  [phone]    Attestation:  I (Dr. Dane Anderson - Spine Surgeon) have  personally evaluated patient with PGY-1 Dr. Woodward, and agree with findings and plan outlined in the note, which I also edited.  I discussed at length with the patient/family, explained the nature of spinal condition, and formulated workup and/or treatment plan together.  All questions were answered to the best of my ability and to patient's apparent satisfaction.      Dane Anderson MD    Orthopaedic Spine Surgery  Dept Orthopaedic Surgery, [facility]  [phone] office, [phone] pager  [email or web address]       Answers for HPI/ROS submitted by the patient on 2/18/2020   General Symptoms: No  Skin Symptoms: No  HENT Symptoms: No  EYE SYMPTOMS: No  HEART SYMPTOMS: No  LUNG SYMPTOMS: No  INTESTINAL SYMPTOMS: No  URINARY SYMPTOMS: No  REPRODUCTIVE SYMPTOMS: No  SKELETAL SYMPTOMS: No  BLOOD SYMPTOMS: No  NERVOUS SYSTEM SYMPTOMS: Yes  MENTAL HEALTH SYMPTOMS: No  Dizziness or trouble with balance: Yes  Fainting or black-out spells: No  Memory loss: No  Headache: No  Seizures: No  Speech problems: No  Tingling: No  Tremor: No  Weakness: No  Difficulty walking: Yes  Paralysis: No  Numbness: Yes    Again, thank you for allowing me to participate in the care of your patient.      Sincerely,    Dane Anderson MD

## 2020-02-21 NOTE — TELEPHONE ENCOUNTER
I called patient letting him know the ENT Referral has been faxed to     [facility]  [address]  [address] [place] [address] [id number] [phone] - appt line  [phone] - fax     Patient will call to make his appt.

## 2020-02-25 ENCOUNTER — TRANSFERRED RECORDS (OUTPATIENT)
Dept: FAMILY MEDICINE | Facility: CLINIC | Age: 65
End: 2020-02-25

## 2020-02-25 DIAGNOSIS — E78.2 MIXED HYPERLIPIDEMIA: ICD-10-CM

## 2020-02-25 RX ORDER — FENOFIBRATE 160 MG/1
160 TABLET ORAL DAILY
Qty: 90 TABLET | Refills: 0 | COMMUNITY
Start: 2020-02-25

## 2020-02-25 NOTE — TELEPHONE ENCOUNTER
Denied fenofibrate as patient last seen 9/2019 and needs a 6 month office visit in March.  [facility] pharmacy notified

## 2020-03-03 DIAGNOSIS — M48.02 CERVICAL STENOSIS OF SPINE: Primary | ICD-10-CM

## 2020-03-05 ENCOUNTER — ANCILLARY PROCEDURE (OUTPATIENT)
Dept: GENERAL RADIOLOGY | Facility: CLINIC | Age: 65
End: 2020-03-05
Attending: ORTHOPAEDIC SURGERY
Payer: COMMERCIAL

## 2020-03-05 ENCOUNTER — ANCILLARY PROCEDURE (OUTPATIENT)
Dept: MRI IMAGING | Facility: CLINIC | Age: 65
End: 2020-03-05
Attending: ORTHOPAEDIC SURGERY
Payer: COMMERCIAL

## 2020-03-05 ENCOUNTER — ANCILLARY PROCEDURE (OUTPATIENT)
Dept: CT IMAGING | Facility: CLINIC | Age: 65
End: 2020-03-05
Attending: ORTHOPAEDIC SURGERY
Payer: COMMERCIAL

## 2020-03-05 ENCOUNTER — OFFICE VISIT (OUTPATIENT)
Dept: ORTHOPEDICS | Facility: CLINIC | Age: 65
End: 2020-03-05
Payer: COMMERCIAL

## 2020-03-05 DIAGNOSIS — M48.02 CERVICAL STENOSIS OF SPINE: ICD-10-CM

## 2020-03-05 DIAGNOSIS — M43.12 ACQUIRED SPONDYLOLISTHESIS OF CERVICAL VERTEBRA: ICD-10-CM

## 2020-03-05 DIAGNOSIS — M47.12 CERVICAL SPONDYLOSIS WITH MYELOPATHY: Primary | ICD-10-CM

## 2020-03-05 NOTE — PROGRESS NOTES
Last Visit Date: 2/20/2020   Previous Impression:  Impression:   1.  Multilevel cervical spondylosis with stenosis C4-T1, with myelopathy (significant gait imbalance).  2.  Cervical spondylolisthesis C3-4, C7-T1.  3.  Cervical Kyphosis   Previous Plan:  - Repeat Cervical MRI (previous one had poor image quality 2' to motion artifact).  - Cervical CT scan      RTC after above, with EOS full spine ap-lat and cervical flex-ext x-rays, to review and discuss options.  Long discussion regarding why this is not a straight forward case of cervical myelopathy given his lack of upper extremity symptoms.     S>    65/m, last seen 2/20/2020 in consult for balance issues and neck pain.  Accompanied by wife.    The patient reports progressively worsening balance issues for approximately 2 years which has led to several falls.  This was previously worked up by Dr. Cervantes who ultimately felt his balance issues were at least partially due to myelopathy from cervical stenosis and cord compression.  More recently he has developed neck pain as well.  He denies pain radiating into his arms, weakness, numbness, and tingling.  He returns today to review his recent imaging.  Since last visit he has also been evaluated by ENT to see if his ears could be contributing.  He did have some hearing loss but workup was otherwise unremarkable.      Neck Disability Index (NDI) Questionnaire  Neck Disability Index (NDI) 2/18/2020   Neck Disability Index: Count 9   NDI: Total Score = SUM (points for all 10 findings) 13   Neck Disability in Percent = (Total Score) / 50 * 100 28.88 (%)   Some recent data might be hidden      PROMIS-10 Scores  Global Mental Health Score: (P) 15  Global Physical Health Score: (P) 12  PROMIS TOTAL - SUBSCORES: (P) 27    O>   Alert, oriented x 3, cooperative.  Not in CP distress.  Ambulates independently.   Grossly neurologically intact.  Detailed exam not performed today; please see previous note.    Imaging:   Cervical CT  scan obtained today shows multilevel advanced cervical spondylosis C4-C7 with loss of cervical lordosis, disc space narrowing and collapse, and anterior and posterior osteophyte formation.  There also seems to be autofusion or ankylosis at T1-2.  Spondylolisthesis at C3-4.    Cervical MRI obtained today shows multilevel cervical spondylosis with stenosis from C3-C7.  No significant spinal cord signal change or myelomalacia.      A>  1.  Multilevel cervical spondylosis with stenosis C4-T1, with myelopathy (significant gait imbalance).  2.  Cervical spondylolisthesis C3-4, C7-T1.  3.  Cervical Kyphosis     P>   Had good long discussion with patient regarding his ongoing symptoms, primarily balance issues, possible etiologies, his cervical spine findings, and treatment options.  He has significant pathology in his cervical spine with multilevel spondylosis or degenerative change, stiffness, loss of lordosis, spondylolisthesis with likely instability, etc.  These have led to narrowing of his spinal canal, with possible irritation or impingement of the spinal cord, and this may be contributing to his balance issues.  However, it is also possible that there are other conditions contributing to his imbalance, and thus there is a risk that even with surgery, his balance may not completely resolve.  We discussed the surgical procedure, risks, benefits and alternatives.  We discussed bone graft options; patient amenable to iliac crest bone graft harvest.    1. Surgical request for multilevel ACDF C4-C7, possible C3-4, anterior iliac bone harvest and graft  2. PAC referral for preoperative assessment    The risks and benefits of the available surgical and non-surgical treatment options were discussed with the patient. All of the patient's questions were answered and the operative procedure was explained. Specific risks addressed today include, but are not limited to, adverse effects of anesthesia, infection, bleeding, pain,  stiffness, blood clots, nerve and vessel damage, delayed healing, and re-injury. The possible restrictions during rehab were also discussed. The patient indicates good understanding and wishes to proceed with surgery. The patient will proceed with scheduling the surgery based on recommendations reviewed during today's visit and a pre-operative work up will be completed within 30 days of the procedure. Follow-up after surgery.    Questions answered.  TT > 25 mins, > 50% CC.    Dane Anderson MD    Orthopaedic Spine Surgery  Dept Orthopaedic Surgery, [facility]  [phone] office, [phone] pager  [email or web address]    Scribe Disclosure:   We, Loren Bean and Jennifer Reid, are serving as scribes to document services personally performed by Dane Anderson MD at this visit, based upon the provider's statements to us. All documentation has been reviewed by the aforementioned provider prior to being entered into the official medical record.     Portions of this medical record were completed by a scribe. UPON MY REVIEW AND AUTHENTICATION BY ELECTRONIC SIGNATURE, this confirms (a) I performed the applicable clinical services, and (b) the record is accurate.

## 2020-03-05 NOTE — NURSING NOTE
Reason For Visit:   Chief Complaint   Patient presents with     RECHECK     Same day CT and MRI for Neck pain      Primary MD: Woody Cintron        ? No  Occupation Not working  Date of injury: None  Date of surgery: None  Smoker: No    Pain Assessment  Patient Currently in Pain: Yes  0-10 Pain Scale: 7  Primary Pain Location: Neck  Pain Descriptors: Discomfort      Neck Disability Index (NDI) Questionnaire    Neck Disability Index (NDI) 3/5/2020   Neck Disability Index: Count 9   NDI: Total Score = SUM (points for all 10 findings) 14   Neck Disability in Percent = (Total Score) / 50 * 100 31.11 (%)   Some recent data might be hidden          Visual Analog Pain Scale  Neck Pain Scale 0-10: 7  Right arm pain: 0  Left arm pain: 0    Promis 10 Assessment    PROMIS 10 3/5/2020   In general, would you say your health is: Good   In general, would you say your quality of life is: Good   In general, how would you rate your physical health? Good   In general, how would you rate your mental health, including your mood and your ability to think? Very good   In general, how would you rate your satisfaction with your social activities and relationships? Very good   In general, please rate how well you carry out your usual social activities and roles Very good   To what extent are you able to carry out your everyday physical activities such as walking, climbing stairs, carrying groceries, or moving a chair? Moderately   How often have you been bothered by emotional problems such as feeling anxious, depressed or irritable? Rarely   How would you rate your fatigue on average? Mild   How would you rate your pain on average?   0 = No Pain  to  10 = Worst Imaginable Pain 7   In general, would you say your health is: 3   In general, would you say your quality of life is: 3   In general, how would you rate your physical health? 3   In general, how would you rate your mental health, including your mood and your  ability to think? 4   In general, how would you rate your satisfaction with your social activities and relationships? 4   In general, please rate how well you carry out your usual social activities and roles. (This includes activities at home, at work and in your community, and responsibilities as a parent, child, spouse, employee, friend, etc.) 4   To what extent are you able to carry out your everyday physical activities such as walking, climbing stairs, carrying groceries, or moving a chair? 3   In the past 7 days, how often have you been bothered by emotional problems such as feeling anxious, depressed, or irritable? 2   In the past 7 days, how would you rate your fatigue on average? 2   In the past 7 days, how would you rate your pain on average, where 0 means no pain, and 10 means worst imaginable pain? 7   Global Mental Health Score 15   Global Physical Health Score 12   PROMIS TOTAL - SUBSCORES 27   Some recent data might be hidden                Angélica Souza LPN

## 2020-03-05 NOTE — LETTER
3/5/2020       RE: Edvin Norton  [address] [place] [address]     Dear Colleague,    Thank you for referring your patient, Edvin Norton, to the [facility] at [facility]. Please see a copy of my visit note below.    Last Visit Date: 2/20/2020   Previous Impression:  Impression:   1.  Multilevel cervical spondylosis with stenosis C4-T1, with myelopathy (significant gait imbalance).  2.  Cervical spondylolisthesis C3-4, C7-T1.  3.  Cervical Kyphosis   Previous Plan:  - Repeat Cervical MRI (previous one had poor image quality 2' to motion artifact).  - Cervical CT scan      RTC after above, with EOS full spine ap-lat and cervical flex-ext x-rays, to review and discuss options.  Long discussion regarding why this is not a straight forward case of cervical myelopathy given his lack of upper extremity symptoms.     S>    65/m, last seen 2/20/2020 in consult for balance issues and neck pain.  Accompanied by wife.    The patient reports progressively worsening balance issues for approximately 2 years which has led to several falls.  This was previously worked up by Dr. Cervantes who ultimately felt his balance issues were at least partially due to myelopathy from cervical stenosis and cord compression.  More recently he has developed neck pain as well.  He denies pain radiating into his arms, weakness, numbness, and tingling.  He returns today to review his recent imaging.  Since last visit he has also been evaluated by ENT to see if his ears could be contributing.  He did have some hearing loss but workup was otherwise unremarkable.      Neck Disability Index (NDI) Questionnaire  Neck Disability Index (NDI) 2/18/2020   Neck Disability Index: Count 9   NDI: Total Score = SUM (points for all 10 findings) 13   Neck Disability in Percent = (Total Score) / 50 * 100 28.88 (%)   Some recent data might be hidden      PROMIS-10 Scores  Global Mental Health  Score: (P) 15  Global Physical Health Score: (P) 12  PROMIS TOTAL - SUBSCORES: (P) 27    O>   Alert, oriented x 3, cooperative.  Not in CP distress.  Ambulates independently.   Grossly neurologically intact.  Detailed exam not performed today; please see previous note.    Imaging:   Cervical CT scan obtained today shows multilevel advanced cervical spondylosis C4-C7 with loss of cervical lordosis, disc space narrowing and collapse, and anterior and posterior osteophyte formation.  There also seems to be autofusion or ankylosis at T1-2.  Spondylolisthesis at C3-4.    Cervical MRI obtained today shows multilevel cervical spondylosis with stenosis from C3-C7.  No significant spinal cord signal change or myelomalacia.      A>  1.  Multilevel cervical spondylosis with stenosis C4-T1, with myelopathy (significant gait imbalance).  2.  Cervical spondylolisthesis C3-4, C7-T1.  3.  Cervical Kyphosis     P>   Had good long discussion with patient regarding his ongoing symptoms, primarily balance issues, possible etiologies, his cervical spine findings, and treatment options.  He has significant pathology in his cervical spine with multilevel spondylosis or degenerative change, stiffness, loss of lordosis, spondylolisthesis with likely instability, etc.  These have led to narrowing of his spinal canal, with possible irritation or impingement of the spinal cord, and this may be contributing to his balance issues.  However, it is also possible that there are other conditions contributing to his imbalance, and thus there is a risk that even with surgery, his balance may not completely resolve.  We discussed the surgical procedure, risks, benefits and alternatives.  We discussed bone graft options; patient amenable to iliac crest bone graft harvest.    1. Surgical request for multilevel ACDF C4-C7, possible C3-4, anterior iliac bone harvest and graft  2. PAC referral for preoperative assessment    The risks and benefits of the  available surgical and non-surgical treatment options were discussed with the patient. All of the patient's questions were answered and the operative procedure was explained. Specific risks addressed today include, but are not limited to, adverse effects of anesthesia, infection, bleeding, pain, stiffness, blood clots, nerve and vessel damage, delayed healing, and re-injury. The possible restrictions during rehab were also discussed. The patient indicates good understanding and wishes to proceed with surgery. The patient will proceed with scheduling the surgery based on recommendations reviewed during today's visit and a pre-operative work up will be completed within 30 days of the procedure. Follow-up after surgery.    Questions answered.  TT > 25 mins, > 50% CC.    Dane Anderson MD    Orthopaedic Spine Surgery  Dept Orthopaedic Surgery, [facility]  [phone] office, [phone] pager  [email or web address]    Scribe Disclosure:   We, Loren Bean and Jennifer Reid, are serving as scribes to document services personally performed by Dane Anderson MD at this visit, based upon the provider's statements to us. All documentation has been reviewed by the aforementioned provider prior to being entered into the official medical record.     Portions of this medical record were completed by a scribe. UPON MY REVIEW AND AUTHENTICATION BY ELECTRONIC SIGNATURE, this confirms (a) I performed the applicable clinical services, and (b) the record is accurate.      Again, thank you for allowing me to participate in the care of your patient.      Sincerely,    Dane Anderson MD

## 2020-03-25 ENCOUNTER — MYC REFILL (OUTPATIENT)
Dept: FAMILY MEDICINE | Facility: CLINIC | Age: 65
End: 2020-03-25

## 2020-03-25 DIAGNOSIS — E78.2 MIXED HYPERLIPIDEMIA: ICD-10-CM

## 2020-03-25 RX ORDER — FENOFIBRATE 160 MG/1
160 TABLET ORAL DAILY
Qty: 90 TABLET | Refills: 0 | Status: CANCELLED | OUTPATIENT
Start: 2020-03-25

## 2020-03-25 NOTE — TELEPHONE ENCOUNTER
Patient is requesting a refill of  Pending Prescriptions:                       Disp   Refills    fenofibrate (TRIGLIDE/LOFIBRA) 160 MG tab*90 tab*0            Sig: Take 1 tablet (160 mg) by mouth daily    Patient last had a refill of this medication on 11/27/19 for a 90 day supply but patient is now due to be seen.

## 2020-03-26 ENCOUNTER — OFFICE VISIT (OUTPATIENT)
Dept: FAMILY MEDICINE | Facility: CLINIC | Age: 65
End: 2020-03-26

## 2020-03-26 VITALS
BODY MASS INDEX: 34.91 KG/M2 | SYSTOLIC BLOOD PRESSURE: 162 MMHG | HEART RATE: 89 BPM | DIASTOLIC BLOOD PRESSURE: 92 MMHG | OXYGEN SATURATION: 96 % | TEMPERATURE: 98.1 F | WEIGHT: 229.6 LBS

## 2020-03-26 DIAGNOSIS — K22.719 BARRETT'S ESOPHAGUS WITH DYSPLASIA: ICD-10-CM

## 2020-03-26 DIAGNOSIS — I10 ESSENTIAL HYPERTENSION, BENIGN: ICD-10-CM

## 2020-03-26 DIAGNOSIS — E78.2 MIXED HYPERLIPIDEMIA: ICD-10-CM

## 2020-03-26 DIAGNOSIS — M43.14 SPONDYLOLISTHESIS OF THORACIC REGION: ICD-10-CM

## 2020-03-26 DIAGNOSIS — M48.02 SPINAL STENOSIS IN CERVICAL REGION: ICD-10-CM

## 2020-03-26 DIAGNOSIS — F41.1 GAD (GENERALIZED ANXIETY DISORDER): Primary | ICD-10-CM

## 2020-03-26 PROCEDURE — 99214 OFFICE O/P EST MOD 30 MIN: CPT | Performed by: PHYSICIAN ASSISTANT

## 2020-03-26 RX ORDER — PROPRANOLOL HYDROCHLORIDE 20 MG/1
TABLET ORAL
Qty: 90 TABLET | Refills: 1 | Status: SHIPPED | OUTPATIENT
Start: 2020-03-26 | End: 2020-09-25

## 2020-03-26 RX ORDER — FENOFIBRATE 160 MG/1
160 TABLET ORAL DAILY
Qty: 90 TABLET | Refills: 1 | Status: SHIPPED | OUTPATIENT
Start: 2020-03-26 | End: 2020-09-25

## 2020-03-26 RX ORDER — SERTRALINE HYDROCHLORIDE 100 MG/1
150 TABLET, FILM COATED ORAL DAILY
Qty: 135 TABLET | Refills: 3 | Status: SHIPPED | OUTPATIENT
Start: 2020-03-26 | End: 2021-03-01

## 2020-03-26 RX ORDER — ATORVASTATIN CALCIUM 40 MG/1
40 TABLET, FILM COATED ORAL DAILY
Qty: 90 TABLET | Refills: 1 | Status: SHIPPED | OUTPATIENT
Start: 2020-03-26 | End: 2020-09-25

## 2020-03-26 ASSESSMENT — ANXIETY QUESTIONNAIRES
7. FEELING AFRAID AS IF SOMETHING AWFUL MIGHT HAPPEN: NOT AT ALL
IF YOU CHECKED OFF ANY PROBLEMS ON THIS QUESTIONNAIRE, HOW DIFFICULT HAVE THESE PROBLEMS MADE IT FOR YOU TO DO YOUR WORK, TAKE CARE OF THINGS AT HOME, OR GET ALONG WITH OTHER PEOPLE: NOT DIFFICULT AT ALL
5. BEING SO RESTLESS THAT IT IS HARD TO SIT STILL: NOT AT ALL
6. BECOMING EASILY ANNOYED OR IRRITABLE: NOT AT ALL
GAD7 TOTAL SCORE: 0
2. NOT BEING ABLE TO STOP OR CONTROL WORRYING: NOT AT ALL
1. FEELING NERVOUS, ANXIOUS, OR ON EDGE: NOT AT ALL
3. WORRYING TOO MUCH ABOUT DIFFERENT THINGS: NOT AT ALL

## 2020-03-26 ASSESSMENT — PATIENT HEALTH QUESTIONNAIRE - PHQ9
SUM OF ALL RESPONSES TO PHQ QUESTIONS 1-9: 2
5. POOR APPETITE OR OVEREATING: NOT AT ALL

## 2020-03-26 NOTE — PROGRESS NOTES
SUBJECTIVE:   Edvin Norton is a 65 year old male who presents to clinic today for the following health issues:        HTN; NOT CONTROLLED TODAY - in moderate pain due to back  Stenosis in back multiple levels  - surgery advised but considered elective and postponed due to COVID.         BP Readings from Last 6 Encounters:   03/26/20 (!) 162/92   01/06/20 128/86   10/24/19 132/82   09/03/19 136/82   07/17/19 142/88   05/20/19 138/90         Hyperlipidemia Follow-Up      Are you regularly taking any medication or supplement to lower your cholesterol?   Yes- statin and fibrate    Are you having muscle aches or other side effects that you think could be caused by your cholesterol lowering medication?  No    Hypertension Follow-up      Do you check your blood pressure regularly outside of the clinic? No     Are you following a low salt diet? No    Are your blood pressures ever more than 140 on the top number (systolic) OR more   than 90 on the bottom number (diastolic), for example 140/90? NA    Anxiety - stable.       Problem list and histories reviewed & adjusted, as indicated.  Additional history: as documented    Patient Active Problem List   Diagnosis     Health Care Home     Mixed hyperlipidemia     Vitamin D deficiency     Performance anxiety     JORGE (generalized anxiety disorder)     ACP (advance care planning)     Essential hypertension, benign (HTN)     Obesity due to excess calories, unspecified obesity severity     Natarajan's esophagus with dysplasia     Nonallergic rhinitis     Past Surgical History:   Procedure Laterality Date     CATARACT IOL, RT/LT       ESOPHAGUS SURGERY      Halo procedure     FINGER SURGERY      tendon       Social History     Tobacco Use     Smoking status: Never Smoker     Smokeless tobacco: Former User   Substance Use Topics     Alcohol use: Yes     Alcohol/week: 15.0 standard drinks     Family History   Problem Relation Age of Onset     Hypertension Mother         passed  age  96     Psychotic Disorder Mother         anxiety     Psychotic Disorder Father         anxiety     Cerebrovascular Disease Sister      Hyperlipidemia Sister         age 66     Psychotic Disorder Sister         anxiety     Psychotic Disorder Brother         anxiety     Thyroid Disease Sister      Thyroid Disease Brother      Thyroid Disease Sister      C.A.D. No family hx of      Diabetes No family hx of      Breast Cancer No family hx of      Cancer - colorectal No family hx of      Prostate Cancer No family hx of          Current Outpatient Medications   Medication Sig Dispense Refill     ASPIRIN PO Take 81 mg by mouth daily        atorvastatin (LIPITOR) 40 MG tablet Take 1 tablet (40 mg) by mouth daily 90 tablet 1     B Complex-Folic Acid (SUPER B COMPLEX MAXI) TABS Take one tablet by mouth daily 30 tablet 2     fenofibrate (TRIGLIDE/LOFIBRA) 160 MG tablet Take 1 tablet (160 mg) by mouth daily 90 tablet 1     L-Methylfolate-B6-B12 (FOLTX) 1.13-25-2 MG TABS Take 1 Dose by mouth daily 90 tablet 0     Omega-3 Fatty Acids (OMEGA-3 FISH OIL PO) Take  by mouth.       omeprazole (PRILOSEC) 40 MG DR capsule TAKE 1 CAPSULE (40MG) BY ORAL ROUTE EVERY DAY BEFORE A MEAL BEFORE A MEAL  0     propranolol (INDERAL) 20 MG tablet TAKE 1 TABLET BY MOUTH DAILY AS NEEDED 90 tablet 1     sertraline (ZOLOFT) 100 MG tablet Take 1.5 tablets (150 mg) by mouth daily 135 tablet 3     No Known Allergies  Recent Labs   Lab Test 09/03/19 12/18/18  1156 06/12/18  1126 11/17/17  1033  08/01/16  0817  05/31/12   LDL 89 SEE COMMENT SEE COMMENT SEE COMMENT   < > 94   < > 66   HDL 59 47 54 51   < > 44   < > 52   TRIG 531* 436* 420* 444*   < > 367*   < > 319*   ALT  --  29 45 115*   < > 32   < > 47   CR 1.07 0.98 1.06 0.87   < > 1.08   < > 1.08   GFRESTIMATED  --  82 75 93   < > 74   < >  --    POTASSIUM 5.11 5.0 4.2 4.9   < > 4.6   < > 5.0   TSH  --   --   --   --   --  2.71  --  3.31    < > = values in this interval not displayed.          BP  Readings from Last 3 Encounters:   03/26/20 (!) 162/92   01/06/20 128/86   10/24/19 132/82    Wt Readings from Last 3 Encounters:   03/26/20 104.1 kg (229 lb 9.6 oz)   02/20/20 86.2 kg (190 lb)   01/06/20 99.3 kg (219 lb)                  Labs reviewed in EPIC    Reviewed and updated as needed this visit by clinical staff  Tobacco  Allergies  Meds  Problems  Med Hx  Surg Hx       Reviewed and updated as needed this visit by Provider           ROS:  Constitutional: NEGATIVE for fever, chills, change in weight  Eyes: NEGATIVE for vision changes or irritation  Ears: NEGATIVE for pain, discharge, decreased hearing  Nose: NEGATIVE for rhinorrhea, epistaxis or congestion  Mouth: NEGATIVE for ulcerations, sore throat.   R: NEGATIVE for significant cough or SOB  CV: NEGATIVE for chest pain, palpitations or peripheral edema      OBJECTIVE:     BP (!) 162/92 (BP Location: Left arm, Patient Position: Sitting, Cuff Size: Adult Large)   Pulse 89   Temp 98.1  F (36.7  C) (Oral)   Wt 104.1 kg (229 lb 9.6 oz)   SpO2 96%   BMI 34.91 kg/m    Body mass index is 34.91 kg/m .  GENERAL: healthy, alert  And in mild pain   CV: regular rate and rhythm, normal S1 S2, no S3 or S4, no murmur, click or rub  RESP: lungs clear to auscultation bilaterally - no rales, rhonchi or wheezes      ASSESSMENT/PLAN:     1. JORGE (generalized anxiety disorder)  CONTROLLED  - sertraline (ZOLOFT) 100 MG tablet; Take 1.5 tablets (150 mg) by mouth daily  Dispense: 135 tablet; Refill: 3    2. Essential hypertension, benign (HTN)  NOT CONTROLLED - most likely due to pain.  Have advised OTC pain meds  Recheck with Dr. Cintron 1 week  - propranolol (INDERAL) 20 MG tablet; TAKE 1 TABLET BY MOUTH DAILY AS NEEDED  Dispense: 90 tablet; Refill: 1  - VENOUS COLLECTION; Standing  - Lipid Panel (BFP); Standing  - Comprehensive Metobolic Panel (BFP); Standing    3. Natarajan's esophagus with dysplasia  stable    4. Mixed hyperlipidemia    - atorvastatin (LIPITOR) 40 MG  tablet; Take 1 tablet (40 mg) by mouth daily  Dispense: 90 tablet; Refill: 1  - fenofibrate (TRIGLIDE/LOFIBRA) 160 MG tablet; Take 1 tablet (160 mg) by mouth daily  Dispense: 90 tablet; Refill: 1  - VENOUS COLLECTION; Standing  - Lipid Panel (BFP); Standing  - Comprehensive Metobolic Panel (BFP); Standing    5. Spondylolisthesis of thoracic region  6. Spinal stenosis in cervical region    Pt has mildy elevated LFTs  Advised caution with APAP  Schedule NSAIDs with food  Will see PCP to discuss HTN and pain mgmt next week.     See PCP 1 week recheck      Total visit time was  >=25 minutes, over half of which was spent counseling the patient and coordinating care.      Marcia Christine PA-C  3/26/2020

## 2020-03-26 NOTE — NURSING NOTE
Edvin is here for non fasting med check    Pre-visit Screening:  Immunizations:  up to date  Colonoscopy:  is up to date  Mammogram: NA  Asthma Action Test/Plan:  NA  PHQ9:  Done today  GAD7:  Done today  Questioned patient about current smoking habits Pt. has never smoked.  Ok to leave detailed message on voice mail for today's visit only Yes, phone # [phone]

## 2020-03-27 PROBLEM — M43.14: Status: ACTIVE | Noted: 2020-03-27

## 2020-03-27 PROBLEM — M48.02 SPINAL STENOSIS IN CERVICAL REGION: Status: ACTIVE | Noted: 2020-03-27

## 2020-03-27 ASSESSMENT — ANXIETY QUESTIONNAIRES: GAD7 TOTAL SCORE: 0

## 2020-04-02 ENCOUNTER — OFFICE VISIT (OUTPATIENT)
Dept: FAMILY MEDICINE | Facility: CLINIC | Age: 65
End: 2020-04-02

## 2020-04-02 VITALS
DIASTOLIC BLOOD PRESSURE: 84 MMHG | TEMPERATURE: 98.4 F | HEIGHT: 68 IN | RESPIRATION RATE: 20 BRPM | WEIGHT: 226.8 LBS | SYSTOLIC BLOOD PRESSURE: 118 MMHG | HEART RATE: 88 BPM | BODY MASS INDEX: 34.37 KG/M2

## 2020-04-02 DIAGNOSIS — E78.2 MIXED HYPERLIPIDEMIA: ICD-10-CM

## 2020-04-02 DIAGNOSIS — M48.02 SPINAL STENOSIS IN CERVICAL REGION: ICD-10-CM

## 2020-04-02 DIAGNOSIS — F41.1 GAD (GENERALIZED ANXIETY DISORDER): ICD-10-CM

## 2020-04-02 DIAGNOSIS — G57.53 TARSAL TUNNEL SYNDROME OF BOTH LOWER EXTREMITIES: ICD-10-CM

## 2020-04-02 DIAGNOSIS — I10 ESSENTIAL HYPERTENSION, BENIGN: Primary | ICD-10-CM

## 2020-04-02 LAB
ALBUMIN SERPL-MCNC: 4.8 G/DL (ref 3.6–5.1)
ALBUMIN/GLOB SERPL: 2 {RATIO} (ref 1–2.5)
ALP SERPL-CCNC: 50 U/L (ref 33–130)
ALT 1742-6: 22 U/L (ref 0–32)
AST 1920-8: 36 U/L (ref 0–35)
BILIRUB SERPL-MCNC: 0.8 MG/DL (ref 0.2–1.2)
BUN SERPL-MCNC: 21 MG/DL (ref 7–25)
BUN/CREATININE RATIO: 17.1 (ref 6–22)
CALCIUM SERPL-MCNC: 9.5 MG/DL (ref 8.6–10.3)
CHLORIDE SERPLBLD-SCNC: 99.9 MMOL/L (ref 98–110)
CHOLEST SERPL-MCNC: 274 MG/DL (ref 0–199)
CHOLEST/HDLC SERPL: 5 {RATIO} (ref 0–5)
CO2 SERPL-SCNC: 24.8 MMOL/L (ref 20–32)
CREAT SERPL-MCNC: 1.23 MG/DL (ref 0.7–1.18)
GFR SERPL CREATININE-BSD FRML MDRD: 62 ML/MIN/1.73M2
GLOBULIN, CALCULATED - QUEST: 2.4 (ref 1.9–3.7)
GLUCOSE SERPL-MCNC: 128 MG/DL (ref 60–99)
HDLC SERPL-MCNC: 50 MG/DL (ref 40–150)
LDLC SERPL CALC-MCNC: 163 MG/DL (ref 0–130)
POTASSIUM SERPL-SCNC: 3.85 MMOL/L (ref 3.5–5.3)
PROT SERPL-MCNC: 7.2 G/DL (ref 6.1–8.1)
SODIUM SERPL-SCNC: 137.3 MMOL/L (ref 135–146)
TRIGL SERPL-MCNC: 307 MG/DL (ref 0–149)

## 2020-04-02 PROCEDURE — 36415 COLL VENOUS BLD VENIPUNCTURE: CPT | Performed by: FAMILY MEDICINE

## 2020-04-02 PROCEDURE — 80061 LIPID PANEL: CPT | Performed by: FAMILY MEDICINE

## 2020-04-02 PROCEDURE — 99214 OFFICE O/P EST MOD 30 MIN: CPT | Performed by: FAMILY MEDICINE

## 2020-04-02 PROCEDURE — 80053 COMPREHEN METABOLIC PANEL: CPT | Performed by: FAMILY MEDICINE

## 2020-04-02 ASSESSMENT — MIFFLIN-ST. JEOR: SCORE: 1793.26

## 2020-04-02 NOTE — PROGRESS NOTES
Subjective     Edvin Norton is a 65 year old male who presents to clinic today for the following health issues:    HPI   Hyperlipidemia Follow-Up      Are you regularly taking any medication or supplement to lower your cholesterol?   Yes- atorvastatin    Are you having muscle aches or other side effects that you think could be caused by your cholesterol lowering medication?  No    Hypertension Ulnowi-xy-qrf high when here last week to see CER-told to f/u and get labs fasting      Do you check your blood pressure regularly outside of the clinic? Yes     Are you following a low salt diet? No    Are your blood pressures ever more than 140 on the top number (systolic) OR more   than 90 on the bottom number (diastolic), for example 140/90? No    Anxiety Follow-Up    How are you doing with your anxiety since your last visit? No change    Are you having other symptoms that might be associated with anxiety? No    Have you had a significant life event? Still dealing with spine issues and now covid 19     Are you feeling depressed? No    Do you have any concerns with your use of alcohol or other drugs? No    Social History     Tobacco Use     Smoking status: Never Smoker     Smokeless tobacco: Former User   Substance Use Topics     Alcohol use: Yes     Alcohol/week: 15.0 standard drinks     Drug use: No     JORGE-7 SCORE 12/18/2018 9/3/2019 3/26/2020   Total Score - - -   Total Score 1 0 0     PHQ 8/31/2015 9/3/2019 3/26/2020   PHQ-9 Total Score 2 0 2   Q9: Thoughts of better off dead/self-harm past 2 weeks Not at all Not at all Not at all     Last PHQ-9 3/26/2020   1.  Little interest or pleasure in doing things 0   2.  Feeling down, depressed, or hopeless 0   3.  Trouble falling or staying asleep, or sleeping too much 2   4.  Feeling tired or having little energy 0   5.  Poor appetite or overeating 0   6.  Feeling bad about yourself 0   7.  Trouble concentrating 0   8.  Moving slowly or restless 0   Q9: Thoughts of better  off dead/self-harm past 2 weeks 0   PHQ-9 Total Score 2   Difficulty at work, home, or with people Not difficult at all     JORGE-7  3/26/2020   1. Feeling nervous, anxious, or on edge 0   2. Not being able to stop or control worrying 0   3. Worrying too much about different things 0   4. Trouble relaxing 0   5. Being so restless that it is hard to sit still 0   6. Becoming easily annoyed or irritable 0   7. Feeling afraid, as if something awful might happen 0   JORGE-7 Total Score 0   If you checked any problems, how difficult have they made it for you to do your work, take care of things at home, or get along with other people? Not difficult at all       Cervical spinal stenosis- surgery recommended but now process on hold due to covid 19-pain bad but tolerable with ibuprofen 400 mg TID , sleep is biggest issue but he does not feel he needs meds at this time    Pt states Dr Cervantes has assured him he does not have peripheral neuropathy but rather tarsal tunnel syndrome and unspecified balance issues-plan s to work on both but this is also on hold due covid    How many servings of fruits and vegetables do you eat daily?  2-3    On average, how many sweetened beverages do you drink each day (Examples: soda, juice, sweet tea, etc.  Do NOT count diet or artificially sweetened beverages)?   1    How many days per week do you exercise enough to make your heart beat faster? 5    How many minutes a day do you exercise enough to make your heart beat faster? 20 - 29    How many days per week do you miss taking your medication? 0        Patient Active Problem List   Diagnosis     Health Care Home     Mixed hyperlipidemia     Vitamin D deficiency     Performance anxiety     JORGE (generalized anxiety disorder)     ACP (advance care planning)     Essential hypertension, benign (HTN)     Obesity due to excess calories, unspecified obesity severity     Natarajan's esophagus with dysplasia     Nonallergic rhinitis     Spondylolisthesis of  thoracic region     Spinal stenosis in cervical region     Past Surgical History:   Procedure Laterality Date     CATARACT IOL, RT/LT       ESOPHAGUS SURGERY      Halo procedure     FINGER SURGERY      tendon       Social History     Tobacco Use     Smoking status: Never Smoker     Smokeless tobacco: Former User   Substance Use Topics     Alcohol use: Yes     Alcohol/week: 15.0 standard drinks     Family History   Problem Relation Age of Onset     Hypertension Mother         passed  age 96     Psychotic Disorder Mother         anxiety     Psychotic Disorder Father         anxiety     Cerebrovascular Disease Sister      Hyperlipidemia Sister         age 66     Psychotic Disorder Sister         anxiety     Psychotic Disorder Brother         anxiety     Thyroid Disease Sister      Thyroid Disease Brother      Thyroid Disease Sister      C.A.D. No family hx of      Diabetes No family hx of      Breast Cancer No family hx of      Cancer - colorectal No family hx of      Prostate Cancer No family hx of          Current Outpatient Medications   Medication Sig Dispense Refill     ASPIRIN PO Take 81 mg by mouth daily        atorvastatin (LIPITOR) 40 MG tablet Take 1 tablet (40 mg) by mouth daily 90 tablet 1     B Complex-Folic Acid (SUPER B COMPLEX MAXI) TABS Take one tablet by mouth daily 30 tablet 2     fenofibrate (TRIGLIDE/LOFIBRA) 160 MG tablet Take 1 tablet (160 mg) by mouth daily 90 tablet 1     L-Methylfolate-B6-B12 (FOLTX) 1.13-25-2 MG TABS Take 1 Dose by mouth daily 90 tablet 0     Omega-3 Fatty Acids (OMEGA-3 FISH OIL PO) Take  by mouth.       omeprazole (PRILOSEC) 40 MG DR capsule TAKE 1 CAPSULE (40MG) BY ORAL ROUTE EVERY DAY BEFORE A MEAL BEFORE A MEAL  0     propranolol (INDERAL) 20 MG tablet TAKE 1 TABLET BY MOUTH DAILY AS NEEDED 90 tablet 1     sertraline (ZOLOFT) 100 MG tablet Take 1.5 tablets (150 mg) by mouth daily 135 tablet 3     No Known Allergies  Recent Labs   Lab Test 09/03/19 12/18/18  1151  "06/12/18  1126 11/17/17  1033  08/01/16  0817  05/31/12   LDL 89 SEE COMMENT SEE COMMENT SEE COMMENT   < > 94   < > 66   HDL 59 47 54 51   < > 44   < > 52   TRIG 531* 436* 420* 444*   < > 367*   < > 319*   ALT  --  29 45 115*   < > 32   < > 47   CR 1.07 0.98 1.06 0.87   < > 1.08   < > 1.08   GFRESTIMATED  --  82 75 93   < > 74   < >  --    POTASSIUM 5.11 5.0 4.2 4.9   < > 4.6   < > 5.0   TSH  --   --   --   --   --  2.71  --  3.31    < > = values in this interval not displayed.      BP Readings from Last 3 Encounters:   04/02/20 118/84   03/26/20 (!) 162/92   01/06/20 128/86    Wt Readings from Last 3 Encounters:   04/02/20 102.9 kg (226 lb 12.8 oz)   03/26/20 104.1 kg (229 lb 9.6 oz)   02/20/20 86.2 kg (190 lb)                      Reviewed and updated as needed this visit by Provider         Review of Systems   ROS COMP: Constitutional, HEENT, cardiovascular, pulmonary, gi and gu systems are negative, except as otherwise noted.      Objective    /84 (BP Location: Left arm, Patient Position: Chair, Cuff Size: Adult Large)   Pulse 88   Temp 98.4  F (36.9  C) (Oral)   Resp 20   Ht 1.727 m (5' 8\")   Wt 102.9 kg (226 lb 12.8 oz)   BMI 34.48 kg/m    Body mass index is 34.48 kg/m .  Physical Exam   GENERAL: healthy, alert and no distress  EYES: Eyes grossly normal to inspection, PERRL and conjunctivae and sclerae normal  HENT: ear canals and TM's normal, nose and mouth without ulcers or lesions  NECK: no adenopathy, no asymmetry, masses, or scars and thyroid normal to palpation  RESP: lungs clear to auscultation - no rales, rhonchi or wheezes  CV: regular rate and rhythm, normal S1 S2, no S3 or S4, no murmur, click or rub, no peripheral edema and peripheral pulses strong  ABDOMEN: soft, nontender, no hepatosplenomegaly, no masses and bowel sounds normal  MS: no gross musculoskeletal defects noted, no edema  SKIN: no suspicious lesions or rashes  PSYCH: mentation appears normal, affect " "normal/bright    Diagnostic Test Results:  Labs reviewed in Epic        Assessment & Plan   Assessment      Plan  (I10) Essential hypertension, benign (HTN)  (primary encounter diagnosis)  Comment: elevations have resolved, likely was pain induced  Plan: continue current medications at current doses     (E78.2) Mixed hyperlipidemia  Comment: control uncertain-labs today  Plan: continue current medications at current doses     (F41.1) JORGE (generalized anxiety disorder)  Comment: well controlled despite spine and covid 19 stressors  Plan: continue current medications at current doses     (M48.02) Spinal stenosis in cervical region  Comment: Patient is having persistent symptoms despite previous therapies. Pt planning surgery when allowed-does not need medication to help with sleep at this time but we did discuss possible muscle relaxant or very rare tramadol etc in future  Plan: continue nsaids for now    (G57.53) Tarsal tunnel syndrome of both lower extremities  Comment: needs to f/u with Dr Cervantes  Plan:     BMI:   Estimated body mass index is 34.48 kg/m  as calculated from the following:    Height as of this encounter: 1.727 m (5' 8\").    Weight as of this encounter: 102.9 kg (226 lb 12.8 oz).   Weight management plan: Discussed healthy diet and exercise guidelines        FUTURE APPOINTMENTS:       - Follow-up visit in 6 mo  Work on weight loss  Regular exercise    No follow-ups on file.    Woody Cintron MD  [facility]            "

## 2020-04-02 NOTE — NURSING NOTE
Edvin Norton is here for fasting blood work and medication refill.  Questioned patient about current smoking habits.  Pt. has never smoked.  Body mass index is 33.67 kg/(m^2).  PULSE regular  My Chart: active    Pre-visit planning  Immunizations - up to date  Colonoscopy - is up to date  Mammogram -   Asthma -   PHQ9  JORGE-7

## 2020-04-20 DIAGNOSIS — R79.89 ELEVATED HOMOCYSTEINE: ICD-10-CM

## 2020-04-20 DIAGNOSIS — E72.12 METHYLENETETRAHYDROFOLATE REDUCTASE (MTHFR) DEFICIENCY (H): ICD-10-CM

## 2020-04-20 NOTE — TELEPHONE ENCOUNTER
Edvin Norton is requesting a refill of:    Pending Prescriptions:                       Disp   Refills    B Complex-Folic Acid (SUPER B COMPLEX MAX*90 tab*0            Sig: TAKE 1 TABLET BY MOUTH EVERY DAY    Please close encounter if RX was sent. Thanks, Gita

## 2020-05-12 ENCOUNTER — TELEPHONE (OUTPATIENT)
Dept: ORTHOPEDICS | Facility: CLINIC | Age: 65
End: 2020-05-12

## 2020-05-12 NOTE — TELEPHONE ENCOUNTER
Called patient to discuss scheduling surgery as hospital is now opening for elective procedures.  No answer, left message for patient to call Rossana to discuss surgical scheduling.

## 2020-05-13 NOTE — TELEPHONE ENCOUNTER
Received voicemail message from patient stating he would like to move forward with surgery if possible. Patient can be reached at [phone].

## 2020-05-13 NOTE — TELEPHONE ENCOUNTER
Returned phone call to patient. We discussed that right now we are just trying to get a sense of who would like to move forward with surgery in the next 2-6 weeks and that currently we do not have a surgery date but once we do I will be in contact with him. Patient stated understanding and will await my phone call.

## 2020-05-18 RX ORDER — DULOXETIN HYDROCHLORIDE 60 MG/1
CAPSULE, DELAYED RELEASE ORAL
Qty: 60 CAPSULE | Refills: 5 | COMMUNITY
Start: 2020-05-18

## 2020-05-18 NOTE — TELEPHONE ENCOUNTER
Edvin Norton is requesting a refill of:    Refused Prescriptions:                       Disp   Refills    DULoxetine (CYMBALTA) 60 MG capsule [Pharm*60 cap*5        Sig: TAKE 1 CASPULE BY MOUTH IN THE MORNING AND 1 CAPSULE           WITH DINNER  Refused By: LIBRADO MURGUIA  Reason for Refusal: OTHER  Reason for Refusal Comment: We have never refilled this medication, pt on Sertraline

## 2020-05-29 ENCOUNTER — TRANSFERRED RECORDS (OUTPATIENT)
Dept: HEALTH INFORMATION MANAGEMENT | Facility: CLINIC | Age: 65
End: 2020-05-29

## 2020-06-01 ENCOUNTER — TELEPHONE (OUTPATIENT)
Dept: ORTHOPEDICS | Facility: CLINIC | Age: 65
End: 2020-06-01

## 2020-06-01 NOTE — TELEPHONE ENCOUNTER
Received voicemail from patient requesting to know when he can schedule surgery with Dr. Anderson. Attempted to return phone call to patient. Left detailed message explaining that we are only scheduling out 6 weeks at a time and that I currently do not have a surgery date for the patient at this time due to limited surgery time for providers. Informed patient that once I have more information for him I will be in contact with him. Left best call back number of [phone]

## 2020-06-02 ENCOUNTER — TELEPHONE (OUTPATIENT)
Dept: ORTHOPEDICS | Facility: CLINIC | Age: 65
End: 2020-06-02

## 2020-06-02 NOTE — TELEPHONE ENCOUNTER
Called and left message for patient about scheduling surgery with Dr. Anderson. Informed patient that Dr. Anderson had 6/15 available and asked patient to call back before 1 pm on Thursday (6/4) to confirm if that date will work or not. Gave [phone] as a call back number.

## 2020-06-04 NOTE — TELEPHONE ENCOUNTER
Attempted to reach out to patient to confirm 7/10 and to schedule PAC appointment. Left best call back number of [phone]

## 2020-06-04 NOTE — TELEPHONE ENCOUNTER
Received voicemail from patient stating that 6/15/20 would not work for a surgery date and requested a call back to discuss next available. Attempted to reach out to patient. Let detailed message that Dr. Anderson would have 7/10/20 and that currently this is my only other surgery date due to the OR only scheduling out 6 weeks at a time. Requested patient call me back and let me know if 6/15 or 7/10 would work otherwise I would have to add him on the reschedule list. Left best call back number of [phone]

## 2020-06-05 ENCOUNTER — TELEPHONE (OUTPATIENT)
Dept: ORTHOPEDICS | Facility: CLINIC | Age: 65
End: 2020-06-05

## 2020-06-05 DIAGNOSIS — Z11.59 ENCOUNTER FOR SCREENING FOR OTHER VIRAL DISEASES: Primary | ICD-10-CM

## 2020-06-05 PROBLEM — M48.02 CERVICAL STENOSIS OF SPINE: Status: ACTIVE | Noted: 2020-06-05

## 2020-06-05 PROBLEM — M43.12 ACQUIRED SPONDYLOLISTHESIS OF CERVICAL VERTEBRA: Status: ACTIVE | Noted: 2020-06-05

## 2020-06-05 PROBLEM — M47.12 CERVICAL SPONDYLOSIS WITH MYELOPATHY: Status: ACTIVE | Noted: 2020-06-05

## 2020-06-05 NOTE — TELEPHONE ENCOUNTER
Patient is scheduled for surgery with Dr. Anderson      Spoke or left message with: Spoke with Edvin    Date of Surgery: 7/10/20    Location: [place]    Informed patient they will need an adult  yes    Pre-op with surgeon (if applicable): n/a    H&P: Scheduled with PAC    Additional imaging/appointments: n/a    Surgery packet: Given in clinic     Additional comments: Patient will receive arrival time at PAC appointment

## 2020-06-08 NOTE — TELEPHONE ENCOUNTER
FUTURE VISIT INFORMATION      SURGERY INFORMATION:    Date: 7/10/20    [facility]    Surgeon:  Dane Anderson MD     Anesthesia Type:  general    Procedure: Anterior cervical diskectomy and fusion with anterior plate fixation cervical 4-7, possible cervical 3-4; Anterior iliac crest bone graft harvest     Consult: ov 3/5    RECORDS REQUESTED FROM:       Primary Care Provider: Woody Cintron MD - [place]    Pertinent Medical History: hypertension    Most recent ECHO: 5/6/2002

## 2020-06-16 ENCOUNTER — TELEPHONE (OUTPATIENT)
Dept: SURGERY | Facility: CLINIC | Age: 65
End: 2020-06-16

## 2020-06-16 NOTE — TELEPHONE ENCOUNTER
I left vm for pt to call back if he wants to do a video visit for his pre-op in PAC.    NEELA Arce LPN

## 2020-06-17 ENCOUNTER — TRANSFERRED RECORDS (OUTPATIENT)
Dept: FAMILY MEDICINE | Facility: CLINIC | Age: 65
End: 2020-06-17

## 2020-06-22 ENCOUNTER — PRE VISIT (OUTPATIENT)
Dept: SURGERY | Facility: CLINIC | Age: 65
End: 2020-06-22

## 2020-06-23 ENCOUNTER — ANESTHESIA EVENT (OUTPATIENT)
Dept: SURGERY | Facility: CLINIC | Age: 65
End: 2020-06-23

## 2020-06-23 ENCOUNTER — OFFICE VISIT (OUTPATIENT)
Dept: SURGERY | Facility: CLINIC | Age: 65
End: 2020-06-23
Payer: COMMERCIAL

## 2020-06-23 ENCOUNTER — MYC MEDICAL ADVICE (OUTPATIENT)
Dept: FAMILY MEDICINE | Facility: CLINIC | Age: 65
End: 2020-06-23

## 2020-06-23 VITALS
RESPIRATION RATE: 16 BRPM | WEIGHT: 221 LBS | BODY MASS INDEX: 33.49 KG/M2 | DIASTOLIC BLOOD PRESSURE: 102 MMHG | TEMPERATURE: 98.9 F | HEART RATE: 83 BPM | HEIGHT: 68 IN | SYSTOLIC BLOOD PRESSURE: 168 MMHG | OXYGEN SATURATION: 96 %

## 2020-06-23 DIAGNOSIS — M48.02 CERVICAL SPINAL STENOSIS: ICD-10-CM

## 2020-06-23 DIAGNOSIS — Z01.818 PREOP EXAMINATION: Primary | ICD-10-CM

## 2020-06-23 DIAGNOSIS — M47.12 CERVICAL SPONDYLOSIS WITH MYELOPATHY: ICD-10-CM

## 2020-06-23 DIAGNOSIS — Z01.818 PREOP EXAMINATION: ICD-10-CM

## 2020-06-23 LAB
ANION GAP SERPL CALCULATED.3IONS-SCNC: 5 MMOL/L (ref 3–14)
BUN SERPL-MCNC: 21 MG/DL (ref 7–30)
CALCIUM SERPL-MCNC: 9.2 MG/DL (ref 8.5–10.1)
CHLORIDE SERPL-SCNC: 106 MMOL/L (ref 94–109)
CO2 SERPL-SCNC: 27 MMOL/L (ref 20–32)
CREAT SERPL-MCNC: 0.96 MG/DL (ref 0.66–1.25)
ERYTHROCYTE [DISTWIDTH] IN BLOOD BY AUTOMATED COUNT: 13.7 % (ref 10–15)
GFR SERPL CREATININE-BSD FRML MDRD: 83 ML/MIN/{1.73_M2}
GLUCOSE SERPL-MCNC: 101 MG/DL (ref 70–99)
HCT VFR BLD AUTO: 44.4 % (ref 40–53)
HGB BLD-MCNC: 14.2 G/DL (ref 13.3–17.7)
MCH RBC QN AUTO: 30.9 PG (ref 26.5–33)
MCHC RBC AUTO-ENTMCNC: 32 G/DL (ref 31.5–36.5)
MCV RBC AUTO: 97 FL (ref 78–100)
NT-PROBNP SERPL-MCNC: 30 PG/ML (ref 0–125)
PLATELET # BLD AUTO: 200 10E9/L (ref 150–450)
POTASSIUM SERPL-SCNC: 4.2 MMOL/L (ref 3.4–5.3)
RBC # BLD AUTO: 4.59 10E12/L (ref 4.4–5.9)
SODIUM SERPL-SCNC: 138 MMOL/L (ref 133–144)
WBC # BLD AUTO: 7.3 10E9/L (ref 4–11)

## 2020-06-23 ASSESSMENT — MIFFLIN-ST. JEOR: SCORE: 1766.95

## 2020-06-23 ASSESSMENT — LIFESTYLE VARIABLES: TOBACCO_USE: 1

## 2020-06-23 ASSESSMENT — PAIN SCALES - GENERAL: PAINLEVEL: SEVERE PAIN (6)

## 2020-06-23 ASSESSMENT — COPD QUESTIONNAIRES: COPD: 0

## 2020-06-23 NOTE — ANESTHESIA PREPROCEDURE EVALUATION
"Anesthesia Pre-Procedure Evaluation    Patient: Edvin Norton   MRN:     [id number] Gender:   male   Age:    65 year old :      1955        Preoperative Diagnosis: * No surgery found *        LABS:  CBC:   Lab Results   Component Value Date    WBC 7.9 2017    WBC 11.2 (A) 2017    HGB 12.6 (L) 2017    HGB 13.7 2017    HCT 38.7 (L) 2017    HCT 42.7 2017     2017     2017     BMP:   Lab Results   Component Value Date    .3 2020    .7 2019    POTASSIUM 3.85 2020    POTASSIUM 5.11 2019    CHLORIDE 99.9 2020    CHLORIDE 105.5 2019    CO2 24.8 2020    CO2 26.1 2019    BUN 21 2020    BUN 17.1 2020    CR 1.23 (A) 2020    CR 1.07 2019     (A) 2020    GLC 97 2019     COAGS: No results found for: PTT, INR, FIBR  POC: No results found for: BGM, HCG, HCGS  OTHER:   Lab Results   Component Value Date    LILI 9.5 2020    MAG 1.89 2012    ALBUMIN 4.8 2020    PROTTOTAL 7.2 2020    ALT 29 2018    AST 33 2018    ALKPHOS 50 2020    BILITOTAL 0.8 2020    BILIDIRECT 0.2 10/15/2013    TSH 2.71 2016    CRP 6.9 2017    SED 31 (H) 2017        Preop Vitals    BP Readings from Last 3 Encounters:   20 (!) 162/102   20 118/84   20 (!) 162/92    Pulse Readings from Last 3 Encounters:   20 83   20 88   20 89      Resp Readings from Last 3 Encounters:   20 16   20 20   19 20    SpO2 Readings from Last 3 Encounters:   20 96%   20 96%   20 96%      Temp Readings from Last 1 Encounters:   20 98.9  F (37.2  C) (Oral)    Ht Readings from Last 1 Encounters:   20 1.727 m (5' 8\")      Wt Readings from Last 1 Encounters:   20 100.2 kg (221 lb)    Estimated body mass index is 33.6 kg/m  as calculated from the following:    Height as of " "this encounter: 1.727 m (5' 8\").    Weight as of this encounter: 100.2 kg (221 lb).     LDA:        Past Medical History:   Diagnosis Date     Anxiety state 9/26/2013     Problem list name updated by automated process. 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 Pt has had prior anesthetic. Type: General and MAC    No history of anesthetic complications          ROS/MED HX    ENT/Pulmonary:     (+)PADILLA risk factors snores loudly, hypertension, tobacco use (hx of chewing tobacco use, no smoking), Past use , . .   (-) asthma, COPD and recent URI   Neurologic:     (+)neuropathy - bilateral feet, other neuro poor balance    Cardiovascular:     (+) Dyslipidemia, hypertension----. Taking blood thinners Pt has received instructions: . . . :. . Previous cardiac testing date:results:date: results: date: results:Cath date: 2011 results:Conclusions:   No significant coronary atherosclerosis.   Right Dominant Circulation.   Widely patent coronary arteries without stenosis. Trivial soft plaque   noted in mid LAD.   No calcified plaques.   Suggest risk factor modification.           METS/Exercise Tolerance:  >4 METS   Hematologic:  - neg hematologic  ROS       Musculoskeletal:   (+)  other musculoskeletal- chronic neck pain      GI/Hepatic:     (+) Other GI/Hepatic solano's esophagus      Renal/Genitourinary:  - ROS Renal section negative       Endo:     (+) Obesity, .      Psychiatric:     (+) psychiatric history anxiety      Infectious Disease:  - neg infectious disease ROS       Malignancy:      - no malignancy   Other:  "   (+) H/O Chronic Pain,                       PHYSICAL EXAM:   Mental Status/Neuro: A/A/O; Age Appropriate   Airway: Facies: Feasible  Mallampati: II  Mouth/Opening: Full  TM distance: > 6 cm  Neck ROM: Limited   Respiratory: Auscultation: CTAB     Resp. Rate: Normal     Resp. Effort: Normal      CV: Rhythm: Regular  Rate: Age appropriate  Heart: Normal Sounds  Edema: None   Comments: Multiple caps     Dental: Details Habitus: Obesity               JZG FV AN PLAN NO PONV RULE       PAC Discussion and Assessment    ASA Classification: 3  Case is suitable for: [facility]  Anesthetic techniques and relevant risks discussed: GA  Invasive monitoring and risk discussed:   Types:   Possibility and Risk of blood transfusion discussed:   NPO instructions given:   Additional anesthetic preparation and risks discussed:   Needs early admission to pre-op area:   Other:     PAC Resident/NP Anesthesia Assessment:  Edvin Norton is a 65 year old male scheduled for an Anterior cervical diskectomy and fusion with anterior plate fixation cervical 4-7, possible cervical 3-4; Anterior iliac crest bone graft harvest on 7/10/20 by Dr. Anderson in treatment of cervical spondylosis with myelopathy, cervical stenosis and cervical spondylolisthesis.  PAC referral for risk assessment and optimization for anesthesia with comorbid conditions of: hypertension, hyperlipidemia, history of chewing tobacco, obesity, solano's esophagus and anxiety.      Pre-operative considerations:  1.  Cardiac:  Functional status- METS >4.  He has been walking 5-6 miles daily for exercise.  His blood pressure is quite elevated x 2 in clinic today (162/102 and 168/102).  He is on propranolol, but that is prescribed to him for anxiety, not for hypertension.  I have instructed him to get a blood pressure cuff for home if able to start monitoring his blood pressure and to also call his primary care provider to arrange an appointment for hypertension management prior to  surgery.  Please check with patient in pre-op to determine if he was prescribed any new blood pressure medications prior to surgery and add to med reconciliation.  He had a coronary angiogram in 2011 that was negative for CAD.   Intermediate risk surgery with 0.4% risk of major adverse cardiac event.   2.  Pulm:  Airway - neck ROM is significantly limited.  PADILLA risk: intermediate.  He reports that he had a sleep study before and it was not diagnostic of sleep apnea.    3.  GI:  Risk of PONV score = 2.  If > 2, anti-emetic intervention recommended.  He is on omeprazole for solano's esophagus and denies GERD.    4. Endo:  He is obese with a BMI >30.  5. Heme:  Hold aspirin 7 days prior to surgery.   6. Neuro:  He does have a history of falls secondary to impaired balance.  He uses walking sticks now when going on walks for exercise.  Consider fall risk precautions.      VTE risk: 1.8%    Patient is optimized and is acceptable candidate for the proposed procedure.  No further diagnostic evaluation is needed.       **For further details of assessment, testing, and physical exam please see H and P completed on same date.          Jocelynn Lawson DNP, RN, APRN      Reviewed and Signed by PAC Mid-Level Provider/Resident  Mid-Level Provider/Resident: Jocelynn Lawson DNP, RN, APRN  Date: 6/23/20  Time: 1443    Attending Anesthesiologist Anesthesia Assessment:        Anesthesiologist:   Date:   Time:   Pass/Fail:   Disposition:     PAC Pharmacist Assessment:        Pharmacist:   Date:   Time:    Jocelynn Lawson, LURDES CNP

## 2020-06-23 NOTE — PATIENT INSTRUCTIONS
Preparing for Your Surgery      Name:  Edvin Norton   MRN:  [id number]   :  1955   Today's Date:  2020     Arriving for surgery:  Surgery date:  7/10/20  Arrival time:  5:30 am    Surgical patients can have one visitor only during the preoperative phase. No visitors under the age of 18. Due to the COVID 19 crisis, we are trying to keep our patients safe from others who might have respiratory illnesses, if you have a respiratory illness or symptoms of COVID 19 please do not come into the hospital as a visitor.  Patient's with COVID 19 are not allowed to have visitors. Also, at this time  parking is not available.  Please come to:     [facility] [address]  [address] [place], [place] [address]. [place]  [address]    - parking is available in front of [facility] from 5:15AM to 8:00PM. If you prefer, park your car in the Green Lot.    -Proceed to the 3rd floor, check in at the Adult Surgery Waiting Lounge. [phone]    If an escort is needed stop at the Information Desk in the lobby. Inform the information person that you are here for surgery. An escort to the Adult Surgery Waiting Lounge will be provided.    What can I eat or drink?  -  You may have solid food or milk products until 8 hours prior to your surgery (11:30).  -  You may have water, apple juice or 7up/Sprite until 2 hours prior to your surgery (5:30 am).    Which medicines can I take?  Hold Aspirin, vitamins and supplements one week prior to surgery.  Hold Ibuprofen for 24 hours and/or Naproxen for 48 hours prior to surgery.     -  Please take these medications the day of surgery:  Atorvastatin (Lipitor)  Omeprazole (Prilosec)  Propranolol (Inderal)  Sertraline (Zoloft)    How do I prepare myself?  -  Take two showers: one the night before surgery; and one the morning of surgery.         Use Scrubcare or Hibiclens to wash from neck down, leave soap on your skin for up to one minute.  Do not get soap  in your eyes or ears.  You may use your own shampoo and conditioner; no other hair products.   -  Do NOT use lotion, powder, deodorant, or antiperspirant the day of your surgery.  -  Do NOT wear any jewelry.  -  Do not bring your own medications to the hospital.  -  Bring your ID and insurance card.    -If you are scheduled to go home the Same Day as surgery you must have a responsible adult as a  and to stay with you overnight the first 24 hours after surgery.     Questions or Concerns:  -If you are scheduled on the East or West campus and have questions or concerns regarding the day of surgery, please call Preadmission Nursing at [phone].       -If you have health changes between today and your surgery please call your surgeon. For questions after surgery please call your surgeons office.         AFTER YOUR SURGERY  Breathing exercises   Breathing exercises help you recover faster. Take deep breaths and let the air out slowly. This will:     Help you wake up after surgery.    Help prevent complications like pneumonia.  Preventing complications will help you go home sooner.   We may give you a breathing device (incentive spirometer) to encourage you to breathe deeply.   Nausea and vomiting   You may feel sick to your stomach after surgery; if so, let your nurse know.    Pain control:  After surgery, you may have pain. Our goal is to help you manage your pain. Pain medicine will help you feel comfortable enough to do activities that will help you heal.  These activities may include breathing exercises, walking and physical therapy.   To help your health care team treat your pain we will ask: 1) If you have pain  2) where it is located 3) describe your pain in your words  Methods of pain control include medications given by mouth, vein or by nerve block for some surgeries.  Sequential Compression Device (SCD):  You may need to wear SCD S (also called pneumo boots)on your legs or feet. These are wraps  connected to a machine that pumps in air and releases it. The repeated pumping helps prevent blood clots from forming.

## 2020-06-25 NOTE — RESULT ENCOUNTER NOTE
Leticia Pardo,    Your test results are attached.  All of your labs are good for surgery.         Jocelynn Lawson DNP, RN, ANP-C

## 2020-07-02 ENCOUNTER — OFFICE VISIT (OUTPATIENT)
Dept: FAMILY MEDICINE | Facility: CLINIC | Age: 65
End: 2020-07-02

## 2020-07-02 VITALS
SYSTOLIC BLOOD PRESSURE: 136 MMHG | WEIGHT: 218.2 LBS | BODY MASS INDEX: 33.07 KG/M2 | DIASTOLIC BLOOD PRESSURE: 72 MMHG | HEIGHT: 68 IN | RESPIRATION RATE: 18 BRPM | HEART RATE: 88 BPM | OXYGEN SATURATION: 95 % | TEMPERATURE: 98 F

## 2020-07-02 DIAGNOSIS — R03.0 ELEVATED BLOOD PRESSURE READING WITHOUT DIAGNOSIS OF HYPERTENSION: ICD-10-CM

## 2020-07-02 DIAGNOSIS — Z01.818 PRE-OP EXAM: Primary | ICD-10-CM

## 2020-07-02 PROCEDURE — 99212 OFFICE O/P EST SF 10 MIN: CPT | Performed by: FAMILY MEDICINE

## 2020-07-02 ASSESSMENT — MIFFLIN-ST. JEOR: SCORE: 1754.25

## 2020-07-02 NOTE — NURSING NOTE
Edvin is here today for a BP check.    Pre-visit Screening:  Immunizations:  up to date  Colonoscopy:  is up to date  Mammogram: NA  Asthma Action Test/Plan:  AMADA  PHQ9:  NA  GAD7:  NA  Questioned patient about current smoking habits Pt. has never smoked.  Ok to leave detailed message on voice mail for today's visit only Yes, phone # [phone]

## 2020-07-02 NOTE — PROGRESS NOTES
"SUBJECTIVE: 65 year old male complaining of elevated blood pressure at his pre-op/ wants a recheck,  Has been working hard to figure out feet numbness and pain, seen by neurology and finally by DR Cervantes at [facility] with EMG/ MRI of the neck cervical spine changes with stenosis.  The patient describes going to the  of [place]/ spinal surgery. Ready for surgery  The patient denies a history of weakness. balnace is very off due to sensation changes  Smoking history: No.   Relevant past medical history: positive for high cholesterol,anxiety,esophagus dysplasia.    OBJECTIVE: The patient appears healthy, alert, no distress, cooperative, smiling and over weight.   Vital signs:  Temp: 98  F (36.7  C) Temp src: Oral BP: 136/72 Pulse: 88   Resp: 18 SpO2: 95 %     Height: 172.7 cm (5' 8\") Weight: 99 kg (218 lb 3.2 oz)  Estimated body mass index is 33.18 kg/m  as calculated from the following:    Height as of this encounter: 1.727 m (5' 8\").    Weight as of this encounter: 99 kg (218 lb 3.2 oz).          ASSESSMENT: (Z01.818) Pre-op exam  (primary encounter diagnosis)  Plan: Noted for his upcoming surgery    (R03.0) Elevated blood pressure reading without diagnosis of hypertension  Plan: I have reviewed the patient's medical history in detail and updated the computerized patient record.        "

## 2020-07-02 NOTE — PATIENT INSTRUCTIONS
Pre-op exam  (primary encounter diagnosis)  Plan: Noted for his upcoming surgery    (R03.0) Elevated blood pressure reading without diagnosis of hypertension  Plan: I have reviewed the patient's medical history in detail and updated the computerized patient record.

## 2020-07-03 ENCOUNTER — TELEPHONE (OUTPATIENT)
Dept: ORTHOPEDICS | Facility: CLINIC | Age: 65
End: 2020-07-03

## 2020-07-03 NOTE — TELEPHONE ENCOUNTER
[facility] Call Center    Phone Message    May a detailed message be left on voicemail: yes     Reason for Call: Other:   Pt is calling with update on BP before surgery next wk.     07/02 at 3pm: 130/81    Action Taken: Other:  ortho    Travel Screening: Not Applicable

## 2020-07-03 NOTE — TELEPHONE ENCOUNTER
See phone message.  Spine surgery scheduled  7-10-20. See PAC H&P dictation 6-23-20 with elevated BP & pt. Was told to get recheck by primary MD.  See epic note from Primary MD yesterday 7-2-20 recheck was 136/72 stated No HTN cleared for surgery.  I called pt back & let him know we can see note  from Primary 7-2-20 above OK for surgery.  He will try again to schedule COVID test for Tues.  Call back prn.  Pt agreed.   Pau Hernandez RN.

## 2020-07-07 DIAGNOSIS — Z11.59 ENCOUNTER FOR SCREENING FOR OTHER VIRAL DISEASES: ICD-10-CM

## 2020-07-07 PROCEDURE — U0003 INFECTIOUS AGENT DETECTION BY NUCLEIC ACID (DNA OR RNA); SEVERE ACUTE RESPIRATORY SYNDROME CORONAVIRUS 2 (SARS-COV-2) (CORONAVIRUS DISEASE [COVID-19]), AMPLIFIED PROBE TECHNIQUE, MAKING USE OF HIGH THROUGHPUT TECHNOLOGIES AS DESCRIBED BY CMS-2020-01-R: HCPCS | Performed by: ORTHOPAEDIC SURGERY

## 2020-07-07 PROCEDURE — 99207 ZZC NO BILLABLE SERVICE THIS VISIT: CPT

## 2020-07-08 LAB
SARS-COV-2 RNA SPEC QL NAA+PROBE: NOT DETECTED
SPECIMEN SOURCE: NORMAL

## 2020-07-09 ENCOUNTER — ANESTHESIA EVENT (OUTPATIENT)
Dept: SURGERY | Facility: CLINIC | Age: 65
DRG: 472 | End: 2020-07-09
Payer: COMMERCIAL

## 2020-07-10 ENCOUNTER — APPOINTMENT (OUTPATIENT)
Dept: GENERAL RADIOLOGY | Facility: CLINIC | Age: 65
DRG: 472 | End: 2020-07-10
Attending: ORTHOPAEDIC SURGERY
Payer: COMMERCIAL

## 2020-07-10 ENCOUNTER — ANESTHESIA (OUTPATIENT)
Dept: SURGERY | Facility: CLINIC | Age: 65
DRG: 472 | End: 2020-07-10
Payer: COMMERCIAL

## 2020-07-10 ENCOUNTER — DOCUMENTATION ONLY (OUTPATIENT)
Dept: ORTHOPEDICS | Facility: CLINIC | Age: 65
End: 2020-07-10

## 2020-07-10 ENCOUNTER — HOSPITAL ENCOUNTER (INPATIENT)
Facility: CLINIC | Age: 65
LOS: 3 days | Discharge: HOME | DRG: 472 | End: 2020-07-13
Attending: ORTHOPAEDIC SURGERY | Admitting: ORTHOPAEDIC SURGERY
Payer: COMMERCIAL

## 2020-07-10 DIAGNOSIS — Z98.1 STATUS POST CERVICAL SPINAL FUSION: Primary | ICD-10-CM

## 2020-07-10 DIAGNOSIS — M48.02 CERVICAL STENOSIS OF SPINE: ICD-10-CM

## 2020-07-10 DIAGNOSIS — M43.12 ACQUIRED SPONDYLOLISTHESIS OF CERVICAL VERTEBRA: ICD-10-CM

## 2020-07-10 DIAGNOSIS — M47.12 CERVICAL SPONDYLOSIS WITH MYELOPATHY: ICD-10-CM

## 2020-07-10 LAB
ABO + RH BLD: NORMAL
ABO + RH BLD: NORMAL
BLD GP AB SCN SERPL QL: NORMAL
BLOOD BANK CMNT PATIENT-IMP: NORMAL
BLOOD BANK CMNT PATIENT-IMP: NORMAL
GLUCOSE BLDC GLUCOMTR-MCNC: 96 MG/DL (ref 70–99)
SPECIMEN EXP DATE BLD: NORMAL

## 2020-07-10 PROCEDURE — 25000125 ZZHC RX 250: Performed by: ORTHOPAEDIC SURGERY

## 2020-07-10 PROCEDURE — 99207 ZZC CONSULT E&M CHANGED TO SUBSEQUENT LEVEL: CPT | Performed by: HOSPITALIST

## 2020-07-10 PROCEDURE — 00000146 ZZHCL STATISTIC GLUCOSE BY METER IP

## 2020-07-10 PROCEDURE — 0RG20A0 FUSION OF 2 OR MORE CERVICAL VERTEBRAL JOINTS WITH INTERBODY FUSION DEVICE, ANTERIOR APPROACH, ANTERIOR COLUMN, OPEN APPROACH: ICD-10-PCS | Performed by: ORTHOPAEDIC SURGERY

## 2020-07-10 PROCEDURE — 40000170 ZZH STATISTIC PRE-PROCEDURE ASSESSMENT II: Performed by: ORTHOPAEDIC SURGERY

## 2020-07-10 PROCEDURE — 25000125 ZZHC RX 250: Performed by: PHYSICIAN ASSISTANT

## 2020-07-10 PROCEDURE — 40000277 XR SURGERY CARM FLUORO LESS THAN 5 MIN W STILLS

## 2020-07-10 PROCEDURE — 0QB20ZZ EXCISION OF RIGHT PELVIC BONE, OPEN APPROACH: ICD-10-PCS | Performed by: ORTHOPAEDIC SURGERY

## 2020-07-10 PROCEDURE — 27211024 ZZHC OR SUPPLY OTHER OPNP: Performed by: ORTHOPAEDIC SURGERY

## 2020-07-10 PROCEDURE — 99232 SBSQ HOSP IP/OBS MODERATE 35: CPT | Performed by: HOSPITALIST

## 2020-07-10 PROCEDURE — 25000128 H RX IP 250 OP 636: Performed by: NURSE ANESTHETIST, CERTIFIED REGISTERED

## 2020-07-10 PROCEDURE — 25000132 ZZH RX MED GY IP 250 OP 250 PS 637: Performed by: STUDENT IN AN ORGANIZED HEALTH CARE EDUCATION/TRAINING PROGRAM

## 2020-07-10 PROCEDURE — 27210995 ZZH RX 272: Performed by: ORTHOPAEDIC SURGERY

## 2020-07-10 PROCEDURE — 27210794 ZZH OR GENERAL SUPPLY STERILE: Performed by: ORTHOPAEDIC SURGERY

## 2020-07-10 PROCEDURE — 71000014 ZZH RECOVERY PHASE 1 LEVEL 2 FIRST HR: Performed by: ORTHOPAEDIC SURGERY

## 2020-07-10 PROCEDURE — 37000009 ZZH ANESTHESIA TECHNICAL FEE, EACH ADDTL 15 MIN: Performed by: ORTHOPAEDIC SURGERY

## 2020-07-10 PROCEDURE — 25000132 ZZH RX MED GY IP 250 OP 250 PS 637: Performed by: HOSPITALIST

## 2020-07-10 PROCEDURE — 25800030 ZZH RX IP 258 OP 636: Performed by: ANESTHESIOLOGY

## 2020-07-10 PROCEDURE — 25000132 ZZH RX MED GY IP 250 OP 250 PS 637: Performed by: NURSE ANESTHETIST, CERTIFIED REGISTERED

## 2020-07-10 PROCEDURE — 0RB30ZZ EXCISION OF CERVICAL VERTEBRAL DISC, OPEN APPROACH: ICD-10-PCS | Performed by: ORTHOPAEDIC SURGERY

## 2020-07-10 PROCEDURE — 37000008 ZZH ANESTHESIA TECHNICAL FEE, 1ST 30 MIN: Performed by: ORTHOPAEDIC SURGERY

## 2020-07-10 PROCEDURE — 25000128 H RX IP 250 OP 636: Performed by: PHYSICIAN ASSISTANT

## 2020-07-10 PROCEDURE — 25000566 ZZH SEVOFLURANE, EA 15 MIN: Performed by: ORTHOPAEDIC SURGERY

## 2020-07-10 PROCEDURE — 36000072 ZZH SURGERY LEVEL 5 W FLUORO 1ST 30 MIN - UMMC: Performed by: ORTHOPAEDIC SURGERY

## 2020-07-10 PROCEDURE — C1713 ANCHOR/SCREW BN/BN,TIS/BN: HCPCS | Performed by: ORTHOPAEDIC SURGERY

## 2020-07-10 PROCEDURE — 25000132 ZZH RX MED GY IP 250 OP 250 PS 637: Performed by: PHYSICIAN ASSISTANT

## 2020-07-10 PROCEDURE — 25000128 H RX IP 250 OP 636: Performed by: STUDENT IN AN ORGANIZED HEALTH CARE EDUCATION/TRAINING PROGRAM

## 2020-07-10 PROCEDURE — 25000125 ZZHC RX 250: Performed by: NURSE ANESTHETIST, CERTIFIED REGISTERED

## 2020-07-10 PROCEDURE — 25000128 H RX IP 250 OP 636: Performed by: ANESTHESIOLOGY

## 2020-07-10 PROCEDURE — 25800030 ZZH RX IP 258 OP 636: Performed by: PHYSICIAN ASSISTANT

## 2020-07-10 PROCEDURE — 71000015 ZZH RECOVERY PHASE 1 LEVEL 2 EA ADDTL HR: Performed by: ORTHOPAEDIC SURGERY

## 2020-07-10 PROCEDURE — 25800030 ZZH RX IP 258 OP 636: Performed by: NURSE ANESTHETIST, CERTIFIED REGISTERED

## 2020-07-10 PROCEDURE — 12000001 ZZH R&B MED SURG/OB UMMC

## 2020-07-10 PROCEDURE — 36000070 ZZH SURGERY LEVEL 5 EA 15 ADDTL MIN - UMMC: Performed by: ORTHOPAEDIC SURGERY

## 2020-07-10 DEVICE — IMP END CAP MEDT 7X18X16MM 6240786: Type: IMPLANTABLE DEVICE | Site: SPINE CERVICAL | Status: FUNCTIONAL

## 2020-07-10 DEVICE — IMP SCR MEDT ZEVO 3.5X17MM ST VA 7723517: Type: IMPLANTABLE DEVICE | Site: SPINE CERVICAL | Status: FUNCTIONAL

## 2020-07-10 DEVICE — IMP PLATE CERV MEDT ZEVO 57MM 3 LVL 3003057: Type: IMPLANTABLE DEVICE | Site: SPINE CERVICAL | Status: FUNCTIONAL

## 2020-07-10 DEVICE — IMP SCR MEDT ZEVO 3.5X15MM ST VA 7723515: Type: IMPLANTABLE DEVICE | Site: SPINE CERVICAL | Status: FUNCTIONAL

## 2020-07-10 RX ORDER — LABETALOL 20 MG/4 ML (5 MG/ML) INTRAVENOUS SYRINGE
10 ONCE
Status: DISCONTINUED | OUTPATIENT
Start: 2020-07-10 | End: 2020-07-10 | Stop reason: HOSPADM

## 2020-07-10 RX ORDER — PHYSOSTIGMINE SALICYLATE 1 MG/ML
1.2 INJECTION INTRAVENOUS
Status: CANCELLED | OUTPATIENT
Start: 2020-07-10

## 2020-07-10 RX ORDER — CEFAZOLIN SODIUM 2 G/100ML
2 INJECTION, SOLUTION INTRAVENOUS EVERY 8 HOURS
Status: COMPLETED | OUTPATIENT
Start: 2020-07-10 | End: 2020-07-11

## 2020-07-10 RX ORDER — SODIUM CHLORIDE, SODIUM LACTATE, POTASSIUM CHLORIDE, CALCIUM CHLORIDE 600; 310; 30; 20 MG/100ML; MG/100ML; MG/100ML; MG/100ML
INJECTION, SOLUTION INTRAVENOUS CONTINUOUS
Status: DISCONTINUED | OUTPATIENT
Start: 2020-07-10 | End: 2020-07-10 | Stop reason: HOSPADM

## 2020-07-10 RX ORDER — PROPOFOL 10 MG/ML
INJECTION, EMULSION INTRAVENOUS PRN
Status: DISCONTINUED | OUTPATIENT
Start: 2020-07-10 | End: 2020-07-10

## 2020-07-10 RX ORDER — ACETAMINOPHEN 325 MG/1
975 TABLET ORAL ONCE
Status: COMPLETED | OUTPATIENT
Start: 2020-07-10 | End: 2020-07-10

## 2020-07-10 RX ORDER — ONDANSETRON 2 MG/ML
4 INJECTION INTRAMUSCULAR; INTRAVENOUS EVERY 30 MIN PRN
Status: DISCONTINUED | OUTPATIENT
Start: 2020-07-10 | End: 2020-07-10 | Stop reason: HOSPADM

## 2020-07-10 RX ORDER — ACETAMINOPHEN 325 MG/1
975 TABLET ORAL ONCE
Status: DISCONTINUED | OUTPATIENT
Start: 2020-07-10 | End: 2020-07-10 | Stop reason: HOSPADM

## 2020-07-10 RX ORDER — LIDOCAINE HYDROCHLORIDE 20 MG/ML
INJECTION, SOLUTION INFILTRATION; PERINEURAL PRN
Status: DISCONTINUED | OUTPATIENT
Start: 2020-07-10 | End: 2020-07-10

## 2020-07-10 RX ORDER — CEFAZOLIN SODIUM 1 G/3ML
1 INJECTION, POWDER, FOR SOLUTION INTRAMUSCULAR; INTRAVENOUS SEE ADMIN INSTRUCTIONS
Status: DISCONTINUED | OUTPATIENT
Start: 2020-07-10 | End: 2020-07-10 | Stop reason: HOSPADM

## 2020-07-10 RX ORDER — FENTANYL CITRATE 50 UG/ML
25-50 INJECTION, SOLUTION INTRAMUSCULAR; INTRAVENOUS
Status: DISCONTINUED | OUTPATIENT
Start: 2020-07-10 | End: 2020-07-10 | Stop reason: HOSPADM

## 2020-07-10 RX ORDER — OXYCODONE HYDROCHLORIDE 5 MG/1
5-10 TABLET ORAL EVERY 4 HOURS PRN
Status: DISCONTINUED | OUTPATIENT
Start: 2020-07-10 | End: 2020-07-10

## 2020-07-10 RX ORDER — NALOXONE HYDROCHLORIDE 0.4 MG/ML
.1-.4 INJECTION, SOLUTION INTRAMUSCULAR; INTRAVENOUS; SUBCUTANEOUS
Status: DISCONTINUED | OUTPATIENT
Start: 2020-07-10 | End: 2020-07-10 | Stop reason: HOSPADM

## 2020-07-10 RX ORDER — LABETALOL 20 MG/4 ML (5 MG/ML) INTRAVENOUS SYRINGE
10
Status: DISCONTINUED | OUTPATIENT
Start: 2020-07-10 | End: 2020-07-13 | Stop reason: HOSPADM

## 2020-07-10 RX ORDER — LIDOCAINE 40 MG/G
CREAM TOPICAL
Status: DISCONTINUED | OUTPATIENT
Start: 2020-07-10 | End: 2020-07-10 | Stop reason: HOSPADM

## 2020-07-10 RX ORDER — ALBUTEROL SULFATE 0.83 MG/ML
2.5 SOLUTION RESPIRATORY (INHALATION) EVERY 4 HOURS PRN
Status: DISCONTINUED | OUTPATIENT
Start: 2020-07-10 | End: 2020-07-10 | Stop reason: HOSPADM

## 2020-07-10 RX ORDER — BUPIVACAINE HYDROCHLORIDE AND EPINEPHRINE 2.5; 5 MG/ML; UG/ML
INJECTION, SOLUTION INFILTRATION; PERINEURAL PRN
Status: DISCONTINUED | OUTPATIENT
Start: 2020-07-10 | End: 2020-07-10 | Stop reason: HOSPADM

## 2020-07-10 RX ORDER — ALBUTEROL SULFATE 90 UG/1
AEROSOL, METERED RESPIRATORY (INHALATION) PRN
Status: DISCONTINUED | OUTPATIENT
Start: 2020-07-10 | End: 2020-07-10

## 2020-07-10 RX ORDER — OXYCODONE HYDROCHLORIDE 5 MG/1
5-10 TABLET ORAL
Status: DISCONTINUED | OUTPATIENT
Start: 2020-07-10 | End: 2020-07-13 | Stop reason: HOSPADM

## 2020-07-10 RX ORDER — DIAZEPAM 5 MG
5 TABLET ORAL EVERY 6 HOURS PRN
Status: DISCONTINUED | OUTPATIENT
Start: 2020-07-10 | End: 2020-07-13 | Stop reason: HOSPADM

## 2020-07-10 RX ORDER — GABAPENTIN 100 MG/1
300 CAPSULE ORAL
Status: COMPLETED | OUTPATIENT
Start: 2020-07-10 | End: 2020-07-10

## 2020-07-10 RX ORDER — POLYETHYLENE GLYCOL 3350 17 G/17G
17 POWDER, FOR SOLUTION ORAL DAILY
Status: DISCONTINUED | OUTPATIENT
Start: 2020-07-10 | End: 2020-07-13 | Stop reason: HOSPADM

## 2020-07-10 RX ORDER — FENTANYL CITRATE 50 UG/ML
INJECTION, SOLUTION INTRAMUSCULAR; INTRAVENOUS PRN
Status: DISCONTINUED | OUTPATIENT
Start: 2020-07-10 | End: 2020-07-10

## 2020-07-10 RX ORDER — ACETAMINOPHEN 325 MG/1
650 TABLET ORAL EVERY 4 HOURS PRN
Status: DISCONTINUED | OUTPATIENT
Start: 2020-07-13 | End: 2020-07-13 | Stop reason: HOSPADM

## 2020-07-10 RX ORDER — AMOXICILLIN 250 MG
2 CAPSULE ORAL 2 TIMES DAILY
Status: DISCONTINUED | OUTPATIENT
Start: 2020-07-10 | End: 2020-07-13 | Stop reason: HOSPADM

## 2020-07-10 RX ORDER — DEXAMETHASONE SODIUM PHOSPHATE 4 MG/ML
INJECTION, SOLUTION INTRA-ARTICULAR; INTRALESIONAL; INTRAMUSCULAR; INTRAVENOUS; SOFT TISSUE PRN
Status: DISCONTINUED | OUTPATIENT
Start: 2020-07-10 | End: 2020-07-10

## 2020-07-10 RX ORDER — ACETAMINOPHEN 325 MG/1
975 TABLET ORAL EVERY 8 HOURS
Status: COMPLETED | OUTPATIENT
Start: 2020-07-10 | End: 2020-07-13

## 2020-07-10 RX ORDER — BUPIVACAINE HYDROCHLORIDE 2.5 MG/ML
INJECTION, SOLUTION INFILTRATION; PERINEURAL PRN
Status: DISCONTINUED | OUTPATIENT
Start: 2020-07-10 | End: 2020-07-10 | Stop reason: HOSPADM

## 2020-07-10 RX ORDER — EPHEDRINE SULFATE 50 MG/ML
INJECTION, SOLUTION INTRAMUSCULAR; INTRAVENOUS; SUBCUTANEOUS PRN
Status: DISCONTINUED | OUTPATIENT
Start: 2020-07-10 | End: 2020-07-10

## 2020-07-10 RX ORDER — ONDANSETRON 4 MG/1
4 TABLET, ORALLY DISINTEGRATING ORAL EVERY 6 HOURS PRN
Status: DISCONTINUED | OUTPATIENT
Start: 2020-07-10 | End: 2020-07-13 | Stop reason: HOSPADM

## 2020-07-10 RX ORDER — DEXAMETHASONE SODIUM PHOSPHATE 4 MG/ML
4 INJECTION, SOLUTION INTRA-ARTICULAR; INTRALESIONAL; INTRAMUSCULAR; INTRAVENOUS; SOFT TISSUE EVERY 10 MIN PRN
Status: DISCONTINUED | OUTPATIENT
Start: 2020-07-10 | End: 2020-07-10 | Stop reason: HOSPADM

## 2020-07-10 RX ORDER — OXYCODONE HYDROCHLORIDE 5 MG/1
5-10 TABLET ORAL
Qty: 40 TABLET | Refills: 0 | Status: SHIPPED | OUTPATIENT
Start: 2020-07-10 | End: 2020-09-25

## 2020-07-10 RX ORDER — CEFAZOLIN SODIUM 2 G/100ML
2 INJECTION, SOLUTION INTRAVENOUS
Status: COMPLETED | OUTPATIENT
Start: 2020-07-10 | End: 2020-07-10

## 2020-07-10 RX ORDER — LANOLIN ALCOHOL/MO/W.PET/CERES
3 CREAM (GRAM) TOPICAL
Status: DISCONTINUED | OUTPATIENT
Start: 2020-07-11 | End: 2020-07-13 | Stop reason: HOSPADM

## 2020-07-10 RX ORDER — ONDANSETRON 4 MG/1
4 TABLET, ORALLY DISINTEGRATING ORAL EVERY 30 MIN PRN
Status: DISCONTINUED | OUTPATIENT
Start: 2020-07-10 | End: 2020-07-10 | Stop reason: HOSPADM

## 2020-07-10 RX ORDER — DEXTROSE MONOHYDRATE, SODIUM CHLORIDE, AND POTASSIUM CHLORIDE 50; 1.49; 4.5 G/1000ML; G/1000ML; G/1000ML
INJECTION, SOLUTION INTRAVENOUS CONTINUOUS
Status: DISCONTINUED | OUTPATIENT
Start: 2020-07-10 | End: 2020-07-13

## 2020-07-10 RX ORDER — HYDROMORPHONE HYDROCHLORIDE 1 MG/ML
.3-.5 INJECTION, SOLUTION INTRAMUSCULAR; INTRAVENOUS; SUBCUTANEOUS EVERY 10 MIN PRN
Status: DISCONTINUED | OUTPATIENT
Start: 2020-07-10 | End: 2020-07-10 | Stop reason: HOSPADM

## 2020-07-10 RX ORDER — NALOXONE HYDROCHLORIDE 0.4 MG/ML
.1-.4 INJECTION, SOLUTION INTRAMUSCULAR; INTRAVENOUS; SUBCUTANEOUS
Status: DISCONTINUED | OUTPATIENT
Start: 2020-07-10 | End: 2020-07-13 | Stop reason: HOSPADM

## 2020-07-10 RX ORDER — BISACODYL 10 MG
10 SUPPOSITORY, RECTAL RECTAL DAILY PRN
Status: DISCONTINUED | OUTPATIENT
Start: 2020-07-10 | End: 2020-07-13 | Stop reason: HOSPADM

## 2020-07-10 RX ORDER — CARBOXYMETHYLCELLULOSE SODIUM 5 MG/ML
1 SOLUTION/ DROPS OPHTHALMIC
Status: DISCONTINUED | OUTPATIENT
Start: 2020-07-10 | End: 2020-07-13 | Stop reason: HOSPADM

## 2020-07-10 RX ORDER — HYDRALAZINE HYDROCHLORIDE 20 MG/ML
2.5-5 INJECTION INTRAMUSCULAR; INTRAVENOUS EVERY 10 MIN PRN
Status: DISCONTINUED | OUTPATIENT
Start: 2020-07-10 | End: 2020-07-10 | Stop reason: HOSPADM

## 2020-07-10 RX ORDER — GABAPENTIN 100 MG/1
300 CAPSULE ORAL ONCE
Status: DISCONTINUED | OUTPATIENT
Start: 2020-07-10 | End: 2020-07-10 | Stop reason: HOSPADM

## 2020-07-10 RX ORDER — ONDANSETRON 2 MG/ML
4 INJECTION INTRAMUSCULAR; INTRAVENOUS EVERY 6 HOURS PRN
Status: DISCONTINUED | OUTPATIENT
Start: 2020-07-10 | End: 2020-07-13 | Stop reason: HOSPADM

## 2020-07-10 RX ORDER — MAGNESIUM HYDROXIDE 1200 MG/15ML
LIQUID ORAL PRN
Status: DISCONTINUED | OUTPATIENT
Start: 2020-07-10 | End: 2020-07-10 | Stop reason: HOSPADM

## 2020-07-10 RX ORDER — LIDOCAINE 40 MG/G
CREAM TOPICAL
Status: DISCONTINUED | OUTPATIENT
Start: 2020-07-10 | End: 2020-07-13 | Stop reason: HOSPADM

## 2020-07-10 RX ORDER — METOPROLOL TARTRATE 1 MG/ML
1-2 INJECTION, SOLUTION INTRAVENOUS EVERY 5 MIN PRN
Status: DISCONTINUED | OUTPATIENT
Start: 2020-07-10 | End: 2020-07-10 | Stop reason: HOSPADM

## 2020-07-10 RX ORDER — ONDANSETRON 2 MG/ML
INJECTION INTRAMUSCULAR; INTRAVENOUS PRN
Status: DISCONTINUED | OUTPATIENT
Start: 2020-07-10 | End: 2020-07-10

## 2020-07-10 RX ORDER — GABAPENTIN 300 MG/1
300 CAPSULE ORAL 2 TIMES DAILY
Status: COMPLETED | OUTPATIENT
Start: 2020-07-10 | End: 2020-07-13

## 2020-07-10 RX ORDER — FAMOTIDINE 20 MG/1
20 TABLET, FILM COATED ORAL 2 TIMES DAILY
Status: DISCONTINUED | OUTPATIENT
Start: 2020-07-10 | End: 2020-07-13 | Stop reason: HOSPADM

## 2020-07-10 RX ORDER — MEPERIDINE HYDROCHLORIDE 25 MG/ML
12.5 INJECTION INTRAMUSCULAR; INTRAVENOUS; SUBCUTANEOUS
Status: CANCELLED | OUTPATIENT
Start: 2020-07-10

## 2020-07-10 RX ADMIN — HYDROMORPHONE HYDROCHLORIDE 0.5 MG: 1 INJECTION, SOLUTION INTRAMUSCULAR; INTRAVENOUS; SUBCUTANEOUS at 10:10

## 2020-07-10 RX ADMIN — PHENYLEPHRINE HYDROCHLORIDE 0.2 MCG/KG/MIN: 10 INJECTION INTRAVENOUS at 08:40

## 2020-07-10 RX ADMIN — OXYCODONE HYDROCHLORIDE 10 MG: 5 TABLET ORAL at 17:44

## 2020-07-10 RX ADMIN — ROCURONIUM BROMIDE 20 MG: 10 INJECTION INTRAVENOUS at 09:51

## 2020-07-10 RX ADMIN — Medication 7.5 MG: at 08:38

## 2020-07-10 RX ADMIN — GABAPENTIN 300 MG: 300 CAPSULE ORAL at 06:27

## 2020-07-10 RX ADMIN — SODIUM CHLORIDE, POTASSIUM CHLORIDE, SODIUM LACTATE AND CALCIUM CHLORIDE: 600; 310; 30; 20 INJECTION, SOLUTION INTRAVENOUS at 10:33

## 2020-07-10 RX ADMIN — CEFAZOLIN SODIUM 2 G: 2 INJECTION, SOLUTION INTRAVENOUS at 17:44

## 2020-07-10 RX ADMIN — CARBOXYMETHYLCELLULOSE SODIUM 1 DROP: 5 SOLUTION/ DROPS OPHTHALMIC at 15:47

## 2020-07-10 RX ADMIN — PHENYLEPHRINE HYDROCHLORIDE 100 MCG: 10 INJECTION INTRAVENOUS at 08:27

## 2020-07-10 RX ADMIN — Medication 100 MG: at 07:51

## 2020-07-10 RX ADMIN — TRANEXAMIC ACID 1 MG/KG/HR: 100 INJECTION, SOLUTION INTRAVENOUS at 08:20

## 2020-07-10 RX ADMIN — PHENYLEPHRINE HYDROCHLORIDE 50 MCG: 10 INJECTION INTRAVENOUS at 10:19

## 2020-07-10 RX ADMIN — MIDAZOLAM 2 MG: 1 INJECTION INTRAMUSCULAR; INTRAVENOUS at 07:47

## 2020-07-10 RX ADMIN — Medication 5 MG: at 08:28

## 2020-07-10 RX ADMIN — PHENYLEPHRINE HYDROCHLORIDE 100 MCG: 10 INJECTION INTRAVENOUS at 09:12

## 2020-07-10 RX ADMIN — FENTANYL CITRATE 25 MCG: 50 INJECTION INTRAMUSCULAR; INTRAVENOUS at 12:22

## 2020-07-10 RX ADMIN — FENTANYL CITRATE 25 MCG: 50 INJECTION INTRAMUSCULAR; INTRAVENOUS at 11:52

## 2020-07-10 RX ADMIN — HYDROMORPHONE HYDROCHLORIDE 0.3 MG: 1 INJECTION, SOLUTION INTRAMUSCULAR; INTRAVENOUS; SUBCUTANEOUS at 12:56

## 2020-07-10 RX ADMIN — ROCURONIUM BROMIDE 10 MG: 10 INJECTION INTRAVENOUS at 10:38

## 2020-07-10 RX ADMIN — OXYCODONE HYDROCHLORIDE 10 MG: 5 TABLET ORAL at 21:50

## 2020-07-10 RX ADMIN — FAMOTIDINE 20 MG: 20 TABLET ORAL at 21:49

## 2020-07-10 RX ADMIN — FENTANYL CITRATE 50 MCG: 50 INJECTION, SOLUTION INTRAMUSCULAR; INTRAVENOUS at 10:01

## 2020-07-10 RX ADMIN — ACETAMINOPHEN 975 MG: 325 TABLET, FILM COATED ORAL at 06:27

## 2020-07-10 RX ADMIN — Medication 5 MG: at 09:10

## 2020-07-10 RX ADMIN — ONDANSETRON 4 MG: 2 INJECTION INTRAMUSCULAR; INTRAVENOUS at 11:09

## 2020-07-10 RX ADMIN — ROCURONIUM BROMIDE 20 MG: 10 INJECTION INTRAVENOUS at 08:47

## 2020-07-10 RX ADMIN — ACETAMINOPHEN 975 MG: 325 TABLET, FILM COATED ORAL at 15:44

## 2020-07-10 RX ADMIN — ALBUTEROL SULFATE 4 PUFF: 90 AEROSOL, METERED RESPIRATORY (INHALATION) at 08:20

## 2020-07-10 RX ADMIN — FENTANYL CITRATE 50 MCG: 50 INJECTION, SOLUTION INTRAMUSCULAR; INTRAVENOUS at 08:16

## 2020-07-10 RX ADMIN — ROCURONIUM BROMIDE 50 MG: 10 INJECTION INTRAVENOUS at 08:05

## 2020-07-10 RX ADMIN — FENTANYL CITRATE 50 MCG: 50 INJECTION, SOLUTION INTRAMUSCULAR; INTRAVENOUS at 07:51

## 2020-07-10 RX ADMIN — LIDOCAINE HYDROCHLORIDE 100 MG: 20 INJECTION, SOLUTION INFILTRATION; PERINEURAL at 07:51

## 2020-07-10 RX ADMIN — HYDROMORPHONE HYDROCHLORIDE 0.3 MG: 1 INJECTION, SOLUTION INTRAMUSCULAR; INTRAVENOUS; SUBCUTANEOUS at 12:43

## 2020-07-10 RX ADMIN — FENTANYL CITRATE 50 MCG: 50 INJECTION, SOLUTION INTRAMUSCULAR; INTRAVENOUS at 08:02

## 2020-07-10 RX ADMIN — Medication 2 G: at 08:10

## 2020-07-10 RX ADMIN — Medication 1 G: at 10:12

## 2020-07-10 RX ADMIN — HYDROMORPHONE HYDROCHLORIDE 0.3 MG: 1 INJECTION, SOLUTION INTRAMUSCULAR; INTRAVENOUS; SUBCUTANEOUS at 13:25

## 2020-07-10 RX ADMIN — TRANEXAMIC ACID 1 G: 100 INJECTION, SOLUTION INTRAVENOUS at 08:09

## 2020-07-10 RX ADMIN — FENTANYL CITRATE 25 MCG: 50 INJECTION INTRAMUSCULAR; INTRAVENOUS at 11:49

## 2020-07-10 RX ADMIN — FENTANYL CITRATE 25 MCG: 50 INJECTION INTRAMUSCULAR; INTRAVENOUS at 12:07

## 2020-07-10 RX ADMIN — PROPOFOL 200 MG: 10 INJECTION, EMULSION INTRAVENOUS at 07:51

## 2020-07-10 RX ADMIN — DEXAMETHASONE SODIUM PHOSPHATE 10 MG: 4 INJECTION, SOLUTION INTRAMUSCULAR; INTRAVENOUS at 08:22

## 2020-07-10 RX ADMIN — GABAPENTIN 300 MG: 300 CAPSULE ORAL at 21:49

## 2020-07-10 RX ADMIN — SODIUM CHLORIDE, POTASSIUM CHLORIDE, SODIUM LACTATE AND CALCIUM CHLORIDE: 600; 310; 30; 20 INJECTION, SOLUTION INTRAVENOUS at 07:47

## 2020-07-10 RX ADMIN — FENTANYL CITRATE 50 MCG: 50 INJECTION, SOLUTION INTRAMUSCULAR; INTRAVENOUS at 08:50

## 2020-07-10 RX ADMIN — FENTANYL CITRATE 50 MCG: 50 INJECTION, SOLUTION INTRAMUSCULAR; INTRAVENOUS at 09:34

## 2020-07-10 RX ADMIN — PROPOFOL 20 MG: 10 INJECTION, EMULSION INTRAVENOUS at 11:25

## 2020-07-10 RX ADMIN — POLYETHYLENE GLYCOL 3350 17 G: 17 POWDER, FOR SOLUTION ORAL at 15:43

## 2020-07-10 ASSESSMENT — MIFFLIN-ST. JEOR: SCORE: 1752.37

## 2020-07-10 ASSESSMENT — ACTIVITIES OF DAILY LIVING (ADL)
TOILETING: 0-->INDEPENDENT
SWALLOWING: 0-->SWALLOWS FOODS/LIQUIDS WITHOUT DIFFICULTY
AMBULATION: 0-->INDEPENDENT
FALL_HISTORY_WITHIN_LAST_SIX_MONTHS: YES
RETIRED_EATING: 0-->INDEPENDENT
RETIRED_COMMUNICATION: 0-->UNDERSTANDS/COMMUNICATES WITHOUT DIFFICULTY
NUMBER_OF_TIMES_PATIENT_HAS_FALLEN_WITHIN_LAST_SIX_MONTHS: 5
BATHING: 0-->INDEPENDENT
DRESS: 0-->INDEPENDENT
COGNITION: 0 - NO COGNITION ISSUES REPORTED
TRANSFERRING: 0-->INDEPENDENT

## 2020-07-10 NOTE — ANESTHESIA PREPROCEDURE EVALUATION
Anesthesia Pre-Procedure Evaluation    Patient: Edvin Norton   MRN:     [id number] Gender:   male   Age:    65 year old :      1955        Preoperative Diagnosis: Cervical spondylosis with myelopathy [M47.12]  Cervical stenosis of spine [M48.02]  Acquired spondylolisthesis of cervical vertebra [M43.12]   Procedure(s):  Anterior cervical diskectomy and fusion with anterior plate fixation cervical 4-7, possible cervical 3-4;  Anterior iliac crest bone graft harvest     LABS:  CBC:   Lab Results   Component Value Date    WBC 7.3 2020    WBC 7.9 2017    HGB 14.2 2020    HGB 12.6 (L) 2017    HCT 44.4 2020    HCT 38.7 (L) 2017     2020     2017     BMP:   Lab Results   Component Value Date     2020    .3 2020    POTASSIUM 4.2 2020    POTASSIUM 3.85 2020    CHLORIDE 106 2020    CHLORIDE 99.9 2020    CO2 27 2020    CO2 24.8 2020    BUN 21 2020    BUN 21 2020    BUN 17.1 2020    CR 0.96 2020    CR 1.23 (A) 2020     (H) 2020     (A) 2020     COAGS: No results found for: PTT, INR, FIBR  POC:   Lab Results   Component Value Date    BGM 96 07/10/2020     OTHER:   Lab Results   Component Value Date    LILI 9.2 2020    MAG 1.89 2012    ALBUMIN 4.8 2020    PROTTOTAL 7.2 2020    ALT 29 2018    AST 33 2018    ALKPHOS 50 2020    BILITOTAL 0.8 2020    BILIDIRECT 0.2 10/15/2013    TSH 2.71 2016    CRP 6.9 2017    SED 31 (H) 2017        Preop Vitals    BP Readings from Last 3 Encounters:   07/10/20 (!) 163/101   20 136/72   20 (!) 168/102    Pulse Readings from Last 3 Encounters:   07/10/20 66   20 88   20 83      Resp Readings from Last 3 Encounters:   07/10/20 16   20 18   20 16    SpO2 Readings from Last 3 Encounters:   07/10/20 98%   20 95%  "  06/23/20 96%      Temp Readings from Last 1 Encounters:   07/10/20 37.1  C (98.8  F) (Oral)    Ht Readings from Last 1 Encounters:   07/10/20 1.727 m (5' 7.99\")      Wt Readings from Last 1 Encounters:   07/10/20 98.8 kg (217 lb 13 oz)    Estimated body mass index is 33.13 kg/m  as calculated from the following:    Height as of this encounter: 1.727 m (5' 7.99\").    Weight as of this encounter: 98.8 kg (217 lb 13 oz).     LDA:  Peripheral IV 07/10/20 Left Hand (Active)   Site Assessment WDL 07/10/20 0639   Line Status Saline locked 07/10/20 0639   Phlebitis Scale 0-->no symptoms 07/10/20 0639   Infiltration Scale 0 07/10/20 0639   Infiltration Site Treatment Method  None 07/10/20 0639   Extravasation? No 07/10/20 0639   Dressing Intervention New dressing  07/10/20 0639   Number of days: 0        Past Medical History:   Diagnosis Date     Anxiety state 9/26/2013     Problem list name updated by automated process. 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 Pt has had prior anesthetic. Type: MAC and General    No history of anesthetic complications          ROS/MED HX    ENT/Pulmonary:  - neg pulmonary ROS     Neurologic:  - neg neurologic ROS     Cardiovascular:     (+) Dyslipidemia, hypertension----. : . . . :. .       METS/Exercise Tolerance:     Hematologic:  - neg hematologic  ROS       Musculoskeletal: Comment: Cervical disc disease        GI/Hepatic:  - neg GI/hepatic ROS       Renal/Genitourinary:  - ROS Renal section negative       Endo:     (+) Obesity, .      Psychiatric:  - neg psychiatric ROS       Infectious " Disease:  - neg infectious disease ROS       Malignancy:      - no malignancy   Other:    - neg other ROS                     PHYSICAL EXAM:   Mental Status/Neuro: A/A/O   Airway: Facies: Feasible  Mallampati: I  Mouth/Opening: Full  TM distance: > 6 cm  Neck ROM: Full   Respiratory: Auscultation: CTAB     Resp. Rate: Normal     Resp. Effort: Normal      CV: Rhythm: Regular  Rate: Age appropriate  Heart: Normal Sounds  Edema: None   Comments:      Dental: Normal Dentition                Assessment:   ASA SCORE: 2    H&P: History and physical reviewed and following examination; no interval change.   Smoking Status:  Non-Smoker/Unknown   NPO Status: NPO Appropriate     Plan:   Anes. Type:  General   Pre-Medication: None   Induction:  IV (Standard)   Airway: ETT; Oral; CMAC/VL   Access/Monitoring: PIV; 2nd PIV   Maintenance: Balanced     Postop Plan:   Postop Pain: Opioids  Postop Sedation/Airway: Not planned  Disposition: Inpatient/Admit     PONV Management:   Adult Risk Factors:, Non-Smoker, Postop Opioids   Prevention: Ondansetron, Dexamethasone     CONSENT: Direct conversation   Plan and risks discussed with: Patient   Blood Products: Consented (ALL Blood Products)                   Temo Carvalho MD

## 2020-07-10 NOTE — PROGRESS NOTES
PACU to Inpatient Nursing Handoff    Patient Edvin Norton is a 65 year old male who speaks English.   Procedure Procedure(s):  Anterior cervical diskectomy and fusion with anterior plate fixation cervical 4-7  Right Anterior iliac crest bone graft harvest   Surgeon(s) Primary: Dane Anderson MD  Resident - Assisting: Serge Sauceda MD     No Known Allergies    Isolation  No active isolations     Past Medical History   has a past medical history of Anxiety state (9/26/2013), Natarajan esophagus (9/26/2013), Essential hypertension, benign (HTN) (10/27/2014), Hyperlipidemia (1995), and Obesity due to excess calories, unspecified obesity severity (3/24/2016).    Anesthesia General   Dermatome Level     Preop Meds acetaminophen (Tylenol) - time given: 0700  gabapentin (Neurontin) - time given: 0700   Nerve block Not applicable   Intraop Meds dexamethasone (Decadron)  fentanyl (Sublimaze): 300 mcg total  hydromorphone (Dilaudid): 0.5 mg total  ondansetron (Zofran): last given at 1109  albuterol puffs, txs, ephedrine, pheylephrine   Local Meds No   Antibiotics cefazolin (Ancef) - last given at 1012     Pain Patient Currently in Pain: yes  Comfort: tolerable with discomfort  Pain Control: partially effective   PACU meds  fentanyl (Sublimaze): 100 mcg (total dose) last given at 1230   hydromorphone (Dilaudid): .9 mg (total dose) last given at 1325   labetalol (Normodyne/Trandate): 10 mg (total dose) last given at 1305    PCA / epidural No   Capnography     Telemetry ECG Rhythm: Normal sinus rhythm   Inpatient Telemetry Monitor Ordered? No        Labs Glucose Lab Results   Component Value Date     06/23/2020       Hgb Lab Results   Component Value Date    HGB 14.2 06/23/2020       INR No results found for: INR   PACU Imaging Not applicable     Wound/Incision Incision/Surgical Site 07/10/20 Right Hip (Active)   Incision Assessment UTV 07/10/20 1315   Closure VITOR 07/10/20 1315   Incision Drainage Amount  UT 07/10/20 1315   Dressing Intervention Clean, dry, intact 07/10/20 1315   Number of days: 0       Incision/Surgical Site 07/10/20 Neck (Active)   Incision Assessment UT 07/10/20 1315   Closure VITOR 07/10/20 1315   Incision Drainage Amount UT 07/10/20 1315   Dressing Intervention Clean, dry, intact 07/10/20 1315   Number of days: 0      CMS        Equipment ice pack   Other LDA       IV Access Peripheral IV 07/10/20 Left Hand (Active)   Site Assessment L 07/10/20 1300   Line Status Infusing 07/10/20 1300   Phlebitis Scale 0-->no symptoms 07/10/20 1300   Infiltration Scale 0 07/10/20 1300   Infiltration Site Treatment Method  None 07/10/20 0639   Extravasation? No 07/10/20 0639   Dressing Intervention New dressing  07/10/20 0639   Number of days: 0       Peripheral IV 07/10/20 Anterior;Right Hand (Active)   Site Assessment [facility] 07/10/20 1300   Line Status Saline locked 07/10/20 1300   Phlebitis Scale 0-->no symptoms 07/10/20 1300   Infiltration Scale 0 07/10/20 1300   Number of days: 0      Blood Products Not applicable EBL 70 mL   Intake/Output Date 07/10/20 0700 - 07/11/20 0659   Shift 1768-2937 8313-4867 7349-1632 24 Hour Total   INTAKE   P.O. 300   300   I.V. 1600   1600   Shift Total(mL/kg) 1900(19.23)   1900(19.23)   OUTPUT   Urine 200   200   Blood 50   50   Shift Total(mL/kg) 250(2.53)   250(2.53)   Weight (kg) 98.8 98.8 98.8 98.8      Drains / Kwan Closed/Suction Drain 1 Left;Anterior Neck Bulb 10 Uzbek (Active)   Site Description [facility] 07/10/20 1315   Dressing Status Drainage - Minimal 07/10/20 1315   Number of days: 0       Urethral Catheter Straight-tip 16 fr (Active)   Tube Description [facility] 07/10/20 1315   Collection Container Standard 07/10/20 1315   Securement Method Securing device (Describe) 07/10/20 1315   Number of days: 0      Time of void PreOp Void Prior to Procedure: 0530 (07/10/20 0606)    PostOp      Diapered? No   Bladder Scan      mL(water, sprite) (07/10/20 1300)  crackers and  water     Vitals    B/P: 134/84  T: 98.2  F (36.8  C)    Temp src: Oral  P:  Pulse: 70 (07/10/20 1315)    Heart Rate: 73 (07/10/20 1315)     R: 10  O2:  SpO2: 93 %    O2 Device: Nasal cannula (07/10/20 1330)    Oxygen Delivery: 2 LPM (07/10/20 1330)         Family/support present significant other   Patient belongings     Patient transported on bed   DC meds/scripts (obs/outpt) Not applicable   Inpatient Pain Meds Released? Yes       Special needs/considerations None   Tasks needing completion None       DINO QUINTANILLA, RN  [facility]COM [id number]

## 2020-07-10 NOTE — ANESTHESIA CARE TRANSFER NOTE
Patient: Edvin Norton    Procedure(s):  Anterior cervical diskectomy and fusion with anterior plate fixation cervical 4-7  Right Anterior iliac crest bone graft harvest    Diagnosis: Cervical spondylosis with myelopathy [M47.12]  Cervical stenosis of spine [M48.02]  Acquired spondylolisthesis of cervical vertebra [M43.12]  Diagnosis Additional Information: No value filed.    Anesthesia Type:   General     Note:  Airway :Face Mask  Patient transferred to:PACU  Comments: Strong SV, VSS. Report to RN.  Handoff Report: Identifed the Patient, Identified the Reponsible Provider, Reviewed the pertinent medical history, Discussed the surgical course, Reviewed Intra-OP anesthesia mangement and issues during anesthesia, Set expectations for post-procedure period and Allowed opportunity for questions and acknowledgement of understanding      Vitals: (Last set prior to Anesthesia Care Transfer)    CRNA VITALS  7/10/2020 1105 - 7/10/2020 1140      7/10/2020             NIBP:  (!) 196/99    Pulse:  88    NIBP Mean:  119    SpO2:  98 %                Electronically Signed By: LURDES Sol CRNA  July 10, 2020  11:40 AM

## 2020-07-10 NOTE — BRIEF OP NOTE
[facility] [place]    Brief Operative Note    Pre-operative diagnosis: Cervical spondylosis with myelopathy [M47.12]  Cervical stenosis of spine [M48.02]  Acquired spondylolisthesis of cervical vertebra [M43.12]  Post-operative diagnosis Same as pre-operative diagnosis    Procedure: Procedure(s):  Anterior cervical diskectomy and fusion with anterior plate fixation cervical 4-7  Right Anterior iliac crest bone graft harvest  Surgeon: Surgeon(s) and Role:  Panel 1:     * Dane Anderson MD - Primary     * Serge Sauceda MD - Resident - Assisting  Panel 2:     * Dane Anderson MD - Primary     * Serge Sauceda MD - Resident - Assisting  Anesthesia: General   Estimated blood loss: 50 cc  Drains: Keith-Chan  Specimens: * No specimens in log *  Findings:   None.  Complications: None.  Implants:   Implant Name Type Inv. Item Serial No.  Lot No. LRB No. Used Action   IMP END CAP MEDT [id number] Metallic Hardware/[place] IMP END CAP MEDT [id number]  [employer] [id number] N/A 1 Implanted   IMP END CAP MEDT [id number] Metallic Hardware/[place] IMP END CAP MEDT [id number]  [employer] [id number] N/A 1 Implanted   IMP END CAP MEDT [id number] Metallic Hardware/[place] IMP END CAP MEDT [id number]  [employer] [id number] N/A 1 Implanted   IMP PLATE CERV MEDT ZEVO 57MM 3 LVL [id number] Metallic Hardware/[place] IMP PLATE CERV MEDT ZEVO 57MM 3 LVL [id number]  [employer] N/A N/A 1 Implanted   IMP SCR MEDT ZEVO 3.5X15MM ST [place] [id number] Metallic Hardware/[place] IMP SCR MEDT ZEVO 3.5X15MM ST [place] [id number]  [employer] N/A N/A 1 Implanted   IMP SCR MEDT ZEVO 3.5X17MM ST [place] [id number] Metallic Hardware/[place] IMP SCR MEDT ZEVO 3.5X17MM ST [place] [id number]  [employer] N/A N/A 1 Implanted       Ortho Primary  Activity: Up with assist until independent. No excessive bending or twisting. No lifting >10 lbs x 6 weeks. No Huey lift for transfers.    Weight bearing status: WBAT.  Pain management: Transition from IV to PO as tolerated. No NSAIDs   Antibiotics: Ancef x24 hours  Diet: Begin with clear fluids and progress diet as tolerated.   DVT prophylaxis: SCDs only. No chemical DVT ppx needed.  Imaging: XR Cervical spine PTDC - ordered.  Labs: labs PRN  Bracing/Splinting: [place] J on at all times except for showering and eating  Dressings: Keep [place] dressing intact x 7 days.  Drains: Document output per shift, will be discontinued at Orthopedic Surgery discretion.  Kwan catheter: Remove POD#1.   Physical Therapy/Occupational Therapy: Eval and treat.  Consults: Hospitalist.  Follow-up: Clinic with Dr. Anderson in 6 weeks with repeat x-rays.   Disposition: Pending progress with therapies, pain control on orals, and medical stability. Anticipate 2-4 days.    Serge Sauceda MD  Orthopaedic Surgery PGY-4  #: [phone]

## 2020-07-10 NOTE — PHARMACY-ADMISSION MEDICATION HISTORY
Admission medication history interview status for the 7/10/2020 admission is complete. See Epic admission navigator for allergy information, pharmacy, prior to admission medications and immunization status.     Medication history interview sources:  Patient via phone due to covid-19 pandemic and Surescript    Changes made to PTA medication list (reason)  Added: none  Deleted: none  Changed: Fish oil >> 1 g po daily  Propranolol 20 mg po daily prn >> 20 mg po daily for anxiety    Additional medication history information (including reliability of information, actions taken by pharmacist): Patient knows what medications he is currently on but does not know the doses.      Prior to Admission medications    Medication Sig Last Dose Taking? Auth Provider   ASPIRIN PO Take 81 mg by mouth every morning  Past Week at Unknown time Yes Reported, Patient   atorvastatin (LIPITOR) 40 MG tablet Take 1 tablet (40 mg) by mouth daily  Patient taking differently: Take 40 mg by mouth every morning  7/10/2020 at 0430 Yes Marcia Christine PA   fenofibrate (TRIGLIDE/LOFIBRA) 160 MG tablet Take 1 tablet (160 mg) by mouth daily  Patient taking differently: Take 160 mg by mouth every morning  7/10/2020 at 0430 Yes Marcia Christine PA   L-Methylfolate-B6-B12 (FOLTX) 1.13-25-2 MG TABS Take 1 Dose by mouth daily  Patient taking differently: Take 1 Dose by mouth every morning  Past Week at Unknown time Yes Woody Cintron MD   Omega-3 Fatty Acids (OMEGA-3 FISH OIL PO) Take 1 g by mouth every morning  Past Week at Unknown time Yes Reported, Patient   omeprazole (PRILOSEC) 40 MG DR capsule Take 40 mg by mouth every morning  7/10/2020 at 0430 Yes Reported, Patient   propranolol (INDERAL) 20 MG tablet TAKE 1 TABLET BY MOUTH DAILY AS NEEDED  Patient taking differently: Take 20 mg by mouth every morning TAKE 1 TABLET BY MOUTH DAILY for anxiety 7/10/2020 at 0430 Yes Marcia Christine PA   sertraline (ZOLOFT)  100 MG tablet Take 1.5 tablets (150 mg) by mouth daily  Patient taking differently: Take 150 mg by mouth every morning  7/10/2020 at 0430 Yes Marcia Christine PA         Medication history completed by: Niecy Trivedi PharmD, BCPS July 10, 2020

## 2020-07-10 NOTE — PROGRESS NOTES
"S:  Pt seen in room [facility] for a cervical collar.    O: 66 y/o, 5'7\", 217 lb pt resting supine.    A: C4-7 ACDF     P:  Fi/delivery of [place] J 300 CO w/ pt supine.  Pt evaluated supine only.  Donning, doffing, fitting parameters, care instructions reviewed.  Pt was provided clear written and oral instruction related to donning, doffing, use, maintenance, safety and potential hazards; verbalizes understanding and satisfaction with fit, comfort, and function.  Extra pads provided  F/U prn.      "

## 2020-07-10 NOTE — ANESTHESIA POSTPROCEDURE EVALUATION
Anesthesia POST Procedure Evaluation    Patient: Edvin Norton   MRN:     [id number] Gender:   male   Age:    65 year old :      1955        Preoperative Diagnosis: Cervical spondylosis with myelopathy [M47.12]  Cervical stenosis of spine [M48.02]  Acquired spondylolisthesis of cervical vertebra [M43.12]   Procedure(s):  Anterior cervical diskectomy and fusion with anterior plate fixation cervical 4-7  Right Anterior iliac crest bone graft harvest   Postop Comments: No value filed.     Anesthesia Type: General       Disposition: Admission   Postop Pain Control: Uneventful            Sign Out: Well controlled pain   PONV: No   Neuro/Psych: Uneventful            Sign Out: Acceptable/Baseline neuro status   Airway/Respiratory: Uneventful            Sign Out: Acceptable/Baseline resp. status   CV/Hemodynamics: Uneventful            Sign Out: Acceptable CV status   Other NRE:             Error/ Injury: Corneal abrasion   DID A NON-ROUTINE EVENT OCCUR? YES    Event details/Postop Comments:  The patient possibly had a left corneal abrasion.  At least his left eye felt scratchy and sore.  I discussed the possible causes with the patientt, and the fact it resolves an a day or two.          Last Anesthesia Record Vitals:  CRNA VITALS  7/10/2020 1105 - 7/10/2020 1205      7/10/2020             NIBP:  (!) 196/99    Pulse:  88    NIBP Mean:  119    SpO2:  98 %          Last PACU Vitals:  Vitals Value Taken Time   /84 7/10/2020  1:30 PM   Temp 36.8  C (98.2  F) 7/10/2020  1:15 PM   Pulse 69 7/10/2020  1:30 PM   Resp 15 7/10/2020  1:41 PM   SpO2 93 % 7/10/2020  1:41 PM   Temp src     NIBP     Pulse     SpO2     Resp     Temp     Ht Rate     Temp 2     Vitals shown include unvalidated device data.      Electronically Signed By: Temo Carvalho MD, July 10, 2020, 1:42 PM

## 2020-07-10 NOTE — OR NURSING
Report to Dominique Morrison.   Dr. Carvalho at bedside to assess pt. left eye.  Pt eye watery, red and irritated.  Dr. Carvalho with no further interventions at this time.  To treat blood pressure one time with labetalol per order per Dr. Carvalho.

## 2020-07-10 NOTE — CONSULTS
[facility], [place]    Hospitalist Consultation    Date of Admission:  7/10/2020  Date of Consult (When I saw the patient): 07/10/20    Assessment & Plan      Edvin Norton is a 65 year old male who was admitted on 7/10/2020.     Edvin Norton is a 65 year old male with hypertension, hyperlipidemia, history of chewing tobacco, obesity, solano's esophagus and anxiety that has chronic neck pain secondary to cervical spondylosis with myelopathy, cervical stenosis and cervical spondylolisthesis. He is admitted after C4-C7 ACDF and right ICBG with Dr. Anderson on 07/10/20.     # SP C4-C7 ACDF and right ICBG with Dr. Anderson on 07/10/20.   -per primary team  -hydrate well  -good IS  -ankle pumps  -pain and DVT prophylaxis management per orthopedics  # HT: High BP post is likely from pain. Ct propranolol with hold parameters. Add prn hydralazine. Control pain first before giving hydralazine.   # Dyslipidemia: Ct Lipitor and fenofibrate  # Tree's Esophagus: Ct omeprazole daily  # Anxiety: Ct Zoloft and propranolol      DVT Prophylaxis: Per primary team  Code Status: Full Code    Disposition: Per primary team.    Wilfredo Esteban MD     Reason for Consult   Reason for consult: post op medical comanagement    Primary Care Physician   Woody Cintron    Chief Complaint   SP spine Sx    History of Present Illness      Edvin Norton is a 65 year old male with hypertension, hyperlipidemia, history of chewing tobacco, obesity, solano's esophagus and anxiety that has chronic neck pain secondary to cervical spondylosis with myelopathy, cervical stenosis and cervical spondylolisthesis. He is admitted after C4-C7 ACDF and right ICBG with Dr. Anderson on 07/10/20.   Medicine consulted for post op medical co management.     Pain is controlled if he is not moving. Denies ho CAD. No chest pain or sob. No fevers.     Past Medical History      I have reviewed this patient's medical history  and updated it with pertinent information if needed.   Past Medical History:   Diagnosis Date     Anxiety state 9/26/2013     Problem list name updated by automated process. 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     Essential hypertension, benign (HTN) 10/27/2014     Hyperlipidemia 1995     Obesity due to excess calories, unspecified obesity severity 3/24/2016     Past Surgical History      I have reviewed this patient's surgical history and updated it with pertinent information if needed.  Past Surgical History:   Procedure Laterality Date     CATARACT IOL, RT/LT       ESOPHAGUS SURGERY      Halo procedure x 5     FINGER SURGERY      tendon     UPPER GI ENDOSCOPY         Prior to Admission Medications   Prior to Admission Medications   Prescriptions Last Dose Informant Patient Reported? Taking?   ASPIRIN PO Past Week at Unknown time  Yes Yes   Sig: Take 81 mg by mouth every morning    L-Methylfolate-B6-B12 (FOLTX) 1.13-25-2 MG TABS Past Week at Unknown time  No Yes   Sig: Take 1 Dose by mouth daily   Patient taking differently: Take 1 Dose by mouth every morning    Omega-3 Fatty Acids (OMEGA-3 FISH OIL PO) Past Week at Unknown time  Yes Yes   Sig: Take by mouth every morning    atorvastatin (LIPITOR) 40 MG tablet 7/10/2020 at 0430  No Yes   Sig: Take 1 tablet (40 mg) by mouth daily   Patient taking differently: Take 40 mg by mouth every morning    fenofibrate (TRIGLIDE/LOFIBRA) 160 MG tablet 7/10/2020 at 0430  No Yes   Sig: Take 1 tablet (160 mg) by mouth daily   Patient taking differently: Take 160 mg by mouth every morning    omeprazole (PRILOSEC) 40 MG DR capsule 7/10/2020 at 0430  Yes Yes   Sig: Take 40 mg by mouth every morning    propranolol (INDERAL) 20 MG tablet 7/10/2020 at 0430  No Yes   Sig: TAKE 1 TABLET BY MOUTH DAILY AS NEEDED   Patient taking differently: Take 20 mg by mouth every morning TAKE 1 TABLET BY MOUTH DAILY AS NEEDED   sertraline (ZOLOFT) 100 MG tablet 7/10/2020 at 0430  No Yes    Sig: Take 1.5 tablets (150 mg) by mouth daily   Patient taking differently: Take 150 mg by mouth every morning       Facility-Administered Medications: None     Allergies   No Known Allergies    Social History      I have reviewed this patient's social history and updated it with pertinent information if needed. Edvin Norton  reports that he has never smoked. He has quit using smokeless tobacco.  His smokeless tobacco use included chew. He reports current alcohol use of about 14.0 standard drinks of alcohol per week. He reports that he does not use drugs.    Family History      I have reviewed this patient's family history and updated it with pertinent information if needed.   Family History   Problem Relation Age of Onset     Hypertension Mother         passed  age 96     Anxiety Disorder Mother      Anxiety Disorder Father      Lung Cancer Father      Esophageal Cancer Brother      Psoriasis Brother      Hyperlipidemia Sister      Anxiety Disorder Sister      Esophageal Cancer Brother      Aneurysm Sister         brain     Thyroid Disease Brother      Neurologic Disorder Brother         unspecified - jerking motions at night     C.A.D. No family hx of      Diabetes No family hx of      Breast Cancer No family hx of      Cancer - colorectal No family hx of      Prostate Cancer No family hx of        Review of Systems   The 10 point Review of Systems is negative other than noted in the HPI or here.     Physical Exam   Temp: 97.8  F (36.6  C) Temp src: Oral BP: (!) 157/82 Pulse: 80 Heart Rate: 76 Resp: 10 SpO2: 95 % O2 Device: Nasal cannula Oxygen Delivery: 2 LPM  Vital Signs with Ranges  Temp:  [97.8  F (36.6  C)-98.8  F (37.1  C)] 97.8  F (36.6  C)  Pulse:  [66-87] 80  Heart Rate:  [70-85] 76  Resp:  [10-20] 10  BP: (134-171)/() 157/82  SpO2:  [93 %-98 %] 95 %  217 lbs 13.03 oz    Constitutional: Awake, alert, cooperative, no apparent distress.  Eyes: Conjunctiva and pupils examined and normal.  HEENT:  Moist mucous membranes, normal dentition.  Respiratory: Clear to auscultation bilaterally, no crackles or wheezing.  Cardiovascular: Regular rate and rhythm, normal S1 and S2, and no murmur noted.  GI: Soft, non-distended, non-tender, normal bowel sounds.  Lymph/Hematologic: No anterior cervical or supraclavicular adenopathy.  Skin: No rashes, no cyanosis, no edema.  Musculoskeletal: SP spine Sx  Neurologic: Cranial nerves 2-12 intact, normal strength and sensation.  Psychiatric: Alert, oriented to person, place and time, no obvious anxiety or depression.    Data   -Data reviewed today: All pertinent laboratory and imaging results from this encounter were reviewed.    No lab results found in last 7 days.    Recent Results (from the past 24 hour(s))   XR Surgery KITTY L/T 5 Min Fluoro w Stills    Narrative    TEMPORARY 7/10/2020 10:18 AM    Provided History: Cervical spine pain. Confirm operative level for  ACDF.    Comparison: MRI cervical spine 03/05/2020..    Technique: Single intraoperative fluoroscopic image was submitted.    Findings:    A single intraoperative image from cervical spine shows  instrumentation projecting toward the level of C5-6.       Impression    Impression: Instrumentation projecting toward C5-6.    The above indicated surgical level was reported to operating room  personnel, via telephone by Dr. Salazar on 7/10/2020 9:00 AM.    I have personally reviewed the examination and initial interpretation  and I agree with the findings.    MERRICK EL MD

## 2020-07-10 NOTE — DISCHARGE SUMMARY
"[facility] [place]  Orthopaedic Discharge Summary    Patient: Edvin Norton MRN# [id number]   Age/Sex: 65 year old male YOB: 1955      Date of Admission:  7/10/2020  Date of Discharge:  7/13/2020  9:47 AM  Admitting Physician:  Dane Anderson*  Discharge Physician:  Serge Sauceda MD  Primary Care Provider:  Woody Cintron  Discharge location         Home    DISCHARGE DIAGNOSIS:    Cervical spondylosis with myelopathy [M47.12]  Cervical stenosis of spine [M48.02]  Acquired spondylolisthesis of cervical vertebra [M43.12]    PROCEDURE(S):   7/10/2020 Procedure(s):  Anterior cervical diskectomy and fusion with anterior plate fixation cervical 4-7  Right Anterior iliac crest bone graft harvest     BRIEF HISTORY: (copied from Dr. Anderson's note on 3/5/2020)  \"The patient reports progressively worsening balance issues for approximately 2 years which has led to several falls.  This was previously worked up by Dr. Cervantes who ultimately felt his balance issues were at least partially due to myelopathy from cervical stenosis and cord compression.  More recently he has developed neck pain as well.  He denies pain radiating into his arms, weakness, numbness, and tingling.  He returns today to review his recent imaging.  Since last visit he has also been evaluated by ENT to see if his ears could be contributing.  He did have some hearing loss but workup was otherwise unremarkable.\"    HOSPITAL COURSE:    Surgery was uncomplicated. Postoperatively the patient was admitted to the orthopedic surgery service.  Intravenous antibiotics were provided for 24 hours after the surgery.  Medicine was consulted to help with management of medical comorbidities. Patient received routine nursing cares on the floor.  A clear liquid diet was started and subsequently advance to regular, which the patient tolerated without issue.  Stool softeners were administered while taking " pain medications to prevent constipation.  The  patient was able to void independently.  Post operative xrays were obtained. Physical and occupational therapy were initiated for safety training, ADLs, mobility, and ROM exercises. Surgical drains were removed on 7/12/20, after output was minimal.  After demonstrating the ability to tolerate a diet, pain control on oral pain medications, and evaluation by physical and occupational therapy, the patient was deemed medically safe for discharge to his home.    FOLLOW UP:      Future Appointments   Date Time Provider Department Center   7/11/2020  9:00 AM Mechelle Benavides, PT [facility] [place]   7/11/2020  1:30 PM Mechelle Benavides, PT [facility] [place]   7/11/2020  7:00 PM UR OT OVERFLOW [facility] [place]   8/20/2020 12:30 PM Dane Anderson MD [facility]   10/6/2020 10:45 AM Dane Anderson MD [facility] - Located 4th Floor (4D ) in the [facility] at [address] [place] [address].     Department Address: [address] [place] [id number]     [facility] Scheduling contact number: [phone]    Call if you haven't heard regarding these appointments within 7 days of discharge.      PHYSICAL EXAMINATION:  Gen: NAD. Resting comfortably in bed  Resp: Breathing comfortably on RA  Drain:   Drain removed on 7/12/20  Musculoskeletal: dressing c/d/I.  Sylvia J collar in place.        Motor Strength Right Left   Deltoids: C5 5/5 5/5   Biceps: C5 5/5 5/5   Wrist extension: C6 5/5 5/5   Triceps: C7  4/5 5/5   Wrist flexion: C7 5/5 5/5    strength: C8 5/5 5/5   Hand intrinsics: T1 5/5 5/5      Sensation from C4-L1 is preserved.     Lower extremities:  Motor Strength Right Left   Hip flexion: L1, L2, L3 5/5 5/5   Hip adduction: L2, L3 5/5 5/5   Knee flexion: S1 5/5 5/5   Knee extension: L3, L4 5/5 5/5   Ankle dosiflexion: L4, L5 5/5 5/5   EHL: L5 5/5 5/5   Ankle plantarflexion:  S1 5/5 5/5      Sensation from L1-S2 is preserved.                PLANNED DISCHARGE ORDERS:          Discharge Procedure Orders   Reason for your hospital stay   Order Comments: Spine surgery     Follow Up and recommended labs and tests   Order Comments: Follow up your treatment team in 6 weeks, please call to schedule this appointment.     Activity   Order Comments: Please, refrain from excessive twisting, bending, or lifting with the waist.  You have a 10 pound weightbearing limit restriction.     Order Specific Question Answer Comments   Is discharge order? Yes      When to contact your care team   Order Comments: DISCHARGE INSTRUCTIONS:  Activity:   - You may weight bear as tolerated.  - Please avoid lifting >10 lbs, excessive bending, twisting, and strenuous activities.  Brace:   Please wear [place] J (may remove for hygiene and meals) postoperatively for at least 6 weeks (possibly as long as 3 months depending on progress) when out of bed.  Pain management:   - Please take pain medications as instructed, but it is okay to begin weaning off of them as your pain tolerates. Avoid driving while taking pain medications as they can make you drowsy.  Wound care:   - Your dressing is to remain in place until postoperative day 5 after which you may remove.   - Your stitches will dissolve on their own and do not need to be removed.   - You may shower once your dressing is off. Let water run over the incision and dab dry -- do not rub or submerge the incision under water for at least 3 weeks.   - After the dressing is off, you may leave it open to the air. You may continue to keep the incision covered for protection.   - Please contact us if there is increased drainage, swelling, pain, or redness stemming from your incision. This may be a sign of infection and would need to be looked at immediately.  New or worsening symptoms:  - Please call or seek medical attention for pain that continues to worsen, new onset of numbness  or tingling, or extreme swelling.  - Please see a medical provider for new onset of chest pain or shortness of breath. This may be a sign of a heart attack or blood clot in your lungs.   Follow-up:  - After discharge, an appointment will be made for you to return to clinic in approximately 6 weeks for a postoperative check with your surgeon.  - Please call [phone] if you have any questions or concerns.  - Appointments are at [facility]: [address] [place] [address]     Wound care and dressings   Order Comments: Please keep wound covered first 5 days after surgery.  Afterwards wound can be left open to air.  Once the dressing is removed you can try, but no water submersion for first 3 weeks.    If you have a waterproof dressing, such as Aquacel, you can shower at any time.  No water submersion.     Discharge Instructions   Order Comments: You will follow-up in clinic in 6 weeks     Walker   Order Comments: DME Documentation:   Describe the reason for need to support medical necessity: Gait instability after surgery.     I, the undersigned, certify that the above prescribed supplies are medically necessary for this patient and is both reasonable and necessary in reference to accepted standards of medical and necessary in reference to accepted standards of medical practice in the treatment of this patient's condition and is not prescribed as a convenience.     Order Specific Question Answer Comments   DME Provider: [facility]    Walker Type: Standard (2 Wheel)      Diet   Order Comments: Follow this diet upon discharge: regular diet     Order Specific Question Answer Comments   Is discharge order? Yes      Assign Questionnaire Series to Patient             Manoj Seay MD  Orthopaedic Surgery, PGY-5

## 2020-07-10 NOTE — PLAN OF CARE
Pt arrived to unit at 1420 on own bed. O2% 97 on RA. VSS ex BP running high. Aox4, CMS+ ex for numbness in bilat toes and balls of the feet. Pt is pleasant but anxious. Incisional pain managed with oxycodone. Eye gtts ordered and administered for L eye dry sx reported post-op. Labetalol ordered for BP. Independently positioning in bed. Denies CP/SOB/HA or nausea. Tolerates regular diet. Kwan patent with good output. GERRY 35 mL serous/bloody output. [place] J in place. Drsgs (ant neck and R hip) CDI. Bilat hand PIVs, R infusing L SL. Pt has backpack and phone in room. Call light in reach, able to make needs known.

## 2020-07-10 NOTE — OP NOTE
Date of Service:  7/10/2020       Surgeon:  Dane Anderson MD   Assistant:  Serge Sauceda MD PGY-4      Preoperative Diagnosis:     1.  Multilevel cervical spondylosis with stenosis C4-T1, with myelopathy (significant gait imbalance).  2.  Cervical spondylolisthesis C3-4, C7-T1.  3.  Cervical Kyphosis       Postoperative Diagnosis:     Same      Procedures:   1.  Anterior cervical diskectomy and fusion (ACDF) C4-5, C5-6 and C6-7 using iliac crest autograft.   2.  Placement of interbody device (peek cage) C4-5, C5-6 and C6-7.   3.  Anterior plate-screw fixation C4-C7.  4.  Right anterior iliac crest cancellous bone graft harvest via separate skin incision.   5.  Use of operating microscope.       Anesthesia:  General endotracheal.   Local anesthetic:  0.25% marcaine + epinephrine = 30 mL.  EBL: 50 mL.  Complications:  None apparent.   Implants / Equipment used:   1.  Medtronic cervical PEEK cage, 18 x 16 mm: C4-5 =7 mm height; C5-6 =7 mm height; C6-7 =7 mm height.  2.  Medtronic Zevo plate 57 mm, with 3.5 mm screws: C4 =17 mm x 2; C5 =15 mm x 2; C6 =15 mm x 2; C7 =15 mm x 2.  3.  AcInsprod bone graft harvesting system, drill size 8 mm.  4.  Tranexamic acid low-dose 10/1.      Indications:  65 year old male with cervical myelopathy (gait balance issues).  Imaging revealed multilevel cervical spondylosis and stenosis C4-C7.  Tried nonoperative treatment, continues to have significant disabling symptoms.  I thus offered surgery in form of multilevel anterior cervical discectomy and fusion C4-C7.  Consented after thorough discussion of the rationale, risks, benefits and alternatives.  Discussed bone graft options; agreed to iliac crest bone graft harvest.      Details:  Properly identified in preop area, site marked, consent signed.  Wheeled to operating theater.  Brief earlier performed.  General anesthesia administered.  Antibiotics given: Cefazolin 2 g IV.  Kwan inserted.  Positioned supine on flat Trios table.   Arms tucked to sides; shoulders taped down.  Neck slightly extended.  Anterior neck and right pelvic region squared off and prepped with Chlorhexidine scrub, ChloraPrep and draped in sterile fashion.  Surgical timeout performed.       One inch incision made 2 fingerbreadths posterior to right ASIS.  Carried down to iliac crest bone.  Divot created using Acumed awl.  Cancellous bone graft harvested using Acumed drill.  Several passes made until harvest volume deemed adequate.  Field irrigated; defect packed with Gelfoam square and sponge.  Attention turned back to spine.    Used lateral C-arm imaging to localize skin incision.  3 cm incision made.  Left anterior Bolton-Anderson approach performed.  Platysma split in line with incision.  Dissected medial to SCM and carotid sheath, and lateral to strap muscles, trachea and esophagus; through pretracheal and prevertebral fascia.  Once down on spine, inserted needle into presumed C5-6 disc.  Lateral image showed correct needle position, confirmed by radiologist; confirmatory timeout performed.  Lifted longus colli bilaterally off spine C4-C7.  First worked at C6-7 level.  Trimline retractor blades placed; frame applied.  [place] pins inserted into adjacent vertebrae.  Distraction applied using [place] frame.  Microscope brought in.  Rectangular annulotomy using #15 blade.  Disc loosened using straight and angled curettes; disc material removed using pituitary.  [place] used to remove anterior and overhanging osteophytes, and prepare the endplates as deep as the posterior annulus.  Posterior osteophytes removed using 2 and 3 mm Kerrisons.  Bilateral anterior foraminotomies performed using same.  Resected PLL.  I created central holes on both endplates for ingrowth purposes.  Epidural bleeding controlled using bipolar cautery, Surgiflo and cottonoids.  Placed trial spacers; elected to use  7 mm height cage.  Cage packed with bone graft; inserted into disc space.  Microscope  removed.      Same procedure performed at C5-6 and C4-5, thus a total of 3 levels.  Lateral C-arm image showed good cage depth.      Flattened anterior osteophytes using erasmo.  Selected 57 mm plate, bent into further lordosis.  Plate positioned over cervical spine.  Drilled holes thru plate into vertebral bodies, using single or double barrel drill guide and drill with 11 mm stop.  Screws inserted, two into each vertebra.  Screws tightened, locking mechanism deployed.  Final lateral and AP C-arm image showed all implants in good position.    Irrigation.  Deep Fr 7 round GERRY drain placed.  Closed with 2-0 Vicryl for platysma, 3-0 Vicryl for subq tissue and 4-0 Monocryl for skin.  2-0 silk drain stitch placed.  Dermabond and sterile dressings applied.  Anesthetic injected.  Tolerated procedure well, taken off general anesthetic, transferred to recovery in satisfactory condition.       Postop: Admit to surgical floor.  Antibiotics x 2 more doses.  Hospitalist service consult for medical comanagement.  PT and OT consult.  Mechanical DVT prophylaxis.  Advance diet slowly as tolerated.  No lifting more than 10 pounds, no excessive bending or twisting.  Semirigid cervical collar ([place] J) for 6 to 12 weeks.  Anticipate discharge in approximately 2 days; cervical AP lateral x-rays prior to discharge.  RTC 6 weeks with cervical ap-lat x-rays.

## 2020-07-11 ENCOUNTER — APPOINTMENT (OUTPATIENT)
Dept: PHYSICAL THERAPY | Facility: CLINIC | Age: 65
DRG: 472 | End: 2020-07-11
Attending: ORTHOPAEDIC SURGERY
Payer: COMMERCIAL

## 2020-07-11 ENCOUNTER — APPOINTMENT (OUTPATIENT)
Dept: GENERAL RADIOLOGY | Facility: CLINIC | Age: 65
DRG: 472 | End: 2020-07-11
Attending: STUDENT IN AN ORGANIZED HEALTH CARE EDUCATION/TRAINING PROGRAM
Payer: COMMERCIAL

## 2020-07-11 LAB
ANION GAP SERPL CALCULATED.3IONS-SCNC: 2 MMOL/L (ref 3–14)
BASOPHILS # BLD AUTO: 0 10E9/L (ref 0–0.2)
BASOPHILS NFR BLD AUTO: 0.1 %
BUN SERPL-MCNC: 15 MG/DL (ref 7–30)
CALCIUM SERPL-MCNC: 8.6 MG/DL (ref 8.5–10.1)
CHLORIDE SERPL-SCNC: 106 MMOL/L (ref 94–109)
CO2 SERPL-SCNC: 31 MMOL/L (ref 20–32)
CREAT SERPL-MCNC: 0.91 MG/DL (ref 0.66–1.25)
DIFFERENTIAL METHOD BLD: ABNORMAL
EOSINOPHIL # BLD AUTO: 0 10E9/L (ref 0–0.7)
EOSINOPHIL NFR BLD AUTO: 0.2 %
ERYTHROCYTE [DISTWIDTH] IN BLOOD BY AUTOMATED COUNT: 14.1 % (ref 10–15)
GFR SERPL CREATININE-BSD FRML MDRD: 88 ML/MIN/{1.73_M2}
GLUCOSE SERPL-MCNC: 117 MG/DL (ref 70–99)
HCT VFR BLD AUTO: 41.2 % (ref 40–53)
HGB BLD-MCNC: 13.1 G/DL (ref 13.3–17.7)
IMM GRANULOCYTES # BLD: 0.1 10E9/L (ref 0–0.4)
IMM GRANULOCYTES NFR BLD: 0.5 %
LYMPHOCYTES # BLD AUTO: 1 10E9/L (ref 0.8–5.3)
LYMPHOCYTES NFR BLD AUTO: 8.1 %
MCH RBC QN AUTO: 30.8 PG (ref 26.5–33)
MCHC RBC AUTO-ENTMCNC: 31.8 G/DL (ref 31.5–36.5)
MCV RBC AUTO: 97 FL (ref 78–100)
MONOCYTES # BLD AUTO: 1.8 10E9/L (ref 0–1.3)
MONOCYTES NFR BLD AUTO: 14.2 %
NEUTROPHILS # BLD AUTO: 9.9 10E9/L (ref 1.6–8.3)
NEUTROPHILS NFR BLD AUTO: 76.9 %
NRBC # BLD AUTO: 0 10*3/UL
NRBC BLD AUTO-RTO: 0 /100
PLATELET # BLD AUTO: 197 10E9/L (ref 150–450)
POTASSIUM SERPL-SCNC: 4.4 MMOL/L (ref 3.4–5.3)
RBC # BLD AUTO: 4.26 10E12/L (ref 4.4–5.9)
SODIUM SERPL-SCNC: 139 MMOL/L (ref 133–144)
WBC # BLD AUTO: 12.8 10E9/L (ref 4–11)

## 2020-07-11 PROCEDURE — 25000132 ZZH RX MED GY IP 250 OP 250 PS 637: Performed by: STUDENT IN AN ORGANIZED HEALTH CARE EDUCATION/TRAINING PROGRAM

## 2020-07-11 PROCEDURE — 97530 THERAPEUTIC ACTIVITIES: CPT | Mod: GP

## 2020-07-11 PROCEDURE — 80048 BASIC METABOLIC PNL TOTAL CA: CPT | Performed by: STUDENT IN AN ORGANIZED HEALTH CARE EDUCATION/TRAINING PROGRAM

## 2020-07-11 PROCEDURE — 85025 COMPLETE CBC W/AUTO DIFF WBC: CPT | Performed by: STUDENT IN AN ORGANIZED HEALTH CARE EDUCATION/TRAINING PROGRAM

## 2020-07-11 PROCEDURE — 97116 GAIT TRAINING THERAPY: CPT | Mod: GP

## 2020-07-11 PROCEDURE — 36415 COLL VENOUS BLD VENIPUNCTURE: CPT | Performed by: STUDENT IN AN ORGANIZED HEALTH CARE EDUCATION/TRAINING PROGRAM

## 2020-07-11 PROCEDURE — 72040 X-RAY EXAM NECK SPINE 2-3 VW: CPT

## 2020-07-11 PROCEDURE — 12000001 ZZH R&B MED SURG/OB UMMC

## 2020-07-11 PROCEDURE — 99231 SBSQ HOSP IP/OBS SF/LOW 25: CPT | Performed by: HOSPITALIST

## 2020-07-11 PROCEDURE — 97161 PT EVAL LOW COMPLEX 20 MIN: CPT | Mod: GP

## 2020-07-11 PROCEDURE — 40000894 ZZH STATISTIC OT IP EVAL DEFER: Performed by: OCCUPATIONAL THERAPIST

## 2020-07-11 PROCEDURE — 25000128 H RX IP 250 OP 636: Performed by: STUDENT IN AN ORGANIZED HEALTH CARE EDUCATION/TRAINING PROGRAM

## 2020-07-11 RX ORDER — ATORVASTATIN CALCIUM 40 MG/1
40 TABLET, FILM COATED ORAL DAILY
Status: DISCONTINUED | OUTPATIENT
Start: 2020-07-11 | End: 2020-07-13 | Stop reason: HOSPADM

## 2020-07-11 RX ORDER — POLYETHYLENE GLYCOL 3350 17 G/17G
17 POWDER, FOR SOLUTION ORAL DAILY
Qty: 255 G | Refills: 0 | Status: SHIPPED | OUTPATIENT
Start: 2020-07-11 | End: 2020-09-25

## 2020-07-11 RX ORDER — AMOXICILLIN 250 MG
2 CAPSULE ORAL 2 TIMES DAILY
Qty: 40 TABLET | Refills: 0 | Status: SHIPPED | OUTPATIENT
Start: 2020-07-11 | End: 2020-09-25

## 2020-07-11 RX ORDER — PROPRANOLOL HYDROCHLORIDE 20 MG/1
20 TABLET ORAL EVERY MORNING
Status: DISCONTINUED | OUTPATIENT
Start: 2020-07-12 | End: 2020-07-13 | Stop reason: HOSPADM

## 2020-07-11 RX ORDER — FENOFIBRATE 160 MG/1
160 TABLET ORAL DAILY
Status: DISCONTINUED | OUTPATIENT
Start: 2020-07-11 | End: 2020-07-13 | Stop reason: HOSPADM

## 2020-07-11 RX ORDER — ACETAMINOPHEN 325 MG/1
975 TABLET ORAL EVERY 8 HOURS
Qty: 50 TABLET | Refills: 0 | Status: SHIPPED | OUTPATIENT
Start: 2020-07-11 | End: 2020-07-13

## 2020-07-11 RX ADMIN — OXYCODONE HYDROCHLORIDE 10 MG: 5 TABLET ORAL at 00:50

## 2020-07-11 RX ADMIN — GABAPENTIN 300 MG: 300 CAPSULE ORAL at 19:44

## 2020-07-11 RX ADMIN — Medication 2 TABLET: at 14:05

## 2020-07-11 RX ADMIN — SENNOSIDES AND DOCUSATE SODIUM 2 TABLET: 8.6; 5 TABLET ORAL at 00:50

## 2020-07-11 RX ADMIN — ACETAMINOPHEN 975 MG: 325 TABLET, FILM COATED ORAL at 16:54

## 2020-07-11 RX ADMIN — BENZOCAINE AND MENTHOL 1 LOZENGE: 15; 3.6 LOZENGE ORAL at 07:10

## 2020-07-11 RX ADMIN — BENZOCAINE AND MENTHOL 1 LOZENGE: 15; 3.6 LOZENGE ORAL at 01:04

## 2020-07-11 RX ADMIN — OXYCODONE HYDROCHLORIDE 10 MG: 5 TABLET ORAL at 22:44

## 2020-07-11 RX ADMIN — SENNOSIDES AND DOCUSATE SODIUM 2 TABLET: 8.6; 5 TABLET ORAL at 08:33

## 2020-07-11 RX ADMIN — ACETAMINOPHEN 975 MG: 325 TABLET, FILM COATED ORAL at 00:50

## 2020-07-11 RX ADMIN — GABAPENTIN 300 MG: 300 CAPSULE ORAL at 08:32

## 2020-07-11 RX ADMIN — FAMOTIDINE 20 MG: 20 TABLET ORAL at 19:44

## 2020-07-11 RX ADMIN — OXYCODONE HYDROCHLORIDE 5 MG: 5 TABLET ORAL at 14:09

## 2020-07-11 RX ADMIN — SERTRALINE HYDROCHLORIDE 150 MG: 50 TABLET ORAL at 12:50

## 2020-07-11 RX ADMIN — POLYETHYLENE GLYCOL 3350 17 G: 17 POWDER, FOR SOLUTION ORAL at 08:33

## 2020-07-11 RX ADMIN — ATORVASTATIN CALCIUM 40 MG: 40 TABLET, FILM COATED ORAL at 12:50

## 2020-07-11 RX ADMIN — SENNOSIDES AND DOCUSATE SODIUM 2 TABLET: 8.6; 5 TABLET ORAL at 19:44

## 2020-07-11 RX ADMIN — CEFAZOLIN SODIUM 2 G: 2 INJECTION, SOLUTION INTRAVENOUS at 01:14

## 2020-07-11 RX ADMIN — OXYCODONE HYDROCHLORIDE 5 MG: 5 TABLET ORAL at 12:35

## 2020-07-11 RX ADMIN — FAMOTIDINE 20 MG: 20 TABLET ORAL at 08:32

## 2020-07-11 RX ADMIN — BENZOCAINE AND MENTHOL 1 LOZENGE: 15; 3.6 LOZENGE ORAL at 20:30

## 2020-07-11 RX ADMIN — OXYCODONE HYDROCHLORIDE 10 MG: 5 TABLET ORAL at 03:46

## 2020-07-11 RX ADMIN — BENZOCAINE AND MENTHOL 1 LOZENGE: 15; 3.6 LOZENGE ORAL at 17:47

## 2020-07-11 RX ADMIN — OXYCODONE HYDROCHLORIDE 10 MG: 5 TABLET ORAL at 06:48

## 2020-07-11 RX ADMIN — ACETAMINOPHEN 975 MG: 325 TABLET, FILM COATED ORAL at 08:32

## 2020-07-11 RX ADMIN — FENOFIBRATE 160 MG: 160 TABLET ORAL at 14:05

## 2020-07-11 RX ADMIN — OXYCODONE HYDROCHLORIDE 10 MG: 5 TABLET ORAL at 19:44

## 2020-07-11 NOTE — PROGRESS NOTES
"Orthopaedic Surgery Progress Note:  07/11/2020     Subjective:   POD#1.  No acute issues overnight.  Pain has been well controlled.  Has been able to tolerate liquid diet without difficulty.  No difficulties with breathing, using incentive spirometry.  Does feel like his voice has changed a little bit.  No new numbness or tingling going down the extremities.  He is very satisfied with the surgery and feels that his preoperative symptoms with clumsiness are somewhat better.  Denies chest pain or shortness of breath.  We discussed going for x-rays today and possible discharge tomorrow.    Objective:   BP (!) 151/82 (BP Location: Left arm)   Pulse 80   Temp 96.6  F (35.9  C) (Oral)   Resp 16   Ht 1.727 m (5' 7.99\")   Wt 98.8 kg (217 lb 13 oz)   SpO2 92%   BMI 33.13 kg/m    I/O this shift:  In: -   Out: 470 [Urine:450; Drains:20]  Gen: NAD. Resting comfortably in bed  Resp: Breathing comfortably on RA  Drain:   Drain output of 95/30 at 24/7 hours, respectively.  Musculoskeletal: dressing c/d/I.  [place] J collar in place.      Motor Strength Right Left   Deltoids: C5 5/5 5/5   Biceps: C5 5/5 5/5   Wrist extension: C6 5/5 5/5   Triceps: C7  4/5 5/5   Wrist flexion: C7 5/5 5/5    strength: C8 5/5 5/5   Hand intrinsics: T1 5/5 5/5     Sensation from C4-L1 is preserved.    Lower extremities:  Motor Strength Right Left   Hip flexion: L1, L2, L3 5/5 5/5   Hip adduction: L2, L3 5/5 5/5   Knee flexion: S1 5/5 5/5   Knee extension: L3, L4 5/5 5/5   Ankle dosiflexion: L4, L5 5/5 5/5   EHL: L5 5/5 5/5   Ankle plantarflexion: S1 5/5 5/5     Sensation from L1-S2 is preserved.    Labs:  Lab Results   Component Value Date    WBC 12.8 (H) 07/11/2020    HGB 13.1 (L) 07/11/2020     07/11/2020        Assessment & Plan:   Edvin Norton is a 65 year old male with cervical spondylosis with myelopathy, now C3-C7 ACDF on July 10 of 2020 with Dr. Anderson.    Goals for today:  -Xrays today  -Kwan out  -Continue to monitor " drain output  -Physical therapy    Justin Primary  Activity: Up with assist until independent. No excessive bending or twisting. No lifting >10 lbs x 6 weeks. No Huey lift for transfers.   Weight bearing status: WBAT.  Pain management: Transition from IV to PO as tolerated. No NSAIDs   Antibiotics: Ancef x24 hours  Diet: Begin with clear fluids and progress diet as tolerated.   DVT prophylaxis: SCDs only. No chemical DVT ppx needed.  Imaging: XR Cervical spine PTDC - ordered.  Labs: labs PRN  Bracing/Splinting: [place] J on at all times except for showering and eating  Dressings: Keep [place] dressing intact x 7 days.  Drains: Document output per shift, will be discontinued at Orthopedic Surgery discretion.  Kwan catheter: Remove POD#1.   Physical Therapy/Occupational Therapy: Eval and treat.  Consults: Hospitalist.  Follow-up: Clinic with Dr. Anderson in 6 weeks with repeat x-rays.   Disposition: Likely discharge tomorrow after drain removal      ------------------------------------------------------------------------------------------    [   ] Drains removed.  [   ] Post xrays done.  [x] Discharge orders done.  [x] Discharge summary started.    Manoj Seay MD  Orthopaedic Surgery, PGY-5  Pager: [phone]     Please page me directly with any questions/concerns during regular weekday hours before 5pm.     If there is no response, if it is a weekend, or if it is during evening hours then please page the orthopaedic surgery resident on call as listed on [facility] call schedule under the orthopaedics tab.        FOLLOWUP:    Future Appointments   Date Time Provider Department [place]   7/11/2020  9:00 AM Mechelle Benavides PT URPT [place]   7/11/2020  1:30 PM Mechelle Benavides PT URPT [place]   7/11/2020  7:00 PM UR OT OVERFLOW [facility]OT [place]   8/20/2020 12:30 PM Dane Anderson MD [facility]   10/6/2020 10:45 AM Dane Anderson MD [facility]

## 2020-07-11 NOTE — PROGRESS NOTES
07/11/20 0900   Quick Adds   Type of Visit Initial PT Evaluation       Present yes   Language English   Living Environment   Lives With spouse   Living Arrangements house   Home Accessibility stairs to enter home;stairs within home   Number of Stairs, Main Entrance 3  (Can go through garage with 1 stair only)   Stair Railings, Main Entrance none   Number of Stairs, Within Home, Primary 9  (+3)   Stair Railings, Within Home, Primary railing on right side (ascending)   Transportation Anticipated family or friend will provide   Self-Care   Usual Activity Tolerance good   Current Activity Tolerance good   Regular Exercise Yes   Activity/Exercise Type walking   Exercise Amount/Frequency   (5 miles daily)   Equipment Currently Used at Home crutches  (putting handle in shower)   Functional Level Prior   Ambulation 0-->independent   Transferring 0-->independent   Toileting 0-->independent   Bathing 0-->independent   Communication 0-->understands/communicates without difficulty   Swallowing 0-->swallows foods/liquids without difficulty   Cognition 0 - no cognition issues reported   Fall history within last six months yes   Number of times patient has fallen within last six months 5   Which of the above functional risks had a recent onset or change? ambulation;transferring   Prior Functional Level Comment Pt independent prior to surgery however reporting many falls and stumbles due to sensory changes in feet   General Information   Onset of Illness/Injury or Date of Surgery - Date 07/10/20   Referring Physician Serge Sauceda MD    Patient/Family Goals Statement get up and moving   Pertinent History of Current Problem (include personal factors and/or comorbidities that impact the POC) Edvin Norton is a 65 year old male with cervical spondylosis with myelopathy, now C3-C7 ACDF on July 10 of 2020 with Dr. Anderson. Mobilizing well with PT   Precautions/Limitations spinal precautions    Weight-Bearing Status - LUE partial weight-bearing (% in comments)  (10lbs)   Weight-Bearing Status - RUE partial weight-bearing (% in comments)  (10lbs)   Weight-Bearing Status - LLE weight-bearing as tolerated   Weight-Bearing Status - RLE weight-bearing as tolerated   General Observations Pt in bed upon arrival, agreeable to PT. Wanting to get up   General Info Comments Drain, [place] J   Cognitive Status Examination   Orientation orientation to person, place and time   Level of Consciousness alert   Follows Commands and Answers Questions 100% of the time   Personal Safety and Judgment impulsive   Pain Assessment   Patient Currently in Pain Yes, see Vital Sign flowsheet   Integumentary/Edema   Integumentary/Edema no deficits were identifed   Posture    Posture Not impaired   Posture Comments Wearing [place] J   Range of Motion (ROM)   ROM Comment PT: Noted impaired ROM within spinal precautions   Strength   Manual Muscle Testing Quick Adds No deficits were identified   Bed Mobility   Bed Mobility Comments PT: Pt completes bed mobility with HOB 1/2 way down, CGA   Transfer Skills   Transfer Comments PT: Pt completes sit to stand from EOB to walker   Gait   Gait Comments PT: Pt completes 250ft ambulation and 3 x 3 stairs with bilateral railings, alternating pattern   Balance   Balance Comments PT: mild balance impairement noted due to impulsive nature and sensory changes in feet   Sensory Examination   Sensory Perception Comments PT: Pt reports numbness and tingling in feet with decreased proprioception due to nerve compression   Modality Interventions   Planned Modality Interventions Cryotherapy   General Therapy Interventions   Planned Therapy Interventions balance training;bed mobility training;neuromuscular re-education;ROM;strengthening;transfer training;home program guidelines   Clinical Impression   Criteria for Skilled Therapeutic Intervention yes, treatment indicated   PT Diagnosis Impaired functional  "mobility   Influenced by the following impairments Impaired ROM, sensation, safety awareness, pain, spinal precautions   Functional limitations due to impairments Impaired bed mobility, stairs, ambulation, transfers   Clinical Presentation Stable/Uncomplicated   Clinical Presentation Rationale Edvin Norton is a 65 year old male with cervical spondylosis with myelopathy, now C3-C7 ACDF on July 10 of 2020 with Dr. Anderson. Mobilizing well with PT.    Clinical Decision Making (Complexity) Low complexity   Therapy Frequency 2x/day   Predicted Duration of Therapy Intervention (days/wks) 3 days   Anticipated Equipment Needs at Discharge walker   Anticipated Discharge Disposition Home with Assist   Risk & Benefits of therapy have been explained Yes   Patient, Family & other staff in agreement with plan of care Yes   [employer] AM-PAC TM \"6 Clicks\"   2016, [employer] of [employer], under license to [employer].  All rights reserved.   6 Clicks Short Forms Basic Mobility Inpatient Short Form   [employer] AM-PAC  \"6 Clicks\" V.2 Basic Mobility Inpatient Short Form   1. Turning from your back to your side while in a flat bed without using bedrails? 3 - A Little   2. Moving from lying on your back to sitting on the side of a flat bed without using bedrails? 3 - A Little   3. Moving to and from a bed to a chair (including a wheelchair)? 3 - A Little   4. Standing up from a chair using your arms (e.g., wheelchair, or bedside chair)? 3 - A Little   5. To walk in hospital room? 3 - A Little   6. Climbing 3-5 steps with a railing? 3 - A Little   Basic Mobility Raw Score (Score out of 24.Lower scores equate to lower levels of function) 18   Total Evaluation Time   Total Evaluation Time (Minutes) 5     "

## 2020-07-11 NOTE — PLAN OF CARE
VS:   A/O x 4. VSS   Output:   Pt voided spontaneously, without difficulty after brown removed, PVR 25cc. BS + x4, LBM 7/9   Lungs LS clear, using IS with encouragement   Activity:   Up with SBAO1 in room, ambulated x 2 in choi. Pt impulsive, needs reinforcement with spine restrictions. [place]- J collar in place   Skin: Intact except for incision   Pain:   C/o incisional pain, managed with scheduled tylenol, oxycodone   Neuro/CMS:   Intact, denies any tingling or numbness   Dressing(s):   Moist drainage, dressing reinforced. Small amount serosanguious draining in GERRY   Diet:   Reg diet, no difficulty swallowing. Pt is spitting up small amount of mucous.    LDA:   PIV x1, sl'd   Equipment:   [place]-J collar   Plan:   Discharge to home when ready.

## 2020-07-11 NOTE — PROGRESS NOTES
[facility], [facility], [place], [place]  Internal Medicine Progress Note      Assessment and Plans:     Edvin Norton is a 65 year old male who was admitted on 7/10/2020.     Edvin Norton is a 65 year old male with hypertension, hyperlipidemia, history of chewing tobacco, obesity, solano's esophagus and anxiety that has chronic neck pain secondary to cervical spondylosis with myelopathy, cervical stenosis and cervical spondylolisthesis. He is admitted after C4-C7 ACDF and right ICBG with Dr. Anderson on 07/10/20.     # SP C4-C7 ACDF and right ICBG with Dr. Anderson on 07/10/20.   -per primary team  -hydrate well  -good IS  -ankle pumps  -pain and DVT prophylaxis management per orthopedics  # HT: was high, but now better. Ct propranolol with hold parameters. Ct prn labetalol. Control pain first before giving hydralazine.   # Dyslipidemia: Ct Lipitor and fenofibrate  # Tree's Esophagus: Ct omeprazole daily  # Anxiety: Ct Zoloft and propranolol    DVT Prophylaxis: Per primary team  Code Status: Full Code    Disposition: Per primary team.      Wilfredo Esteban MD  Text Page  (7am - 5pm, M-F)    Subjective:      He is doing good. Pain is controlled. No swallowing issues. No chest pain or sob. Passing gas. NO BM yet. No abdominal pain or nausea.        -Data reviewed today: I reviewed all new labs and imaging results over the last 24 hours.     Exam:         Temp: 97.9  F (36.6  C) Temp src: Oral BP: 139/80 Pulse: 80 Heart Rate: 79 Resp: 16 SpO2: 94 % O2 Device: None (Room air) Oxygen Delivery: 2 LPM  Vitals:    07/10/20 0537   Weight: 98.8 kg (217 lb 13 oz)     Vital Signs with Ranges  Temp:  [96.6  F (35.9  C)-98.4  F (36.9  C)] 97.9  F (36.6  C)  Pulse:  [69-87] 80  Heart Rate:  [70-92] 79  Resp:  [9-20] 16  BP: (134-171)/() 139/80  SpO2:  [92 %-97 %] 94 %  I/O last 3 completed shifts:  In: 2100 [P.O.:300; I.V.:1800]  Out: 2465 [Urine:2320; Drains:95;  Blood:50]    Constitutional: No distress noted, Alert, Answering questions appropriately  SP neck surgery. Wound dressed.   Respiratory: AE is goog on both sides, no wheezing onr crackles  Cardiovascular: S1S2 normal, no new murmur  GI: Soft, non tender      Medications     dextrose 5% and 0.45% NaCl + KCl 20 mEq/L Stopped (07/11/20 0317)       acetaminophen  975 mg Oral Q8H     famotidine  20 mg Oral BID    Or     famotidine  20 mg Intravenous BID     gabapentin  300 mg Oral BID     polyethylene glycol  17 g Oral Daily     senna-docusate  2 tablet Oral BID     sodium chloride (PF)  3 mL Intracatheter Q8H       Data   Recent Labs   Lab 07/11/20  0559   WBC 12.8*   HGB 13.1*   MCV 97         POTASSIUM 4.4   CHLORIDE 106   CO2 31   BUN 15   CR 0.91   ANIONGAP 2*   LILI 8.6   *       Recent Results (from the past 24 hour(s))   XR Surgery KITTY L/T 5 Min Fluoro w Stills    Narrative    TEMPORARY 7/10/2020 10:18 AM    Provided History: Cervical spine pain. Confirm operative level for  ACDF.    Comparison: MRI cervical spine 03/05/2020..    Technique: Single intraoperative fluoroscopic image was submitted.    Findings:    A single intraoperative image from cervical spine shows  instrumentation projecting toward the level of C5-6.       Impression    Impression: Instrumentation projecting toward C5-6.    The above indicated surgical level was reported to operating room  personnel, via telephone by Dr. Salazar on 7/10/2020 9:00 AM.    I have personally reviewed the examination and initial interpretation  and I agree with the findings.    MERRICK EL MD   XR Cervical Spine 2/3 Views    Narrative    2 views cervical spine radiographs 7/11/2020 10:12 AM    History: s/p C4-C7 ACDF    Comparison: 3/5/2020    Findings:    AP and lateral views of the cervical spine were obtained.    Down to the level of C6-C7 is well visualized on the lateral view(s).  The cervicothoracic junction is not  well  assessed.    There is no acute osseous abnormality. Mild prevertebral soft tissue  swelling, likely postsurgical. Normal cervical lordosis is maintained.       New postsurgical changes of ACDF C4-C7. No evidence of hardware  complication. Mild disc space loss C2-C3 and C3-C4. Alignment  maintained. Anterior surgical drain noted. The visualized lung apices  are clear.        Impression    Impression:    ACDF changes C4-C7.    FABIANO FLOWERS MD (Joe)

## 2020-07-11 NOTE — PLAN OF CARE
Discharge Planner OT   Patient plan for discharge: home  Current status: order received and chart reviewed.  Per discussion with PT and patient, no skilled OT needs identified.  Patient able to dress self maintaining precautions, and is CGA transfers. Pt ambulating hallway CGA.  Barriers to return to prior living situation: defer to PT  Recommendations for discharge: defer to PT  Rationale for recommendations: no skilled OT needs identified.  Will complete orders.        Entered by: Blanca Cates 07/11/2020 10:49 AM

## 2020-07-11 NOTE — PLAN OF CARE
"      VS:   BP (!) 151/82 (BP Location: Left arm)   Pulse 80   Temp 96.6  F (35.9  C) (Oral)   Resp 16   Ht 1.727 m (5' 7.99\")   Wt 98.8 kg (217 lb 13 oz)   SpO2 92%   BMI 33.13 kg/m       Output:   Brown putting out adequate amount of fluid, LBM 7/9. Passing gas  Brown removed @ 7am   Lungs CTA   Activity:   Repositions self in bed with direction   Skin: Intact ex incision   Pain:   Tolerable with discomfort   Neuro/CMS:   A&Ox3, denies numbness and tingling   Dressing(s):   Small drainage, otherwise CDI   Diet:   Regular, tolerating   LDA:   L & R PIV SL, GERRY, brown   Equipment:   PCD's, Continuous O2   Plan:   Continue to monitor and follow plan of care. Able to make needs known and call light within reach.   Additional Info:          "

## 2020-07-11 NOTE — PLAN OF CARE
Discharge Planner PT   Patient plan for discharge: home with assist  Current status: Evaluation complete, treatment initiated. Pt completes bed mobility with HOB elevated, CGA, cues to adhere to spinal precautions. STS from EOB to walker with CGA. Ambulation 250ft and 3 x 3 stairs with bilateral railings, CGA - requires cues throughout to slow movement and improve safety. Pt demonstrates mild impulsivity.   Barriers to return to prior living situation: Assist needed with mobility, safety with mobility  Recommendations for discharge: Home with assist  Rationale for recommendations: Anticipate pt will progress to appropriate level of assist for discharge home given length of stay.       Entered by: Mechelle Benavides 07/11/2020 10:16 AM

## 2020-07-12 ENCOUNTER — APPOINTMENT (OUTPATIENT)
Dept: PHYSICAL THERAPY | Facility: CLINIC | Age: 65
DRG: 472 | End: 2020-07-12
Attending: ORTHOPAEDIC SURGERY
Payer: COMMERCIAL

## 2020-07-12 LAB — GLUCOSE BLDC GLUCOMTR-MCNC: 101 MG/DL (ref 70–99)

## 2020-07-12 PROCEDURE — 97116 GAIT TRAINING THERAPY: CPT | Mod: GP

## 2020-07-12 PROCEDURE — 97110 THERAPEUTIC EXERCISES: CPT | Mod: GP

## 2020-07-12 PROCEDURE — 99231 SBSQ HOSP IP/OBS SF/LOW 25: CPT | Performed by: HOSPITALIST

## 2020-07-12 PROCEDURE — 25000132 ZZH RX MED GY IP 250 OP 250 PS 637: Performed by: STUDENT IN AN ORGANIZED HEALTH CARE EDUCATION/TRAINING PROGRAM

## 2020-07-12 PROCEDURE — 00000146 ZZHCL STATISTIC GLUCOSE BY METER IP

## 2020-07-12 PROCEDURE — 12000001 ZZH R&B MED SURG/OB UMMC

## 2020-07-12 RX ADMIN — BENZOCAINE AND MENTHOL 1 LOZENGE: 15; 3.6 LOZENGE ORAL at 02:12

## 2020-07-12 RX ADMIN — PROPRANOLOL HYDROCHLORIDE 20 MG: 20 TABLET ORAL at 08:46

## 2020-07-12 RX ADMIN — SENNOSIDES AND DOCUSATE SODIUM 2 TABLET: 8.6; 5 TABLET ORAL at 19:41

## 2020-07-12 RX ADMIN — OXYCODONE HYDROCHLORIDE 10 MG: 5 TABLET ORAL at 15:13

## 2020-07-12 RX ADMIN — OXYCODONE HYDROCHLORIDE 10 MG: 5 TABLET ORAL at 08:51

## 2020-07-12 RX ADMIN — ACETAMINOPHEN 975 MG: 325 TABLET, FILM COATED ORAL at 16:10

## 2020-07-12 RX ADMIN — ACETAMINOPHEN 975 MG: 325 TABLET, FILM COATED ORAL at 01:57

## 2020-07-12 RX ADMIN — SERTRALINE HYDROCHLORIDE 150 MG: 50 TABLET ORAL at 10:40

## 2020-07-12 RX ADMIN — OXYCODONE HYDROCHLORIDE 10 MG: 5 TABLET ORAL at 22:51

## 2020-07-12 RX ADMIN — ATORVASTATIN CALCIUM 40 MG: 40 TABLET, FILM COATED ORAL at 08:46

## 2020-07-12 RX ADMIN — OXYCODONE HYDROCHLORIDE 10 MG: 5 TABLET ORAL at 05:17

## 2020-07-12 RX ADMIN — BENZOCAINE AND MENTHOL 1 LOZENGE: 15; 3.6 LOZENGE ORAL at 21:15

## 2020-07-12 RX ADMIN — OXYCODONE HYDROCHLORIDE 10 MG: 5 TABLET ORAL at 01:57

## 2020-07-12 RX ADMIN — OMEPRAZOLE 40 MG: 20 CAPSULE, DELAYED RELEASE ORAL at 08:46

## 2020-07-12 RX ADMIN — FENOFIBRATE 160 MG: 160 TABLET ORAL at 08:46

## 2020-07-12 RX ADMIN — Medication 2 TABLET: at 08:46

## 2020-07-12 RX ADMIN — GABAPENTIN 300 MG: 300 CAPSULE ORAL at 08:46

## 2020-07-12 RX ADMIN — FAMOTIDINE 20 MG: 20 TABLET ORAL at 08:46

## 2020-07-12 RX ADMIN — OXYCODONE HYDROCHLORIDE 10 MG: 5 TABLET ORAL at 12:11

## 2020-07-12 RX ADMIN — POLYETHYLENE GLYCOL 3350 17 G: 17 POWDER, FOR SOLUTION ORAL at 08:46

## 2020-07-12 RX ADMIN — BENZOCAINE AND MENTHOL 1 LOZENGE: 15; 3.6 LOZENGE ORAL at 05:17

## 2020-07-12 RX ADMIN — OXYCODONE HYDROCHLORIDE 10 MG: 5 TABLET ORAL at 19:41

## 2020-07-12 RX ADMIN — FAMOTIDINE 20 MG: 20 TABLET ORAL at 19:41

## 2020-07-12 RX ADMIN — ACETAMINOPHEN 975 MG: 325 TABLET, FILM COATED ORAL at 08:45

## 2020-07-12 RX ADMIN — SENNOSIDES AND DOCUSATE SODIUM 2 TABLET: 8.6; 5 TABLET ORAL at 08:46

## 2020-07-12 RX ADMIN — GABAPENTIN 300 MG: 300 CAPSULE ORAL at 19:41

## 2020-07-12 NOTE — PLAN OF CARE
"      VS:   /75 (BP Location: Left arm)   Pulse 97   Temp 99  F (37.2  C) (Oral)   Resp 16   Ht 1.727 m (5' 7.99\")   Wt 98.8 kg (217 lb 13 oz)   SpO2 91%   BMI 33.13 kg/m     VSS. Lung sounds clear.      Output:   Voiding spontaneously without difficulty. LBM 7/12   Activity:   Standby assistance ambulating in hallways.   Skin: Ex incision.   Pain:   Pain in throat neck and above hip near incision site. Tolerable with discomfort managed with Oxycodone 10mg ev 3 hrs , ice applied to shoulders and back.   Neuro/CMS:   Alert and oriented x 4. N/t in toes per pt baseline.    Dressing(s):   Dried drainage.   Diet:   Regular diet tolerating.   LDA:   IV saline locked.   Equipment:   IV pole, [place] J collar on.   Plan:   Continue to monitor. Pt plans to discharge tomorrow   Additional Info:   [place] J collar on.            "

## 2020-07-12 NOTE — PLAN OF CARE
"  VS: Blood pressure 121/77, pulse 95, temperature 96.5  F (35.8  C), temperature source Oral, resp. rate 16, height 1.727 m (5' 7.99\"), weight 98.8 kg (217 lb 13 oz), SpO2 93 %.     O2: Adequate at RA, no SOB. Has a productive cough, left a sticky note for MD   Output: adequate   Last BM: 7/12 twice   Activity: SBA   Skin: Neck incision   Pain: Managed with Oxycodone 10 mg and ice for comfort   CMS: intact   Dressing: Intact with some old drainage   Diet: Regular diet   LDA: PIV in rt arm patent   Equipment: [place] J collar   Plan: Continue to monitor   Additional Info:        "

## 2020-07-12 NOTE — PROGRESS NOTES
[facility], [facility], [place], [place], [place]  Internal Medicine Progress Note      Assessment and Plans:     Edvin Norton is a 65 year old male who was admitted on 7/10/2020.     Edvin Norton is a 65 year old male with hypertension, hyperlipidemia, history of chewing tobacco, obesity, solano's esophagus and anxiety that has chronic neck pain secondary to cervical spondylosis with myelopathy, cervical stenosis and cervical spondylolisthesis. He is admitted after C4-C7 ACDF and right ICBG with Dr. Anderson on 07/10/20.     # SP C4-C7 ACDF and right ICBG with Dr. Anderson on 07/10/20.   -per primary team  -hydrate well  -good IS  -ankle pumps  -pain and DVT prophylaxis management per orthopedics  # HT: BP was on high side this AM. Usually, its better controlled. Ct propranolol with hold parameters. Ct prn labetalol. Control pain first before giving labetalol.   # Dyslipidemia: Ct Lipitor and fenofibrate  # Tree's Esophagus: Ct omeprazole daily  # Anxiety: Ct Zoloft and propranolol  # Low grade fever: likely post op. Monitor. Do good IS.     DVT Prophylaxis: Per primary team  Code Status: Full Code    Disposition: Per primary team.      Wilfredo Esteban MD  Text Page  (7am - 5pm, M-F)    Subjective:      He is doing good. He ha low grade temp. Has mild cough. NO chest pain or sob.  No nausea or vomiting.           -Data reviewed today: I reviewed all new labs and imaging results over the last 24 hours.     Exam:         Temp: 96.5  F (35.8  C) Temp src: Oral BP: (!) 163/90 Pulse: 95 Heart Rate: 104 Resp: 16 SpO2: 93 % O2 Device: None (Room air)    Vitals:    07/10/20 0537   Weight: 98.8 kg (217 lb 13 oz)     Vital Signs with Ranges  Temp:  [96.5  F (35.8  C)-100.6  F (38.1  C)] 96.5  F (35.8  C)  Pulse:  [95] 95  Heart Rate:  [] 104  Resp:  [16-18] 16  BP: (126-163)/(65-90) 163/90  SpO2:  [93 %-95 %] 93 %  I/O last 3 completed shifts:  In: 850  [P.O.:850]  Out: 538 [Urine:525; Drains:13]    Constitutional: No distress noted, Alert, Answering questions appropriately  SP neck surgery. Wound dressed.   Respiratory: AE is goog on both sides, no wheezing onr crackles  Cardiovascular: S1S2 normal, no new murmur  GI: Soft, non tender      Medications     dextrose 5% and 0.45% NaCl + KCl 20 mEq/L Stopped (07/11/20 0317)       acetaminophen  975 mg Oral Q8H     atorvastatin  40 mg Oral Daily     famotidine  20 mg Oral BID    Or     famotidine  20 mg Intravenous BID     fenofibrate  160 mg Oral Daily     folic acid-vit B6-vit B12  2 tablet Oral Daily     gabapentin  300 mg Oral BID     omeprazole  40 mg Oral QAM     polyethylene glycol  17 g Oral Daily     propranolol  20 mg Oral QAM     senna-docusate  2 tablet Oral BID     sertraline  150 mg Oral Daily     sodium chloride (PF)  3 mL Intracatheter Q8H       Data   Recent Labs   Lab 07/11/20  0559   WBC 12.8*   HGB 13.1*   MCV 97         POTASSIUM 4.4   CHLORIDE 106   CO2 31   BUN 15   CR 0.91   ANIONGAP 2*   LILI 8.6   *       Recent Results (from the past 24 hour(s))   XR Cervical Spine 2/3 Views    Narrative    2 views cervical spine radiographs 7/11/2020 10:12 AM    History: s/p C4-C7 ACDF    Comparison: 3/5/2020    Findings:    AP and lateral views of the cervical spine were obtained.    Down to the level of C6-C7 is well visualized on the lateral view(s).  The cervicothoracic junction is not  well assessed.    There is no acute osseous abnormality. Mild prevertebral soft tissue  swelling, likely postsurgical. Normal cervical lordosis is maintained.       New postsurgical changes of ACDF C4-C7. No evidence of hardware  complication. Mild disc space loss C2-C3 and C3-C4. Alignment  maintained. Anterior surgical drain noted. The visualized lung apices  are clear.        Impression    Impression:    ACDF changes C4-C7.    FABIANO FLOWERS MD (Joe)

## 2020-07-12 NOTE — PLAN OF CARE
"      VS:   BP (!) 147/79 (BP Location: Left arm)   Pulse 80   Temp 100.6  F (38.1  C) (Oral)   Resp 18   Ht 1.727 m (5' 7.99\")   Wt 98.8 kg (217 lb 13 oz)   SpO2 95%   BMI 33.13 kg/m       Output:   Voiding spontaneously without difficulty, LBM 7/9. Passing gas   Lungs CTA   Activity:   Up with SBA, repositions self in bed   Skin: Intact ex incisions   Pain:   Tolerable with discomfort, given oxyx3 and replaced ice packs throughout night   Neuro/CMS:   A&Ox3, denies numbness and tingling   Dressing(s):   Small drainage on neck dressing, iliac crest graft CDI   Diet:   Regular-tolerating   LDA:   JULIO WADSWORTH, GERRY   Equipment:      Plan:   Continue to monitor and follow plan of care. Able to make needs known and call light within reach.   Additional Info:          "

## 2020-07-12 NOTE — PLAN OF CARE
Discharge Planner PT   Patient plan for discharge: Home with assist  Current status: Pt performs STS with SBA, no assistive device. Pt ambulates 300ft with SBA and no assistive device. Pt negotiates 3 x 3 stairs with bilateral railings progressing to single railing, CGA.   Barriers to return to prior living situation: None from PT standpoint  Recommendations for discharge: Home with assist from wife  Rationale for recommendations: Pt safe to discharge from PT standpoint       Entered by: Mechelle Benavides 07/12/2020 12:55 PM     Physical Therapy Discharge Summary    Reason for therapy discharge:    All goals and outcomes met, no further needs identified.    Progress towards therapy goal(s). See goals on Care Plan in [facility] electronic health record for goal details.  Goals met    Therapy recommendation(s):    No further therapy is recommended.

## 2020-07-12 NOTE — PROGRESS NOTES
"Orthopaedic Surgery Progress Note:  07/12/2020     Subjective:   POD#2.  No acute issues overnight.  Was able to sleep.  Tolerating solid diet without difficulty.  Has been able to ambulate around the unit multiple times.  No new numbness or tingling.  We discussed possible discharge home after removal of the drain, the patient will call his wife to see if she is available to pick him up.    Objective:   BP (!) 147/79 (BP Location: Left arm)   Pulse 80   Temp 100.6  F (38.1  C) (Oral)   Resp 18   Ht 1.727 m (5' 7.99\")   Wt 98.8 kg (217 lb 13 oz)   SpO2 95%   BMI 33.13 kg/m    No intake/output data recorded.  Gen: NAD. Resting comfortably in bed  Resp: Breathing comfortably on RA  Drain:   Drain output of 10/3 at 24/7 hours, respectively.  Musculoskeletal: dressing c/d/I.  [place] J collar in place.      Motor Strength Right Left   Deltoids: C5 5/5 5/5   Biceps: C5 5/5 5/5   Wrist extension: C6 5/5 5/5   Triceps: C7  4/5 5/5   Wrist flexion: C7 5/5 5/5    strength: C8 5/5 5/5   Hand intrinsics: T1 5/5 5/5     Sensation from C4-L1 is preserved.    Lower extremities:  Motor Strength Right Left   Hip flexion: L1, L2, L3 5/5 5/5   Hip adduction: L2, L3 5/5 5/5   Knee flexion: S1 5/5 5/5   Knee extension: L3, L4 5/5 5/5   Ankle dosiflexion: L4, L5 5/5 5/5   EHL: L5 5/5 5/5   Ankle plantarflexion: S1 5/5 5/5     Sensation from L1-S2 is preserved.    Labs:  Lab Results   Component Value Date    WBC 12.8 (H) 07/11/2020    HGB 13.1 (L) 07/11/2020     07/11/2020        Assessment & Plan:   Edvin Norton is a 65 year old male with cervical spondylosis with myelopathy, now C3-C7 ACDF on July 10 of 2020 with Dr. Anderson.    Goals for today:  -Removal of drain  -Possible discharge home in the afternoon    Justin Primary  Activity: Up with assist until independent. No excessive bending or twisting. No lifting >10 lbs x 6 weeks. No Huey lift for transfers.   Weight bearing status: WBAT.  Pain management: " Transition from IV to PO as tolerated. No NSAIDs   Antibiotics: Ancef x24 hours, done.  Diet: Begin with clear fluids and progress diet as tolerated.   DVT prophylaxis: SCDs only. No chemical DVT ppx needed.  Imaging: XR Cervical spine PTDC -done.  Labs: labs PRN  Bracing/Splinting: [place] J on at all times except for showering and eating  Dressings: Keep [place] dressing intact x 7 days.  Drains: Document output per shift, will be discontinued at Orthopedic Surgery discretion.  Kwan catheter: Remove POD#1.   Physical Therapy/Occupational Therapy: Eval and treat.  Consults: Hospitalist.  Follow-up: Clinic with Dr. Anderson in 6 weeks with repeat x-rays.   Disposition: Possible discharge home today in the afternoon.      ------------------------------------------------------------------------------------------    [   ] Drains removed.  [x] Post xrays done.  [x] Discharge orders done.  [x] Discharge summary started.    Manoj Seay MD  Orthopaedic Surgery, PGY-5  Pager: [phone]     Please page me directly with any questions/concerns during regular weekday hours before 5pm.     If there is no response, if it is a weekend, or if it is during evening hours then please page the orthopaedic surgery resident on call as listed on [facility] call schedule under the orthopaedics tab.        FOLLOWUP:    Future Appointments   Date Time Provider Department Center   7/11/2020  9:00 AM Mechelle Benavides, PT URPT [place]   7/11/2020  1:30 PM Mechelle Benavides, PT URPT [place]   7/11/2020  7:00 PM UR OT OVERFLOW UROT [place]   8/20/2020 12:30 PM Dane Anderson MD [facility]   10/6/2020 10:45 AM Dane Anderson MD [facility]

## 2020-07-13 ENCOUNTER — APPOINTMENT (OUTPATIENT)
Dept: GENERAL RADIOLOGY | Facility: CLINIC | Age: 65
DRG: 472 | End: 2020-07-13
Attending: HOSPITALIST
Payer: COMMERCIAL

## 2020-07-13 ENCOUNTER — PATIENT OUTREACH (OUTPATIENT)
Dept: CARE COORDINATION | Facility: CLINIC | Age: 65
End: 2020-07-13

## 2020-07-13 VITALS
HEART RATE: 93 BPM | WEIGHT: 217.81 LBS | SYSTOLIC BLOOD PRESSURE: 143 MMHG | BODY MASS INDEX: 33.01 KG/M2 | OXYGEN SATURATION: 96 % | TEMPERATURE: 98.2 F | DIASTOLIC BLOOD PRESSURE: 77 MMHG | RESPIRATION RATE: 16 BRPM | HEIGHT: 68 IN

## 2020-07-13 LAB
BASOPHILS # BLD AUTO: 0 10E9/L (ref 0–0.2)
BASOPHILS NFR BLD AUTO: 0.4 %
DIFFERENTIAL METHOD BLD: ABNORMAL
EOSINOPHIL # BLD AUTO: 0.1 10E9/L (ref 0–0.7)
EOSINOPHIL NFR BLD AUTO: 1.2 %
ERYTHROCYTE [DISTWIDTH] IN BLOOD BY AUTOMATED COUNT: 13.8 % (ref 10–15)
HCT VFR BLD AUTO: 38.3 % (ref 40–53)
HGB BLD-MCNC: 12 G/DL (ref 13.3–17.7)
IMM GRANULOCYTES # BLD: 0.1 10E9/L (ref 0–0.4)
IMM GRANULOCYTES NFR BLD: 1.1 %
LACTATE BLD-SCNC: 1.1 MMOL/L (ref 0.7–2)
LYMPHOCYTES # BLD AUTO: 1.4 10E9/L (ref 0.8–5.3)
LYMPHOCYTES NFR BLD AUTO: 12 %
MCH RBC QN AUTO: 30.8 PG (ref 26.5–33)
MCHC RBC AUTO-ENTMCNC: 31.3 G/DL (ref 31.5–36.5)
MCV RBC AUTO: 99 FL (ref 78–100)
MONOCYTES # BLD AUTO: 1.5 10E9/L (ref 0–1.3)
MONOCYTES NFR BLD AUTO: 13 %
NEUTROPHILS # BLD AUTO: 8.1 10E9/L (ref 1.6–8.3)
NEUTROPHILS NFR BLD AUTO: 72.3 %
NRBC # BLD AUTO: 0 10*3/UL
NRBC BLD AUTO-RTO: 0 /100
PLATELET # BLD AUTO: 184 10E9/L (ref 150–450)
RBC # BLD AUTO: 3.89 10E12/L (ref 4.4–5.9)
WBC # BLD AUTO: 11.2 10E9/L (ref 4–11)

## 2020-07-13 PROCEDURE — 25000132 ZZH RX MED GY IP 250 OP 250 PS 637: Performed by: STUDENT IN AN ORGANIZED HEALTH CARE EDUCATION/TRAINING PROGRAM

## 2020-07-13 PROCEDURE — 36415 COLL VENOUS BLD VENIPUNCTURE: CPT | Performed by: HOSPITALIST

## 2020-07-13 PROCEDURE — 71046 X-RAY EXAM CHEST 2 VIEWS: CPT

## 2020-07-13 PROCEDURE — 85025 COMPLETE CBC W/AUTO DIFF WBC: CPT | Performed by: HOSPITALIST

## 2020-07-13 PROCEDURE — 83605 ASSAY OF LACTIC ACID: CPT | Performed by: HOSPITALIST

## 2020-07-13 RX ORDER — ACETAMINOPHEN 325 MG/1
650 TABLET ORAL EVERY 4 HOURS PRN
Qty: 50 TABLET | Refills: 0 | Status: SHIPPED | OUTPATIENT
Start: 2020-07-13 | End: 2022-11-16

## 2020-07-13 RX ADMIN — ACETAMINOPHEN 975 MG: 325 TABLET, FILM COATED ORAL at 02:23

## 2020-07-13 RX ADMIN — OXYCODONE HYDROCHLORIDE 10 MG: 5 TABLET ORAL at 02:23

## 2020-07-13 RX ADMIN — OXYCODONE HYDROCHLORIDE 10 MG: 5 TABLET ORAL at 09:26

## 2020-07-13 RX ADMIN — FENOFIBRATE 160 MG: 160 TABLET ORAL at 07:59

## 2020-07-13 RX ADMIN — OMEPRAZOLE 40 MG: 20 CAPSULE, DELAYED RELEASE ORAL at 07:59

## 2020-07-13 RX ADMIN — Medication 2 TABLET: at 08:13

## 2020-07-13 RX ADMIN — GABAPENTIN 300 MG: 300 CAPSULE ORAL at 08:00

## 2020-07-13 RX ADMIN — ATORVASTATIN CALCIUM 40 MG: 40 TABLET, FILM COATED ORAL at 07:59

## 2020-07-13 RX ADMIN — ACETAMINOPHEN 975 MG: 325 TABLET, FILM COATED ORAL at 09:27

## 2020-07-13 RX ADMIN — OXYCODONE HYDROCHLORIDE 10 MG: 5 TABLET ORAL at 05:50

## 2020-07-13 RX ADMIN — FAMOTIDINE 20 MG: 20 TABLET ORAL at 07:59

## 2020-07-13 RX ADMIN — PROPRANOLOL HYDROCHLORIDE 20 MG: 20 TABLET ORAL at 07:59

## 2020-07-13 NOTE — PROGRESS NOTES
Pt triggered sepsis protocol due to temp of 100.5, HR of 102, and WBC of 12.8. LA came back at 1.1.  Cont to assess.

## 2020-07-13 NOTE — PLAN OF CARE
A/O x 4. Pain controlled well with oxycodone, scheduled tylenol and  ice packs. Pt has a lot of phlegm in the back of throat and clears the throat a lot, especially after taking meds. Has an occasional productive cough, lungs sound clear bilaterally.  Temp this AM was 98.6. up independently in the choi and room. Pt is looking forward to discharging today and is waiting to see the doctors. Call light is in reach. Cont to assess.

## 2020-07-13 NOTE — PROGRESS NOTES
"Orthopaedic Surgery Progress Note:  07/13/2020     Subjective:   POD#3.  Stable, no acute issues overnight.  Pain well controlled.  His feet feel a lot more stable during ambulation.  Would like to go home today.  Denies any chest pain or shortness of breath.  Very satisfied with cares.    Objective:   /68 (BP Location: Left arm)   Pulse 97   Temp 98.6  F (37  C) (Oral)   Resp 16   Ht 1.727 m (5' 7.99\")   Wt 98.8 kg (217 lb 13 oz)   SpO2 95%   BMI 33.13 kg/m    No intake/output data recorded.  Gen: NAD. Resting comfortably in bed  Resp: Breathing comfortably on RA  Drain:   Drain removed on 7/12/20  Musculoskeletal: dressing c/d/I.  [place] J collar in place.      Motor Strength Right Left   Deltoids: C5 5/5 5/5   Biceps: C5 5/5 5/5   Wrist extension: C6 5/5 5/5   Triceps: C7  4/5 5/5   Wrist flexion: C7 5/5 5/5    strength: C8 5/5 5/5   Hand intrinsics: T1 5/5 5/5     Sensation from C4-L1 is preserved.    Lower extremities:  Motor Strength Right Left   Hip flexion: L1, L2, L3 5/5 5/5   Hip adduction: L2, L3 5/5 5/5   Knee flexion: S1 5/5 5/5   Knee extension: L3, L4 5/5 5/5   Ankle dosiflexion: L4, L5 5/5 5/5   EHL: L5 5/5 5/5   Ankle plantarflexion: S1 5/5 5/5     Sensation from L1-S2 is preserved.    Labs:  Lab Results   Component Value Date    WBC 12.8 (H) 07/11/2020    HGB 13.1 (L) 07/11/2020     07/11/2020        Assessment & Plan:   Edvin Norton is a 65 year old male with cervical spondylosis with myelopathy, now C3-C7 ACDF on July 10 of 2020 with Dr. Anderson.    Goals for today:  -Home today    Justin Primary  Activity: Up with assist until independent. No excessive bending or twisting. No lifting >10 lbs x 6 weeks. No Huey lift for transfers.   Weight bearing status: WBAT.  Pain management: Transition from IV to PO as tolerated. No NSAIDs   Antibiotics: Ancef x24 hours, done.  Diet: Begin with clear fluids and progress diet as tolerated.   DVT prophylaxis: SCDs only. No " chemical DVT ppx needed.  Imaging: XR Cervical spine PTDC -done.  Labs: labs PRN  Bracing/Splinting: [place] J on at all times except for showering and eating  Dressings: Keep [place] dressing intact x 7 days.  Drains: Document output per shift, will be discontinued at Orthopedic Surgery discretion.  Kwan catheter: Remove POD#1.   Physical Therapy/Occupational Therapy: Eval and treat.  Consults: Hospitalist.  Follow-up: Clinic with Dr. Anderson in 6 weeks with repeat x-rays.   Disposition: Home today      ------------------------------------------------------------------------------------------    [x] Drains removed.  [x] Post xrays done.  [x] Discharge orders done.  [x] Discharge summary started.    Manoj Seay MD  Orthopaedic Surgery, PGY-5  Pager: [phone]     Please page me directly with any questions/concerns during regular weekday hours before 5pm.     If there is no response, if it is a weekend, or if it is during evening hours then please page the orthopaedic surgery resident on call as listed on [facility] call schedule under the orthopaedics tab.        FOLLOWUP:    Future Appointments   Date Time Provider Department Center   7/11/2020  9:00 AM Mechelle Benavides PT URPT [place]   7/11/2020  1:30 PM Mechelle Benavides PT [facility]PT [place]   7/11/2020  7:00 PM UR OT OVERFLOW UROT [place]   8/20/2020 12:30 PM [facility]   10/6/2020 10:45 AM [facility]

## 2020-07-13 NOTE — PLAN OF CARE
"      VS:   BP (!) 143/77   Pulse 93   Temp 98.2  F (36.8  C) (Oral)   Resp 16   Ht 1.727 m (5' 7.99\")   Wt 98.8 kg (217 lb 13 oz)   SpO2 96%   BMI 33.13 kg/m    A/O x4   Output:   Voiding without difficulty. LBM 7/12     Lungs LS clear, productive cough   Activity:   Up indep in room , [place]-j collar on   Skin: Intact except for incisions   Pain:   Incisional pain managed with oxycodone   Neuro/CMS:   Intact except for baseline numbness in toes   Dressing(s):   Marked drainage on dsg to neck, no change. R hip dsg CDI   Diet:   Tolerating reg diet   LDA:   PIV dc'd prior to discharge   Equipment:   n/a   Plan:   Discharge to home   Additional Info:   Discharge instructions and 4 meds given and explained to pt. Pt states he understands all instructions and has no questions. Pt discharge to home via private transport.       "

## 2020-08-17 ENCOUNTER — TRANSFERRED RECORDS (OUTPATIENT)
Dept: FAMILY MEDICINE | Facility: CLINIC | Age: 65
End: 2020-08-17

## 2020-08-17 DIAGNOSIS — R79.89 ELEVATED HOMOCYSTEINE: ICD-10-CM

## 2020-08-17 DIAGNOSIS — E72.12 METHYLENETETRAHYDROFOLATE REDUCTASE (MTHFR) DEFICIENCY (H): ICD-10-CM

## 2020-08-17 RX ORDER — B12/LEVOMEFOLATE CALCIUM/B-6 2-1.13-25
1 TABLET ORAL DAILY
Qty: 90 TABLET | Refills: 0 | Status: SHIPPED | OUTPATIENT
Start: 2020-08-17 | End: 2020-09-25

## 2020-08-17 NOTE — TELEPHONE ENCOUNTER
Edvin Norton is requesting a refill of:    Pending Prescriptions:                       Disp   Refills    L-Methylfolate-B6-B12 (FOLTX) 1.13-25-2 M*90 tab*0            Sig: Take 1 Dose by mouth daily    Please close encounter if RX was sent. Thanks, Gita

## 2020-08-20 ENCOUNTER — VIRTUAL VISIT (OUTPATIENT)
Dept: ORTHOPEDICS | Facility: CLINIC | Age: 65
End: 2020-08-20
Payer: COMMERCIAL

## 2020-08-20 DIAGNOSIS — Z98.1 S/P CERVICAL SPINAL FUSION: Primary | ICD-10-CM

## 2020-08-20 NOTE — PROGRESS NOTES
Spine Surgical Hx:  07/10/2020 - 3-level ACDF with ant plate fixation C4-C7; Right ant ICBG harvest (Sembrano).  [Implants: Medtronic Zevo plate; PEEK cages [id number]].      TELEPHONE VISIT:  Patient consented to today's telephone visit.    Time:  1:20pm to 1:32pm  Person spoken to:  Patient      Reason for Visit:  Chief Complaint   Patient presents with     Surgical Followup     DOS 7/10/20 S/P Anterior cervical diskectomy and fusion with anterior plate fixation cervical 4-7       S>  65/m, 6 wks postop; 1st postop visit.    Per patient, he is doing very well.  Happy with surgery.  Preop symptoms resolved.  Wearing collar full time.  Been walking 2.5 miles a day; uses walking stick.  Starting to get his balance back.  Feet also feel better (still with some numbness in his toes).  Still having some swallowing difficulties.  Feels like his esophagus has gotten smaller; needs to take smaller bites.    Off opioids.  Per patient, never took any.       Neck Disability Index (NDI) Questionnaire    Neck Disability Index (NDI) 8/20/2020   Neck Disability Index: Count 10   NDI: Total Score = SUM (points for all 10 findings) 4   Neck Disability in Percent = (Total Score) / 50 * 100 8 (%)   Some recent data might be hidden      Preop NDI 31.11%  6 wks  8%       O>   This was a telephone visit.  There is no direct contact with nor physical examination of the patient was performed.  Over the phone, patient sounded alert, oriented x3 and cooperative.  Speech was coherent.  Answered questions appropriately.  Per patient, surgical wound healing well or already fully healed.    Imaging:     No new x-rays.  Reviewed discharge x-rays from 7/11/20.  Showed stable 3-level ACDF with ant plate fixation C4-C7.    A>   - 6 wks postop, doing very well, with improve preop symptoms, with some postop dysphagia.    P>    Congratulated and reassured patient.  Swallowing difficulties are likely to improve over time.  - Internal PT order  placed.    RTC 6 wks (10/6/20), with cervical ap-lat x-rays.          Dane Anderson MD    Orthopaedic Spine Surgery  Dept Orthopaedic Surgery, [facility]  [phone] office, [phone] pager  [email or web address]

## 2020-08-20 NOTE — NURSING NOTE
Reason For Visit:   Chief Complaint   Patient presents with     Surgical Followup     DOS 7/10/20 S/P Anterior cervical diskectomy and fusion with anterior plate fixation cervical 4-7       Primary MD: Woody Cintron  There were no vitals taken for this visit.    Pain Assessment  Patient Currently in Pain: Denies        Neck Disability Index (NDI) Questionnaire    Neck Disability Index (NDI) 8/20/2020   Neck Disability Index: Count 10   NDI: Total Score = SUM (points for all 10 findings) 4   Neck Disability in Percent = (Total Score) / 50 * 100 8 (%)   Some recent data might be hidden              Visual Analog Pain Scale  Neck Pain Scale 0-10: 0  Right arm pain: 0  Left arm pain: 0    Promis 10 Assessment    PROMIS 10 8/20/2020   In general, would you say your health is: Excellent   In general, would you say your quality of life is: Excellent   In general, how would you rate your physical health? Very good   In general, how would you rate your mental health, including your mood and your ability to think? Excellent   In general, how would you rate your satisfaction with your social activities and relationships? Excellent   In general, please rate how well you carry out your usual social activities and roles Very good   To what extent are you able to carry out your everyday physical activities such as walking, climbing stairs, carrying groceries, or moving a chair? Completely   How often have you been bothered by emotional problems such as feeling anxious, depressed or irritable? Never   How would you rate your fatigue on average? Mild   How would you rate your pain on average?   0 = No Pain  to  10 = Worst Imaginable Pain 0   In general, would you say your health is: 5   In general, would you say your quality of life is: 5   In general, how would you rate your physical health? 4   In general, how would you rate your mental health, including your mood and your ability to think? 5   In general, how would you  rate your satisfaction with your social activities and relationships? 5   In general, please rate how well you carry out your usual social activities and roles. (This includes activities at home, at work and in your community, and responsibilities as a parent, child, spouse, employee, friend, etc.) 4   To what extent are you able to carry out your everyday physical activities such as walking, climbing stairs, carrying groceries, or moving a chair? 5   In the past 7 days, how often have you been bothered by emotional problems such as feeling anxious, depressed, or irritable? 1   In the past 7 days, how would you rate your fatigue on average? 2   In the past 7 days, how would you rate your pain on average, where 0 means no pain, and 10 means worst imaginable pain? 0   Global Mental Health Score 20   Global Physical Health Score 18   PROMIS TOTAL - SUBSCORES 38   Some recent data might be hidden                Angélica Souza LPN

## 2020-08-22 DIAGNOSIS — E78.2 MIXED HYPERLIPIDEMIA: ICD-10-CM

## 2020-08-24 RX ORDER — ATORVASTATIN CALCIUM 40 MG/1
TABLET, FILM COATED ORAL
Qty: 30 TABLET | Refills: 5 | COMMUNITY
Start: 2020-08-24

## 2020-08-24 NOTE — TELEPHONE ENCOUNTER
Edvin Norton is requesting a refill of:    Refused Prescriptions:                       Disp   Refills    atorvastatin (LIPITOR) 40 MG tablet [Pharm*30 tab*5        Sig: TAKE 1 TABLET BY MOUTH EVERY DAY  Refused By: LIBRADO MURGUIA  Reason for Refusal: Patient needs appointment  Reason for Refusal Comment: Pt due for OV end of September

## 2020-09-08 ENCOUNTER — THERAPY VISIT (OUTPATIENT)
Dept: PHYSICAL THERAPY | Facility: CLINIC | Age: 65
End: 2020-09-08
Attending: ORTHOPAEDIC SURGERY
Payer: COMMERCIAL

## 2020-09-08 DIAGNOSIS — M43.12 ACQUIRED SPONDYLOLISTHESIS OF CERVICAL VERTEBRA: ICD-10-CM

## 2020-09-08 DIAGNOSIS — M48.02 CERVICAL STENOSIS OF SPINE: ICD-10-CM

## 2020-09-08 DIAGNOSIS — Z98.1 STATUS POST CERVICAL SPINAL FUSION: ICD-10-CM

## 2020-09-08 DIAGNOSIS — Z98.1 S/P CERVICAL SPINAL FUSION: ICD-10-CM

## 2020-09-08 DIAGNOSIS — M47.12 CERVICAL SPONDYLOSIS WITH MYELOPATHY: ICD-10-CM

## 2020-09-08 DIAGNOSIS — M48.02 SPINAL STENOSIS IN CERVICAL REGION: Primary | ICD-10-CM

## 2020-09-08 PROCEDURE — 97110 THERAPEUTIC EXERCISES: CPT | Mod: GP | Performed by: PHYSICAL THERAPIST

## 2020-09-08 PROCEDURE — 97161 PT EVAL LOW COMPLEX 20 MIN: CPT | Mod: GP | Performed by: PHYSICAL THERAPIST

## 2020-09-08 PROCEDURE — 97112 NEUROMUSCULAR REEDUCATION: CPT | Mod: GP | Performed by: PHYSICAL THERAPIST

## 2020-09-08 NOTE — PROGRESS NOTES
[facility] Initial Evaluation  Subjective:  Onset of lower extremity numbness/ tingling and difficulty with balance  2 years ago. Pt diagnosed with cervical stenosis. 7- Anterior cervical diskectomy and fusion with anterior plate fixation cervical 4-7 . Pt referred by MD for physical therapy on 8-.    The history is provided by the patient. No  was used.   Therapist Generated HPI Evaluation         Type of problem:  Cervical spine.    This is a chronic condition.  Condition occurred with:  Degenerative joint disease.    Patient reports pain:  Cervical right side and cervical left side.    Pain radiates to:  None.     Associated symptoms:  Loss of motion/stiffness and loss of strength. Symptoms are exacerbated by carrying, lifting and driving (sleeping)  and relieved by rest (brace).                              Objective:  Standing Alignment:    Cervical/thoracic deviations alignment: forward head and shoulder posture.                                    Cervical/Thoracic Evaluation  Cervical AROM: not assessed   Thoracic AROM: not assessed    Strength: weak scapular stabilzers  Headaches: none  Cervical Myotomes:  normal                  DTR's:  normal          Cervical Dermatomes:  normal                    Cervical Palpation:    Tenderness present at Left:    Rhomboids; Upper Trap; Levator and Erector Spinae  Tenderness present at Right:    Rhomboids; Upper Trap; Levator and Erector Spinae               Shoulder Evaluation:  ROM:  AROM:    Flexion:  Left:  160    Right:  155  Extension: Left: 70Right: 68  Abduction:  Left: 155   Right:  154                                                                       General     ROS    Assessment/Plan:    Patient is a 65 year old male with cervical complaints.    Patient has the following significant findings with corresponding treatment plan.                Diagnosis 1: Anterior cervical diskectomy and fusion with  anterior plate fixation cervical 4-7    Pain -  hot/cold therapy, self management, education and home program  Decreased ROM/flexibility - therapeutic exercise, therapeutic activity and home program  Decreased strength - therapeutic exercise, therapeutic activities and home program    Therapy Evaluation Codes:   1) History comprised of:   Personal factors that impact the plan of care:      None.    Comorbidity factors that impact the plan of care are:      None.     Medications impacting care: None.  2) Examination of Body Systems comprised of:   Body structures and functions that impact the plan of care:      Cervical spine.   Activity limitations that impact the plan of care are:      Driving, Lifting, Sleeping and Laying down.  3) Clinical presentation characteristics are:   Stable/Uncomplicated.  4) Decision-Making    Low complexity using standardized patient assessment instrument and/or measureable assessment of functional outcome.  Cumulative Therapy Evaluation is: Low complexity.    Previous and current functional limitations:  (See Goal Flow Sheet for this information)    Short term and Long term goals: (See Goal Flow Sheet for this information)     Communication ability:  Patient appears to be able to clearly communicate and understand verbal and written communication and follow directions correctly.  Treatment Explanation - The following has been discussed with the patient:   RX ordered/plan of care  Anticipated outcomes  Possible risks and side effects  This patient would benefit from PT intervention to resume normal activities.   Rehab potential is good.    Frequency:  1 X week, once daily  Duration:  for 6 weeks  Discharge Plan:  Achieve all LTG.  Independent in home treatment program.  Reach maximal therapeutic benefit.    Please refer to the daily flowsheet for treatment today, total treatment time and time spent performing 1:1 timed codes.

## 2020-09-16 DIAGNOSIS — E78.2 MIXED HYPERLIPIDEMIA: ICD-10-CM

## 2020-09-16 RX ORDER — ATORVASTATIN CALCIUM 40 MG/1
TABLET, FILM COATED ORAL
Qty: 30 TABLET | Refills: 5 | COMMUNITY
Start: 2020-09-16

## 2020-09-16 NOTE — TELEPHONE ENCOUNTER
Edvin Norton is requesting a refill of:    Refused Prescriptions:                       Disp   Refills    atorvastatin (LIPITOR) 40 MG tablet [Pharm*30 tab*5        Sig: TAKE 1 TABLET BY MOUTH EVERY DAY  Refused By: LIBRADO MURGUIA  Reason for Refusal: Patient needs appointment      Pt last med check 04/02/20 advised to return in 6 months. Due for FASTING OV

## 2020-09-20 DIAGNOSIS — E78.2 MIXED HYPERLIPIDEMIA: ICD-10-CM

## 2020-09-21 RX ORDER — FENOFIBRATE 160 MG/1
TABLET ORAL
Qty: 30 TABLET | Refills: 5 | COMMUNITY
Start: 2020-09-21

## 2020-09-21 NOTE — TELEPHONE ENCOUNTER
Refused Prescriptions:                       Disp   Refills    fenofibrate (TRIGLIDE/LOFIBRA) 160 MG tabl*30 tab*5        Sig: TAKE 1 TABLET BY MOUTH EVERY DAY  Refused By: BUSHRA BERNARD  Reason for Refusal: Patient needs appointment      Pt last seen for lipids on 4/2/20.  Pt is due for a FASTING OV.  Can call in an extension of medication once an appointment has been scheduled.

## 2020-09-25 ENCOUNTER — OFFICE VISIT (OUTPATIENT)
Dept: FAMILY MEDICINE | Facility: CLINIC | Age: 65
End: 2020-09-25

## 2020-09-25 VITALS
WEIGHT: 210 LBS | DIASTOLIC BLOOD PRESSURE: 88 MMHG | HEART RATE: 84 BPM | RESPIRATION RATE: 22 BRPM | SYSTOLIC BLOOD PRESSURE: 140 MMHG | TEMPERATURE: 98.1 F | OXYGEN SATURATION: 96 % | BODY MASS INDEX: 31.83 KG/M2 | HEIGHT: 68 IN

## 2020-09-25 DIAGNOSIS — R79.89 ELEVATED HOMOCYSTEINE: ICD-10-CM

## 2020-09-25 DIAGNOSIS — E72.12 METHYLENETETRAHYDROFOLATE REDUCTASE (MTHFR) DEFICIENCY (H): ICD-10-CM

## 2020-09-25 DIAGNOSIS — Z12.5 SPECIAL SCREENING FOR MALIGNANT NEOPLASM OF PROSTATE: ICD-10-CM

## 2020-09-25 DIAGNOSIS — E78.2 MIXED HYPERLIPIDEMIA: ICD-10-CM

## 2020-09-25 DIAGNOSIS — I10 ESSENTIAL HYPERTENSION, BENIGN: ICD-10-CM

## 2020-09-25 DIAGNOSIS — M48.02 CERVICAL STENOSIS OF SPINE: Primary | ICD-10-CM

## 2020-09-25 DIAGNOSIS — K22.719 BARRETT'S ESOPHAGUS WITH DYSPLASIA: ICD-10-CM

## 2020-09-25 DIAGNOSIS — F41.1 GAD (GENERALIZED ANXIETY DISORDER): ICD-10-CM

## 2020-09-25 LAB
ALBUMIN SERPL-MCNC: 4.8 G/DL (ref 3.6–5.1)
ALBUMIN/GLOB SERPL: 1.8 {RATIO} (ref 1–2.5)
ALP SERPL-CCNC: 69 U/L (ref 33–130)
ALT 1742-6: 38 U/L (ref 0–32)
AST 1920-8: 44 U/L (ref 0–35)
BILIRUB SERPL-MCNC: 0.6 MG/DL (ref 0.2–1.2)
BUN SERPL-MCNC: 19 MG/DL (ref 7–25)
BUN/CREATININE RATIO: 20.2 (ref 6–22)
CALCIUM SERPL-MCNC: 9.8 MG/DL (ref 8.6–10.3)
CHLORIDE SERPLBLD-SCNC: 106.3 MMOL/L (ref 98–110)
CHOLEST SERPL-MCNC: 236 MG/DL (ref 0–199)
CHOLEST/HDLC SERPL: 4 {RATIO} (ref 0–5)
CO2 SERPL-SCNC: 27.9 MMOL/L (ref 20–32)
CREAT SERPL-MCNC: 0.94 MG/DL (ref 0.7–1.18)
GLOBULIN, CALCULATED - QUEST: 2.7 (ref 1.9–3.7)
GLUCOSE SERPL-MCNC: 108 MG/DL (ref 60–99)
HDLC SERPL-MCNC: 57 MG/DL (ref 40–150)
LDLC SERPL CALC-MCNC: 129 MG/DL (ref 0–130)
POTASSIUM SERPL-SCNC: 4.99 MMOL/L (ref 3.5–5.3)
PROT SERPL-MCNC: 7.5 G/DL (ref 6.1–8.1)
SODIUM SERPL-SCNC: 144.9 MMOL/L (ref 135–146)
TRIGL SERPL-MCNC: 251 MG/DL (ref 0–149)

## 2020-09-25 PROCEDURE — 84153 ASSAY OF PSA TOTAL: CPT | Mod: 90 | Performed by: FAMILY MEDICINE

## 2020-09-25 PROCEDURE — 99214 OFFICE O/P EST MOD 30 MIN: CPT | Performed by: FAMILY MEDICINE

## 2020-09-25 PROCEDURE — 80061 LIPID PANEL: CPT | Performed by: FAMILY MEDICINE

## 2020-09-25 PROCEDURE — 80053 COMPREHEN METABOLIC PANEL: CPT | Performed by: FAMILY MEDICINE

## 2020-09-25 PROCEDURE — 36415 COLL VENOUS BLD VENIPUNCTURE: CPT | Performed by: FAMILY MEDICINE

## 2020-09-25 RX ORDER — FENOFIBRATE 160 MG/1
160 TABLET ORAL DAILY
Qty: 90 TABLET | Refills: 1 | Status: SHIPPED | OUTPATIENT
Start: 2020-09-25 | End: 2021-03-01

## 2020-09-25 RX ORDER — ATORVASTATIN CALCIUM 40 MG/1
40 TABLET, FILM COATED ORAL DAILY
Qty: 90 TABLET | Refills: 1 | Status: SHIPPED | OUTPATIENT
Start: 2020-09-25 | End: 2021-03-01

## 2020-09-25 RX ORDER — B12/LEVOMEFOLATE CALCIUM/B-6 2-1.13-25
1 TABLET ORAL DAILY
Qty: 90 TABLET | Refills: 1 | Status: SHIPPED | OUTPATIENT
Start: 2020-09-25 | End: 2021-03-01

## 2020-09-25 RX ORDER — PROPRANOLOL HYDROCHLORIDE 20 MG/1
TABLET ORAL
Qty: 90 TABLET | Refills: 1 | Status: SHIPPED | OUTPATIENT
Start: 2020-09-25 | End: 2021-04-13

## 2020-09-25 RX ORDER — OMEPRAZOLE 40 MG/1
40 CAPSULE, DELAYED RELEASE ORAL EVERY MORNING
Qty: 90 CAPSULE | Refills: 1 | Status: SHIPPED | OUTPATIENT
Start: 2020-09-25 | End: 2021-03-01

## 2020-09-25 ASSESSMENT — ANXIETY QUESTIONNAIRES
7. FEELING AFRAID AS IF SOMETHING AWFUL MIGHT HAPPEN: NOT AT ALL
3. WORRYING TOO MUCH ABOUT DIFFERENT THINGS: NOT AT ALL
1. FEELING NERVOUS, ANXIOUS, OR ON EDGE: NOT AT ALL
GAD7 TOTAL SCORE: 0
2. NOT BEING ABLE TO STOP OR CONTROL WORRYING: NOT AT ALL
6. BECOMING EASILY ANNOYED OR IRRITABLE: NOT AT ALL
IF YOU CHECKED OFF ANY PROBLEMS ON THIS QUESTIONNAIRE, HOW DIFFICULT HAVE THESE PROBLEMS MADE IT FOR YOU TO DO YOUR WORK, TAKE CARE OF THINGS AT HOME, OR GET ALONG WITH OTHER PEOPLE: NOT DIFFICULT AT ALL
5. BEING SO RESTLESS THAT IT IS HARD TO SIT STILL: NOT AT ALL

## 2020-09-25 ASSESSMENT — MIFFLIN-ST. JEOR: SCORE: 1717.05

## 2020-09-25 ASSESSMENT — PATIENT HEALTH QUESTIONNAIRE - PHQ9
SUM OF ALL RESPONSES TO PHQ QUESTIONS 1-9: 0
5. POOR APPETITE OR OVEREATING: NOT AT ALL

## 2020-09-25 NOTE — NURSING NOTE
Chief Complaint   Patient presents with     Recheck Medication     fasting medication check and review     Pre-visit Screening:  Immunizations:  up to date  Colonoscopy:  is up to date  Mammogram: NA  Asthma Action Test/Plan:  NA  PHQ9:  Given today  GAD7:  Given today   Questioned patient about current smoking habits Pt. has never smoked.  Ok to leave detailed message on voice mail for today's visit only Yes, phone #   [phone]

## 2020-09-25 NOTE — PROGRESS NOTES
Subjective     Edvin Norton is a 65 year old male who presents to clinic today for the following health issues:    HPI       Hyperlipidemia Follow-Up      Are you regularly taking any medication or supplement to lower your cholesterol?   Yes- atorvastatin    Are you having muscle aches or other side effects that you think could be caused by your cholesterol lowering medication?  No    Hypertension Follow-up      Do you check your blood pressure regularly outside of the clinic? Yes     Are you following a low salt diet? No    Are your blood pressures ever more than 140 on the top number (systolic) OR more   than 90 on the bottom number (diastolic), for example 140/90? No    Anxiety Follow-Up    How are you doing with your anxiety since your last visit? No change    Are you having other symptoms that might be associated with anxiety? No    Have you had a significant life event? No     Are you feeling depressed? No    Do you have any concerns with your use of alcohol or other drugs? No    Social History     Tobacco Use     Smoking status: Never Smoker     Smokeless tobacco: Former User     Types: Chew   Substance Use Topics     Alcohol use: Yes     Alcohol/week: 14.0 standard drinks     Types: 14 Standard drinks or equivalent per week     Drug use: No     JORGE-7 SCORE 12/18/2018 9/3/2019 3/26/2020   Total Score - - -   Total Score 1 0 0     PHQ 8/31/2015 9/3/2019 3/26/2020   PHQ-9 Total Score 2 0 2   Q9: Thoughts of better off dead/self-harm past 2 weeks Not at all Not at all Not at all     Last PHQ-9 3/26/2020   1.  Little interest or pleasure in doing things 0   2.  Feeling down, depressed, or hopeless 0   3.  Trouble falling or staying asleep, or sleeping too much 2   4.  Feeling tired or having little energy 0   5.  Poor appetite or overeating 0   6.  Feeling bad about yourself 0   7.  Trouble concentrating 0   8.  Moving slowly or restless 0   Q9: Thoughts of better off dead/self-harm past 2 weeks 0   PHQ-9  "Total Score 2   Difficulty at work, home, or with people Not difficult at all     JORGE-7  3/26/2020   1. Feeling nervous, anxious, or on edge 0   2. Not being able to stop or control worrying 0   3. Worrying too much about different things 0   4. Trouble relaxing 0   5. Being so restless that it is hard to sit still 0   6. Becoming easily annoyed or irritable 0   7. Feeling afraid, as if something awful might happen 0   JORGE-7 Total Score 0   If you checked any problems, how difficult have they made it for you to do your work, take care of things at home, or get along with other people? Not difficult at all       S/P cervical spine surgery 7/20-happy so far , seeing surgeon 10/6    How many servings of fruits and vegetables do you eat daily?  2-3    On average, how many sweetened beverages do you drink each day (Examples: soda, juice, sweet tea, etc.  Do NOT count diet or artificially sweetened beverages)?   1    How many days per week do you exercise enough to make your heart beat faster? 5    How many minutes a day do you exercise enough to make your heart beat faster? 30 - 60    How many days per week do you miss taking your medication? 0        Review of Systems   Constitutional, HEENT, cardiovascular, pulmonary, gi and gu systems are negative, except as otherwise noted.      Objective    BP (!) 140/88 (BP Location: Right arm, Patient Position: Sitting, Cuff Size: Adult Large)   Pulse 84   Temp 98.1  F (36.7  C) (Oral)   Resp 22   Ht 1.727 m (5' 8\")   Wt 95.3 kg (210 lb)   SpO2 96%   BMI 31.93 kg/m    Body mass index is 31.93 kg/m .  Physical Exam   GENERAL: healthy, alert and no distress-in neck brace  EYES: Eyes grossly normal to inspection, PERRL and conjunctivae and sclerae normal  HENT: ear canals and TM's normal, nose and mouth without ulcers or lesions  NECK: no adenopathy, no asymmetry, masses, or scars and thyroid normal to palpation  RESP: lungs clear to auscultation - no rales, rhonchi or " "wheezes  CV: regular rate and rhythm, normal S1 S2, no S3 or S4, no murmur, click or rub, no peripheral edema and peripheral pulses strong  ABDOMEN: soft, nontender, no hepatosplenomegaly, no masses and bowel sounds normal  MS: no gross musculoskeletal defects noted, no edema  NEURO: Normal strength and tone, mentation intact and speech normal  PSYCH: mentation appears normal, affect normal/bright  LYMPH: no cervical, supraclavicular, axillary, or inguinal adenopathy            Assessment & Plan     Mixed hyperlipidemia  Control uncertain, continue current medications at current doses   - atorvastatin (LIPITOR) 40 MG tablet  Dispense: 90 tablet; Refill: 1  - fenofibrate (TRIGLIDE/LOFIBRA) 160 MG tablet  Dispense: 90 tablet; Refill: 1    Essential hypertension, benign (HTN)  Borderline today but looks fine at home, continue current medications at current doses   - propranolol (INDERAL) 20 MG tablet  Dispense: 90 tablet; Refill: 1    JORGE (generalized anxiety disorder)  Well controlled, continue current medications at current doses     Methylenetetrahydrofolate reductase (MTHFR) deficiency (H)    - L-Methylfolate-B6-B12 (FOLTX) 1.13-25-2 MG TABS  Dispense: 90 tablet; Refill: 0    Elevated homocysteine    - L-Methylfolate-B6-B12 (FOLTX) 1.13-25-2 MG TABS  Dispense: 90 tablet; Refill: 0    Cervical stenosis of spine  Doing well post op    Special screening for malignant neoplasm of prostate      Natarajan's esophagus with dysplasia  stable symptomatically   - omeprazole (PRILOSEC) 40 MG DR capsule  Dispense: 90 capsule; Refill: 1       BMI:   Estimated body mass index is 31.93 kg/m  as calculated from the following:    Height as of this encounter: 1.727 m (5' 8\").    Weight as of this encounter: 95.3 kg (210 lb).   Weight management plan: Discussed healthy diet and exercise guidelines        FUTURE APPOINTMENTS:       - Follow-up visit in 6 mo  Work on weight loss  Regular exercise    No follow-ups on file.    Woody" Zane Cintron MD  [facility]

## 2020-09-26 LAB — ABBOTT PSA - QUEST: 0.8 NG/ML

## 2020-09-26 ASSESSMENT — ANXIETY QUESTIONNAIRES: GAD7 TOTAL SCORE: 0

## 2020-09-29 DIAGNOSIS — M47.12 CERVICAL SPONDYLOSIS WITH MYELOPATHY: Primary | ICD-10-CM

## 2020-10-04 NOTE — PROGRESS NOTES
Spine Surgical Hx:  07/10/2020 - 3-level ACDF with ant plate fixation C4-C7; Right ant ICBG harvest (Sembrano).  [Implants: Medtronic Zevo plate; PEEK cages [id number]].      Reason for Visit:  Chief Complaint   Patient presents with     Surgical Followup     DOS 7/10/20 S/P Anterior cervical diskectomy and fusion with anterior plate fixation cervical 4-7       S>  65/m, 3 mos postop; last seen at 6 wks.  Doing very well.  P> Internal PT order placed.    Unaccompanied.  Per patient, doing better vs preop.  Still with some numbness and pain in feet R>L, but 60% improved.  Balance also 60% improved.  Still wearing the [place] J collar.    Went for PT at [facility] in [place].  However, stopped going after a while.    Off opioids.    Neck Disability Index (NDI) Questionnaire    Neck Disability Index (NDI) 10/6/2020   Neck Disability Index: Count 10   NDI: Total Score = SUM (points for all 10 findings) 10   Neck Disability in Percent = (Total Score) / 50 * 100 20 (%)   Some recent data might be hidden      Preop NDI      31.11%  6 wks             8%  3 mos  20%       O>   Alert, oriented x 3, cooperative.  Not in CP distress.  There were no vitals taken for this visit.  Surgical incisions (L ant neck; and R ant ICBG) well-healed, no sign of infection.  Ambulates independently.   Grossly neurologically intact.    Imaging:   Cervical ap-lat x-rays today, compared to discharge x-rays, show 3-level ACDF with anterior plate fixation C4-C7.  There is subsidence across the upper endplate of C7 (lowermost fusion level), with inferior translational shift of the plate.  This is also evidenced by change in angle of the C7 screws in relation to the plate on the lateral image.  The other 2 levels do not show similar subsidence.  Construct still appears stable.    A>   3 mos s/p 3-level ACDF with anterior plate fixation C4-C7, with subsidence across the C6-7 level; clinically improved versus preoperative.    P>    Congratulations and  reassured patient.  Will continue watching the noted subsidence at C6-7.  Continue activities as tolerated; advised re taking common sense approach to activity progression.    Seeing Dr. William Cervantes again in Dec 2020.    RTC 3 mos (6 mos postop), may be virtual, with cervical flex-ext x-rays.  If these show further progression of subsidence or if there is significant motion at C6-7, will get cervical CT scan.      Dane Anderson MD    Orthopaedic Spine Surgery  Dept Orthopaedic Surgery, [facility]  [phone] office, [phone] pager  [email or web address]

## 2020-10-06 ENCOUNTER — OFFICE VISIT (OUTPATIENT)
Dept: ORTHOPEDICS | Facility: CLINIC | Age: 65
End: 2020-10-06
Payer: COMMERCIAL

## 2020-10-06 ENCOUNTER — ANCILLARY PROCEDURE (OUTPATIENT)
Dept: GENERAL RADIOLOGY | Facility: CLINIC | Age: 65
End: 2020-10-06
Attending: ORTHOPAEDIC SURGERY
Payer: COMMERCIAL

## 2020-10-06 DIAGNOSIS — Z98.1 S/P CERVICAL SPINAL FUSION: Primary | ICD-10-CM

## 2020-10-06 DIAGNOSIS — M47.12 CERVICAL SPONDYLOSIS WITH MYELOPATHY: ICD-10-CM

## 2020-10-06 PROCEDURE — 72040 X-RAY EXAM NECK SPINE 2-3 VW: CPT | Mod: GC | Performed by: RADIOLOGY

## 2020-10-06 PROCEDURE — 99024 POSTOP FOLLOW-UP VISIT: CPT | Performed by: ORTHOPAEDIC SURGERY

## 2020-10-06 NOTE — NURSING NOTE
Reason For Visit:   Chief Complaint   Patient presents with     Surgical Followup     DOS 7/10/20 S/P Anterior cervical diskectomy and fusion with anterior plate fixation cervical 4-7        Primary MD: Woody Cintron        ? No  Occupation Not working  Date of injury: None  Date of surgery: None  Smoker: No      There were no vitals taken for this visit.    Pain Assessment  Patient Currently in Pain: Yes  0-10 Pain Scale: 7  Primary Pain Location: Neck  Pain Descriptors: Discomfort    Oswestry (NIDA) Questionnaire    No flowsheet data found.         Neck Disability Index (NDI) Questionnaire    Neck Disability Index (NDI) 10/6/2020   Neck Disability Index: Count 10   NDI: Total Score = SUM (points for all 10 findings) 10   Neck Disability in Percent = (Total Score) / 50 * 100 20 (%)   Some recent data might be hidden              Visual Analog Pain Scale  Neck Pain Scale 0-10: 7  Right arm pain: 0  Left arm pain: 0    Promis 10 Assessment    PROMIS 10 10/6/2020   In general, would you say your health is: Good   In general, would you say your quality of life is: Good   In general, how would you rate your physical health? Good   In general, how would you rate your mental health, including your mood and your ability to think? Very good   In general, how would you rate your satisfaction with your social activities and relationships? Very good   In general, please rate how well you carry out your usual social activities and roles Good   To what extent are you able to carry out your everyday physical activities such as walking, climbing stairs, carrying groceries, or moving a chair? Mostly   How often have you been bothered by emotional problems such as feeling anxious, depressed or irritable? Never   How would you rate your fatigue on average? None   How would you rate your pain on average?   0 = No Pain  to  10 = Worst Imaginable Pain 7   In general, would you say your health is: 3   In general,  would you say your quality of life is: 3   In general, how would you rate your physical health? 3   In general, how would you rate your mental health, including your mood and your ability to think? 4   In general, how would you rate your satisfaction with your social activities and relationships? 4   In general, please rate how well you carry out your usual social activities and roles. (This includes activities at home, at work and in your community, and responsibilities as a parent, child, spouse, employee, friend, etc.) 3   To what extent are you able to carry out your everyday physical activities such as walking, climbing stairs, carrying groceries, or moving a chair? 4   In the past 7 days, how often have you been bothered by emotional problems such as feeling anxious, depressed, or irritable? 1   In the past 7 days, how would you rate your fatigue on average? 1   In the past 7 days, how would you rate your pain on average, where 0 means no pain, and 10 means worst imaginable pain? 7   Global Mental Health Score 16   Global Physical Health Score 14   PROMIS TOTAL - SUBSCORES 30   Some recent data might be hidden                Angélica Souza LPN

## 2020-10-06 NOTE — LETTER
10/6/2020         RE: Edvin Norton  [id number] [address] [facility] [facility] [address]        Dear Colleague,    Thank you for referring your patient, Edvin Norton, to the [facility] [place]. Please see a copy of my visit note below.    Spine Surgical Hx:  07/10/2020 - 3-level ACDF with ant plate fixation C4-C7; Right ant ICBG harvest (Sembrano).  [Implants: Medtronic Zevo plate; PEEK cages [id number]].      Reason for Visit:  Chief Complaint   Patient presents with     Surgical Followup     DOS 7/10/20 S/P Anterior cervical diskectomy and fusion with anterior plate fixation cervical 4-7       S>  65/m, 3 mos postop; last seen at 6 wks.  Doing very well.  P> Internal PT order placed.    Unaccompanied.  Per patient, doing better vs preop.  Still with some numbness and pain in feet R>L, but 60% improved.  Balance also 60% improved.  Still wearing the [place] J collar.    Went for PT at [facility] in [place].  However, stopped going after a while.    Off opioids.    Neck Disability Index (NDI) Questionnaire    Neck Disability Index (NDI) 10/6/2020   Neck Disability Index: Count 10   NDI: Total Score = SUM (points for all 10 findings) 10   Neck Disability in Percent = (Total Score) / 50 * 100 20 (%)   Some recent data might be hidden      Preop NDI      31.11%  6 wks             8%  3 mos  20%       O>   Alert, oriented x 3, cooperative.  Not in CP distress.  There were no vitals taken for this visit.  Surgical incisions (L ant neck; and R ant ICBG) well-healed, no sign of infection.  Ambulates independently.   Grossly neurologically intact.    Imaging:   Cervical ap-lat x-rays today, compared to discharge x-rays, show 3-level ACDF with anterior plate fixation C4-C7.  There is subsidence across the upper endplate of C7 (lowermost fusion level), with inferior translational shift of the plate.  This is also evidenced by change in angle of the C7 screws in relation to the plate on the lateral  image.  The other 2 levels do not show similar subsidence.  Construct still appears stable.    A>   3 mos s/p 3-level ACDF with anterior plate fixation C4-C7, with subsidence across the C6-7 level; clinically improved versus preoperative.    P>    Congratulations and reassured patient.  Will continue watching the noted subsidence at C6-7.  Continue activities as tolerated; advised re taking common sense approach to activity progression.    Seeing Dr. William Cervantes again in Dec 2020.    RTC 3 mos (6 mos postop), may be virtual, with cervical flex-ext x-rays.  If these show further progression of subsidence or if there is significant motion at C6-7, will get cervical CT scan.      Dane Anderson MD    Orthopaedic Spine Surgery  Dept Orthopaedic Surgery, [facility]  [phone] office, [phone] pager  [email or web address]

## 2020-12-03 DIAGNOSIS — I10 ESSENTIAL HYPERTENSION, BENIGN: ICD-10-CM

## 2020-12-03 RX ORDER — PROPRANOLOL HYDROCHLORIDE 20 MG/1
TABLET ORAL
Qty: 30 TABLET | Refills: 5 | COMMUNITY
Start: 2020-12-03

## 2020-12-03 NOTE — TELEPHONE ENCOUNTER
Last OV 9/25/2020 with [facility] with instructions to follow up in 6 months.     Refused Prescriptions:                       Disp   Refills    propranolol (INDERAL) 20 MG tablet [Pharma*30 tab*5        Sig: TAKE 1 TABLET BY MOUTH EVERY DAY AS NEEDED  Refused By: VALERIE FAULKNER  Reason for Refusal: Should already have refills on file  Reason for Refusal Comment: 9/25/2020 sent 90 tabs with 1 refill

## 2020-12-04 DIAGNOSIS — E72.12 METHYLENETETRAHYDROFOLATE REDUCTASE (MTHFR) DEFICIENCY (H): ICD-10-CM

## 2020-12-04 DIAGNOSIS — R79.89 ELEVATED HOMOCYSTEINE: ICD-10-CM

## 2020-12-04 NOTE — TELEPHONE ENCOUNTER
Edvin Norton is requesting a refill of:    Refused Prescriptions:                       Disp   Refills    B Complex-Folic Acid (SUPER B COMPLEX MAXI*90 tab*0        Si tab daily  Refused By: IFRAH PURVIS  Reason for Refusal: Should already have refills on file

## 2020-12-07 ENCOUNTER — TRANSFERRED RECORDS (OUTPATIENT)
Dept: HEALTH INFORMATION MANAGEMENT | Facility: CLINIC | Age: 65
End: 2020-12-07

## 2020-12-23 ENCOUNTER — TELEPHONE (OUTPATIENT)
Dept: ORTHOPEDICS | Facility: CLINIC | Age: 65
End: 2020-12-23

## 2020-12-23 DIAGNOSIS — M47.12 CERVICAL SPONDYLOSIS WITH MYELOPATHY: Primary | ICD-10-CM

## 2020-12-23 NOTE — TELEPHONE ENCOUNTER
Called and LVM for pt to convert to virtual.     XR orders placed. Can get done anywhere.     Angélica

## 2020-12-31 ENCOUNTER — TELEPHONE (OUTPATIENT)
Dept: ORTHOPEDICS | Facility: CLINIC | Age: 65
End: 2020-12-31

## 2020-12-31 ENCOUNTER — OFFICE VISIT (OUTPATIENT)
Dept: FAMILY MEDICINE | Facility: CLINIC | Age: 65
End: 2020-12-31

## 2020-12-31 VITALS
SYSTOLIC BLOOD PRESSURE: 142 MMHG | HEIGHT: 68 IN | DIASTOLIC BLOOD PRESSURE: 80 MMHG | WEIGHT: 216.2 LBS | RESPIRATION RATE: 20 BRPM | HEART RATE: 84 BPM | BODY MASS INDEX: 32.77 KG/M2 | TEMPERATURE: 97.8 F

## 2020-12-31 DIAGNOSIS — M47.12 CERVICAL SPONDYLOSIS WITH MYELOPATHY: Primary | ICD-10-CM

## 2020-12-31 DIAGNOSIS — R79.89 ELEVATED HOMOCYSTEINE: ICD-10-CM

## 2020-12-31 DIAGNOSIS — E72.12 METHYLENETETRAHYDROFOLATE REDUCTASE (MTHFR) DEFICIENCY (H): ICD-10-CM

## 2020-12-31 PROCEDURE — 72040 X-RAY EXAM NECK SPINE 2-3 VW: CPT | Performed by: FAMILY MEDICINE

## 2020-12-31 PROCEDURE — 99213 OFFICE O/P EST LOW 20 MIN: CPT | Performed by: FAMILY MEDICINE

## 2020-12-31 ASSESSMENT — MIFFLIN-ST. JEOR: SCORE: 1745.18

## 2020-12-31 NOTE — PROGRESS NOTES
"SUBJECTIVE:  Edvin Norton, a 65 year old male scheduled an appointment to discuss the following issues:  Cervical spondylosis with myelopathy  Dr Culp recommended pt get C spine xray as part of post surgical 6 mo f/u- did virtual visit a few weeks back and here for xray    Pt states he is more sore today- apparently there was concern about some slipping in fusion    Pt feels well otherwise  Medical, social, surgical, and family histories reviewed.    ROS:  CONSTITUTIONAL: NEGATIVE for fever, chills  RESP: NEGATIVE for significant cough or SOB  CV: NEGATIVE for chest pain, palpitations     OBJECTIVE:  BP (!) 142/80 (BP Location: Right arm, Patient Position: Chair, Cuff Size: Adult Regular)   Pulse 84   Temp 97.8  F (36.6  C)   Resp 20   Ht 1.727 m (5' 8\")   Wt 98.1 kg (216 lb 3.2 oz)   BMI 32.87 kg/m    EXAM:  GENERAL APPEARANCE: healthy, alert and no distress  RESP: lungs clear to auscultation - no rales, rhonchi or wheezes  CV: regular rates and rhythm, normal S1 S2, no S3 or S4 and no murmur, click or rub -    ASSESSMENT/PLAN:  (M47.12) Cervical spondylosis with myelopathy  (primary encounter diagnosis)  Comment: xray done for f/u, will be reviewed by DR Anderson  Plan: XR Cervical Spine 2/3 Views             "

## 2020-12-31 NOTE — NURSING NOTE
Questioned patient about current smoking habits.  Pt. has never smoked.  PULSE regular  My Chart: active  CLASSIFICATION OF OVERWEIGHT AND OBESITY BY BMI                        Obesity Class           BMI(kg/m2)  Underweight                                    < 18.5  Normal                                         18.5-24.9  Overweight                                     25.0-29.9  OBESITY                     I                  30.0-34.9                             II                 35.0-39.9  EXTREME OBESITY             III                >40                            Patient's  BMI Body mass index is 32.87 kg/m .  http://hin.nhlbi.nih.gov/menuplanner/menu.cgi  Pre-visit planning  Immunizations - up to date  Colonoscopy - is due and to be scheduled by patient for later completion  Mammogram -   Asthma -   PHQ9 -    JORGE-7 -

## 2021-01-05 ENCOUNTER — VIRTUAL VISIT (OUTPATIENT)
Dept: ORTHOPEDICS | Facility: CLINIC | Age: 66
End: 2021-01-05
Payer: MEDICARE

## 2021-01-05 DIAGNOSIS — M47.12 CERVICAL SPONDYLOSIS WITH MYELOPATHY: ICD-10-CM

## 2021-01-05 DIAGNOSIS — Z98.1 S/P CERVICAL SPINAL FUSION: Primary | ICD-10-CM

## 2021-01-05 PROCEDURE — 99212 OFFICE O/P EST SF 10 MIN: CPT | Mod: 95 | Performed by: ORTHOPAEDIC SURGERY

## 2021-01-05 NOTE — PROGRESS NOTES
"Spine Surgical Hx:  07/10/2020 - 3-level ACDF with ant plate fixation C4-C7; Right ant ICBG harvest (Sembrano).  [Implants: Medtronic Zevo plate; PEEK cages [id number]].      VIRTUAL VISIT:  Patient is evaluated today via billable virtual visit.    The patient has been notified of following:   \"This virtual visit will be conducted via a call between you and your physician/provider. We have found that certain health care needs can be provided without the need for an in-person physical exam.  This service lets us provide the care you need with a virtual conversation.  If a prescription is necessary we can send it directly to your pharmacy.  If lab work is needed we can place an order for that and you can then stop by our lab to have the test done at a later time.  If during the course of the call the physician/provider feels a virtual visit is not appropriate, you will not be charged for this service.\"     Physician has received verbal consent for a Virtual Visit from the patient.  Platform used:  Telephone  Time:  11:10am to 11:25am  Originating Location (pt. Location): Home  Distant Location (provider location):  [facility] [place]     Chief Complaint   Patient presents with     Surgical Followup     DOS 7/10/20 S/P Anterior cervical diskectomy and fusion with anterior plate fixation cervical 4-7       S>  66 year old male, RHD, 6 mos postop.  Last seen at 3 mos; doing well.  Feet pain and numbness 60% improved.  Balance also 60% improved.  P> Will observe C6-7 cage subsidence.  Will f/u with Dr. William Cervantes in Dec 2020.  RTC at 6 mos postop with cervical flex-ext x-rays.    Per patient, things are worse now compared to last visit.  Toes are again very numb.  Balance is not really good.  Neck pain is terrible; localized right below where the plate is.    Worse: when looking down.  Compared to preop, things may overall even be slightly worse.  The toe numbness especially.    Been going to " PT 2 days/week.  Getting massage and ultrasound.  Not really helping.    Swallowing is much better now compared to early postop.  Off opioids.      Neck Disability Index (NDI) Questionnaire    Neck Disability Index (NDI) 1/5/2021   Neck Disability Index: Count 10   NDI: Total Score = SUM (points for all 10 findings) 17   Neck Disability in Percent = (Total Score) / 50 * 100 34 (%)   Some recent data might be hidden      Preop NDI               31.11%  6 wks                8%  3 mos                 20%  6 mos  34%        Physical Examination:    This was a virtual visit, so very limited exam could be performed.  Patient seemed alert, oriented x 3, cooperative, with coherent speech, and not in distress.  Able to respond to questions appropriately and follow instructions.    Imaging:    Per patient, had cervical x-rays taken last week at [facility].  However, not yet uploaded to PACS.    Assessment:    1.  6 mos s/p 3-level ACDF C4-C7, with initial good improvement of preop symptoms, but with gradual regression, now back to preoperative levels.    Plan:    Had good discussion with patient regarding his symptoms.  He initially had a very good response to surgery, but has been rapidly declining since.  He now feels that symptoms are perhaps even worse compared to preoperative.  This is also borne out by his NDI scores.  Preoperatively 31%.  It went down to as low as 8% at 6 weeks postoperative.  Now back up to 34%.  I have not yet had a chance to review his most recent radiographs, but given this worsening trajectory, and the fact that he had 3 level fusion which is at higher risk for junctional and mechanical complications, I recommend obtaining advanced imaging as well.    -Retrieve cervical flexion-extension x-rays from [facility] orthopedics [place].  -Cervical MRI  -Cervical CT    Virtual return to clinic after above, to review studies and discuss further options.    Dane Anderson MD  Associate  Professor  Orthopaedic Spine Surgery  Dept Orthopaedic Surgery, [facility]  [phone] office, [phone] pager  [email or web address]    Addendum 1/18/2021:  Reviewed cervical AP lateral x-rays from 12/31/2020.  Shows stable 3 level ACDF with anterior plate fixation C4-C7.  Of note, these were not flexion-extension x-rays as I had ordered.  Thus, he will still need to get flexion-extension x-rays when he comes in next week for his cervical CT and MRI.

## 2021-01-05 NOTE — LETTER
"    1/5/2021         RE: Edvin Norton  [address] [place] [address]        Dear Colleague,    Thank you for referring your patient, Edvin Norton, to the [facility]. Please see a copy of my visit note below.    Spine Surgical Hx:  07/10/2020 - 3-level ACDF with ant plate fixation C4-C7; Right ant ICBG harvest (Sembrano).  [Implants: Medtronic Zevo plate; PEEK cages [id number]].      VIRTUAL VISIT:  Patient is evaluated today via billable virtual visit.    The patient has been notified of following:   \"This virtual visit will be conducted via a call between you and your physician/provider. We have found that certain health care needs can be provided without the need for an in-person physical exam.  This service lets us provide the care you need with a virtual conversation.  If a prescription is necessary we can send it directly to your pharmacy.  If lab work is needed we can place an order for that and you can then stop by our lab to have the test done at a later time.  If during the course of the call the physician/provider feels a virtual visit is not appropriate, you will not be charged for this service.\"     Physician has received verbal consent for a Virtual Visit from the patient.  Platform used:  Telephone  Time:  11:10am to 11:25am  Originating Location (pt. Location): Home  Distant Location (provider location):  [facility]     Chief Complaint   Patient presents with     Surgical Followup     DOS 7/10/20 S/P Anterior cervical diskectomy and fusion with anterior plate fixation cervical 4-7       S>  66 year old male, RHD, 6 mos postop.  Last seen at 3 mos; doing well.  Feet pain and numbness 60% improved.  Balance also 60% improved.  P> Will observe C6-7 cage subsidence.  Will f/u with Dr. William Cervantes in Dec 2020.  RTC at 6 mos postop with cervical flex-ext x-rays.    Per patient, things are worse now compared to " last visit.  Toes are again very numb.  Balance is not really good.  Neck pain is terrible; localized right below where the plate is.    Worse: when looking down.  Compared to preop, things may overall even be slightly worse.  The toe numbness especially.    Been going to PT 2 days/week.  Getting massage and ultrasound.  Not really helping.    Swallowing is much better now compared to early postop.  Off opioids.      Neck Disability Index (NDI) Questionnaire    Neck Disability Index (NDI) 1/5/2021   Neck Disability Index: Count 10   NDI: Total Score = SUM (points for all 10 findings) 17   Neck Disability in Percent = (Total Score) / 50 * 100 34 (%)   Some recent data might be hidden      Preop NDI               31.11%  6 wks                8%  3 mos                 20%  6 mos  34%        Physical Examination:    This was a virtual visit, so very limited exam could be performed.  Patient seemed alert, oriented x 3, cooperative, with coherent speech, and not in distress.  Able to respond to questions appropriately and follow instructions.    Imaging:    Per patient, had cervical x-rays taken last week at [facility].  However, not yet uploaded to PACS.    Assessment:    1.  6 mos s/p 3-level ACDF C4-C7, with initial good improvement of preop symptoms, but with gradual regression, now back to preoperative levels.    Plan:    Had good discussion with patient regarding his symptoms.  He initially had a very good response to surgery, but has been rapidly declining since.  He now feels that symptoms are perhaps even worse compared to preoperative.  This is also borne out by his NDI scores.  Preoperatively 31%.  It went down to as low as 8% at 6 weeks postoperative.  Now back up to 34%.  I have not yet had a chance to review his most recent radiographs, but given this worsening trajectory, and the fact that he had 3 level fusion which is at higher risk for junctional and mechanical complications, I recommend obtaining  advanced imaging as well.    -Retrieve cervical flexion-extension x-rays from [facility] orthopedics [place].  -Cervical MRI  -Cervical CT    Virtual return to clinic after above, to review studies and discuss further options.    Dane Anderson MD    Orthopaedic Spine Surgery  Dept Orthopaedic Surgery, [facility]  [phone] office, [phone] pager  [email or web address]

## 2021-01-20 ENCOUNTER — HOSPITAL ENCOUNTER (OUTPATIENT)
Dept: GENERAL RADIOLOGY | Facility: CLINIC | Age: 66
End: 2021-01-20
Attending: ORTHOPAEDIC SURGERY
Payer: MEDICARE

## 2021-01-20 ENCOUNTER — HOSPITAL ENCOUNTER (OUTPATIENT)
Dept: MRI IMAGING | Facility: CLINIC | Age: 66
End: 2021-01-20
Attending: ORTHOPAEDIC SURGERY
Payer: MEDICARE

## 2021-01-20 ENCOUNTER — HOSPITAL ENCOUNTER (OUTPATIENT)
Dept: CT IMAGING | Facility: CLINIC | Age: 66
End: 2021-01-20
Attending: ORTHOPAEDIC SURGERY
Payer: MEDICARE

## 2021-01-20 DIAGNOSIS — M47.12 CERVICAL SPONDYLOSIS WITH MYELOPATHY: ICD-10-CM

## 2021-01-20 DIAGNOSIS — Z98.1 S/P CERVICAL SPINAL FUSION: ICD-10-CM

## 2021-01-20 PROCEDURE — G1004 CDSM NDSC: HCPCS

## 2021-01-20 PROCEDURE — 72125 CT NECK SPINE W/O DYE: CPT | Mod: MG

## 2021-01-20 PROCEDURE — 72040 X-RAY EXAM NECK SPINE 2-3 VW: CPT

## 2021-01-20 PROCEDURE — 72141 MRI NECK SPINE W/O DYE: CPT | Mod: MG

## 2021-01-20 NOTE — PROGRESS NOTES
"Spine Surgical Hx:  07/10/2020 - 3-level ACDF with ant plate fixation C4-C7; Right ant ICBG harvest (Sembrano).  [Implants: Medtronic Zevo plate; PEEK cages [id number]].      VIRTUAL VISIT:  Patient is evaluated today via billable virtual visit.    The patient has been notified of following:   \"This virtual visit will be conducted via a call between you and your physician/provider. We have found that certain health care needs can be provided without the need for an in-person physical exam.  This service lets us provide the care you need with a virtual conversation.  If a prescription is necessary we can send it directly to your pharmacy.  If lab work is needed we can place an order for that and you can then stop by our lab to have the test done at a later time.  If during the course of the call the physician/provider feels a virtual visit is not appropriate, you will not be charged for this service.\"     Physician has received verbal consent for a Virtual Visit from the patient.  Platform used:  Telephone  Time:  2:34pm to 2:49pm  Originating Location (pt. Location): Home  Distant Location (provider location):  [facility] [place]     Chief Complaint   Patient presents with     RECHECK     F/U MRI and CT results       Last Visit Date: 1/5/21  Previous Impression:  1.  6 mos s/p 3-level ACDF C4-C7, with initial good improvement of preop symptoms, but with gradual regression, now back to preoperative levels.  Previous Plan:  -Retrieve cervical flexion-extension x-rays from [facility] orthopedics [place].  -Cervical MRI  -Cervical CT  Virtual return to clinic after above, to review studies and discuss further options.      S>  66 year old male, RHD, here to review imaging studies and discuss further options.  Had MRI, CT and flex-ext x-rays taken.    Since I saw him 3 weeks ago, thinks things have gotten even worse.  More difficulty moving toes.  Balance very poor.  Difficult and painful to " move neck.  Neck pain worse when trying to look up (extension).    Went to 5 session of massage therapy (ordered by Dr. William Cervantes); no benefit.      Neck Disability Index (NDI) Questionnaire    Neck Disability Index (NDI) 1/26/2021   Neck Disability Index: Count 10   NDI: Total Score = SUM (points for all 10 findings) 21   Neck Disability in Percent = (Total Score) / 50 * 100 42 (%)   Some recent data might be hidden      Preop NDI          31.11%  6 wks                8%  3 mos                20%  6 mos                34%        Physical Examination:    This was a virtual visit, so very limited exam could be performed.  Patient seemed alert, oriented x 3, cooperative, with coherent speech, and not in distress.  Able to respond to questions appropriately and follow instructions.    Imaging:    Cervical flexion-extension x-rays that I ordered, and done 1/20/2021 reviewed.  Interspinous distance difference shows increased difference at C6-7, suggestive of pseudoarthrosis.  Possible motion at C4-5.  Very little motion at C5-6.  Interspinous distance difference measurements as follows:  C4-5 1.8mm  C5-6 1.0mm  C6-7 3.8mm    Cervical MRI that I ordered and performed 1/20/2021 reviewed.  Shows moderate left foraminal stenosis C3-4.  Operative levels C4-C7 show patent canal and foramina.  No spinal cord deformation; no myelomalacia.  Overall, reassuring findings.  No significant increased signal at C7, although signal is attenuated by the presence of metal (plate and screws.    Cervical CT scan that I ordered and performed 1/20/2021 reviewed.  Shows 3 level ACDF with anterior plate fixation C4-C7.  Left facet arthropathy with left foraminal stenosis C3-4.  Operative levels show patent canal and foramina.  There is subsidence of the cage at the inferior level C6-7, across the superior C7 endplate.  No other significant abnormal findings.    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    Plan:    Had a good discussion with patient regarding his ongoing symptoms.  He has persistent motion and thus delayed union at C6-7.  This might explain some of his ongoing neck pain, but does not account for poor balance.  His new MRI shows that his spinal canal is much more patent compared to preoperative.  There is no ongoing deformation of the spinal cord; no cord signal change or myelomalacia.  A thoracic MRI from January 2020, while showing some degenerative changes and mild disc protrusion, likewise does not show any severe stenosis that would expect that the cough balance problems.  Thus, I do not think that his balance issues could be explained by spinal findings.    - Orthofix bone stimulator for delayed union.  - Encouraged to set up f/u appt with Dr. William Rodriguez ([facility]) to evaluate balance issues.    In person return to clinic in 6 months (1 year postoperative) with repeat cervical CT and cervical flexion-extension x-rays for fusion status evaluation.      Dane Anderson MD    Orthopaedic Spine Surgery  Dept Orthopaedic Surgery, [facility]  [phone] office, [phone] pager  [email or web address]

## 2021-01-21 ENCOUNTER — DOCUMENTATION ONLY (OUTPATIENT)
Dept: CARE COORDINATION | Facility: CLINIC | Age: 66
End: 2021-01-21

## 2021-01-26 ENCOUNTER — VIRTUAL VISIT (OUTPATIENT)
Dept: ORTHOPEDICS | Facility: CLINIC | Age: 66
End: 2021-01-26
Payer: MEDICARE

## 2021-01-26 DIAGNOSIS — Z98.1 S/P CERVICAL SPINAL FUSION: Primary | ICD-10-CM

## 2021-01-26 DIAGNOSIS — M96.0 PSEUDARTHROSIS FOLLOWING SPINAL FUSION: ICD-10-CM

## 2021-01-26 PROCEDURE — 99213 OFFICE O/P EST LOW 20 MIN: CPT | Mod: 95 | Performed by: ORTHOPAEDIC SURGERY

## 2021-01-26 NOTE — LETTER
"    1/26/2021         RE: Edvin Norton  [address] [place] [address]        Dear Colleague,    Thank you for referring your patient, Edvin Norton, to the [facility]. Please see a copy of my visit note below.    Spine Surgical Hx:  07/10/2020 - 3-level ACDF with ant plate fixation C4-C7; Right ant ICBG harvest (Sembrano).  [Implants: Medtronic Zevo plate; PEEK cages [id number]].      VIRTUAL VISIT:  Patient is evaluated today via billable virtual visit.    The patient has been notified of following:   \"This virtual visit will be conducted via a call between you and your physician/provider. We have found that certain health care needs can be provided without the need for an in-person physical exam.  This service lets us provide the care you need with a virtual conversation.  If a prescription is necessary we can send it directly to your pharmacy.  If lab work is needed we can place an order for that and you can then stop by our lab to have the test done at a later time.  If during the course of the call the physician/provider feels a virtual visit is not appropriate, you will not be charged for this service.\"     Physician has received verbal consent for a Virtual Visit from the patient.  Platform used:  Telephone  Time:  2:34pm to 2:49pm  Originating Location (pt. Location): Home  Distant Location (provider location):  [facility]     Chief Complaint   Patient presents with     RECHECK     F/U MRI and CT results       Last Visit Date: 1/5/21  Previous Impression:  1.  6 mos s/p 3-level ACDF C4-C7, with initial good improvement of preop symptoms, but with gradual regression, now back to preoperative levels.  Previous Plan:  -Retrieve cervical flexion-extension x-rays from [facility] orthopedics [place].  -Cervical MRI  -Cervical CT  Virtual return to clinic after above, to review studies and discuss further " options.      S>  66 year old male, RHD, here to review imaging studies and discuss further options.  Had MRI, CT and flex-ext x-rays taken.    Since I saw him 3 weeks ago, thinks things have gotten even worse.  More difficulty moving toes.  Balance very poor.  Difficult and painful to move neck.  Neck pain worse when trying to look up (extension).    Went to 5 session of massage therapy (ordered by Dr. William Cervantes); no benefit.      Neck Disability Index (NDI) Questionnaire    Neck Disability Index (NDI) 1/26/2021   Neck Disability Index: Count 10   NDI: Total Score = SUM (points for all 10 findings) 21   Neck Disability in Percent = (Total Score) / 50 * 100 42 (%)   Some recent data might be hidden      Preop NDI          31.11%  6 wks                8%  3 mos                20%  6 mos                34%        Physical Examination:    This was a virtual visit, so very limited exam could be performed.  Patient seemed alert, oriented x 3, cooperative, with coherent speech, and not in distress.  Able to respond to questions appropriately and follow instructions.    Imaging:    Cervical flexion-extension x-rays that I ordered, and done 1/20/2021 reviewed.  Interspinous distance difference shows increased difference at C6-7, suggestive of pseudoarthrosis.  Possible motion at C4-5.  Very little motion at C5-6.  Interspinous distance difference measurements as follows:  C4-5 1.8mm  C5-6 1.0mm  C6-7 3.8mm    Cervical MRI that I ordered and performed 1/20/2021 reviewed.  Shows moderate left foraminal stenosis C3-4.  Operative levels C4-C7 show patent canal and foramina.  No spinal cord deformation; no myelomalacia.  Overall, reassuring findings.  No significant increased signal at C7, although signal is attenuated by the presence of metal (plate and screws.    Cervical CT scan that I ordered and performed 1/20/2021 reviewed.  Shows 3 level ACDF with anterior plate fixation C4-C7.  Left facet arthropathy with left  foraminal stenosis C3-4.  Operative levels show patent canal and foramina.  There is subsidence of the cage at the inferior level C6-7, across the superior C7 endplate.  No other significant abnormal findings.    Assessment:    6 months status post 3 level ACDF with anterior plate fixation C4-C7, with cage subsidence and persistent motion (delayed union) C6-7.    Plan:    Had a good discussion with patient regarding his ongoing symptoms.  He has persistent motion and thus delayed union at C6-7.  This might explain some of his ongoing neck pain, but does not account for poor balance.  His new MRI shows that his spinal canal is much more patent compared to preoperative.  There is no ongoing deformation of the spinal cord; no cord signal change or myelomalacia.  A thoracic MRI from January 2020, while showing some degenerative changes and mild disc protrusion, likewise does not show any severe stenosis that would expect that the cough balance problems.  Thus, I do not think that his balance issues could be explained by spinal findings.    - Orthofix bone stimulator for delayed union.  - Encouraged to set up f/u appt with Dr. William Rodriguez ([facility]) to evaluate balance issues.    In person return to clinic in 6 months (1 year postoperative) with repeat cervical CT and cervical flexion-extension x-rays for fusion status evaluation.      Dane Anderson MD    Orthopaedic Spine Surgery  Dept Orthopaedic Surgery, [facility]  [phone] office, [phone] pager  [email or web address]

## 2021-02-02 ENCOUNTER — TRANSFERRED RECORDS (OUTPATIENT)
Dept: HEALTH INFORMATION MANAGEMENT | Facility: CLINIC | Age: 66
End: 2021-02-02

## 2021-02-02 ENCOUNTER — MYC MEDICAL ADVICE (OUTPATIENT)
Dept: FAMILY MEDICINE | Facility: CLINIC | Age: 66
End: 2021-02-02

## 2021-02-02 NOTE — TELEPHONE ENCOUNTER
Dr. Cintron please review patients MY CHART message below. He is asking for a referral to see a cardiologist for poor circulation in his feet    Please advise - Thank you

## 2021-02-16 ENCOUNTER — TRANSFERRED RECORDS (OUTPATIENT)
Dept: FAMILY MEDICINE | Facility: CLINIC | Age: 66
End: 2021-02-16

## 2021-02-23 DIAGNOSIS — E78.2 MIXED HYPERLIPIDEMIA: ICD-10-CM

## 2021-02-23 RX ORDER — FENOFIBRATE 160 MG/1
TABLET ORAL
Qty: 90 TABLET | Refills: 1 | COMMUNITY
Start: 2021-02-23

## 2021-02-23 RX ORDER — ATORVASTATIN CALCIUM 40 MG/1
TABLET, FILM COATED ORAL
Qty: 90 TABLET | Refills: 1 | COMMUNITY
Start: 2021-02-23

## 2021-02-23 NOTE — TELEPHONE ENCOUNTER
Edvin Norton is requesting a refill of:    Refused Prescriptions:                       Disp   Refills    fenofibrate (TRIGLIDE/LOFIBRA) 160 MG tabl*90 tab*1        Sig: TAKE 1 TABLET BY MOUTH EVERY DAY  Refused By: IFRAH PURVIS  Reason for Refusal: Patient needs appointment    atorvastatin (LIPITOR) 40 MG tablet [Pharm*90 tab*1        Sig: TAKE 1 TABLET BY MOUTH EVERY DAY  Refused By: IFRAH PURVIS  Reason for Refusal: Patient needs appointment    Needs fasting OV for refills

## 2021-02-26 ENCOUNTER — TELEPHONE (OUTPATIENT)
Dept: FAMILY MEDICINE | Facility: CLINIC | Age: 66
End: 2021-02-26

## 2021-02-26 NOTE — TELEPHONE ENCOUNTER
Pt's daughter (Jocelynn) called to reach out to [facility] prior to pt's appointment later today. She stated his appointment is to discuss walking concerns. They are extremely concerned about his walking as he is having a very hard time. He is having poor foot circulation. He is currently using a cane and struggling. She would like a referral to [place] to address this as she feels he needs a team approach rather than a referral elsewhere. Notified pt no PMI signed, she understood.

## 2021-03-01 ENCOUNTER — OFFICE VISIT (OUTPATIENT)
Dept: FAMILY MEDICINE | Facility: CLINIC | Age: 66
End: 2021-03-01

## 2021-03-01 VITALS
HEIGHT: 68 IN | TEMPERATURE: 97.6 F | BODY MASS INDEX: 33.19 KG/M2 | RESPIRATION RATE: 20 BRPM | HEART RATE: 84 BPM | SYSTOLIC BLOOD PRESSURE: 156 MMHG | WEIGHT: 219 LBS | DIASTOLIC BLOOD PRESSURE: 92 MMHG

## 2021-03-01 DIAGNOSIS — M47.12 CERVICAL SPONDYLOSIS WITH MYELOPATHY: ICD-10-CM

## 2021-03-01 DIAGNOSIS — R26.89 BALANCE PROBLEMS: Primary | ICD-10-CM

## 2021-03-01 DIAGNOSIS — F41.1 GAD (GENERALIZED ANXIETY DISORDER): ICD-10-CM

## 2021-03-01 DIAGNOSIS — K22.719 BARRETT'S ESOPHAGUS WITH DYSPLASIA: ICD-10-CM

## 2021-03-01 DIAGNOSIS — R79.89 ELEVATED HOMOCYSTEINE: ICD-10-CM

## 2021-03-01 DIAGNOSIS — E72.12 METHYLENETETRAHYDROFOLATE REDUCTASE (MTHFR) DEFICIENCY (H): ICD-10-CM

## 2021-03-01 DIAGNOSIS — E78.2 MIXED HYPERLIPIDEMIA: ICD-10-CM

## 2021-03-01 DIAGNOSIS — Z12.11 SPECIAL SCREENING FOR MALIGNANT NEOPLASMS, COLON: ICD-10-CM

## 2021-03-01 DIAGNOSIS — M51.26 LUMBAR DISC HERNIATION: ICD-10-CM

## 2021-03-01 DIAGNOSIS — I10 ESSENTIAL HYPERTENSION, BENIGN: ICD-10-CM

## 2021-03-01 LAB
ALBUMIN SERPL-MCNC: 4.8 G/DL (ref 3.6–5.1)
ALBUMIN/GLOB SERPL: 2 {RATIO} (ref 1–2.5)
ALP SERPL-CCNC: 47 U/L (ref 33–130)
ALT 1742-6: 51 U/L (ref 0–32)
AST 1920-8: 67 U/L (ref 0–35)
BILIRUB SERPL-MCNC: 0.9 MG/DL (ref 0.2–1.2)
BUN SERPL-MCNC: 15 MG/DL (ref 7–25)
BUN/CREATININE RATIO: 13.2 (ref 6–22)
CALCIUM SERPL-MCNC: 9.8 MG/DL (ref 8.6–10.3)
CHLORIDE SERPLBLD-SCNC: 106.1 MMOL/L (ref 98–110)
CHOLEST SERPL-MCNC: 222 MG/DL (ref 0–199)
CHOLEST/HDLC SERPL: 4 {RATIO} (ref 0–5)
CO2 SERPL-SCNC: 27.8 MMOL/L (ref 20–32)
CREAT SERPL-MCNC: 1.14 MG/DL (ref 0.6–1.3)
GLOBULIN, CALCULATED - QUEST: 2.4 (ref 1.9–3.7)
GLUCOSE SERPL-MCNC: 93 MG/DL (ref 60–99)
HDLC SERPL-MCNC: 57 MG/DL (ref 40–150)
LDLC SERPL CALC-MCNC: 118 MG/DL (ref 0–130)
POTASSIUM SERPL-SCNC: 5.39 MMOL/L (ref 3.5–5.3)
PROT SERPL-MCNC: 7.2 G/DL (ref 6.1–8.1)
SODIUM SERPL-SCNC: 143.6 MMOL/L (ref 135–146)
TRIGL SERPL-MCNC: 237 MG/DL (ref 0–149)

## 2021-03-01 PROCEDURE — 80053 COMPREHEN METABOLIC PANEL: CPT | Performed by: FAMILY MEDICINE

## 2021-03-01 PROCEDURE — 99214 OFFICE O/P EST MOD 30 MIN: CPT | Performed by: FAMILY MEDICINE

## 2021-03-01 PROCEDURE — 36415 COLL VENOUS BLD VENIPUNCTURE: CPT | Performed by: FAMILY MEDICINE

## 2021-03-01 PROCEDURE — 80061 LIPID PANEL: CPT | Performed by: FAMILY MEDICINE

## 2021-03-01 RX ORDER — ATORVASTATIN CALCIUM 40 MG/1
40 TABLET, FILM COATED ORAL DAILY
Qty: 90 TABLET | Refills: 1 | Status: SHIPPED | OUTPATIENT
Start: 2021-03-01 | End: 2021-04-26

## 2021-03-01 RX ORDER — OMEPRAZOLE 40 MG/1
40 CAPSULE, DELAYED RELEASE ORAL EVERY MORNING
Qty: 90 CAPSULE | Refills: 1 | Status: SHIPPED | OUTPATIENT
Start: 2021-03-01 | End: 2021-10-25

## 2021-03-01 RX ORDER — B12/LEVOMEFOLATE CALCIUM/B-6 2-1.13-25
1 TABLET ORAL DAILY
Qty: 90 TABLET | Refills: 1 | Status: SHIPPED | OUTPATIENT
Start: 2021-03-01 | End: 2022-11-16

## 2021-03-01 RX ORDER — PROPRANOLOL HYDROCHLORIDE 40 MG/1
40 TABLET ORAL DAILY
Qty: 90 TABLET | Refills: 1 | Status: SHIPPED | OUTPATIENT
Start: 2021-03-01 | End: 2021-04-13

## 2021-03-01 RX ORDER — FENOFIBRATE 160 MG/1
160 TABLET ORAL DAILY
Qty: 90 TABLET | Refills: 1 | Status: SHIPPED | OUTPATIENT
Start: 2021-03-01 | End: 2021-04-26

## 2021-03-01 RX ORDER — SERTRALINE HYDROCHLORIDE 100 MG/1
150 TABLET, FILM COATED ORAL DAILY
Qty: 135 TABLET | Refills: 1 | Status: SHIPPED | OUTPATIENT
Start: 2021-03-01 | End: 2021-04-26

## 2021-03-01 RX ORDER — PROPRANOLOL HYDROCHLORIDE 20 MG/1
TABLET ORAL
Qty: 90 TABLET | Refills: 1 | Status: CANCELLED | OUTPATIENT
Start: 2021-03-01

## 2021-03-01 ASSESSMENT — ANXIETY QUESTIONNAIRES
3. WORRYING TOO MUCH ABOUT DIFFERENT THINGS: NOT AT ALL
GAD7 TOTAL SCORE: 0
IF YOU CHECKED OFF ANY PROBLEMS ON THIS QUESTIONNAIRE, HOW DIFFICULT HAVE THESE PROBLEMS MADE IT FOR YOU TO DO YOUR WORK, TAKE CARE OF THINGS AT HOME, OR GET ALONG WITH OTHER PEOPLE: NOT DIFFICULT AT ALL
2. NOT BEING ABLE TO STOP OR CONTROL WORRYING: NOT AT ALL
7. FEELING AFRAID AS IF SOMETHING AWFUL MIGHT HAPPEN: NOT AT ALL
5. BEING SO RESTLESS THAT IT IS HARD TO SIT STILL: NOT AT ALL
6. BECOMING EASILY ANNOYED OR IRRITABLE: NOT AT ALL
1. FEELING NERVOUS, ANXIOUS, OR ON EDGE: NOT AT ALL

## 2021-03-01 ASSESSMENT — MIFFLIN-ST. JEOR: SCORE: 1752.88

## 2021-03-01 ASSESSMENT — PATIENT HEALTH QUESTIONNAIRE - PHQ9
SUM OF ALL RESPONSES TO PHQ QUESTIONS 1-9: 2
5. POOR APPETITE OR OVEREATING: NOT AT ALL

## 2021-03-01 NOTE — NURSING NOTE
Edvin Norton is here for a medication check and refill.    Questioned patient about current smoking habits.  Pt. has never smoked.  PULSE regular  My Chart: active  CLASSIFICATION OF OVERWEIGHT AND OBESITY BY BMI                        Obesity Class           BMI(kg/m2)  Underweight                                    < 18.5  Normal                                         18.5-24.9  Overweight                                     25.0-29.9  OBESITY                     I                  30.0-34.9                             II                 35.0-39.9  EXTREME OBESITY             III                >40                            Patient's  BMI Body mass index is 33.3 kg/m .  http://hin.nhlbi.nih.gov/menuplanner/menu.cgi  Pre-visit planning  Immunizations - up to date  Colonoscopy - is due and ordered today  Mammogram -   Asthma -   PHQ9 -  done  JORGE-7 -  done  Hearing Test - is completed today

## 2021-03-02 ASSESSMENT — ANXIETY QUESTIONNAIRES: GAD7 TOTAL SCORE: 0

## 2021-03-12 ENCOUNTER — TRANSFERRED RECORDS (OUTPATIENT)
Dept: FAMILY MEDICINE | Facility: CLINIC | Age: 66
End: 2021-03-12

## 2021-03-20 ENCOUNTER — HEALTH MAINTENANCE LETTER (OUTPATIENT)
Age: 66
End: 2021-03-20

## 2021-04-07 ENCOUNTER — TRANSFERRED RECORDS (OUTPATIENT)
Dept: HEALTH INFORMATION MANAGEMENT | Facility: CLINIC | Age: 66
End: 2021-04-07

## 2021-04-07 PROBLEM — M48.02 CERVICAL STENOSIS OF SPINE: Status: RESOLVED | Noted: 2020-06-05 | Resolved: 2021-04-07

## 2021-04-07 PROBLEM — M47.12 CERVICAL SPONDYLOSIS WITH MYELOPATHY: Status: RESOLVED | Noted: 2020-06-05 | Resolved: 2021-04-07

## 2021-04-07 PROBLEM — Z98.1 STATUS POST CERVICAL SPINAL FUSION: Status: RESOLVED | Noted: 2020-07-10 | Resolved: 2021-04-07

## 2021-04-07 PROBLEM — M43.12 ACQUIRED SPONDYLOLISTHESIS OF CERVICAL VERTEBRA: Status: RESOLVED | Noted: 2020-06-05 | Resolved: 2021-04-07

## 2021-04-07 PROBLEM — M48.02 SPINAL STENOSIS IN CERVICAL REGION: Status: RESOLVED | Noted: 2020-03-27 | Resolved: 2021-04-07

## 2021-04-08 ENCOUNTER — TRANSFERRED RECORDS (OUTPATIENT)
Dept: FAMILY MEDICINE | Facility: CLINIC | Age: 66
End: 2021-04-08

## 2021-04-13 ENCOUNTER — APPOINTMENT (OUTPATIENT)
Dept: SPEECH THERAPY | Facility: CLINIC | Age: 66
DRG: 948 | End: 2021-04-13
Attending: INTERNAL MEDICINE
Payer: MEDICARE

## 2021-04-13 ENCOUNTER — HOSPITAL ENCOUNTER (INPATIENT)
Facility: CLINIC | Age: 66
LOS: 2 days | Discharge: HOME | DRG: 948 | End: 2021-04-15
Attending: EMERGENCY MEDICINE | Admitting: INTERNAL MEDICINE
Payer: MEDICARE

## 2021-04-13 ENCOUNTER — APPOINTMENT (OUTPATIENT)
Dept: MRI IMAGING | Facility: CLINIC | Age: 66
DRG: 948 | End: 2021-04-13
Attending: INTERNAL MEDICINE
Payer: MEDICARE

## 2021-04-13 ENCOUNTER — APPOINTMENT (OUTPATIENT)
Dept: CARDIOLOGY | Facility: CLINIC | Age: 66
DRG: 948 | End: 2021-04-13
Attending: INTERNAL MEDICINE
Payer: MEDICARE

## 2021-04-13 ENCOUNTER — APPOINTMENT (OUTPATIENT)
Dept: CT IMAGING | Facility: CLINIC | Age: 66
DRG: 948 | End: 2021-04-13
Attending: EMERGENCY MEDICINE
Payer: MEDICARE

## 2021-04-13 ENCOUNTER — HOSPITAL ENCOUNTER (OUTPATIENT)
Dept: NEUROLOGY | Facility: CLINIC | Age: 66
DRG: 948 | End: 2021-04-13
Attending: INTERNAL MEDICINE
Payer: MEDICARE

## 2021-04-13 DIAGNOSIS — I10 ESSENTIAL HYPERTENSION, BENIGN: ICD-10-CM

## 2021-04-13 DIAGNOSIS — R94.31 PROLONGED QT INTERVAL: ICD-10-CM

## 2021-04-13 DIAGNOSIS — Z78.9 ALCOHOL USE: ICD-10-CM

## 2021-04-13 DIAGNOSIS — M43.14 SPONDYLOLISTHESIS OF THORACIC REGION: Primary | ICD-10-CM

## 2021-04-13 DIAGNOSIS — R41.82 ALTERED MENTAL STATUS, UNSPECIFIED ALTERED MENTAL STATUS TYPE: ICD-10-CM

## 2021-04-13 DIAGNOSIS — R26.9 GAIT DISORDER: ICD-10-CM

## 2021-04-13 DIAGNOSIS — R41.0 CONFUSION: ICD-10-CM

## 2021-04-13 LAB
ALBUMIN UR-MCNC: 20 MG/DL
ANION GAP SERPL CALCULATED.3IONS-SCNC: 11 MMOL/L (ref 3–14)
APPEARANCE UR: CLEAR
APTT PPP: 26 SEC (ref 22–37)
BASOPHILS # BLD AUTO: 0.1 10E9/L (ref 0–0.2)
BASOPHILS NFR BLD AUTO: 0.8 %
BILIRUB UR QL STRIP: NEGATIVE
BUN SERPL-MCNC: 17 MG/DL (ref 7–30)
CALCIUM SERPL-MCNC: 9.1 MG/DL (ref 8.5–10.1)
CHLORIDE SERPL-SCNC: 104 MMOL/L (ref 94–109)
CHOLEST SERPL-MCNC: 252 MG/DL
CK SERPL-CCNC: 115 U/L (ref 30–300)
CO2 SERPL-SCNC: 22 MMOL/L (ref 20–32)
COLOR UR AUTO: YELLOW
CREAT SERPL-MCNC: 0.97 MG/DL (ref 0.66–1.25)
D DIMER PPP FEU-MCNC: 0.4 UG/ML FEU (ref 0–0.5)
DIFFERENTIAL METHOD BLD: NORMAL
EOSINOPHIL # BLD AUTO: 0.1 10E9/L (ref 0–0.7)
EOSINOPHIL NFR BLD AUTO: 1.5 %
ERYTHROCYTE [DISTWIDTH] IN BLOOD BY AUTOMATED COUNT: 13.2 % (ref 10–15)
GFR SERPL CREATININE-BSD FRML MDRD: 82 ML/MIN/{1.73_M2}
GLUCOSE BLDC GLUCOMTR-MCNC: 103 MG/DL (ref 70–99)
GLUCOSE BLDC GLUCOMTR-MCNC: 113 MG/DL (ref 70–99)
GLUCOSE SERPL-MCNC: 117 MG/DL (ref 70–99)
GLUCOSE SERPL-MCNC: 147 MG/DL (ref 70–99)
GLUCOSE UR STRIP-MCNC: NEGATIVE MG/DL
HCT VFR BLD AUTO: 44.3 % (ref 40–53)
HDLC SERPL-MCNC: 59 MG/DL
HGB BLD-MCNC: 14.5 G/DL (ref 13.3–17.7)
HGB UR QL STRIP: NEGATIVE
IMM GRANULOCYTES # BLD: 0.1 10E9/L (ref 0–0.4)
IMM GRANULOCYTES NFR BLD: 1.4 %
INR PPP: 1.01 (ref 0.86–1.14)
INTERPRETATION ECG - MUSE: NORMAL
KETONES UR STRIP-MCNC: 5 MG/DL
LABORATORY COMMENT REPORT: NORMAL
LACTATE BLD-SCNC: 1 MMOL/L (ref 0.7–2)
LDLC SERPL CALC-MCNC: 162 MG/DL
LEUKOCYTE ESTERASE UR QL STRIP: NEGATIVE
LYMPHOCYTES # BLD AUTO: 1.1 10E9/L (ref 0.8–5.3)
LYMPHOCYTES NFR BLD AUTO: 16 %
MCH RBC QN AUTO: 30.7 PG (ref 26.5–33)
MCHC RBC AUTO-ENTMCNC: 32.7 G/DL (ref 31.5–36.5)
MCV RBC AUTO: 94 FL (ref 78–100)
MONOCYTES # BLD AUTO: 0.8 10E9/L (ref 0–1.3)
MONOCYTES NFR BLD AUTO: 12.4 %
MUCOUS THREADS #/AREA URNS LPF: PRESENT /LPF
NEUTROPHILS # BLD AUTO: 4.5 10E9/L (ref 1.6–8.3)
NEUTROPHILS NFR BLD AUTO: 67.9 %
NITRATE UR QL: NEGATIVE
NONHDLC SERPL-MCNC: 193 MG/DL
NRBC # BLD AUTO: 0 10*3/UL
NRBC BLD AUTO-RTO: 0 /100
PH UR STRIP: 6.5 PH (ref 5–7)
PLATELET # BLD AUTO: 226 10E9/L (ref 150–450)
POTASSIUM SERPL-SCNC: 3.3 MMOL/L (ref 3.4–5.3)
POTASSIUM SERPL-SCNC: 3.4 MMOL/L (ref 3.4–5.3)
POTASSIUM SERPL-SCNC: 3.5 MMOL/L (ref 3.4–5.3)
RBC # BLD AUTO: 4.73 10E12/L (ref 4.4–5.9)
RBC #/AREA URNS AUTO: 0 /HPF (ref 0–2)
SARS-COV-2 RNA RESP QL NAA+PROBE: NEGATIVE
SODIUM SERPL-SCNC: 137 MMOL/L (ref 133–144)
SOURCE: ABNORMAL
SP GR UR STRIP: 1.01 (ref 1–1.03)
SPECIMEN SOURCE: NORMAL
SQUAMOUS #/AREA URNS AUTO: 0 /HPF (ref 0–1)
TRIGL SERPL-MCNC: 155 MG/DL
TROPONIN I SERPL-MCNC: <0.015 UG/L (ref 0–0.04)
TROPONIN I SERPL-MCNC: <0.015 UG/L (ref 0–0.04)
UROBILINOGEN UR STRIP-MCNC: 0 MG/DL (ref 0–2)
WBC # BLD AUTO: 6.6 10E9/L (ref 4–11)
WBC #/AREA URNS AUTO: 0 /HPF (ref 0–5)

## 2021-04-13 PROCEDURE — 87635 SARS-COV-2 COVID-19 AMP PRB: CPT | Performed by: EMERGENCY MEDICINE

## 2021-04-13 PROCEDURE — 93005 ELECTROCARDIOGRAM TRACING: CPT

## 2021-04-13 PROCEDURE — 92526 ORAL FUNCTION THERAPY: CPT | Mod: GN | Performed by: SPEECH-LANGUAGE PATHOLOGIST

## 2021-04-13 PROCEDURE — 85025 COMPLETE CBC W/AUTO DIFF WBC: CPT | Performed by: EMERGENCY MEDICINE

## 2021-04-13 PROCEDURE — 83605 ASSAY OF LACTIC ACID: CPT | Performed by: EMERGENCY MEDICINE

## 2021-04-13 PROCEDURE — 84484 ASSAY OF TROPONIN QUANT: CPT | Performed by: INTERNAL MEDICINE

## 2021-04-13 PROCEDURE — 92610 EVALUATE SWALLOWING FUNCTION: CPT | Mod: GN | Performed by: SPEECH-LANGUAGE PATHOLOGIST

## 2021-04-13 PROCEDURE — 258N000003 HC RX IP 258 OP 636: Performed by: INTERNAL MEDICINE

## 2021-04-13 PROCEDURE — 120N000001 HC R&B MED SURG/OB

## 2021-04-13 PROCEDURE — 95816 EEG AWAKE AND DROWSY: CPT

## 2021-04-13 PROCEDURE — 70553 MRI BRAIN STEM W/O & W/DYE: CPT | Mod: ME

## 2021-04-13 PROCEDURE — 99223 1ST HOSP IP/OBS HIGH 75: CPT | Mod: AI | Performed by: INTERNAL MEDICINE

## 2021-04-13 PROCEDURE — 82947 ASSAY GLUCOSE BLOOD QUANT: CPT | Performed by: INTERNAL MEDICINE

## 2021-04-13 PROCEDURE — 85610 PROTHROMBIN TIME: CPT | Performed by: EMERGENCY MEDICINE

## 2021-04-13 PROCEDURE — 82550 ASSAY OF CK (CPK): CPT | Performed by: EMERGENCY MEDICINE

## 2021-04-13 PROCEDURE — 99291 CRITICAL CARE FIRST HOUR: CPT | Mod: 25

## 2021-04-13 PROCEDURE — 93306 TTE W/DOPPLER COMPLETE: CPT | Mod: 26 | Performed by: INTERNAL MEDICINE

## 2021-04-13 PROCEDURE — 255N000002 HC RX 255 OP 636: Performed by: INTERNAL MEDICINE

## 2021-04-13 PROCEDURE — 0042T CT HEAD PERFUSION WITH CONTRAST: CPT | Mod: MG

## 2021-04-13 PROCEDURE — A9585 GADOBUTROL INJECTION: HCPCS | Performed by: INTERNAL MEDICINE

## 2021-04-13 PROCEDURE — 80048 BASIC METABOLIC PNL TOTAL CA: CPT | Performed by: EMERGENCY MEDICINE

## 2021-04-13 PROCEDURE — 93306 TTE W/DOPPLER COMPLETE: CPT

## 2021-04-13 PROCEDURE — 70450 CT HEAD/BRAIN W/O DYE: CPT | Mod: MG

## 2021-04-13 PROCEDURE — 250N000011 HC RX IP 250 OP 636: Performed by: INTERNAL MEDICINE

## 2021-04-13 PROCEDURE — C9803 HOPD COVID-19 SPEC COLLECT: HCPCS

## 2021-04-13 PROCEDURE — 80061 LIPID PANEL: CPT | Performed by: INTERNAL MEDICINE

## 2021-04-13 PROCEDURE — 250N000013 HC RX MED GY IP 250 OP 250 PS 637: Performed by: INTERNAL MEDICINE

## 2021-04-13 PROCEDURE — 999N001017 HC STATISTIC GLUCOSE BY METER IP

## 2021-04-13 PROCEDURE — 85379 FIBRIN DEGRADATION QUANT: CPT | Performed by: EMERGENCY MEDICINE

## 2021-04-13 PROCEDURE — 85730 THROMBOPLASTIN TIME PARTIAL: CPT | Performed by: EMERGENCY MEDICINE

## 2021-04-13 PROCEDURE — 36415 COLL VENOUS BLD VENIPUNCTURE: CPT | Performed by: INTERNAL MEDICINE

## 2021-04-13 PROCEDURE — 81001 URINALYSIS AUTO W/SCOPE: CPT | Performed by: INTERNAL MEDICINE

## 2021-04-13 PROCEDURE — 250N000011 HC RX IP 250 OP 636: Performed by: EMERGENCY MEDICINE

## 2021-04-13 PROCEDURE — 84484 ASSAY OF TROPONIN QUANT: CPT | Performed by: EMERGENCY MEDICINE

## 2021-04-13 PROCEDURE — 70498 CT ANGIOGRAPHY NECK: CPT | Mod: MG

## 2021-04-13 PROCEDURE — 84132 ASSAY OF SERUM POTASSIUM: CPT | Performed by: INTERNAL MEDICINE

## 2021-04-13 RX ORDER — POLYETHYLENE GLYCOL 3350 17 G/17G
17 POWDER, FOR SOLUTION ORAL DAILY PRN
Status: DISCONTINUED | OUTPATIENT
Start: 2021-04-13 | End: 2021-04-15 | Stop reason: HOSPADM

## 2021-04-13 RX ORDER — PROPRANOLOL HYDROCHLORIDE 40 MG/1
40 TABLET ORAL DAILY PRN
Status: DISCONTINUED | OUTPATIENT
Start: 2021-04-13 | End: 2021-04-13

## 2021-04-13 RX ORDER — ACETAMINOPHEN 325 MG/1
650 TABLET ORAL EVERY 4 HOURS PRN
Status: DISCONTINUED | OUTPATIENT
Start: 2021-04-13 | End: 2021-04-13

## 2021-04-13 RX ORDER — SODIUM CHLORIDE 9 MG/ML
INJECTION, SOLUTION INTRAVENOUS CONTINUOUS
Status: DISCONTINUED | OUTPATIENT
Start: 2021-04-13 | End: 2021-04-14

## 2021-04-13 RX ORDER — LIDOCAINE 40 MG/G
CREAM TOPICAL
Status: DISCONTINUED | OUTPATIENT
Start: 2021-04-13 | End: 2021-04-15 | Stop reason: HOSPADM

## 2021-04-13 RX ORDER — IOPAMIDOL 755 MG/ML
120 INJECTION, SOLUTION INTRAVASCULAR ONCE
Status: COMPLETED | OUTPATIENT
Start: 2021-04-13 | End: 2021-04-13

## 2021-04-13 RX ORDER — CLOPIDOGREL 300 MG/1
300 TABLET, FILM COATED ORAL ONCE
Status: DISCONTINUED | OUTPATIENT
Start: 2021-04-13 | End: 2021-04-13

## 2021-04-13 RX ORDER — FENOFIBRATE 160 MG/1
160 TABLET ORAL DAILY
Status: DISCONTINUED | OUTPATIENT
Start: 2021-04-13 | End: 2021-04-15 | Stop reason: HOSPADM

## 2021-04-13 RX ORDER — POTASSIUM CHLORIDE 7.45 MG/ML
10 INJECTION INTRAVENOUS
Status: COMPLETED | OUTPATIENT
Start: 2021-04-13 | End: 2021-04-13

## 2021-04-13 RX ORDER — ONDANSETRON 2 MG/ML
4 INJECTION INTRAMUSCULAR; INTRAVENOUS EVERY 6 HOURS PRN
Status: DISCONTINUED | OUTPATIENT
Start: 2021-04-13 | End: 2021-04-15 | Stop reason: HOSPADM

## 2021-04-13 RX ORDER — ASPIRIN 81 MG/1
81 TABLET ORAL EVERY MORNING
Status: DISCONTINUED | OUTPATIENT
Start: 2021-04-13 | End: 2021-04-15 | Stop reason: HOSPADM

## 2021-04-13 RX ORDER — CLOPIDOGREL BISULFATE 75 MG/1
300 TABLET ORAL ONCE
Status: DISCONTINUED | OUTPATIENT
Start: 2021-04-13 | End: 2021-04-13

## 2021-04-13 RX ORDER — POTASSIUM CHLORIDE 1500 MG/1
40 TABLET, EXTENDED RELEASE ORAL ONCE
Status: COMPLETED | OUTPATIENT
Start: 2021-04-13 | End: 2021-04-13

## 2021-04-13 RX ORDER — BISACODYL 10 MG
10 SUPPOSITORY, RECTAL RECTAL DAILY PRN
Status: DISCONTINUED | OUTPATIENT
Start: 2021-04-13 | End: 2021-04-15 | Stop reason: HOSPADM

## 2021-04-13 RX ORDER — ACETAMINOPHEN 325 MG/1
650 TABLET ORAL EVERY 4 HOURS PRN
Status: DISCONTINUED | OUTPATIENT
Start: 2021-04-13 | End: 2021-04-15 | Stop reason: HOSPADM

## 2021-04-13 RX ORDER — PROCHLORPERAZINE 25 MG
12.5 SUPPOSITORY, RECTAL RECTAL EVERY 12 HOURS PRN
Status: DISCONTINUED | OUTPATIENT
Start: 2021-04-13 | End: 2021-04-15 | Stop reason: HOSPADM

## 2021-04-13 RX ORDER — CLOPIDOGREL BISULFATE 75 MG/1
75 TABLET ORAL DAILY
Status: DISCONTINUED | OUTPATIENT
Start: 2021-04-14 | End: 2021-04-13

## 2021-04-13 RX ORDER — PROPRANOLOL HYDROCHLORIDE 40 MG/1
40 TABLET ORAL DAILY PRN
Status: ON HOLD | COMMUNITY
End: 2021-04-15

## 2021-04-13 RX ORDER — PROCHLORPERAZINE MALEATE 5 MG
5 TABLET ORAL EVERY 6 HOURS PRN
Status: DISCONTINUED | OUTPATIENT
Start: 2021-04-13 | End: 2021-04-15 | Stop reason: HOSPADM

## 2021-04-13 RX ORDER — GADOBUTROL 604.72 MG/ML
9 INJECTION INTRAVENOUS ONCE
Status: COMPLETED | OUTPATIENT
Start: 2021-04-13 | End: 2021-04-13

## 2021-04-13 RX ORDER — AMOXICILLIN 250 MG
1 CAPSULE ORAL 2 TIMES DAILY PRN
Status: DISCONTINUED | OUTPATIENT
Start: 2021-04-13 | End: 2021-04-15 | Stop reason: HOSPADM

## 2021-04-13 RX ORDER — AMOXICILLIN 250 MG
2 CAPSULE ORAL 2 TIMES DAILY PRN
Status: DISCONTINUED | OUTPATIENT
Start: 2021-04-13 | End: 2021-04-15 | Stop reason: HOSPADM

## 2021-04-13 RX ORDER — ATORVASTATIN CALCIUM 40 MG/1
40 TABLET, FILM COATED ORAL DAILY
Status: DISCONTINUED | OUTPATIENT
Start: 2021-04-13 | End: 2021-04-15 | Stop reason: HOSPADM

## 2021-04-13 RX ORDER — ONDANSETRON 4 MG/1
4 TABLET, ORALLY DISINTEGRATING ORAL EVERY 6 HOURS PRN
Status: DISCONTINUED | OUTPATIENT
Start: 2021-04-13 | End: 2021-04-15 | Stop reason: HOSPADM

## 2021-04-13 RX ADMIN — ATORVASTATIN CALCIUM 40 MG: 40 TABLET, FILM COATED ORAL at 14:11

## 2021-04-13 RX ADMIN — POTASSIUM CHLORIDE 10 MEQ: 7.46 INJECTION, SOLUTION INTRAVENOUS at 14:09

## 2021-04-13 RX ADMIN — ASPIRIN 81 MG: 81 TABLET, DELAYED RELEASE ORAL at 14:11

## 2021-04-13 RX ADMIN — SODIUM CHLORIDE, PRESERVATIVE FREE: 5 INJECTION INTRAVENOUS at 10:52

## 2021-04-13 RX ADMIN — SERTRALINE HYDROCHLORIDE 150 MG: 100 TABLET ORAL at 14:10

## 2021-04-13 RX ADMIN — OMEPRAZOLE 40 MG: 20 CAPSULE, DELAYED RELEASE ORAL at 14:10

## 2021-04-13 RX ADMIN — POTASSIUM CHLORIDE 10 MEQ: 7.46 INJECTION, SOLUTION INTRAVENOUS at 15:23

## 2021-04-13 RX ADMIN — POTASSIUM CHLORIDE 10 MEQ: 7.46 INJECTION, SOLUTION INTRAVENOUS at 11:59

## 2021-04-13 RX ADMIN — FENOFIBRATE 160 MG: 160 TABLET ORAL at 14:10

## 2021-04-13 RX ADMIN — POTASSIUM CHLORIDE 10 MEQ: 7.46 INJECTION, SOLUTION INTRAVENOUS at 16:43

## 2021-04-13 RX ADMIN — IOPAMIDOL 120 ML: 755 INJECTION, SOLUTION INTRAVENOUS at 04:02

## 2021-04-13 RX ADMIN — GADOBUTROL 9 ML: 604.72 INJECTION INTRAVENOUS at 12:31

## 2021-04-13 RX ADMIN — POTASSIUM CHLORIDE 40 MEQ: 1500 TABLET, EXTENDED RELEASE ORAL at 21:49

## 2021-04-13 ASSESSMENT — ENCOUNTER SYMPTOMS
DIAPHORESIS: 1
CONFUSION: 1
FACIAL ASYMMETRY: 0
COLOR CHANGE: 0

## 2021-04-13 ASSESSMENT — ACTIVITIES OF DAILY LIVING (ADL)
DRESSING/BATHING_DIFFICULTY: NO
ADLS_ACUITY_SCORE: 13
ADLS_ACUITY_SCORE: 13
TOILETING_ISSUES: NO
ADLS_ACUITY_SCORE: 12
WHICH_OF_THE_ABOVE_FUNCTIONAL_RISKS_HAD_A_RECENT_ONSET_OR_CHANGE?: AMBULATION;COGNITION
DIFFICULTY_COMMUNICATING: NO
DIFFICULTY_EATING/SWALLOWING: OTHER (SEE COMMENTS)

## 2021-04-13 NOTE — ED TRIAGE NOTES
"BIBA from home. LTSN: [id number] last night when he went to sleep. Wife called after hearing patient making \"gargling sounds\" around 0234. Patient is confused x 2, difficulty finding words, not following commands.    Pt was seen at [place] yesterday for balance issues and neuropathy.     Hx. Of frequent falls.  "

## 2021-04-13 NOTE — CONSULTS
[facility]    Stroke Telephone Note    I was called by Les Thomas on 04/13/21 regarding patient Edvin Norton. The patient is a 66 year old male who was LSN at 2130 was found gurgling by wife and not following command an had trouble with speaking. CT/CT/CTP was negative. Patients symptoms started to resolve after imaging.     Stroke Code Data (for stroke code without tele)  Stroke code activated 04/13/21   0331   Stroke provider first response  04/13/21   0331 04/13/21   0331   Last known normal 04/12/21 2130    Unknown   Time of discovery   (or onset of symptoms) 04/13/21   0230   Head CT read by Stroke Neuro Dr/Provider 04/13/21   0347   Was stroke code de-escalated?   04/13/21 0404  other (see comments) improving symptoms     Thrombolytic Treatment   Not given due to minor/isolated/quickly resolving symptoms and unclear or unfavorable risk-benefit profile for extended window thrombolysis beyond the conventional 4.5 hour time window.    Endovascular Treatment  Not initiated due to absence of proximal vessel occlusion    Impression  Transient difficulty talking and following commmand       Recommendations   - Neurochecks and Vital Signs every 4   - Daily aspirin 81 mg for secondary stroke prevention  - Plavix (clopidogrel) 300 mg PO loading dose x 1  - Statin: Lipitor 40 mg  - MRI Brain with and without contrast  - TTE (with Bubble Study if age 60 yrs or less)  - 24-hour Telemetry  - Bedside Glucose Monitoring  - A1c, Lipid Panel  - PT/OT/SLP  - Stroke Education  - Euthermia, Euglycemia    Patient will be seen by AM team once he is admitted.     Addendum 04/13/21 1:47 PM     MRI brain negative, no further stroke work up needed.   Will discontinue plavix.  Recommend general neurology consult for episode AMS work up and to follow up on EEG.      The Stroke Staff is Dr. Fields.    Patricio Christy MD  Vascular Neurology Fellow  To page me or covering stroke neurology team member,  "click here: AMCOM   Choose \"On Call\" tab at top, then search dropdown box for \"Neurology Adult\", select location, press Enter, then look for stroke/neuro ICU/telestroke.           "

## 2021-04-13 NOTE — PROVIDER NOTIFICATION
Brief update:    Paged regarding signout Mr. Norton by Dr Thomas.     Found by wife with gurgling respiratory sounds, unable to awaken after an episode of emesis after dinner earlier in the evening.      With EMS arrival, was alert, though confused and oriented only to self.    Arrived at [facility] as a code stroke, still confused, though nonfocal.  Initial stroke work-up negative in the emergency department with plan to admit for MRI, additional stroke evaluation. Had not had significant improvement in symptoms from arrival to ER signout    See stroke neurology consultation by Dr. Christy this a.m; plan is for TTE, telemetry, initiation of Plavix and aspirin, Lipitor, MRI brain, every 4 hours neuro checks    Consider EEG given encephalopathy    Discussed with oncoming provider, Dr Mcneill, regarding admission given uncertain bed status (has been roomed in ER several hours at this point w/ known limited bed availability).    Discussed w/ house supervisor and neurology bed will be available for patient yet this morning.    Shane Sanchez MD  8:02 AM

## 2021-04-13 NOTE — PLAN OF CARE
Pt admitted via ED for altered mental status, stroke workup. VSS on RA, A+Ox4, forgetful and impulsive at times. Bed alarm on and pt reminded to call staff for assistance. Neuros intact with baseline numbness and tingling in all toes. Intermittent nausea this AM with emesis x1. Continent of bowel and bladder, per pt he's been having loose stools since last night. EEG and MRI done, MRI negative for stroke so neuro-stroke team signed off, awaiting Echo and general Neurology consult. Up with SBA and GB, tolerating regular diet, no speech or swallowing difficulties noted. K+ 3.3, IV replacement. PT/OT/SLP consulted. Discharge plan pending workup.

## 2021-04-13 NOTE — ED NOTES
"[facility]  ED Nurse Handoff Report    ED Chief complaint: Altered Mental Status      ED Diagnosis:   Final diagnoses:   None       Code Status: as per hospitalist    Allergies: No Known Allergies    Patient Story: wife found patient making \"garggling sounds\" while asleep and cover in sweats was unable to wake him up. EMS found patient confused, with difficulty finding words, not following commands and pt asking the same questions over and over. Pt was seen at [facility] for problems with his balance and neuropathy. Hx. Of DM and sleep apnea  Focused Assessment:  Pt was ambulatory per EMS, awake but confused x3 (oriented to person only), respirations even and unlabored, skin dry, warm, color wnl. Code stroke de-escalated at 0405  Results for orders placed or performed during the hospital encounter of 04/13/21   Basic metabolic panel     Status: Abnormal   Result Value Ref Range    Sodium 137 133 - 144 mmol/L    Potassium 3.5 3.4 - 5.3 mmol/L    Chloride 104 94 - 109 mmol/L    Carbon Dioxide 22 20 - 32 mmol/L    Anion Gap 11 3 - 14 mmol/L    Glucose 147 (H) 70 - 99 mg/dL    Urea Nitrogen 17 7 - 30 mg/dL    Creatinine 0.97 0.66 - 1.25 mg/dL    GFR Estimate 82 >60 mL/min/[1.73_m2]    GFR Estimate If Black >90 >60 mL/min/[1.73_m2]    Calcium 9.1 8.5 - 10.1 mg/dL   CBC with platelets differential     Status: None   Result Value Ref Range    WBC 6.6 4.0 - 11.0 10e9/L    RBC Count 4.73 4.4 - 5.9 10e12/L    Hemoglobin 14.5 13.3 - 17.7 g/dL    Hematocrit 44.3 40.0 - 53.0 %    MCV 94 78 - 100 fl    MCH 30.7 26.5 - 33.0 pg    MCHC 32.7 31.5 - 36.5 g/dL    RDW 13.2 10.0 - 15.0 %    Platelet Count 226 150 - 450 10e9/L    Diff Method Automated Method     % Neutrophils 67.9 %    % Lymphocytes 16.0 %    % Monocytes 12.4 %    % Eosinophils 1.5 %    % Basophils 0.8 %    % Immature Granulocytes 1.4 %    Nucleated RBCs 0 0 /100    Absolute Neutrophil 4.5 1.6 - 8.3 10e9/L    Absolute Lymphocytes 1.1 0.8 - 5.3 10e9/L    " Absolute Monocytes 0.8 0.0 - 1.3 10e9/L    Absolute Eosinophils 0.1 0.0 - 0.7 10e9/L    Absolute Basophils 0.1 0.0 - 0.2 10e9/L    Abs Immature Granulocytes 0.1 0 - 0.4 10e9/L    Absolute Nucleated RBC 0.0    INR     Status: None   Result Value Ref Range    INR 1.01 0.86 - 1.14   Partial thromboplastin time     Status: None   Result Value Ref Range    PTT 26 22 - 37 sec   Troponin I     Status: None   Result Value Ref Range    Troponin I ES <0.015 0.000 - 0.045 ug/L        Treatments and/or interventions provided: cardiac monitoring and neuro check  Patient's response to treatments and/or interventions: pt comfortably resting in stretcher, BP improved. In no distress at this moment.    To be done/followed up on inpatient unit:  neuro consult?    Does this patient have any cognitive concerns?: Disoriented to time, Disoriented to place and Disoriented to situation    Activity level - Baseline/Home:  Independent  Activity Level - Current:   Stand with Assist    Patient's Preferred language: English   Needed?: No    Isolation: None  Infection: Not Applicable  Patient tested for COVID 19 prior to admission: YES  Bariatric?: No    Vital Signs:   Vitals:    04/13/21 0331 04/13/21 0406   BP: (!) 166/102 (!) 162/89   Pulse: 91 90   Resp: 17 19   Temp:  97.8  F (36.6  C)   TempSrc:  Temporal   SpO2: 91% 93%   Weight: 98.6 kg (217 lb 6.4 oz)        Cardiac Rhythm:     Was the PSS-3 completed:   Yes  What interventions are required if any?               Family Comments: wife at bedside  OBS brochure/video discussed/provided to patient/family: N/A           For the majority of the shift this patient's behavior was Green.   Behavioral interventions performed were n/a.    ED NURSE PHONE NUMBER: [phone]

## 2021-04-13 NOTE — PROGRESS NOTES
[facility] EEG #  portable, ordered by Dr. CHICA Mcneill.     Pt is awake, drowsy, light sleep on EEG.     Several hand, body jerks were recorded in light sleep.    Pt is alert when awake, able to follow commands, speech is clear.   Not confused at time of test.   No activations.    T4 electrode sway on EEG   I tried several methods to correct this.   All impedances were below 5 k ohms

## 2021-04-13 NOTE — PROVIDER NOTIFICATION
MRI negative for stroke, stroke neuro team is recommending general neuro consult. Text page to Dr. Mcneill.

## 2021-04-13 NOTE — PLAN OF CARE
RECEIVING UNIT ED HANDOFF REVIEW    ED Nurse Handoff Report was reviewed by: Afua Blake RN on April 13, 2021 at 8:25 AM

## 2021-04-13 NOTE — ED PROVIDER NOTES
"  History   Chief Complaint:  Altered Mental Status       The history is provided by the EMS personnel and the spouse. The history is limited by the condition of the patient.      Edvin Norton is a 66 year old male with history of hypertension who presents with altered mental status. Per EMS, the patient went to bed around 2130 and was normal at this time. The wife woke up around 0230 to the patient making \"gargling sounds\". When EMS arrived they noted the patient to be disoriented and struggling to follow instructions or answer questions appropriately. EMS also notes that the patient was seen at [place] yesterday for balance issue and neuropathy. They also noted that the patient has been having frequent falls over the past few weeks, and that he was unsteady when getting to the ambulance. They measured his oxygen saturations at 92-94%.  No facial droop was noted.     Per the patient's wife, the patient had come home form [place] today. He had a pizza and vomited around 2130 and then went to bed shortly after. The wife says that she woke during the night to the patient making \"gurgling sounds\". She spent about 10 minutes trying to wake up the patient and was unsuccessful so she called EMS she says that the patient was diaphoretic, but was still breathing and was not whiter/blue. She says that he had no incontinence either. When EMS arrived about 5 minutes after the patient was able to be wakened and walk down the stairs, albeit unsteadily. She denies any exposure to COVID, and note that the patient has received his second COVID vaccination about 3 weeks ago.       Review of Systems   Constitutional: Positive for diaphoresis.   Respiratory:        \"Gargling\"   Skin: Negative for color change and pallor.   Neurological: Negative for facial asymmetry.   Psychiatric/Behavioral: Positive for confusion.   ROS limited due to patient's altered mental status.     Allergies:  No Known Drug Allergies "     Medications:  Zoloft  Inderal  Prilosec  Foltx  Triglide  Lipitor  Aspirin     Past Medical History:    Anxiety  Natarajan esophagus  Hypertension   Hyperlipidemia   Obesity    GERD  Atherosclerosis    Colon polyps   Spondylolisthesis of thoracic region      Past Surgical History:    Upper GI endoscopy  Graft bone from iliac crest  Fusion cervical anterior three levels  Finger tendon surgery  Halo procedure x5  Cataract IOL   Polypectomy   EGD    Family History:    Neurologic disorder  Thyroid disease  Brain aneurysm  Esophageal cancer  Anxiety  Hyperlipidemia   Psoriasis  Lung cancer  Anxiety   Hypertension      Social History:  Patient presents via EMS      Physical Exam     Patient Vitals for the past 24 hrs:   BP Temp Temp src Pulse Resp SpO2 Weight   04/13/21 0430 (!) 158/94 -- -- 120 21 90 % --   04/13/21 0420 (!) 159/95 -- -- 82 -- 94 % --   04/13/21 0406 (!) 162/89 97.8  F (36.6  C) Temporal 90 19 93 % --   04/13/21 0331 (!) 166/102 -- -- 91 17 91 % 98.6 kg (217 lb 6.4 oz)       Physical Exam  Physical Exam   Nursing note and vitals reviewed.  General: Oriented to person, place, and time. Appears well-developed and well-nourished.   Head: No signs of trauma.   Mouth/Throat: Oropharynx is clear and moist.   Eyes: Conjunctivae are normal. Pupils are equal, round, and reactive to light.   Neck: Normal range of motion. No nuchal rigidity.   Cardiovascular: Normal rate and regular rhythm.    Respiratory: Effort normal and breath sounds normal. No respiratory distress.   Abdominal: Soft. There is no tenderness. There is no guarding.   Musculoskeletal: Normal range of motion. no edema.   Neurological: The patient is Oriented to person only.  PERRLA, EOMI, visual fields intact, strength in upper/lower extremities normal and symmetrical.   Sensation normal. Speech normal  GCS eye subscore is 4. GCS verbal subscore is 5. GCS motor subscore is 6.   Skin: Skin is warm and dry. No rash noted.   Psychiatric: normal mood  and affect. behavior is normal.     Emergency Department Course     ECG  ECG taken at 0357, ECG read at 0400  Normal sinus rhythm  Prolonged QT   Abnormal ECG  Rate 88 bpm. TX interval 196 ms. QRS duration 96 ms. QT/QTc 404/488 ms. P-R-T axes 36 47 30.       Imaging:  CT Head Perfusion w Contrast  1.  No acute perfusion abnormality.  Dr Les Thomas was notified by Dr Johnny Schaffer at  4:20 AM 04/13/2021.  Reading per radiology     CTA Head Neck with Contrast  HEAD CT:  1.  No acute intracranial hemorrhage.  2.  No CT evidence of acute infarct. Aspect score 10.  HEAD CTA:   1.  No intracranial arterial large vessel occlusion.  2.  Proximal intracranial arteries demonstrate no significant stenosis/occlusion.   NECK CTA:  1.  Left distal common carotid artery moderate stenosis secondary to calcified/noncalcified plaque.  2.  Otherwise, cervical arteries demonstrate no significant stenosis/occlusion or dissection.   Dr Les Thomas was notified by Dr Johnny Schaffer at  4:20 AM 04/13/2021.  Reading per radiology    CT Head w/o Contrast  HEAD CT:  1.  No acute intracranial hemorrhage.  2.  No CT evidence of acute infarct. Aspect score 10.  Dr Les Thomas was notified by Dr Johnny Schaffer at  3:58 AM 04/13/2021.  HEAD CTA:   1.  No intracranial arterial large vessel occlusion.  2.  Proximal intracranial arteries demonstrate no significant stenosis/occlusion.   NECK CTA:  1.  Left distal common carotid artery moderate stenosis secondary to calcified/noncalcified plaque.  2.  Otherwise, cervical arteries demonstrate no significant stenosis/occlusion or dissection.   Dr Les Thomas was notified by Dr Johnny Schaffer at  4:20 AM 04/13/2021.  Reading per radiology       Laboratory:    CBC: WBC 6.6, HGB 14.5,   BMP:  (H) o/w WNL (Creatinine 0.97)  INR: 1.01  PTT: 26  Troponin (Collected 0328): <0.015   D Dimer: 0.4    Asymptomatic COVID19 Virus PCR by nasopharyngeal swab pending      Procedures    Emergency Department  Course:    Reviewed:  I reviewed nursing notes, vitals, past medical history and care everywhere       Assessments:  0326 I performed an exam of the patient as documented above.   0329 Tier 2 Stroke Code called.    Consults:   0333 I spoke with Dr. Christy of the stroke neurology service regarding patient's presentation, findings, and plan of care.   0400 I spoke with Dr. Schaffer from [facility] regarding patient's presentation, findings, and plan of care.   0509 I spoke with Dr. Sanchez of the hospitalist service regarding patient's presentation, findings, and plan of care.       Disposition:  The patient was admitted to the hospital under the care of Dr. Sanchez.       Impression & Plan     Medical Decision Making:  Edvin Norton is a 66 year old male who presents to the emergency department today for evaluation of confusion.  The patient was initially evaluated as a possible stroke.  Initial evaluation is negative.   Imaging as noted above; no dissection or bleed noted.  Based on symptoms and timing, they are not a candidate for TPA. Did discuss this with the patient and they express understanding. Differential considered for symptoms included CVA, TIA, dissection, cerebral tumor, migraine, infection, metabolic derangement, demyelination, etc.  EKG shows no atrial fibrillation nor arrhythmia noted on telemetry monitoring.  Will admit to medicine with neurology consulting for further cares.  Will need further workup as inpatient for reason for symptoms.        Diagnosis:    ICD-10-CM    1. Confusion  R41.0 Asymptomatic SARS-CoV-2 COVID-19 Virus (Coronavirus) by PCR     D dimer quantitative     D dimer quantitative     CANCELED: D dimer quantitative   2. Prolonged QT interval  R94.31          Scribe Disclosure:  I, Freddie Vazquez, am serving as a scribe at 3:27 AM on 4/13/2021 to document services personally performed by Les Thomas MD based on my observations and the provider's statements to me.           Les Thomas MD  04/13/21 1010

## 2021-04-13 NOTE — PROGRESS NOTES
"   04/13/21 8982   General Information   Onset of Illness/Injury or Date of Surgery 04/13/21   Referring Physician Vinayak Mcneill MD   Patient/Family Therapy Goal Statement (SLP) Wanting to eat and drink    Pertinent History of Current Problem Edvin Norton is a 66 year old male with history of hypertension who presents with altered mental status. Per EMS, the patient went to bed around 2130 and was normal at this time. The wife woke up around 0230 to the patient making \"gargling sounds\". When EMS arrived they noted the patient to be disoriented and struggling to follow instructions or answer questions appropriately. EMS also notes that the patient was seen at [place] yesterday for balance issue and neuropathy. They also noted that the patient has been having frequent falls over the past few weeks, and that he was unsteady when getting to the ambulance. They measured his oxygen saturations at 92-94%.  No facial droop was noted.    General Observations pleasant, cooperative    Past History of Dysphagia No hx of dysphagia    Type of Evaluation   Type of Evaluation Swallow Evaluation   Oral Motor   Oral Musculature generally intact   Structural Abnormalities none present   Dentition (Oral Motor)   Dentition (Oral Motor) natural dentition   Facial Symmetry (Oral Motor)   Facial Symmetry (Oral Motor) WNL   Lip Function (Oral Motor)   Lip Range of Motion (Oral Motor) WNL   Lip Strength (Oral Motor) WNL   Tongue Function (Oral Motor)   Tongue ROM (Oral Motor) WNL   Tongue Strength (Oral Motor) WNL   Tongue Coordination/Speed (Oral Motor) WNL   Jaw Function (Oral Motor)   Jaw Function (Oral Motor) WNL   Cough/Swallow/Gag Reflex (Oral Motor)   Soft Palate/Velum (Oral Motor) WNL   Volitional Throat Clear/Cough (Oral Motor) WNL   Vocal Quality/Secretion Management (Oral Motor)   Vocal Quality (Oral Motor) WNL   General Swallowing Observations   Current Diet/Method of Nutritional Intake (General Swallowing Observations, NIS) " regular diet;thin liquids   Respiratory Support (General Swallowing Observations) none   Swallowing Evaluation Clinical swallow evaluation   Clinical Swallow Evaluation   Feeding Assistance no assistance needed   Additional evaluation(s) completed today No   Clinical Swallow Evaluation Textures Trialed Thin Liquids;Puree Textures;Solid Foods   Clinical Swallow Eval: Thin Liquid Texture Trial   Mode of Presentation, Thin Liquids cup;straw;self-fed   Volume of Liquid or Food Presented 4 oz    Oral Phase of Swallow Premature pharyngeal entry   Pharyngeal Phase of Swallow intact   Diagnostic Statement thin liquid trials via cup and straw with premature pharyngeal entry on consecutive gulps.    Clinical Swallow Evaluation: Puree Solid Texture Trial   Mode of Presentation, Puree spoon;self-fed   Oral Phase, Puree WFL   Pharyngeal Phase, Puree intact   Diagnostic Statement No overt s/sx of aspiration   Clinical Swallow Evaluation: Solid Food Texture Trial   Mode of Presentation, Solid fed by clinician   Volume of Solid Food Presented 1 cracker   Oral Phase, Solid WFL   Pharyngeal Phase, Solid intact   Esophageal Phase of Swallow   Patient reports or presents with symptoms of esophageal dysphagia No   Swallowing Recommendations   Diet Consistency Recommendations regular diet;thin liquids   Supervision Level for Intake patient independent   Mode of Delivery Recommendations bolus size, small;no straws;slow rate of intake   Swallowing Maneuver Recommendations alternate food and liquid intake   Monitoring/Assistance Required (Eating/Swallowing) monitor for cough or change in vocal quality with intake   Recommended Feeding/Eating Techniques (Swallow Eval) maintain upright posture during/after eating for 30 minutes;minimize distractions during oral intake   Medication Administration Recommendations, Swallowing (SLP) whole as tolerated   SLP Therapy Assessment/Plan   Criteria for Skilled Therapeutic Interventions Met (SLP Eval)  yes   SLP Diagnosis Minimal oral pharyngeal dysphagia    Rehab Potential (SLP Eval) good, to achieve stated therapy goals   Therapy Frequency (SLP Eval) one time eval and treatment only   Predicted Duration of Therapy Intervention (SLP Eval) 1 week   Comment, Therapy Assessment/Plan (SLP) Patient presents with minimal oral pharyngeal dysphagia characterized by premature pharyngeal entry of thin liquids. Patient needing cues to slow down and take smaller sips d/t large gulps. Patient tolerated pureed and solid textures with no overt s/sx of aspiration. Patient states no swallowing difficulties. Swallow function grossly within functional limits. Recommend continue regular diet with thin liquids, no straws, upright position, slow rate of intake, and small bites/sips.     Therapy Plan Review/Discharge Plan (SLP)   Therapy Plan Review (SLP) evaluation/treatment results reviewed;care plan/treatment goals reviewed;risks/benefits reviewed;current/potential barriers reviewed;participants voiced agreement with care plan;participants included;patient   SLP Discharge Planning    SLP Discharge Recommendation (DC Rec) home   SLP Rationale for DC Rec One time evaluation and treatment only; patient will likely meet goals during hospital stay and no further SLP needs     SLP Brief overview of current status  Recommend continue regular diet with thin liquids, no straws, upright position, slow rate of intake, and small bites/sips    Total Evaluation Time   Total Evaluation Time (Minutes) 10

## 2021-04-13 NOTE — PHARMACY-ADMISSION MEDICATION HISTORY
Pharmacy Medication History  Admission medication history interview status for the 4/13/2021  admission is complete. See EPIC admission navigator for prior to admission medications     Location of Interview: Patient room  Medication history sources: Patient and his wife.    Significant changes made to the medication list:  Changed propranolol to 40mg daily prn.    In the past week, patient estimated taking medication this percent of the time: greater than 90%        Medication reconciliation completed by provider prior to medication history? No    Time spent in this activity: 10min    Prior to Admission medications    Medication Sig Last Dose Taking? Auth Provider   acetaminophen (TYLENOL) 325 MG tablet Take 2 tablets (650 mg) by mouth every 4 hours as needed for mild pain Max tylenol 4 gm per day  Yes Wilfredo Esteban MD   ASPIRIN PO Take 81 mg by mouth every morning  4/12/2021 at Unknown time Yes Reported, Patient   atorvastatin (LIPITOR) 40 MG tablet Take 1 tablet (40 mg) by mouth daily 4/12/2021 at Unknown time Yes Woody Cintron MD   B Complex-Folic Acid (SUPER B COMPLEX MAXI) TABS Take 1 tablet by mouth daily 4/12/2021 at Unknown time Yes Woody Cintron MD   fenofibrate (TRIGLIDE/LOFIBRA) 160 MG tablet Take 1 tablet (160 mg) by mouth daily 4/12/2021 at Unknown time Yes Woody Cintron MD   O-Ogdvsjuygsds-N3-B12 (FOLTX) 1.13-25-2 MG TABS Take 1 Dose by mouth daily 4/12/2021 at Unknown time Yes Woody Cintron MD   Omega-3 Fatty Acids (OMEGA-3 FISH OIL PO) Take 1 g by mouth every morning  4/12/2021 at Unknown time Yes Reported, Patient   omeprazole (PRILOSEC) 40 MG DR capsule Take 1 capsule (40 mg) by mouth every morning 4/12/2021 at Unknown time Yes Woody Cintron MD   propranolol (INDERAL) 40 MG tablet Take 40 mg by mouth daily as needed prn Yes Unknown, Entered By History   sertraline (ZOLOFT) 100 MG tablet Take 1.5 tablets (150 mg) by mouth daily 4/12/2021  at Unknown time Yes Woody Cintron MD       The information provided in this note is only as accurate as the sources available at the time of update(s)

## 2021-04-14 ENCOUNTER — APPOINTMENT (OUTPATIENT)
Dept: OCCUPATIONAL THERAPY | Facility: CLINIC | Age: 66
DRG: 948 | End: 2021-04-14
Attending: INTERNAL MEDICINE
Payer: MEDICARE

## 2021-04-14 ENCOUNTER — APPOINTMENT (OUTPATIENT)
Dept: PHYSICAL THERAPY | Facility: CLINIC | Age: 66
DRG: 948 | End: 2021-04-14
Attending: INTERNAL MEDICINE
Payer: MEDICARE

## 2021-04-14 LAB
ALBUMIN SERPL-MCNC: 3.6 G/DL (ref 3.4–5)
ALP SERPL-CCNC: 41 U/L (ref 40–150)
ALT SERPL W P-5'-P-CCNC: 58 U/L (ref 0–70)
AST SERPL W P-5'-P-CCNC: 63 U/L (ref 0–45)
BILIRUB DIRECT SERPL-MCNC: 0.2 MG/DL (ref 0–0.2)
BILIRUB SERPL-MCNC: 0.7 MG/DL (ref 0.2–1.3)
BUN SERPL-MCNC: 17 MG/DL (ref 7–30)
CALCIUM SERPL-MCNC: 8.6 MG/DL (ref 8.5–10.1)
CHLORIDE SERPL-SCNC: 108 MMOL/L (ref 94–109)
CO2 SERPL-SCNC: 25 MMOL/L (ref 20–32)
CREAT SERPL-MCNC: 1.04 MG/DL (ref 0.66–1.25)
GFR SERPL CREATININE-BSD FRML MDRD: 75 ML/MIN/{1.73_M2}
GLUCOSE BLDC GLUCOMTR-MCNC: 92 MG/DL (ref 70–99)
GLUCOSE BLDC GLUCOMTR-MCNC: 97 MG/DL (ref 70–99)
GLUCOSE SERPL-MCNC: 99 MG/DL (ref 70–99)
POTASSIUM SERPL-SCNC: 4.5 MMOL/L (ref 3.4–5.3)
PROT SERPL-MCNC: 6.8 G/DL (ref 6.8–8.8)
SODIUM SERPL-SCNC: 139 MMOL/L (ref 133–144)
T4 FREE SERPL-MCNC: 0.81 NG/DL (ref 0.76–1.46)
TSH SERPL DL<=0.005 MIU/L-ACNC: 6.5 MU/L (ref 0.4–4)

## 2021-04-14 PROCEDURE — 36415 COLL VENOUS BLD VENIPUNCTURE: CPT | Performed by: INTERNAL MEDICINE

## 2021-04-14 PROCEDURE — 84425 ASSAY OF VITAMIN B-1: CPT | Performed by: INTERNAL MEDICINE

## 2021-04-14 PROCEDURE — 258N000003 HC RX IP 258 OP 636: Performed by: INTERNAL MEDICINE

## 2021-04-14 PROCEDURE — 80048 BASIC METABOLIC PNL TOTAL CA: CPT | Performed by: INTERNAL MEDICINE

## 2021-04-14 PROCEDURE — 97116 GAIT TRAINING THERAPY: CPT | Mod: GP

## 2021-04-14 PROCEDURE — 80076 HEPATIC FUNCTION PANEL: CPT | Performed by: INTERNAL MEDICINE

## 2021-04-14 PROCEDURE — 84443 ASSAY THYROID STIM HORMONE: CPT | Performed by: INTERNAL MEDICINE

## 2021-04-14 PROCEDURE — 84439 ASSAY OF FREE THYROXINE: CPT | Performed by: INTERNAL MEDICINE

## 2021-04-14 PROCEDURE — 250N000011 HC RX IP 250 OP 636: Performed by: INTERNAL MEDICINE

## 2021-04-14 PROCEDURE — 97162 PT EVAL MOD COMPLEX 30 MIN: CPT | Mod: GP

## 2021-04-14 PROCEDURE — 97535 SELF CARE MNGMENT TRAINING: CPT | Mod: GO

## 2021-04-14 PROCEDURE — 99233 SBSQ HOSP IP/OBS HIGH 50: CPT | Performed by: INTERNAL MEDICINE

## 2021-04-14 PROCEDURE — 999N001017 HC STATISTIC GLUCOSE BY METER IP

## 2021-04-14 PROCEDURE — 250N000013 HC RX MED GY IP 250 OP 250 PS 637: Performed by: INTERNAL MEDICINE

## 2021-04-14 PROCEDURE — 97165 OT EVAL LOW COMPLEX 30 MIN: CPT | Mod: GO

## 2021-04-14 PROCEDURE — 120N000001 HC R&B MED SURG/OB

## 2021-04-14 RX ORDER — PROPRANOLOL HCL 60 MG
60 CAPSULE, EXTENDED RELEASE 24HR ORAL DAILY
Status: DISCONTINUED | OUTPATIENT
Start: 2021-04-14 | End: 2021-04-15 | Stop reason: HOSPADM

## 2021-04-14 RX ORDER — THIAMINE HYDROCHLORIDE 100 MG/ML
100 INJECTION, SOLUTION INTRAMUSCULAR; INTRAVENOUS DAILY
Status: DISCONTINUED | OUTPATIENT
Start: 2021-04-14 | End: 2021-04-14

## 2021-04-14 RX ADMIN — ATORVASTATIN CALCIUM 40 MG: 40 TABLET, FILM COATED ORAL at 09:49

## 2021-04-14 RX ADMIN — SERTRALINE HYDROCHLORIDE 150 MG: 100 TABLET ORAL at 09:49

## 2021-04-14 RX ADMIN — B-COMPLEX W/ C & FOLIC ACID TAB 1 TABLET: TAB at 15:32

## 2021-04-14 RX ADMIN — Medication 1 TABLET: at 15:32

## 2021-04-14 RX ADMIN — SODIUM CHLORIDE, PRESERVATIVE FREE: 5 INJECTION INTRAVENOUS at 01:32

## 2021-04-14 RX ADMIN — PROPRANOLOL HYDROCHLORIDE 60 MG: 60 CAPSULE, EXTENDED RELEASE ORAL at 18:09

## 2021-04-14 RX ADMIN — ASPIRIN 81 MG: 81 TABLET, DELAYED RELEASE ORAL at 09:49

## 2021-04-14 RX ADMIN — OMEPRAZOLE 40 MG: 20 CAPSULE, DELAYED RELEASE ORAL at 09:49

## 2021-04-14 RX ADMIN — FENOFIBRATE 160 MG: 160 TABLET ORAL at 09:49

## 2021-04-14 RX ADMIN — THIAMINE HYDROCHLORIDE 500 MG: 100 INJECTION, SOLUTION INTRAMUSCULAR; INTRAVENOUS at 20:54

## 2021-04-14 RX ADMIN — THIAMINE HYDROCHLORIDE 500 MG: 100 INJECTION, SOLUTION INTRAMUSCULAR; INTRAVENOUS at 13:33

## 2021-04-14 ASSESSMENT — ACTIVITIES OF DAILY LIVING (ADL)
ADLS_ACUITY_SCORE: 13
ADLS_ACUITY_SCORE: 13
ADLS_ACUITY_SCORE: 12
ADLS_ACUITY_SCORE: 13
PREVIOUS_RESPONSIBILITIES: DRIVING;FINANCES;MEDICATION MANAGEMENT
ADLS_ACUITY_SCORE: 13
ADLS_ACUITY_SCORE: 13

## 2021-04-14 NOTE — UTILIZATION REVIEW
Admission Status; Secondary Review Determination       Under the authority of the Utilization Management Committee, the utilization review process indicated a secondary review on the above patient. The review outcome is based on review of the medical records, discussions with staff, and applying clinical experience noted on the date of the review.     (x) Inpatient Status Appropriate - This patient's medical care is consistent with medical management for inpatient care and reasonable inpatient medical practice.     RATIONALE FOR DETERMINATION   65 yo man with hypertension, dyslipidemia, anxiety disorder, Natarajan's esophagus, regular alcohol use, gait instability and peripheral neuropathy who presented with worsening bilateral foot numbness and gait instability, episode of confusion.  Recent extensive evaluation at the [facility] which revealed vitamin D toxicity.  Getting PT and OT consultations for input here.  Also getting general neurology consultation with consideration for possible seizures, either alcohol withdrawal or other etiology.  Has had chronic transaminitis since 2012.  He is definitely not discharging today and anticipate he will discharge at the soonest tomorrow.    At the time of admission with the information available to the attending physician more than 2 nights hospital complex care was anticipated, based on patient risk of adverse outcome if treated as outpatient and complex care required. Inpatient admission is appropriate based on the Medicare guidelines.     This document was produced using voice recognition software.    The information on this document is developed by the utilization review team in order for the business office to ensure compliance. This only denotes the appropriateness of proper admission status and does not reflect the quality of care rendered.   The definitions of Inpatient Status and Observation Status used in making the determination above are those provided in the  CMS Coverage Manual, Chapter 1 and Chapter 6, section 70.4.     Sincerely,   Lakia Zapata MD  Utilization Review  Physician Advisor  [facility].

## 2021-04-14 NOTE — PLAN OF CARE
Pt here after altered mental status. Pt A&O x4. Neuros intact. Some forgetfulness at times. Pt does not remember last night but able to recall events from today. Denies pain, denies nausea. VSS, Tele NSR. Pt up with SBA/GB/W. Pt tolerating regular diet, takes pills whole with water. Pt voiding without difficulty. Plan for general neuro to see pt tomorrow.   Pt greenlight on stoplight aggression tool.   Pt belongings at bedside.

## 2021-04-14 NOTE — PROGRESS NOTES
04/14/21 1046   Quick Adds   Type of Visit Initial Occupational Therapy Evaluation   Living Environment   People in home spouse   Current Living Arrangements house  (two  story home )   Home Accessibility stairs to enter home;stairs within home   Transportation Anticipated family or friend will provide  (Pt typically drives in local areas.  )   Living Environment Comments Spouse present during evaluation and reported concerns for patient's balance/mobility.    Self-Care   Usual Activity Tolerance good   Current Activity Tolerance moderate   Equipment Currently Used at Home grab bar, tub/shower  (Uses walking sticks. )   Disability/Function   Walking or Climbing Stairs Difficulty no   Dressing/Bathing Difficulty no   Toileting issues no   Fall history within last six months yes   Number of times patient has fallen within last six months 6   General Information   Onset of Illness/Injury or Date of Surgery 04/13/21   Referring Physician Vinayak Mcneill MD   Patient/Family Therapy Goal Statement (OT) Home    Additional Occupational Profile Info/Pertinent History of Current Problem Per chart: Edvin Norton is a 66 year old male with history of hypertension, hyperlipidemia, Natarajan's esophagus, anxiety disorder  and recent evaluation for worsening bilateral foot numbness and gait instability being worked up at [facility] who presents with altered mental status. see chart for details.    Cognitive Status Examination   Orientation Status orientation to person, place and time   Safety Deficit impulsivity;at risk behavior observed   Transfers   Transfers sit-stand transfer;toilet transfer   Sit-Stand Transfer   Sit-Stand Gibson (Transfers) set up  (SBA)   Toilet Transfer   Type (Toilet Transfer) stand-sit;sit-stand   Gibson Level (Toilet Transfer) set up;contact guard   Activities of Daily Living   BADL Assessment/Intervention lower body dressing   Lower Body Dressing Assessment/Training   Gibson Level  (Lower Body Dressing) set up;contact guard assist   Instrumental Activities of Daily Living (IADL)   Previous Responsibilities driving;finances;medication management   Clinical Impression   Criteria for Skilled Therapeutic Interventions Met (OT) yes   OT Diagnosis Decreased independence for I/ADLs   OT Problem List-Impairments impacting ADL problems related to;activity tolerance impaired;cognition   ADL comments/analysis Decreased independence for I/ADLs (Dressing, bathing, toileting)   Assessment of Occupational Performance 1-3 Performance Deficits   Identified Performance Deficits Decreased independence for I/ADLs (Dressing, bathing, toileting)   Planned Therapy Interventions (OT) ADL retraining;IADL retraining;cognition;transfer training   Clinical Decision Making Complexity (OT) low complexity   Therapy Frequency (OT) 5x/week   Predicted Duration of Therapy 4 days   Risk & Benefits of therapy have been explained evaluation/treatment results reviewed;care plan/treatment goals reviewed;risks/benefits reviewed;patient;spouse/significant other   OT Discharge Planning    OT Discharge Recommendation (DC Rec) Home with assist;home with outpatient occupational therapy   OT Rationale for DC Rec Assist as needed for I/ADLs (including dressing, bathing, medication management etc).  OP OT to address cognition and independence in I/ADLs.    Total Evaluation Time (Minutes)   Total Evaluation Time (Minutes) 7

## 2021-04-14 NOTE — PROGRESS NOTES
[facility]    Hospitalist Progress Note    Assessment & Plan   Edvin Norton is a 66 year old male with history of hypertension, hyperlipidemia, Natarajan's esophagus, anxiety disorder  and recent evaluation for worsening bilateral foot numbness and gait instability being worked up at [facility] who presents with altered mental status.      Acute episode of unresponsiveness with transient altered mental status.    -CT and CTA of the brain were negative.   - CTA of the neck showed left distal common carotid with moderate stenosis.   - Recent MRI of the brain on 3/25 at [facility] was negative.  -Recent A1c was normal.  Recent lipid panel on 3/1 showed cholesterol 222 triglycerides 237 HDL 57 and .  -consult neurology, symptoms seem more concerning for seizure  - MRI to evaluate for possible seizure or stroke--  1.  Negative brain MRI for age. No acute intracranial finding. No  evidence for recent ischemia, intracranial hemorrhage, or mass.  -EEG obtained 4/13- result pending  - troponin negative X 2  - nl tele  - nl  -? Seizure at home with post ictal state. ? etoh w/d sz.   - RN noted nystagmus with pt c/o dbl vision this am.   -given plavix load in ED, asa started.   -pt drinks etoh about 4-5 drinks per day for years. No hx of etoh w/d. Last drink Sunday.   -no signs etoh w/d at this time  -following N/V episdoe pt found by wife gurgling and unresponsive hours later. Not responsive. Slowly woke up with ems. No clear sz activity. ? Post ictal state  -negative ortho statics      Plan;   -IV fluids 100 ml/hr and can likely discontinue these tomorrow  -expect multiple days in the hospital for workup and therapies  -eeg, follow up  - neurology  -q4 hour neuro checks.   -follow for signs etoh w/d    Addendum; reviewed case with Gen Neurology and prior spine imaging at [facility]. Nl T spine and L spine MRI and not explain his presenting sxs.   No further spine imaging indicated.    Toe numbness may be neuropathy. Possible etoh. tx with thiamine in hospital IV then po at discharge.   Gait do may be combination of etoh intox, neuropathy (doubtful contributing significantly) and cervical spine disease. Cont work up with primary neurology team.   Doubt Wernickes encephalopathy as no encephalopathy.   PADILLA workup needed.        Chronic Balance problems and numbness in feet bilaterally thought to be neuropathy with ongoing [facility] workup in progress  History Cervical spondylosis and neck surgery  - no improvement in bilateral toe or gait disorder/balance issues with cervical spine surgery.   Patient is having persistent symptoms despite previous therapies and symptoms seem to be worsening. Records from recent visit at [facility] show normal EMG and conduction studies in both legs, normal autonomic reflex scan, MRI brain on 3/25 was normal.  MRI of the cervical spine on 3/25 showed some straightening of the lordosis and low-grade anterolisthesis thesis of C3 on C4 but did not have any cord issues identified. Vitamin E was high at 22 with toxicity from vitamin E most commonly causing nausea and hemorrhagic strokes. AST was mildly elevated at 62 with ALT of 57, TSH was elevated at 10.3 with normal free T4 of 1.0 and negative TPO antibody. MMA was normal, A1c was normal at 5.6, ESR was 8, CBC and basic metabolic panel were normal, extensive autoimmune work-up was negative including LUANN, CCP, SSA, SSB, SM antibody, RNP antibody, SCL antibody, Cristel-1 antibody.  Serology and copper studies were also negative.  HIV was negative.  B12 was normal at 389.  SPEP was normal with no spike protein.   -concern this could be from lumbar spine now with plan to review MRI of lumbar spine as outpatient but bilateral symptom and cord compression seem unlikely  -discussed alcohol use with patient and his wife and that mild LFT elevation, peripheral neuropathy with balance issues, Father that was an alcoholic, 14 drinks per week  noted by patient with 2 drinks per day, and a possible seizure all make me concerned this could be alcohol related. Patient and wife say he really does only drink 2 drinks a night and does not drink in excess and they do not think the symptoms are from alcohol.  I recommended he stop drinking for a while to make sure it is not contributing but agreed we would pursue workup for other causes.    -per wife gait do for several hears. Wide based gate.  Worse in last 1 year, notable increase last few months.   -nystagmus on exam bilaterally with subtle dbl vision. No hx of similar c/o. Wife and pt deny cognitive impairment  -continue outpatient workup with Dr. Cervantes and at [place]  -follow for signs etoh w/d.   -Wernickes but no clear encephalopathy.   - neuropathy. ? etoh related. Rule out low thiamine  -? L spine dz  - may be unrelated to change in mental status episode.   - TSH, LfTs, thiamine level  - emperic thiamine   -general Neuro consulted.   -consider MRI L spine  - check thiamine level, emperic high dose thiamine for now  - PT, OT, fall precuations, cognitive eval  - follow up eeg,   -consider emg     Transaminitis (chronic from 2012)  -stop alcohol  -will need repeat right upper quadrant ultrasound as outpatient and need to follow mild LFT elevation that he has had since 2012 in our records (last ultrasound 6/19/2019 showed diffuse fatty infiltration of the liver and was otherwise normal); would like to see if this resolves off alcohol  -if LFT's continue to be elevated could also have trial off lipitor to see if LFT's improve  -check LFTS     Vitamin E toxicity  -discontinue all supplements that contain vitamin E     Elevated TSH with normal free T4 and negative TPO antibody  - TSH was elevated at 10.3 with normal free T4 of 1.0 and negative TPO antibody.  --with normal free T4 this is likely euthyroid sick syndrome but TSH is quite elevated  -recommend following up with PCP and repeating TSH and free T4 in 4-6  weeks     Mixed hyperlipidemia  -Continue home atorvastatin and fenofibrate     Essential hypertension, benign (HTN)  -running mildly high during stay  - hold home PRN propranolol (looks like PCP was planning to make this a scheduled medicine with recent high pressures), can decide if this should be scheduled or PRN at discharge  Start Inderal LA 60mg every day      JORGE (generalized anxiety disorder)  stable symptomatically  -continue home sertraline     Methylenetetrahydrofolate reductase (MTHFR) deficiency (H)  Elevated homocysteine  - cont home L-Methylfolate-B6-B12 while in the hospital     Natarajan's esophagus with dysplasia  stable symptomatically   - continue PTA omeprazole     Lumbar disc herniation  Records note he had a lumbar spine MRI with Dr. Cervantes and that he has a L5-S1 herniated disc but cannot find any of those records.  -will need to get those reviewed as outpatient to see if MRI shows nerve entrapment but would be unusual to have chronic numbness symptoms in both feet     Suspect Aileen  Hx snoring. Apneas per wife, daytime somnolence. Remote hx of sleep study  reeval outpatient. Follow up pcp for referral     Diet:   NPO until cleared by speech and then can have regular diet  DVT Prophylaxis: Pneumatic Compression Devices  Kwan Catheter: not present  Code Status:   Full code          Disposition Plan     Expected discharge: 2 - 3 days, recommended to prior living arrangement once mental status at baseline and neurology workup complete with plans in place. PT and OT consulted      George Coronado MD  Text Page  (7am to 6pm)  Interval History   RN noted nystagmus on exam this am. Pt had dbl vision complaint during this exam. Nl neuro checks otherwise.   Some forgetfullness and easily reoriented per RN notes.   Gait do for years, worse in laste 1 year and last sevefral weeks  Falls  etoh drinks 4-6 per day per pt  No confusion  Wide based gait per wife  Snores and apnea  No dizziness.   N/V night  before admission and in ed- now resolved. After eating pizza      -Data reviewed today: I reviewed all new labs and imaging results over the last 24 hours. I personally reviewed imaging and labs since admission    Physical Exam   Temp: 99.3  F (37.4  C) Temp src: Oral BP: (!) 154/92 Pulse: 72   Resp: 16 SpO2: 94 % O2 Device: None (Room air)    Vitals:    04/13/21 0331   Weight: 98.6 kg (217 lb 6.4 oz)     Vital Signs with Ranges  Temp:  [98.8  F (37.1  C)-99.4  F (37.4  C)] 99.3  F (37.4  C)  Pulse:  [67-85] 72  Resp:  [16] 16  BP: (151-162)/(82-92) 154/92  SpO2:  [93 %-97 %] 94 %  I/O last 3 completed shifts:  In: 1160 [P.O.:360; I.V.:800]  Out: 300 [Urine:300]    Constitutional: Up in chair, alert  Respiratory: CTAB  Cardiovascular: RRR no r/g/m  GI: soft, nt, nd  Skin/Integumen: no rash or edema  Neuro: + nystagmus L and R, mild. Some dbl vision complaint. Nl finger nose finger, nl strength throughout, CN 2-12 grossly intact, fully oriented.   Nl speech and \mentattion  No tremors or sweats  Psych: nl affect      Medications     - MEDICATION INSTRUCTIONS -       - MEDICATION INSTRUCTIONS -       sodium chloride 75 mL/hr at 04/14/21 0718       aspirin  81 mg Oral QAM     atorvastatin  40 mg Oral Daily     fenofibrate  160 mg Oral Daily     omeprazole  40 mg Oral QAM     sertraline  150 mg Oral Daily     sodium chloride (PF)  10 mL Intravenous Once     sodium chloride (PF)  3 mL Intracatheter Q8H       Data   Recent Labs   Lab 04/14/21  0205 04/13/21  1936 04/13/21  0946 04/13/21  0328   WBC  --   --   --  6.6   HGB  --   --   --  14.5   MCV  --   --   --  94   PLT  --   --   --  226   INR  --   --   --  1.01     --   --  137   POTASSIUM 4.5 3.4 3.3* 3.5   CHLORIDE 108  --   --  104   CO2 25  --   --  22   BUN 17  --   --  17   CR 1.04  --   --  0.97   ANIONGAP  --   --   --  11   LILI 8.6  --   --  9.1   GLC 99  --  117* 147*   TROPI  --   --  <0.015 <0.015       Imaging:   Recent Results (from the past 24  hour(s))   MR Brain w/o & w Contrast    Narrative    MR BRAIN W/O & W CONTRAST 2021 1:13 PM    INDICATION: Neuro deficit, acute, stroke suspected; Acute ischemic  stroke balance problem. Numbness  TECHNIQUE: Noncontrast and contrast enhanced MRI of the brain.  CONTRAST: 10 mL Gadavist   COMPARISON: Head and neck CTA 2021    FINDINGS:  There is no restricted diffusion. Mild mucosal thickening  within the ethmoid air cells. Paranasal sinuses are otherwise free  from significant disease. Mastoid air cells appear free from  significant disease. Intraorbital contents are unremarkable.  Ventricles are within normal limits in size for the patient's age.  Intracranial flow voids are intact. There is no mass effect, midline  shift, or extraaxial collection. A few foci of nonspecific T2/FLAIR  hyperintensity within the white matter probably reflect minor changes  of chronic small vessel ischemic injury and are within the range of  expected for a patient of this age. No evidence for acute or chronic  intracranial blood products.      Impression    IMPRESSION:   1.  Negative brain MRI for age. No acute intracranial finding. No  evidence for recent ischemia, intracranial hemorrhage, or mass.    ALMA MOCTEZUMA MD   Echocardiogram Complete - For age > 60 yrs    Narrative    [id number]  [id number]  [id number]  [id number]^JONNY^TARYN^R     [facility]  Echocardiography Laboratory  [address] [place] [address]     Name: TAMIKA LUA  MRN: [id number]  : 1955  Study Date: 2021 02:59 PM  Age: 66 yrs  Gender: Male  Patient Location: [facility]  Reason For Study: Cerebrovascular Incident  Ordering Physician: TARYN FULLER  Referring Physician: BRENDA GIBSON  Performed By: Audelia Kelley     BSA: 2.1 m2  Height: 68 in  Weight: 217 lb  HR: 77  BP: 156/95 mmHg  ______________________________________________________________________________  Procedure  Complete Portable Echo  Adult.  ______________________________________________________________________________  Interpretation Summary     The left ventricle is normal in size. There is normal left ventricular wall  thickness. Left ventricular systolic function is normal. The visual ejection  fraction is estimated at 55-60%. Diastolic Doppler findings (E/E' ratio and/or  other parameters) suggest left ventricular filling pressures are  indeterminate. No regional wall motion abnormalities noted.  The right ventricle is normal size. The right ventricular systolic function is  normal.  Trace mitral and tricuspid regurgitation.  No pericardial effusion.  No previous study for comparison.  ______________________________________________________________________________  Left Ventricle  The left ventricle is normal in size. There is normal left ventricular wall  thickness. Left ventricular systolic function is normal. The visual ejection  fraction is estimated at 55-60%. Diastolic Doppler findings (E/E' ratio and/or  other parameters) suggest left ventricular filling pressures are  indeterminate. No regional wall motion abnormalities noted.     Right Ventricle  The right ventricle is normal size. The right ventricular systolic function is  normal.     Atria  Normal left atrial size. Right atrial size is normal. There is no color  Doppler evidence of an atrial shunt.     Mitral Valve  There is trace mitral regurgitation.     Tricuspid Valve  There is trace tricuspid regurgitation. Right ventricular systolic pressure  could not be approximated due to inadequate tricuspid regurgitation.     Aortic Valve  There is mild trileaflet aortic sclerosis. No aortic regurgitation is present.  No aortic stenosis is present. Transaortic gradients are mildly increased  consistent with aortic sclerosis.     Pulmonic Valve  There is no pulmonic valvular stenosis.     Vessels  The aortic root is normal size. Normal size ascending aorta. The inferior vena  cava is  normal.     Pericardium  There is no pericardial effusion.     Rhythm  Sinus rhythm was noted.  ______________________________________________________________________________  MMode/2D Measurements & Calculations  IVSd: 1.1 cm     LVIDd: 5.2 cm  LVIDs: 3.3 cm  LVPWd: 1.1 cm  FS: 36.7 %  LV mass(C)d: 219.4 grams  LV mass(C)dI: 103.7 grams/m2  Ao root diam: 3.2 cm  LA dimension: 5.0 cm  asc Aorta Diam: 3.3 cm  LA/Ao: 1.6  LVOT diam: 2.4 cm  LVOT area: 4.6 cm2  LA Volume (BP): 51.2 ml  LA Volume Index (BP): 24.2 ml/m2  RWT: 0.41     Doppler Measurements & Calculations  MV E max richy: 91.7 cm/sec  MV A max richy: 84.0 cm/sec  MV E/A: 1.1  MV dec time: 0.23 sec  PA V2 max: 133.4 cm/sec  PA max P.1 mmHg  PA acc time: 0.13 sec  E/E' avg: 10.9  Lateral E/e': 9.8  Medial E/e': 12.0     ______________________________________________________________________________  Report approved by: Mickey Aleman 2021 03:35 PM

## 2021-04-14 NOTE — PLAN OF CARE
Pt admitted with episode of AMS. Alert and oriented x4. BP elevated (150s-80-90s), VS otherwise WDL. Tele NSR. Neuros with lateral nystgmus and ataxic gait. Baseline numbness in bilateral feet. Strength 5/5 x4 extremities. Lung sounds clear. +BS. Voiding without difficulty. Tolerating regular diet. Denies pain. Likely home tomorrow.

## 2021-04-14 NOTE — PLAN OF CARE
Pt here with altered mental status work up. A&O x4 with forgetfulness at times-easily re-oriented. Neuros intact. VSS on RA. Tele SR. Regular  diet. Takes pills whole. Up with SBA, GB& walker. Denies pain. Pt scoring green on the Aggression Stop Light Tool. Plan for PT/OT/SW & general neurology consult . Discharge pending improvement.

## 2021-04-14 NOTE — CONSULTS
[facility]    Neurology Consultation     Date of Admission:  4/13/2021    Assessment & Plan   Edvin Norton is a 66 year old male who was admitted on 4/13/2021. I was asked to see the patient for episode of confusion possible seizure.  Episode of loss of consciousness with secondary confusion after that and no recollection of the event.  This might be a first-time seizure, another type of syncope is not entirely excluded.  So far the MRI of the brain is normal and EEG is unremarkable if with no epileptiform activity.  Since this is the first event I would not recommend treatment with antiepileptic drugs.  The patient can follow-up with primary neurologist as an outpatient for further EEG monitoring, ambulatory to try to catch some sleep, to further look for epileptiform activity which is seen more frequent during sleep.    Orthostatic blood pressure and heart rate will be also useful.    The patient has history of snoring, fatigue in the morning upon awakening as well as weight gain, all of these factors might be related to sleep apnea.  I would recommend to do a sleep study as an outpatient.    Vitamin B-1 level was done, results pending.  I agree with treatment with thiamine for now.  The patient should continue to stop alcohol.    The patient has had an extensive work-up for unsteady gait and numbness and tingling in the legs this has to be followed by the primary neurologist or at [facility].    Nydia Jones MD    Code Status    Full Code    Reason for Consult   Reason for consult: I was asked by Dr Mcneill to evaluate this patient for episode of confusion possible seizure    Primary Care Physician   Woody Cintron    Chief Complaint   episode of confusion possible seizure    History is obtained from the patient    History of Present Illness   Edvin Norton is a 66 year old male who states that he has had numbness and pain in both feet for some time and has  seen first neurologist about a year ago who thought he had neuropathy.  No work-up was done and eventually the patient saw the second neurologist Dr. Adelina Cervantes who did blood work-up as well as an MRI of the cervical region which showed some disc herniation and stenosis.  The patient was subsequently seen at [facility] at [facility] and has had a fusion in the neck on July 10, 2020.  The numbness and tingling however did not improve and the patient continues to feel worse actually.  He has had no low back pain.  The patient went to [facility] for a second opinion.    The patient went to bed 2 days ago around 930 he was very tired after a busy day at [facility].  Patient wife woke up around 230 and found the patient making a gurgling sound.  Patient snores.  However the sound he was making was different.  Patient's wife tried to wake him up and could not for about 10 minutes.  She also noticed that the patient was sweating but did not change the color of the face.  She called paramedics.  The patient woke up when paramedics were there and was able to walk with help towards car.  The patient was disoriented and had a hard time answering questions.  The patient does not remember any of this episode.  While in the emergency room he had nausea and vomiting.  He was very unsteady.    Since hospitalization the patient has been better.  He continues to have a wide-based and unsteady walking which patient's wife states has been present for some time.  The patient has gained weight.  The patient is retired for the last 3 years.  Patient's wife states that the patient wakes up in the morning and is tired.  He takes naps over the day, he has not been very active.    The patient is a prior smoker.    Apparently work-up at [facility] consisted of a normal MRI of the brain on 325,.he MRI of the cervical spine shows straightening of the cervical spine and low-grade anterolisthesis of C3 on C4 but spinal cord was normal.  EMG  was normal.  Vitamin D was high at 22.  AST mildly elevated at 62, ALT 57.  TSH was increased at 10.3 but normal free T4.  TPO antibodies negative.  Methylmalonic acid was normal.  Hemoglobin A1c was 5.6%.  Autoimmune work-up including Sabine, CCP, SSA and SSB, Cristel antibody copper studies were all negative.  Vitamin B12 was 389.  SPEP was normal.  HIV negative.    Past Medical History   I have reviewed this patient's medical history and updated it with pertinent information if needed.   Past Medical History:   Diagnosis Date     Anxiety state 9/26/2013     Problem list name updated by automated process. 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  Past Surgical History:   Procedure Laterality Date     CATARACT IOL, RT/LT       ESOPHAGUS SURGERY      Halo procedure x 5     FINGER SURGERY      tendon     FUSION CERVICAL ANTERIOR THREE+ LEVELS N/A 7/10/2020    Procedure: Anterior cervical diskectomy and fusion with anterior plate fixation cervical 4-7;  Surgeon: Dane Anderson MD;  Location: [facility] OR     GRAFT BONE FROM ILIAC CREST Right 7/10/2020    Procedure: Right Anterior iliac crest bone graft harvest;  Surgeon: Dane Anderson MD;  Location: [facility] OR     UPPER GI ENDOSCOPY         Prior to Admission Medications   Prior to Admission Medications   Prescriptions Last Dose Informant Patient Reported? Taking?   ASPIRIN PO 4/12/2021 at Unknown time  Yes Yes   Sig: Take 81 mg by mouth every morning    B Complex-Folic Acid (SUPER B COMPLEX MAXI) TABS 4/12/2021 at Unknown time  No Yes   Sig: Take 1 tablet by mouth daily   L-Methylfolate-B6-B12 (FOLTX) 1.13-25-2 MG TABS 4/12/2021 at Unknown time  No Yes   Sig: Take 1 Dose by  mouth daily   Omega-3 Fatty Acids (OMEGA-3 FISH OIL PO) 4/12/2021 at Unknown time  Yes Yes   Sig: Take 1 g by mouth every morning    acetaminophen (TYLENOL) 325 MG tablet   No Yes   Sig: Take 2 tablets (650 mg) by mouth every 4 hours as needed for mild pain Max tylenol 4 gm per day   atorvastatin (LIPITOR) 40 MG tablet 4/12/2021 at Unknown time  No Yes   Sig: Take 1 tablet (40 mg) by mouth daily   fenofibrate (TRIGLIDE/LOFIBRA) 160 MG tablet 4/12/2021 at Unknown time  No Yes   Sig: Take 1 tablet (160 mg) by mouth daily   omeprazole (PRILOSEC) 40 MG DR capsule 4/12/2021 at Unknown time  No Yes   Sig: Take 1 capsule (40 mg) by mouth every morning   propranolol (INDERAL) 40 MG tablet prn  Yes Yes   Sig: Take 40 mg by mouth daily as needed   sertraline (ZOLOFT) 100 MG tablet 4/12/2021 at Unknown time  No Yes   Sig: Take 1.5 tablets (150 mg) by mouth daily      Facility-Administered Medications: None     Allergies   No Known Allergies    Social History   I have reviewed this patient's social history and updated it with pertinent information if needed. Edvin Norton  reports that he has never smoked. He has quit using smokeless tobacco.  His smokeless tobacco use included chew. He reports current alcohol use of about 14.0 standard drinks of alcohol per week. He reports that he does not use drugs.    Family History   I have reviewed this patient's family history and updated it with pertinent information if needed.   Family History   Problem Relation Age of Onset     Hypertension Mother         passed  age 96     Anxiety Disorder Mother      Anxiety Disorder Father      Lung Cancer Father      Esophageal Cancer Brother      Psoriasis Brother      Hyperlipidemia Sister      Anxiety Disorder Sister      Esophageal Cancer Brother      Aneurysm Sister         brain     Thyroid Disease Brother      Neurologic Disorder Brother         unspecified - jerking motions at night     C.A.D. No family hx of      Diabetes No family hx  of      Breast Cancer No family hx of      Cancer - colorectal No family hx of      Prostate Cancer No family hx of        Review of Systems   The 10 point Review of Systems is negative other than noted in the HPI or here.     Physical Exam   Temp: 99.3  F (37.4  C) Temp src: Oral BP: (!) 165/90 Pulse: 82   Resp: 16 SpO2: 97 % O2 Device: None (Room air)    Vital Signs with Ranges  Temp:  [98.8  F (37.1  C)-99.4  F (37.4  C)] 99.3  F (37.4  C)  Pulse:  [67-85] 82  Resp:  [16] 16  BP: (151-165)/(82-92) 165/90  SpO2:  [94 %-97 %] 97 %  217 lbs 6.4 oz    Constitutional: Normal  Eyes: No conjunctival erythema  ENT: Neck is supple  Respiratory: CTA  Cardiovascular: RRR  Skin: No obvious lesions, no tongue biting   musculoskeletal: No pedal edema  Neurologic: The patient is alert oriented x3 no acute distress.  He knows the name of the president.  Speech is fluent there is no aphasia apraxia or agnosia.  He can spell world forward and backward.  Memory was 4 out of 4 at 1 minute and 4 out of 4 at 5 minutes.  There was one confabulation.  Pupils are equal round reactive to light extraocular movements are intact there is mild horizontal nystagmus when looking towards the right.  Visual fields are normal.  Facial muscles are normal bilaterally uvula and tongue are midline.  Muscular mass tone and strength are normal in all 4 extremities.  There is no pronator drift.  Reflexes are +2 in upper extremities I could not get reflexes in lower extremities the patient could not relax.  Vibratory sense was decreased at the ankle on the right and was normal on the left.  Light touch is normal.  Finger-nose-finger without dysmetria heel-to-shin without dysmetria.  Fine movements are normal.  Gait is with a white increasing base slightly unsteady.  I did not ask him to walk in tandem.  Neuropsychiatric: Anxious    Data   Results for orders placed or performed during the hospital encounter of 04/13/21 (from the past 24 hour(s))    Echocardiogram Complete - For age > 60 yrs    Narrative    [id number]  [id number]  [id number]  [id number]^JONNY^TARYN^JESUS     [facility]  Echocardiography Laboratory  [address] [place] [address]     Name: TAMIKA LUA  MRN: [id number]  : 1955  Study Date: 2021 02:59 PM  Age: 66 yrs  Gender: Male  Patient Location: [facility]  Reason For Study: Cerebrovascular Incident  Ordering Physician: TARYN FULLER  Referring Physician: BRENDA GIBSON  Performed By: Audelia Kelley     BSA: 2.1 m2  Height: 68 in  Weight: 217 lb  HR: 77  BP: 156/95 mmHg  ______________________________________________________________________________  Procedure  Complete Portable Echo Adult.  ______________________________________________________________________________  Interpretation Summary     The left ventricle is normal in size. There is normal left ventricular wall  thickness. Left ventricular systolic function is normal. The visual ejection  fraction is estimated at 55-60%. Diastolic Doppler findings (E/E' ratio and/or  other parameters) suggest left ventricular filling pressures are  indeterminate. No regional wall motion abnormalities noted.  The right ventricle is normal size. The right ventricular systolic function is  normal.  Trace mitral and tricuspid regurgitation.  No pericardial effusion.  No previous study for comparison.  ______________________________________________________________________________  Left Ventricle  The left ventricle is normal in size. There is normal left ventricular wall  thickness. Left ventricular systolic function is normal. The visual ejection  fraction is estimated at 55-60%. Diastolic Doppler findings (E/E' ratio and/or  other parameters) suggest left ventricular filling pressures are  indeterminate. No regional wall motion abnormalities noted.     Right Ventricle  The right ventricle is normal size. The right ventricular systolic function is  normal.      Atria  Normal left atrial size. Right atrial size is normal. There is no color  Doppler evidence of an atrial shunt.     Mitral Valve  There is trace mitral regurgitation.     Tricuspid Valve  There is trace tricuspid regurgitation. Right ventricular systolic pressure  could not be approximated due to inadequate tricuspid regurgitation.     Aortic Valve  There is mild trileaflet aortic sclerosis. No aortic regurgitation is present.  No aortic stenosis is present. Transaortic gradients are mildly increased  consistent with aortic sclerosis.     Pulmonic Valve  There is no pulmonic valvular stenosis.     Vessels  The aortic root is normal size. Normal size ascending aorta. The inferior vena  cava is normal.     Pericardium  There is no pericardial effusion.     Rhythm  Sinus rhythm was noted.  ______________________________________________________________________________  MMode/2D Measurements & Calculations  IVSd: 1.1 cm     LVIDd: 5.2 cm  LVIDs: 3.3 cm  LVPWd: 1.1 cm  FS: 36.7 %  LV mass(C)d: 219.4 grams  LV mass(C)dI: 103.7 grams/m2  Ao root diam: 3.2 cm  LA dimension: 5.0 cm  asc Aorta Diam: 3.3 cm  LA/Ao: 1.6  LVOT diam: 2.4 cm  LVOT area: 4.6 cm2  LA Volume (BP): 51.2 ml  LA Volume Index (BP): 24.2 ml/m2  RWT: 0.41     Doppler Measurements & Calculations  MV E max richy: 91.7 cm/sec  MV A max richy: 84.0 cm/sec  MV E/A: 1.1  MV dec time: 0.23 sec  PA V2 max: 133.4 cm/sec  PA max P.1 mmHg  PA acc time: 0.13 sec  E/E' avg: 10.9  Lateral E/e': 9.8  Medial E/e': 12.0     ______________________________________________________________________________  Report approved by: Mickey Aleman 2021 03:35 PM         UA with Microscopic reflex to Culture    Specimen: Urine Midstream; Midstream Urine   Result Value Ref Range    Color Urine Yellow     Appearance Urine Clear     Glucose Urine Negative NEG^Negative mg/dL    Bilirubin Urine Negative NEG^Negative    Ketones Urine 5 (A) NEG^Negative mg/dL    Specific Gravity  Urine 1.015 1.003 - 1.035    Blood Urine Negative NEG^Negative    pH Urine 6.5 5.0 - 7.0 pH    Protein Albumin Urine 20 (A) NEG^Negative mg/dL    Urobilinogen mg/dL 0.0 0.0 - 2.0 mg/dL    Nitrite Urine Negative NEG^Negative    Leukocyte Esterase Urine Negative NEG^Negative    Source Midstream Urine     WBC Urine 0 0 - 5 /HPF    RBC Urine 0 0 - 2 /HPF    Squamous Epithelial /HPF Urine 0 0 - 1 /HPF    Mucous Urine Present (A) NEG^Negative /LPF   Potassium   Result Value Ref Range    Potassium 3.4 3.4 - 5.3 mmol/L   Glucose by meter   Result Value Ref Range    Glucose 97 70 - 99 mg/dL   Potassium   Result Value Ref Range    Potassium 4.5 3.4 - 5.3 mmol/L   Urea nitrogen   Result Value Ref Range    Urea Nitrogen 17 7 - 30 mg/dL   Calcium   Result Value Ref Range    Calcium 8.6 8.5 - 10.1 mg/dL   Chloride   Result Value Ref Range    Chloride 108 94 - 109 mmol/L   Co2 Total   Result Value Ref Range    Carbon Dioxide 25 20 - 32 mmol/L   Creatinine   Result Value Ref Range    Creatinine 1.04 0.66 - 1.25 mg/dL    GFR Estimate 75 >60 mL/min/[1.73_m2]    GFR Estimate If Black 87 >60 mL/min/[1.73_m2]   Glucose   Result Value Ref Range    Glucose 99 70 - 99 mg/dL   Sodium   Result Value Ref Range    Sodium 139 133 - 144 mmol/L   Hepatic panel   Result Value Ref Range    Bilirubin Direct 0.2 0.0 - 0.2 mg/dL    Bilirubin Total 0.7 0.2 - 1.3 mg/dL    Albumin 3.6 3.4 - 5.0 g/dL    Protein Total 6.8 6.8 - 8.8 g/dL    Alkaline Phosphatase 41 40 - 150 U/L    ALT 58 0 - 70 U/L    AST 63 (H) 0 - 45 U/L   TSH with free T4 reflex   Result Value Ref Range    TSH 6.50 (H) 0.40 - 4.00 mU/L   T4 free   Result Value Ref Range    T4 Free 0.81 0.76 - 1.46 ng/dL     MRI of the brain was a normal study, shows only mild small vessel ischemic disease.  The films were reviewed by me.    EEG was a normal study.  During the recording the patient fell asleep multiple times and woke up really fast.    Cardiac echo is unremarkable.

## 2021-04-14 NOTE — PROGRESS NOTES
04/14/21 1444   Quick Adds   Type of Visit Initial PT Evaluation   Living Environment   People in home spouse   Current Living Arrangements house   Home Accessibility stairs to enter home;stairs within home   Number of Stairs, Main Entrance 2   Stair Railings, Main Entrance railings on both sides of stairs   Number of Stairs, Within Home, Primary other (see comments)  (4 to main level, 8 more to upper)   Stair Railings, Within Home, Primary railings on both sides of stairs   Transportation Anticipated family or friend will provide   Living Environment Comments 4 stairs to access main level with kitchen, living room, 8 more stairs to access bedroom.   Self-Care   Usual Activity Tolerance good   Current Activity Tolerance good   Regular Exercise Yes   Activity/Exercise Type walking   Exercise Amount/Frequency   (up to 4 miles, 2-6 days per week with walking sticks)   Disability/Function   Hearing Difficulty or Deaf no   Wear Glasses or Blind yes   Vision Management glasses   Concentrating, Remembering or Making Decisions Difficulty no   Difficulty Communicating no   Difficulty Eating/Swallowing no   Walking or Climbing Stairs Difficulty no   Dressing/Bathing Difficulty no   Toileting issues no   Doing Errands Independently Difficulty (such as shopping) no   Fall history within last six months yes   Number of times patient has fallen within last six months 6   Change in Functional Status Since Onset of Current Illness/Injury yes   General Information   Onset of Illness/Injury or Date of Surgery 04/13/21   Referring Physician Vinayak Mcneill MD   Patient/Family Therapy Goals Statement (PT) figure out why he has neuropathy   Pertinent History of Current Problem (include personal factors and/or comorbidities that impact the POC) Pt has h/o neuropathy in bilat feet with unknown etiology. Patient had outpatient workup at [facility] 4/12/21 for neuropathy, wasn't feeling well upon returning home, went to bed and woke up in  the hospital. Per chart, wife found him gargling on emesis following his going to bed and patient was brought to ED for suspected stroke or seizure. MRI negative for stroke.   Existing Precautions/Restrictions fall   Heart Disease Risk Factors Age;Overweight   Cognition   Orientation Status (Cognition) oriented x 3   Affect/Mental Status (Cognition) WFL   Follows Commands (Cognition) other (see comments)  (follows commands but is impulsive)   Safety Deficit (Cognition) impulsivity   Pain Assessment   Patient Currently in Pain No   Integumentary/Edema   Integumentary/Edema Comments echymosis on Right LE   Posture    Posture Not impaired   Range of Motion (ROM)   ROM Quick Adds ROM WFL   Strength   Manual Muscle Testing Quick Adds Strength WFL   Bed Mobility   Comment (Bed Mobility) Independent   Transfers   Transfer Safety Comments SBA sit to stand   Gait/Stairs (Locomotion)   [place] Level (Gait) supervision   Assistive Device (Gait)   (IV pole)   Distance in Feet (Required for LE Total Joints) 50ft, 150ft   Deviations/Abnormal Patterns (Gait) base of support, wide   Balance   Balance Comments Poor balance in tandem stance. Maintains functional dynamic balance with head turns, but with some path deviation.   Sensory Examination   Sensory Perception Comments bilat foot neuropathy   Coordination   Coordination no deficits were identified   Muscle Tone   Muscle Tone no deficits were identified   Clinical Impression   Criteria for Skilled Therapeutic Intervention yes, treatment indicated   PT Diagnosis (PT) impaired functional mobility   Influenced by the following impairments impaired balance, impulsivity, impaired sensation   Functional limitations due to impairments inability to perform ADLs   Clinical Presentation Stable/Uncomplicated   Clinical Presentation Rationale patient presentation   Clinical Decision Making (Complexity) moderate complexity   Therapy Frequency (PT)   (1-2 follow-up sessions)   Predicted  Duration of Therapy Intervention (days/wks) 1-2 days   Planned Therapy Interventions (PT) balance training;patient/family education;gait training   Risk & Benefits of therapy have been explained evaluation/treatment results reviewed;care plan/treatment goals reviewed;risks/benefits reviewed;current/potential barriers reviewed;participants voiced agreement with care plan;participants included;patient   PT Discharge Planning    PT Discharge Recommendation (DC Rec) home with outpatient physical therapy   PT Rationale for DC Rec Patient is slightly below baseline level of mobility. PT eval revealed some concerns for impulsivity and higher level balance, but not unsafe to return home. Therefore, recommend PT for balance while in the hospital and OP PT following discharge to maximize safety with mobility, reduce falls risk, and educate on compensatory strategies with lack of foot sensation.   PT Brief overview of current status  SBA gait with IV pole, CGA during balance ex   Total Evaluation Time   Total Evaluation Time (Minutes) 10

## 2021-04-15 ENCOUNTER — APPOINTMENT (OUTPATIENT)
Dept: PHYSICAL THERAPY | Facility: CLINIC | Age: 66
DRG: 948 | End: 2021-04-15
Attending: INTERNAL MEDICINE
Payer: MEDICARE

## 2021-04-15 ENCOUNTER — HOSPITAL ENCOUNTER (INPATIENT)
Dept: CARDIOLOGY | Facility: CLINIC | Age: 66
DRG: 948 | End: 2021-04-15
Attending: INTERNAL MEDICINE
Payer: MEDICARE

## 2021-04-15 ENCOUNTER — APPOINTMENT (OUTPATIENT)
Dept: OCCUPATIONAL THERAPY | Facility: CLINIC | Age: 66
DRG: 948 | End: 2021-04-15
Attending: INTERNAL MEDICINE
Payer: MEDICARE

## 2021-04-15 VITALS
SYSTOLIC BLOOD PRESSURE: 130 MMHG | OXYGEN SATURATION: 95 % | HEART RATE: 67 BPM | HEIGHT: 68 IN | BODY MASS INDEX: 32.95 KG/M2 | TEMPERATURE: 98.3 F | RESPIRATION RATE: 18 BRPM | DIASTOLIC BLOOD PRESSURE: 76 MMHG | WEIGHT: 217.4 LBS

## 2021-04-15 DIAGNOSIS — R41.82 ALTERED MENTAL STATUS, UNSPECIFIED ALTERED MENTAL STATUS TYPE: ICD-10-CM

## 2021-04-15 LAB — GLUCOSE BLDC GLUCOMTR-MCNC: 103 MG/DL (ref 70–99)

## 2021-04-15 PROCEDURE — 93272 ECG/REVIEW INTERPRET ONLY: CPT | Performed by: INTERNAL MEDICINE

## 2021-04-15 PROCEDURE — 97535 SELF CARE MNGMENT TRAINING: CPT | Mod: GO | Performed by: OCCUPATIONAL THERAPIST

## 2021-04-15 PROCEDURE — 97116 GAIT TRAINING THERAPY: CPT | Mod: GP

## 2021-04-15 PROCEDURE — 999N001017 HC STATISTIC GLUCOSE BY METER IP

## 2021-04-15 PROCEDURE — 250N000011 HC RX IP 250 OP 636: Performed by: INTERNAL MEDICINE

## 2021-04-15 PROCEDURE — 250N000013 HC RX MED GY IP 250 OP 250 PS 637: Performed by: INTERNAL MEDICINE

## 2021-04-15 PROCEDURE — 258N000003 HC RX IP 258 OP 636: Performed by: INTERNAL MEDICINE

## 2021-04-15 PROCEDURE — 97530 THERAPEUTIC ACTIVITIES: CPT | Mod: GP

## 2021-04-15 PROCEDURE — 93005 ELECTROCARDIOGRAM TRACING: CPT

## 2021-04-15 PROCEDURE — 99239 HOSP IP/OBS DSCHRG MGMT >30: CPT | Performed by: INTERNAL MEDICINE

## 2021-04-15 PROCEDURE — 93270 REMOTE 30 DAY ECG REV/REPORT: CPT

## 2021-04-15 RX ORDER — LANOLIN ALCOHOL/MO/W.PET/CERES
100 CREAM (GRAM) TOPICAL DAILY
Qty: 14 TABLET | Refills: 0 | Status: SHIPPED | OUTPATIENT
Start: 2021-04-16 | End: 2022-02-15

## 2021-04-15 RX ORDER — PROPRANOLOL HCL 60 MG
60 CAPSULE, EXTENDED RELEASE 24HR ORAL DAILY
Qty: 30 CAPSULE | Refills: 0 | Status: SHIPPED | OUTPATIENT
Start: 2021-04-16 | End: 2021-04-26

## 2021-04-15 RX ADMIN — Medication 1 TABLET: at 09:04

## 2021-04-15 RX ADMIN — B-COMPLEX W/ C & FOLIC ACID TAB 1 TABLET: TAB at 09:04

## 2021-04-15 RX ADMIN — ATORVASTATIN CALCIUM 40 MG: 40 TABLET, FILM COATED ORAL at 09:04

## 2021-04-15 RX ADMIN — ASPIRIN 81 MG: 81 TABLET, DELAYED RELEASE ORAL at 09:04

## 2021-04-15 RX ADMIN — THIAMINE HYDROCHLORIDE 500 MG: 100 INJECTION, SOLUTION INTRAMUSCULAR; INTRAVENOUS at 05:12

## 2021-04-15 RX ADMIN — FENOFIBRATE 160 MG: 160 TABLET ORAL at 09:03

## 2021-04-15 RX ADMIN — SODIUM CHLORIDE 500 ML: 9 INJECTION, SOLUTION INTRAVENOUS at 08:47

## 2021-04-15 RX ADMIN — PROPRANOLOL HYDROCHLORIDE 60 MG: 60 CAPSULE, EXTENDED RELEASE ORAL at 09:03

## 2021-04-15 RX ADMIN — THIAMINE HYDROCHLORIDE 500 MG: 100 INJECTION, SOLUTION INTRAMUSCULAR; INTRAVENOUS at 15:46

## 2021-04-15 RX ADMIN — SERTRALINE HYDROCHLORIDE 150 MG: 100 TABLET ORAL at 09:03

## 2021-04-15 RX ADMIN — OMEPRAZOLE 40 MG: 20 CAPSULE, DELAYED RELEASE ORAL at 09:02

## 2021-04-15 ASSESSMENT — ACTIVITIES OF DAILY LIVING (ADL)
ADLS_ACUITY_SCORE: 12
ADLS_ACUITY_SCORE: 13
ADLS_ACUITY_SCORE: 13
ADLS_ACUITY_SCORE: 12
ADLS_ACUITY_SCORE: 13

## 2021-04-15 NOTE — DISCHARGE SUMMARY
"[facility]    Discharge Summary  Hospitalist    Date of Admission:  4/13/2021  Date of Discharge:  4/15/2021  Discharging Provider: George Coronado MD    Discharge Diagnoses   Episode of altered mental status  Daily alcohol use  Gait disorder  Chronic Bilateral toe numbness    History of Present Illness   Edvin Norton is a 66 year old male with history of hypertension, hyperlipidemia, Natarajan's esophagus, anxiety disorder  and recent evaluation for worsening bilateral foot numbness and gait instability being worked up at [facility] who presents with altered mental status. The patient went to bed around 2130 and was normal at this time.  He had a long day at [facility] with multiple doctor visits yesterday. His wife woke up around 0230 to the patient making \"gargling sounds\". She spent about 10 minutes trying to wake up the patient and was unsuccessful so she called EMS she says that the patient was diaphoretic, but was still breathing and was not whiter/blue. She says that he had no incontinence either. When EMS arrived about 5 minutes after the patient was able to be wakened and walk down the stairs, albeit unsteadily. She denies any exposure to COVID, and note that the patient has received his second COVID vaccination about 3 weeks ago. When EMS arrived they noted the patient to be disoriented and struggling to follow instructions or answer questions appropriately. The patient was confused but was able to ambulate down the stairs to the ambulance with paramedics although he was very unsteady. They measured his oxygen saturations at 92-94%. No facial droop was noted.      Recent [facility] workup reviewed that he was getting because of worsening numbness in his feet with unsteadiness and frequent falls. Records from recent visit at [facility] shows normal EMG and conduction studies in both legs, normal autonomic reflex scan, MRI brain on 3/25 was normal.  MRI of the cervical spine on 3/25 showed some " straightening of the lordosis and low-grade anterolisthesis thesis of C3 on C4 but did not have any cord issues identified. Vitamin E was high at 22 with toxicity from vitamin E most commonly causing nausea and hemorrhagic strokes. AST was mildly elevated at 62 with ALT of 57, TSH was elevated at 10.3 with normal free T4 of 1.0 and negative TPO antibody. MMA was normal, A1c was normal at 5.6, ESR was 8, CBC and basic metabolic panel were normal, extensive autoimmune work-up was negative including LUANN, CCP, SSA, SSB, SM antibody, RNP antibody, SCL antibody, Cristel-1 antibody.  Serology and copper studies were also negative.  HIV was negative.  B12 was normal at 389.  SPEP was normal with no spike protein.              Hospital Course   Edvin Norton was admitted on 4/13/2021.  The following problems were addressed during his hospitalization:    Active Problems:    Confusion    Prolonged QT interval  Edvin Norton is a 66 year old male with history of hypertension, hyperlipidemia, Natarajan's esophagus, anxiety disorder  and recent evaluation for worsening bilateral foot numbness and gait instability being worked up at [facility] who presents with altered mental status.      Acute episode of unresponsiveness with transient altered mental status.    -CT and CTA of the brain were negative.   - CTA of the neck showed left distal common carotid with moderate stenosis.   - Recent MRI of the brain on 3/25 at [facility] was negative.  -Recent A1c was normal.  Recent lipid panel on 3/1 showed cholesterol 222 triglycerides 237 HDL 57 and .  -consult neurology, symptoms seem more concerning for seizure  - MRI to evaluate for possible seizure or stroke--  1.  Negative brain MRI for age. No acute intracranial finding. No  evidence for recent ischemia, intracranial hemorrhage, or mass.  -EEG obtained 4/13- result pending  - troponin negative X 2  - nl tele  - nl  -? Seizure at home with post ictal state. ? etoh w/d sz.   - RN noted  nystagmus with pt c/o dbl vision this am. Minimal for neurology eval.   -given plavix load in ED, asa started.   -pt drinks etoh about 4-5 drinks per day for years. No hx of etoh w/d. Last drink Sunday.   -no signs etoh w/d at this time  -following N/V episdoe pt found by wife kalyanrgling and unresponsive hours later. Not responsive. Slowly woke up with ems. No clear sz activity. ? Post ictal state  -negative ortho statics initially but mildly positive day of discharge. Asymptomatic. Resolved with 500cc bolus  -given IVFs  -EEG unremarkeable  - seen by [facility] Neu[facility]  -possible first time seizure, but no indication for AEDs at this time.  Neurology thinks unlikely seizure episode  -nl ekg except mildly prolonged qtc interval on admission. Resolved day of dishcarge per ekg. K normalized.     Plan:   Will obtain 30 day event monitor (of not patient wears a spine stimulator for C spine over substernal region daily for 4  Hours per day)- possible this may interfere with event monitor  - follow up with primary neurologist for possible outpatient EEG monitoring  Aileen work up       Chronic Balance problems and numbness in feet bilaterally thought to be neuropathy with ongoing [facility] workup in progress  History Cervical spondylosis and neck surgery  - no improvement in bilateral toe or gait disorder/balance issues with cervical spine surgery.   -Patient is having persistent symptoms despite previous therapies and symptoms seem to be worsening. Records from recent visit at [facility] show normal EMG and conduction studies in both legs, normal autonomic reflex scan, MRI brain on 3/25 was normal.  MRI of the cervical spine on 3/25 showed some straightening of the lordosis and low-grade anterolisthesis thesis of C3 on C4 but did not have any cord issues identified. Vitamin E was high at 22 with toxicity from vitamin E most commonly causing nausea and hemorrhagic strokes. AST was mildly elevated at 62 with ALT of 57, TSH was elevated at 10.3  with normal free T4 of 1.0 and negative TPO antibody. MMA was normal, A1c was normal at 5.6, ESR was 8, CBC and basic metabolic panel were normal, extensive autoimmune work-up was negative including LUANN, CCP, SSA, SSB, SM antibody, RNP antibody, SCL antibody, Cristel-1 antibody.  Serology and copper studies were also negative.  HIV was negative.  B12 was normal at 389.  SPEP was normal with no spike protein.   -per wife gait do for several hears. Wide based gate.  Worse in last 1 year, notable increase last few months.   -concern this could be from lumbar spine now with plan to review MRI of lumbar spine as outpatient but bilateral symptom and cord compression seem unlikely  --reviewed case with [facility] Neurology here and prior spine imaging at [facility]. Nl T spine and L spine MRI and not explain his presenting sxs.   -No further spine imaging indicated at this time per neurology  -Toe numbness may be neuropathy. Possible etoh. tx with thiamine in hospital IV then po at discharge. Has had prior work up at [facility] and primary neurologist [facility]  -Gait do may be combination of etoh intox, neuropathy (doubtful contributing significantly) and cervical spine disease. Cont work up with primary neurology team.   -Doubt Wernickes encephalopathy as no encephalopathy.  -gait do and numbness toes unrelated to altered MS episode.   -discussed alcohol use with patient and his wife and that mild LFT elevation, peripheral neuropathy with balance issues, Father that was an alcoholic, 14 drinks per week noted by patient with 2 drinks per day, and a possible seizure all make me concerned this could be alcohol related. Patient and wife say he really does only drink 2 drinks a night and does not drink in excess and they do not think the symptoms are from alcohol. - I recommended he stop drinking for a while to make sure it is not contributing but agreed we would pursue workup for other causes.  pt tells me later  drinking 4-5 drinks per day  -nystagmus on exam bilaterally with subtle dbl vision. No hx of similar c/o. Wife and pt deny cognitive impairment  -continue outpatient workup with Dr. Cervantes and at [facility]  - TSH 6,, LfTs notable for AST of 63. thiamine level pending  - emperic thiamine IV 500mg q8 hours given in hospital  -general Neuro consulted.   - PT, OT, fall precuations in hospital.   PT OT recommend outpatient eval - ordered  -pt feeling better day of discharge. Less toe numbness and gait better.  -per neurology no findings of neuropathy on exam. Gait improved on day of discharge but still slightly wide based.     Plan;   Avoid etoh  Follow up PT, OT outpatient  pcp follow up 1 week  Follow up with primary neurology team/[facility] neuro team       Transaminitis (chronic from 2012)  -stop alcohol  -will need repeat right upper quadrant ultrasound as outpatient and need to follow mild LFT elevation that he has had since 2012 in our records (last ultrasound 6/19/2019 showed diffuse fatty infiltration of the liver and was otherwise normal); would like to see if this resolves off alcohol  -if LFT's continue to be elevated could also have trial off lipitor to see if LFT's improve  -check LFTS     Vitamin E toxicity  -discontinue all supplements that contain vitamin E     Elevated TSH with normal free T4 and negative TPO antibody  - TSH was elevated at 10.3 with normal free T4 of 1.0 and negative TPO antibody.  --with normal free T4 this is likely euthyroid sick syndrome but TSH is quite elevated  -recommend following up with PCP and repeating TSH and free T4 in 4-6 weeks     Mixed hyperlipidemia  -Continue home atorvastatin and fenofibrate     Essential hypertension, benign (HTN)  -running mildly high during stay initially then normalized,   - hold home PRN propranolol (looks like PCP was planning to make this a scheduled medicine with recent high pressures)  -Started Inderal LA 60mg every day   -+positive orthostatics day  of discharge. Asymptomatic. Given 500cc bolus with resolution of orthostatic changes.      JORGE (generalized anxiety disorder)  stable symptomatically  -continue home sertraline  -pcp follow up  - avoid etoh     Methylenetetrahydrofolate reductase (MTHFR) deficiency (H)  Elevated homocysteine  - cont home L-Methylfolate-B6-B12 while in the hospital-resume at discharge     Natarajan's esophagus with dysplasia  stable symptomatically   - continue PTA omeprazole  -rec'd cessation of etoh     Lumbar disc herniation  Records note he had a lumbar spine MRI with Dr. Cervantes and that he has a L5-S1 herniated disc but cannot find any of those records.  -will need to get those reviewed as outpatient to see if MRI shows nerve entrapment but would be unusual to have chronic numbness symptoms in both feet     Suspect Aileen  Hx snoring. Apneas per wife, daytime somnolence. Remote hx of sleep study  reeval outpatient. Follow up pcp for referral     Diet:   NPO until cleared by speech and then can have regular diet  DVT Prophylaxis: Pneumatic Compression Devices  Kwan Catheter: not present  Code Status:   Full code          Disposition Plan- discharge home with wife with outpatient PT, OT  Neurology and pcp follow up     Discussed care plan with neurologist, rn,pt, pt's wife day of discharge.     George Coronado MD, MD    Significant Results and Procedures   See hospital course    Pending Results   These results will be followed up by hospitalist  Unresulted Labs Ordered in the Past 30 Days of this Admission     Date and Time Order Name Status Description    4/14/2021 1209 Vitamin B1 whole blood In process           Code Status   Full Code       Primary Care Physician   Woody Cintron    Physical Exam   Temp: 98.3  F (36.8  C) Temp src: Oral BP: 130/76 Pulse: 67   Resp: 18 SpO2: 95 % O2 Device: None (Room air)    Vitals:    04/13/21 0331   Weight: 98.6 kg (217 lb 6.4 oz)     Vital Signs with Ranges  Temp:  [98.3  F (36.8   C)-99.5  F (37.5  C)] 98.3  F (36.8  C)  Pulse:  [62-75] 67  Resp:  [16-20] 18  BP: (124-175)/(76-96) 130/76  SpO2:  [95 %-98 %] 95 %  I/O last 3 completed shifts:  In: 360 [P.O.:360]  Out: -     Constitutional: alert, nad  Eyes: eomi, ? Subtle nystagmus on left, no c/o dbl vision.   Respiratory: CTAB  Cardiovascular: RRR no r/g/m  GI: soft, nt, nd  Skin: no rash or edema  Musculoskeletal: no focal jt swelling or redness  Neurologic: CN 2-12 grossly intact, (gait wide based but improved - per neurology assessment), eomi, perrla, nl speech and mentation. Fully oriented, alert, strength 5/5 extrem X 4.   Neuropsychiatric: calm, pleasant, cooperative    Discharge Disposition   Discharged to home  Condition at discharge: Good    Consultations This Hospital Stay   PATIENT LEARNING CENTER IP CONSULT  SWALLOW EVAL SPEECH PATH AT BEDSIDE IP CONSULT  SPEECH LANGUAGE PATH ADULT IP CONSULT  PHARMACY IP CONSULT  PHARMACY IP CONSULT  PHARMACY IP CONSULT  PHYSICAL THERAPY ADULT IP CONSULT  OCCUPATIONAL THERAPY ADULT IP CONSULT  CARE MANAGEMENT / SOCIAL WORK IP CONSULT  NEUROLOGY IP CONSULT  NEUROLOGY IP CONSULT    Time Spent on this Encounter   I, George Coronado MD, personally saw the patient today and spent greater than 30 minutes discharging this patient.    Discharge Orders      Physical Therapy Referral      Physical Therapy Referral      Occupational Therapy Referral      Reason for your hospital stay    1. Episode of altered mental status. Possible seizure, normal EEG, evaluate for heart rhythm problem  2. Gait disorder: suspect secondary to cervical spine disease. Follow up with primary neurology team     Activity    Your activity upon discharge: activity as tolerated     Discharge Instructions    1. Start long acting propranolol for high blood pressure. Reassess at doctor's office.   2. Complete course of thiamine  3. Avoid all alcohol at this time given risk for injury to liver, neuropathy, worsening gait and risk  for falls.  4. You had elevated Vitamin E level at [facility]. Do not take Vitamin E or any supplement that contains this. If your vitamin supplements contain this, hold and bring bottle into primary care doctor's office at visit next week to discuss     Follow-up and recommended labs and tests     1. Follow up with primary care doctor next week to follow up hospital stay, high blood pressure, gait, obtain referral to sleep center for PADILLA evaluation.   2. 30 day event monitor- PCP to follow up, this will be placed at time of discharge from hospital  3. Follow up with primary neurologist regarding toe numbness/neuropathy and gait disorder and possible outpatient EEG monitoring for possible seizure  4. Outpatient PT, OT eval  5. Outpatient follow up TSH (thyroid test) test in 6 weeks with primary care doctor. Yours is minimally elevated at 6 range.     Full Code     Cardiac Event Monitor Adult Pediatric     Diet    Follow this diet upon discharge: Orders Placed This Encounter      Regular Diet Adult     Discharge Medications   Current Discharge Medication List      START taking these medications    Details   propranolol ER (INDERAL LA) 60 MG 24 hr capsule Take 1 capsule (60 mg) by mouth daily  Qty: 30 capsule, Refills: 0    Comments: Future refills by PCP Dr. Woody Cintron with phone number [phone].  Associated Diagnoses: Essential hypertension, benign      thiamine (B-1) 100 MG tablet Take 1 tablet (100 mg) by mouth daily  Qty: 14 tablet, Refills: 0    Comments: Future refills by PCP Dr. Woody Cintron with phone number [phone].  Associated Diagnoses: Alcohol use         CONTINUE these medications which have NOT CHANGED    Details   acetaminophen (TYLENOL) 325 MG tablet Take 2 tablets (650 mg) by mouth every 4 hours as needed for mild pain Max tylenol 4 gm per day  Qty: 50 tablet, Refills: 0    Associated Diagnoses: Cervical spondylosis with myelopathy      ASPIRIN PO Take 81 mg by mouth  every morning       atorvastatin (LIPITOR) 40 MG tablet Take 1 tablet (40 mg) by mouth daily  Qty: 90 tablet, Refills: 1    Associated Diagnoses: Mixed hyperlipidemia      B Complex-Folic Acid (SUPER B COMPLEX MAXI) TABS Take 1 tablet by mouth daily  Qty: 90 tablet, Refills: 3    Associated Diagnoses: Methylenetetrahydrofolate reductase (MTHFR) deficiency (H); Elevated homocysteine      fenofibrate (TRIGLIDE/LOFIBRA) 160 MG tablet Take 1 tablet (160 mg) by mouth daily  Qty: 90 tablet, Refills: 1    Associated Diagnoses: Mixed hyperlipidemia      L-Methylfolate-B6-B12 (FOLTX) 1.13-25-2 MG TABS Take 1 Dose by mouth daily  Qty: 90 tablet, Refills: 1    Associated Diagnoses: Methylenetetrahydrofolate reductase (MTHFR) deficiency (H); Elevated homocysteine      Omega-3 Fatty Acids (OMEGA-3 FISH OIL PO) Take 1 g by mouth every morning       omeprazole (PRILOSEC) 40 MG DR capsule Take 1 capsule (40 mg) by mouth every morning  Qty: 90 capsule, Refills: 1    Associated Diagnoses: Natarajan's esophagus with dysplasia      sertraline (ZOLOFT) 100 MG tablet Take 1.5 tablets (150 mg) by mouth daily  Qty: 135 tablet, Refills: 1    Associated Diagnoses: JORGE (generalized anxiety disorder)         STOP taking these medications       propranolol (INDERAL) 40 MG tablet Comments:   Reason for Stopping:             Allergies   No Known Allergies  Data   Most Recent 3 CBC's:  Recent Labs   Lab Test 04/13/21  0328 07/13/20  0245 07/11/20  0559   WBC 6.6 11.2* 12.8*   HGB 14.5 12.0* 13.1*   MCV 94 99 97    184 197      Most Recent 3 BMP's:  Recent Labs   Lab Test 04/14/21  0205 04/13/21  1936 04/13/21  0946 04/13/21  0328 03/01/21  1341 07/11/20  0559 07/11/20  0559 06/23/20  1459     --   --  137 143.6   < > 139 138   POTASSIUM 4.5 3.4 3.3* 3.5 5.39*   < > 4.4 4.2   CHLORIDE 108  --   --  104 106.1   < > 106 106   CO2 25  --   --  22 27.8   < > 31 27   BUN 17  --   --  17 15  13.2   < > 15 21   CR 1.04  --   --  0.97 1.14   <  > 0.91 0.96   ANIONGAP  --   --   --  11  --   --  2* 5   LILI 8.6  --   --  9.1 9.8   < > 8.6 9.2   GLC 99  --  117* 147* 93   < > 117* 101*    < > = values in this interval not displayed.     Most Recent 2 LFT's:  Recent Labs   Lab Test 04/14/21  0205 03/01/21  1341 12/18/18  1156 12/18/18  1156   AST 63*  --   --  33   ALT 58  --   --  29   ALKPHOS 41 47   < > 50   BILITOTAL 0.7 0.9   < > 0.6    < > = values in this interval not displayed.     Most Recent INR's and Anticoagulation Dosing History:  Anticoagulation Dose History     Recent Dosing and Labs Latest Ref Rng & Units 4/13/2021    INR 0.86 - 1.14 1.01        Most Recent 3 Troponin's:  Recent Labs   Lab Test 04/13/21  0946 04/13/21  0328   TROPI <0.015 <0.015     Most Recent Cholesterol Panel:  Recent Labs   Lab Test 04/13/21  0946   CHOL 252*   *   HDL 59   TRIG 155*     Most Recent 6 Bacteria Isolates From Any Culture (See EPIC Reports for Culture Details):No lab results found.  Most Recent TSH, T4 and A1c Labs:  Recent Labs   Lab Test 04/14/21 0205   TSH 6.50*   T4 0.81     Results for orders placed or performed during the hospital encounter of 04/13/21   CT Head w/o Contrast    Narrative    EXAM: CTA  HEAD NECK WITH CONTRAST, CT HEAD W/O CONTRAST  LOCATION: [facility]  DATE/TIME: 4/13/2021 3:49 AM    INDICATION: Confusion. Altered mental status. Cerebral infarct/stroke.  COMPARISON: None.  CONTRAST: 70mL Isovue-370  TECHNIQUE: Head and neck CT angiogram with IV contrast. Noncontrast head CT followed by axial helical CT images of the head and neck vessels obtained during the arterial phase of intravenous contrast administration. Axial 2D reconstructed images and   multiplanar 3D MIP reconstructed images of the head and neck vessels were performed by the technologist. Dose reduction techniques were used. All stenosis measurements made according to NASCET criteria unless otherwise specified.    FINDINGS:   NONCONTRAST HEAD CT:    INTRACRANIAL CONTENTS: No intracranial hemorrhage, extraaxial collection, or mass effect.  No CT evidence of acute infarct. Normal parenchymal attenuation. Normal ventricles and sulci.       HEAD CTA:  ANTERIOR CIRCULATION: No stenosis/occlusion, aneurysm, or high flow vascular malformation. Standard [place] of [place] anatomy.    POSTERIOR CIRCULATION: No stenosis/occlusion, aneurysm, or high flow vascular malformation. Balanced vertebral arteries supply a normal basilar artery.     DURAL VENOUS SINUSES: Not well evaluated on a technical basis.      NECK CTA:  RIGHT CAROTID: No measurable stenosis or dissection. Right extracranial carotid artery demonstrate a retropharyngeal medially deviated course impressing upon the aerodigestive airway.    LEFT CAROTID: Left distal common carotid artery moderate stenosis secondary to calcified/noncalcified plaque. Otherwise, no significant stenosis/occlusion or dissection.    VERTEBRAL ARTERIES: No focal stenosis or dissection. Balanced vertebral arteries.    AORTIC ARCH: Classic aortic arch anatomy with no significant stenosis at the origin of the great vessels.    NONVASCULAR STRUCTURES:   Dental amalgam resulting in streak artifact. C4 C7 ACDF. Degenerative changes noted within the spine. Mild emphysema.        Impression    IMPRESSION:   HEAD CT:  1.  No acute intracranial hemorrhage.  2.  No CT evidence of acute infarct. Aspect score 10.    Dr Les Thomas was notified by Dr Johnny Schaffer at  3:58 AM 04/13/2021.    HEAD CTA:   1.  No intracranial arterial large vessel occlusion.  2.  Proximal intracranial arteries demonstrate no significant stenosis/occlusion.     NECK CTA:  1.  Left distal common carotid artery moderate stenosis secondary to calcified/noncalcified plaque.  2.  Otherwise, cervical arteries demonstrate no significant stenosis/occlusion or dissection.       Dr Les Thomas was notified by Dr Johnny Schaffer at  4:20 AM 04/13/2021.   CTA Head Neck with Contrast     Narrative    EXAM: CTA  HEAD NECK WITH CONTRAST, CT HEAD W/O CONTRAST  LOCATION: [facility]  DATE/TIME: 4/13/2021 3:49 AM    INDICATION: Confusion. Altered mental status. Cerebral infarct/stroke.  COMPARISON: None.  CONTRAST: 70mL Isovue-370  TECHNIQUE: Head and neck CT angiogram with IV contrast. Noncontrast head CT followed by axial helical CT images of the head and neck vessels obtained during the arterial phase of intravenous contrast administration. Axial 2D reconstructed images and   multiplanar 3D MIP reconstructed images of the head and neck vessels were performed by the technologist. Dose reduction techniques were used. All stenosis measurements made according to NASCET criteria unless otherwise specified.    FINDINGS:   NONCONTRAST HEAD CT:   INTRACRANIAL CONTENTS: No intracranial hemorrhage, extraaxial collection, or mass effect.  No CT evidence of acute infarct. Normal parenchymal attenuation. Normal ventricles and sulci.       HEAD CTA:  ANTERIOR CIRCULATION: No stenosis/occlusion, aneurysm, or high flow vascular malformation. Standard [place] of [place] anatomy.    POSTERIOR CIRCULATION: No stenosis/occlusion, aneurysm, or high flow vascular malformation. Balanced vertebral arteries supply a normal basilar artery.     DURAL VENOUS SINUSES: Not well evaluated on a technical basis.      NECK CTA:  RIGHT CAROTID: No measurable stenosis or dissection. Right extracranial carotid artery demonstrate a retropharyngeal medially deviated course impressing upon the aerodigestive airway.    LEFT CAROTID: Left distal common carotid artery moderate stenosis secondary to calcified/noncalcified plaque. Otherwise, no significant stenosis/occlusion or dissection.    VERTEBRAL ARTERIES: No focal stenosis or dissection. Balanced vertebral arteries.    AORTIC ARCH: Classic aortic arch anatomy with no significant stenosis at the origin of the great vessels.    NONVASCULAR STRUCTURES:   Dental amalgam resulting  in streak artifact. C4 C7 ACDF. Degenerative changes noted within the spine. Mild emphysema.        Impression    IMPRESSION:   HEAD CT:  1.  No acute intracranial hemorrhage.  2.  No CT evidence of acute infarct. Aspect score 10.    Dr Les Thomas was notified by Dr Johnny Schaffer at  3:58 AM 04/13/2021.    HEAD CTA:   1.  No intracranial arterial large vessel occlusion.  2.  Proximal intracranial arteries demonstrate no significant stenosis/occlusion.     NECK CTA:  1.  Left distal common carotid artery moderate stenosis secondary to calcified/noncalcified plaque.  2.  Otherwise, cervical arteries demonstrate no significant stenosis/occlusion or dissection.       Dr Les Thomas was notified by Dr Johnny Schaffer at  4:20 AM 04/13/2021.   CT Head Perfusion w Contrast    Narrative    EXAM: CT HEAD PERFUSION WITH CONTRAST  LOCATION: [facility]  DATE/TIME: 4/13/2021 3:49 AM    INDICATION: Confusion. Altered mental status. Cerebral infarct/stroke.  COMPARISON: None.  TECHNIQUE: CT cerebral perfusion was performed utilizing a second contrast bolus. Perfusion data were post processed with generation of standard perfusion maps and estimation of ischemic/infarcted volumes utilizing standard threshold values. Dose   reduction techniques were used.    CONTRAST: 50mL Isovue-370    FINDINGS:   CT PERFUSION:  PERFUSION MAPS: Symmetrical cerebral perfusion. No acute perfusion abnormality..    RAPID ANALYSIS:  CBF<30%: 0 mL  Tmax>6sec: 0 mL  Mismatch volume: 0 mL  Mismatch ratio: None      Impression    IMPRESSION:   1.  No acute perfusion abnormality.      Dr Les Thomas was notified by Dr Johnny Schaffer at  4:20 AM 04/13/2021.   MR Brain w/o & w Contrast    Narrative    MR BRAIN W/O & W CONTRAST 4/13/2021 1:13 PM    INDICATION: Neuro deficit, acute, stroke suspected; Acute ischemic  stroke balance problem. Numbness  TECHNIQUE: Noncontrast and contrast enhanced MRI of the brain.  CONTRAST: 10 mL Gadavist   COMPARISON: Head  and neck CTA 2021    FINDINGS:  There is no restricted diffusion. Mild mucosal thickening  within the ethmoid air cells. Paranasal sinuses are otherwise free  from significant disease. Mastoid air cells appear free from  significant disease. Intraorbital contents are unremarkable.  Ventricles are within normal limits in size for the patient's age.  Intracranial flow voids are intact. There is no mass effect, midline  shift, or extraaxial collection. A few foci of nonspecific T2/FLAIR  hyperintensity within the white matter probably reflect minor changes  of chronic small vessel ischemic injury and are within the range of  expected for a patient of this age. No evidence for acute or chronic  intracranial blood products.      Impression    IMPRESSION:   1.  Negative brain MRI for age. No acute intracranial finding. No  evidence for recent ischemia, intracranial hemorrhage, or mass.    ALMA MOCTEZUMA MD   Echocardiogram Complete - For age > 60 yrs    Narrative    [id number]  [id number]  [id number]  [id number]^JONNY^TARYN^JESUS     [facility]  Echocardiography Laboratory  [address] [place] [address]     Name: TAMIKA LUA  MRN: [id number]  : 1955  Study Date: 2021 02:59 PM  Age: 66 yrs  Gender: Male  Patient Location: [facility]  Reason For Study: Cerebrovascular Incident  Ordering Physician: TARYN FULLER  Referring Physician: BRENDA GIBSON  Performed By: Audelia Kelley     BSA: 2.1 m2  Height: 68 in  Weight: 217 lb  HR: 77  BP: 156/95 mmHg  ______________________________________________________________________________  Procedure  Complete Portable Echo Adult.  ______________________________________________________________________________  Interpretation Summary     The left ventricle is normal in size. There is normal left ventricular wall  thickness. Left ventricular systolic function is normal. The visual ejection  fraction is estimated at 55-60%. Diastolic Doppler  findings (E/E' ratio and/or  other parameters) suggest left ventricular filling pressures are  indeterminate. No regional wall motion abnormalities noted.  The right ventricle is normal size. The right ventricular systolic function is  normal.  Trace mitral and tricuspid regurgitation.  No pericardial effusion.  No previous study for comparison.  ______________________________________________________________________________  Left Ventricle  The left ventricle is normal in size. There is normal left ventricular wall  thickness. Left ventricular systolic function is normal. The visual ejection  fraction is estimated at 55-60%. Diastolic Doppler findings (E/E' ratio and/or  other parameters) suggest left ventricular filling pressures are  indeterminate. No regional wall motion abnormalities noted.     Right Ventricle  The right ventricle is normal size. The right ventricular systolic function is  normal.     Atria  Normal left atrial size. Right atrial size is normal. There is no color  Doppler evidence of an atrial shunt.     Mitral Valve  There is trace mitral regurgitation.     Tricuspid Valve  There is trace tricuspid regurgitation. Right ventricular systolic pressure  could not be approximated due to inadequate tricuspid regurgitation.     Aortic Valve  There is mild trileaflet aortic sclerosis. No aortic regurgitation is present.  No aortic stenosis is present. Transaortic gradients are mildly increased  consistent with aortic sclerosis.     Pulmonic Valve  There is no pulmonic valvular stenosis.     Vessels  The aortic root is normal size. Normal size ascending aorta. The inferior vena  cava is normal.     Pericardium  There is no pericardial effusion.     Rhythm  Sinus rhythm was noted.  ______________________________________________________________________________  MMode/2D Measurements & Calculations  IVSd: 1.1 cm     LVIDd: 5.2 cm  LVIDs: 3.3 cm  LVPWd: 1.1 cm  FS: 36.7 %  LV mass(C)d: 219.4 grams  LV  mass(C)dI: 103.7 grams/m2  Ao root diam: 3.2 cm  LA dimension: 5.0 cm  asc Aorta Diam: 3.3 cm  LA/Ao: 1.6  LVOT diam: 2.4 cm  LVOT area: 4.6 cm2  LA Volume (BP): 51.2 ml  LA Volume Index (BP): 24.2 ml/m2  RWT: 0.41     Doppler Measurements & Calculations  MV E max richy: 91.7 cm/sec  MV A max richy: 84.0 cm/sec  MV E/A: 1.1  MV dec time: 0.23 sec  PA V2 max: 133.4 cm/sec  PA max P.1 mmHg  PA acc time: 0.13 sec  E/E' avg: 10.9  Lateral E/e': 9.8  Medial E/e': 12.0     ______________________________________________________________________________  Report approved by: Mickey Aleman 2021 03:35 PM

## 2021-04-15 NOTE — PROGRESS NOTES
I: SW consult for discharge planning will be closed. Pt will discharge with no needs as therapies are recommending home w/OP services.    P: No SW interventions anticipated at this time.    SHIREEN Venegas   [facility]  [phone]

## 2021-04-15 NOTE — PLAN OF CARE
Pt here with worsening baseline numbness of feet, altered mental status; for stroke work up. A&O x4. Neuros intact except for baseline numbness of the toes of both feet and nystagmus of both eyes. VSS. Tele: NSR. Regular diet, thin liquids. Takes pills whole. Up with SBA + GB. Denies pain, N/V or dizziness. Pt scoring green on the Aggression Stop Light Tool. Plan for discharge today; outpatient sleep study.

## 2021-04-15 NOTE — PLAN OF CARE
[id number]: Pt here following episode of AMS. A&Ox4. Up with SBA. Nystagmus noted in both eyes, baseline numbness continues in bilateral feet. Elevated BP, but within parameters of SBP < 180. Tele SR w/ 1st degree AVB. Denies any nausea/dizziness. Plan for outpatient sleep study. Discharge to home, likely tomorrow.

## 2021-04-15 NOTE — PLAN OF CARE
Physical Therapy Discharge Summary    Reason for therapy discharge:    Discharged to home with outpatient therapy.    Progress towards therapy goal(s). See goals on Care Plan in [facility] electronic health record for goal details.  Goals met    Therapy recommendation(s):    Continued therapy is recommended.  Rationale/Recommendations:  to improve functional balance and reduce risk of falls with functional activities.

## 2021-04-15 NOTE — PLAN OF CARE
Pt admitted with AMS. No spells observed. Alert and oriented x4. Positive orthostatic BP at 0500 per chart, negative in afternoon after receiving 500 ml IV bolus. VS otherwise WDL. Tele NSR. Neuros with nystgmus and wide, a little unsteady gait, otherwise intact. CMS with baseline numbness in bilateral feet and toes. Lung sounds clear. Denies pain. Tolerating regular diet. Reviewed written discharge instructions with pt and his spouse including medications, follow up appointments and 30 day cardiac event monitor. All questions answered. Pt discharged to home.

## 2021-04-16 NOTE — PLAN OF CARE
Occupational Therapy Discharge Summary    Reason for therapy discharge:    Discharged to home with outpatient therapy.    Progress towards therapy goal(s). See goals on Care Plan in [facility] electronic health record for goal details.  Goals partially met.  Barriers to achieving goals:   discharge from facility.    Therapy recommendation(s):    Continued therapy is recommended.  Rationale/Recommendations:  Recommend OP OT for further cognitive assessment, SLUMs score of 21/30 on 4/14/21.

## 2021-04-18 LAB — VIT B1 BLD-MCNC: 198 NMOL/L (ref 70–180)

## 2021-04-19 ENCOUNTER — CARE COORDINATION (OUTPATIENT)
Dept: FAMILY MEDICINE | Facility: CLINIC | Age: 66
End: 2021-04-19

## 2021-04-19 NOTE — PROGRESS NOTES
Care Coordination Assessment    PCP: Woody Cintron    Referral Source:  ED/IP List      Clinical Data: Patient discharged from inpatient at [facility] 04/14/2021 with vertigo, dizziness.  Patient discharged home with instruction to follow up with PCP in 7 days.      Plan: Patient already has appt scheduled.  I will close encounter

## 2021-04-19 NOTE — PROGRESS NOTES
Care Coordination Assessment    PCP: Woody Cintron    Referral Source:  ED/IP List      Clinical Data: Patient discharged from inpatient at [facility] 04/15/2021 with Confusion and Spondylolisthesis of thoracic region.  Patient discharged home with instruction to follow up with neurologist and PCP in 1 week for hospital follow up.    Plan: I will forward to Gita in clinical support to assess and assist with appropriate follow up.

## 2021-04-20 LAB — INTERPRETATION ECG - MUSE: NORMAL

## 2021-04-26 ENCOUNTER — OFFICE VISIT (OUTPATIENT)
Dept: FAMILY MEDICINE | Facility: CLINIC | Age: 66
End: 2021-04-26

## 2021-04-26 VITALS
HEART RATE: 68 BPM | TEMPERATURE: 97.9 F | RESPIRATION RATE: 20 BRPM | DIASTOLIC BLOOD PRESSURE: 76 MMHG | HEIGHT: 68 IN | BODY MASS INDEX: 32.28 KG/M2 | WEIGHT: 213 LBS | SYSTOLIC BLOOD PRESSURE: 132 MMHG

## 2021-04-26 DIAGNOSIS — E78.2 MIXED HYPERLIPIDEMIA: ICD-10-CM

## 2021-04-26 DIAGNOSIS — G60.9 IDIOPATHIC POLYNEUROPATHY: ICD-10-CM

## 2021-04-26 DIAGNOSIS — R40.4 EPISODE OF UNRESPONSIVENESS: Primary | ICD-10-CM

## 2021-04-26 DIAGNOSIS — F41.1 GAD (GENERALIZED ANXIETY DISORDER): ICD-10-CM

## 2021-04-26 DIAGNOSIS — I10 ESSENTIAL HYPERTENSION, BENIGN: ICD-10-CM

## 2021-04-26 DIAGNOSIS — R26.89 BALANCE PROBLEMS: ICD-10-CM

## 2021-04-26 PROCEDURE — 99214 OFFICE O/P EST MOD 30 MIN: CPT | Performed by: FAMILY MEDICINE

## 2021-04-26 RX ORDER — PROPRANOLOL HCL 60 MG
60 CAPSULE, EXTENDED RELEASE 24HR ORAL DAILY
Qty: 90 CAPSULE | Refills: 1 | Status: SHIPPED | OUTPATIENT
Start: 2021-04-26 | End: 2021-10-21

## 2021-04-26 RX ORDER — FENOFIBRATE 160 MG/1
160 TABLET ORAL DAILY
Qty: 90 TABLET | Refills: 1 | Status: SHIPPED | OUTPATIENT
Start: 2021-04-26 | End: 2021-10-21

## 2021-04-26 RX ORDER — ATORVASTATIN CALCIUM 40 MG/1
40 TABLET, FILM COATED ORAL DAILY
Qty: 90 TABLET | Refills: 1 | Status: SHIPPED | OUTPATIENT
Start: 2021-04-26 | End: 2021-07-08

## 2021-04-26 RX ORDER — SERTRALINE HYDROCHLORIDE 100 MG/1
150 TABLET, FILM COATED ORAL DAILY
Qty: 135 TABLET | Refills: 1 | Status: SHIPPED | OUTPATIENT
Start: 2021-04-26 | End: 2021-10-21

## 2021-04-26 ASSESSMENT — MIFFLIN-ST. JEOR: SCORE: 1725.66

## 2021-04-26 NOTE — PROGRESS NOTES
"    Assessment & Plan     Episode of unresponsiveness  No further issues, unclear etiology- neurology f/u recommended only sleep eval-ordered today    Balance problems  Improved since last visit-pt walking avidly most days    JORGE (generalized anxiety disorder)  Well controlled, continue current medications at current doses     Essential hypertension, benign (HTN)  Well controlled, continue current medications at current doses     Idiopathic polyneuropathy  Improved- no clear reason why better but he is very happy      Review of external notes as documented elsewhere in note  Review of the result(s) of each unique test - labs, scans, hositaliztion records  Prescription drug management         BMI:   Estimated body mass index is 32.39 kg/m  as calculated from the following:    Height as of this encounter: 1.727 m (5' 8\").    Weight as of this encounter: 96.6 kg (213 lb).   Weight management plan: Discussed healthy diet and exercise guidelines    FUTURE APPOINTMENTS:       - Follow-up visit in 3-6 mo  Work on weight loss  Regular exercise    No follow-ups on file.    Woody Cintron MD  [facility]    Hai Pardo is a 66 year old who presents for the following health issues     HPI       Hospital Follow-up Visit:-reviewed hospital records and tests    Hospital/Nursing Home/IP Rehab Facility: [facility]  Date of Admission: 4/13/21  Date of Discharge: 4/15/21  Reason(s) for Admission: episode unresponsiveness      Was your hospitalization related to COVID-19? No   Problems taking medications regularly:  None  Medication changes since discharge: None  Problems adhering to non-medication therapy:  None    Summary of hospitalization:  [facility] discharge summary reviewed  Diagnostic Tests/Treatments reviewed.  Follow up needed: already saw neurology-they now recommend sleep test  Other Healthcare Providers Involved in Patient s Care:         PT  Update since " "discharge: improved.       Post Discharge Medication Reconciliation: discharge medications reconciled, continue medications without change.  Plan of care communicated with patient                  Review of Systems   Constitutional, HEENT, cardiovascular, pulmonary, gi and gu systems are negative, except as otherwise noted.      Objective    /76 (BP Location: Right arm, Patient Position: Chair, Cuff Size: Adult Large)   Pulse 68   Temp 97.9  F (36.6  C)   Resp 20   Ht 1.727 m (5' 8\")   Wt 96.6 kg (213 lb)   BMI 32.39 kg/m    Body mass index is 32.39 kg/m .  Physical Exam   GENERAL: healthy, alert and no distress  EYES: Eyes grossly normal to inspection, PERRL and conjunctivae and sclerae normal  HENT: ear canals and TM's normal, nose and mouth without ulcers or lesions  NECK: no adenopathy, no asymmetry, masses, or scars and thyroid normal to palpation  RESP: lungs clear to auscultation - no rales, rhonchi or wheezes  CV: regular rate and rhythm, normal S1 S2, no S3 or S4, no murmur, click or rub, no peripheral edema and peripheral pulses strong  ABDOMEN: soft, nontender, no hepatosplenomegaly, no masses and bowel sounds normal  MS: no gross musculoskeletal defects noted, no edema  NEURO: Normal strength and tone, mentation intact and speech normal  PSYCH: mentation appears normal, affect normal/bright    Admission on 04/13/2021, Discharged on 04/15/2021   Component Date Value Ref Range Status     Sodium 04/13/2021 137  133 - 144 mmol/L Final     Potassium 04/13/2021 3.5  3.4 - 5.3 mmol/L Final     Chloride 04/13/2021 104  94 - 109 mmol/L Final     Carbon Dioxide 04/13/2021 22  20 - 32 mmol/L Final     Anion Gap 04/13/2021 11  3 - 14 mmol/L Final     Glucose 04/13/2021 147* 70 - 99 mg/dL Final     Urea Nitrogen 04/13/2021 17  7 - 30 mg/dL Final     Creatinine 04/13/2021 0.97  0.66 - 1.25 mg/dL Final     GFR Estimate 04/13/2021 82  >60 mL/min/[1.73_m2] Final    Comment: Non  GFR " Calc  Starting 12/18/2018, serum creatinine based estimated GFR (eGFR) will be   calculated using the Chronic Kidney Disease Epidemiology Collaboration   (CKD-EPI) equation.       GFR Estimate If Black 04/13/2021 >90  >60 mL/min/[1.73_m2] Final    Comment:  GFR Calc  Starting 12/18/2018, serum creatinine based estimated GFR (eGFR) will be   calculated using the Chronic Kidney Disease Epidemiology Collaboration   (CKD-EPI) equation.       Calcium 04/13/2021 9.1  8.5 - 10.1 mg/dL Final     WBC 04/13/2021 6.6  4.0 - 11.0 10e9/L Final     RBC Count 04/13/2021 4.73  4.4 - 5.9 10e12/L Final     Hemoglobin 04/13/2021 14.5  13.3 - 17.7 g/dL Final     Hematocrit 04/13/2021 44.3  40.0 - 53.0 % Final     MCV 04/13/2021 94  78 - 100 fl Final     MCH 04/13/2021 30.7  26.5 - 33.0 pg Final     MCHC 04/13/2021 32.7  31.5 - 36.5 g/dL Final     RDW 04/13/2021 13.2  10.0 - 15.0 % Final     Platelet Count 04/13/2021 226  150 - 450 10e9/L Final     Diff Method 04/13/2021 Automated Method   Final     % Neutrophils 04/13/2021 67.9  % Final     % Lymphocytes 04/13/2021 16.0  % Final     % Monocytes 04/13/2021 12.4  % Final     % Eosinophils 04/13/2021 1.5  % Final     % Basophils 04/13/2021 0.8  % Final     % Immature Granulocytes 04/13/2021 1.4  % Final     Nucleated RBCs 04/13/2021 0  0 /100 Final     Absolute Neutrophil 04/13/2021 4.5  1.6 - 8.3 10e9/L Final     Absolute Lymphocytes 04/13/2021 1.1  0.8 - 5.3 10e9/L Final     Absolute Monocytes 04/13/2021 0.8  0.0 - 1.3 10e9/L Final     Absolute Eosinophils 04/13/2021 0.1  0.0 - 0.7 10e9/L Final     Absolute Basophils 04/13/2021 0.1  0.0 - 0.2 10e9/L Final     Abs Immature Granulocytes 04/13/2021 0.1  0 - 0.4 10e9/L Final     Absolute Nucleated RBC 04/13/2021 0.0   Final     INR 04/13/2021 1.01  0.86 - 1.14 Final     PTT 04/13/2021 26  22 - 37 sec Final     Troponin I ES 04/13/2021 <0.015  0.000 - 0.045 ug/L Final    Comment: The 99th percentile for upper reference range  is 0.045 ug/L.  Troponin values   in the range of 0.045 - 0.120 ug/L may be associated with risks of adverse   clinical events.       SARS-CoV-2 Virus Specimen Source 04/13/2021 Nasopharyngeal   Final     SARS-CoV-2 PCR Result 04/13/2021 NEGATIVE   Final    SARS-CoV2 (COVID-19) RNA not detected, presumed negative.     SARS-CoV-2 PCR Comment 04/13/2021 (Note)   Final    Comment: Testing was performed using the rika SARS-CoV-2 & Influenza A/B Assay on the   rika Sharee System.  This test should be ordered for the detection of SARS-COV-2 in individuals who   meet SARS-CoV-2 clinical and/or epidemiological criteria. Test performance is   unknown in asymptomatic patients.  This test is for in vitro diagnostic use under the FDA EUA for laboratories   certified under CLIA to perform moderate and/or high complexity testing. This   test has not been FDA cleared or approved.  A negative test does not rule out the presence of PCR inhibitors in the   specimen or target RNA in concentration below the limit of detection for the   assay. The possibility of a false negative should be considered if the   patient's recent exposure or clinical presentation suggests COVID-19.  [facility] are certified under the Clinical Laboratory   Improvement Amendments of 1988 (CLIA-88) as qualified to perform moderate   and/or high complexity laboratory testing.       Interpretation ECG 04/13/2021 Click View Image link to view waveform and result   Final     D Dimer 04/13/2021 0.4  0.0 - 0.50 ug/ml FEU Final    Comment: This D-dimer assay is intended for use in conjunction with a clinical pretest   probability assessment model to exclude pulmonary embolism (PE) and deep   venous thrombosis (DVT) in outpatients suspected of PE or DVT. The cut-off   value is 0.5 ug/mL FEU.       Lactic Acid 04/13/2021 1.0  0.7 - 2.0 mmol/L Final     CK Total 04/13/2021 115  30 - 300 U/L Final     Cholesterol 04/13/2021 252* <200 mg/dL Final     Desirable:       <200 mg/dl     Triglycerides 04/13/2021 155* <150 mg/dL Final    Comment: Borderline high:  150-199 mg/dl  High:             200-499 mg/dl  Very high:       >499 mg/dl       HDL Cholesterol 04/13/2021 59  >39 mg/dL Final     LDL Cholesterol Calculated 04/13/2021 162* <100 mg/dL Final    Comment: Above desirable:  100-129 mg/dl  Borderline High:  130-159 mg/dL  High:             160-189 mg/dL  Very high:       >189 mg/dl       Non HDL Cholesterol 04/13/2021 193* <130 mg/dL Final    Comment: Above Desirable:  130-159 mg/dl  Borderline high:  160-189 mg/dl  High:             190-219 mg/dl  Very high:       >219 mg/dl       Potassium 04/13/2021 3.3* 3.4 - 5.3 mmol/L Final     Troponin I ES 04/13/2021 <0.015  0.000 - 0.045 ug/L Final    Comment: The 99th percentile for upper reference range is 0.045 ug/L.  Troponin values   in the range of 0.045 - 0.120 ug/L may be associated with risks of adverse   clinical events.       Glucose 04/13/2021 117* 70 - 99 mg/dL Final     Color Urine 04/13/2021 Yellow   Final     Appearance Urine 04/13/2021 Clear   Final     Glucose Urine 04/13/2021 Negative  NEG^Negative mg/dL Final     Bilirubin Urine 04/13/2021 Negative  NEG^Negative Final     Ketones Urine 04/13/2021 5* NEG^Negative mg/dL Final     Specific Gravity Urine 04/13/2021 1.015  1.003 - 1.035 Final     Blood Urine 04/13/2021 Negative  NEG^Negative Final     pH Urine 04/13/2021 6.5  5.0 - 7.0 pH Final     Protein Albumin Urine 04/13/2021 20* NEG^Negative mg/dL Final     Urobilinogen mg/dL 04/13/2021 0.0  0.0 - 2.0 mg/dL Final     Nitrite Urine 04/13/2021 Negative  NEG^Negative Final     Leukocyte Esterase Urine 04/13/2021 Negative  NEG^Negative Final     Source 04/13/2021 Midstream Urine   Final     WBC Urine 04/13/2021 0  0 - 5 /HPF Final     RBC Urine 04/13/2021 0  0 - 2 /HPF Final     Squamous Epithelial /HPF Urine 04/13/2021 0  0 - 1 /HPF Final     Mucous Urine 04/13/2021 Present* NEG^Negative /LPF Final      Potassium 04/13/2021 3.4  3.4 - 5.3 mmol/L Final     Potassium 04/14/2021 4.5  3.4 - 5.3 mmol/L Final     Glucose 04/14/2021 97  70 - 99 mg/dL Final     Urea Nitrogen 04/14/2021 17  7 - 30 mg/dL Final     Calcium 04/14/2021 8.6  8.5 - 10.1 mg/dL Final     Chloride 04/14/2021 108  94 - 109 mmol/L Final     Carbon Dioxide 04/14/2021 25  20 - 32 mmol/L Final     Creatinine 04/14/2021 1.04  0.66 - 1.25 mg/dL Final     GFR Estimate 04/14/2021 75  >60 mL/min/[1.73_m2] Final    Comment: Non  GFR Calc  Starting 12/18/2018, serum creatinine based estimated GFR (eGFR) will be   calculated using the Chronic Kidney Disease Epidemiology Collaboration   (CKD-EPI) equation.       GFR Estimate If Black 04/14/2021 87  >60 mL/min/[1.73_m2] Final    Comment:  GFR Calc  Starting 12/18/2018, serum creatinine based estimated GFR (eGFR) will be   calculated using the Chronic Kidney Disease Epidemiology Collaboration   (CKD-EPI) equation.       Glucose 04/14/2021 99  70 - 99 mg/dL Final     Sodium 04/14/2021 139  133 - 144 mmol/L Final     Bilirubin Direct 04/14/2021 0.2  0.0 - 0.2 mg/dL Final     Bilirubin Total 04/14/2021 0.7  0.2 - 1.3 mg/dL Final     Albumin 04/14/2021 3.6  3.4 - 5.0 g/dL Final     Protein Total 04/14/2021 6.8  6.8 - 8.8 g/dL Final     Alkaline Phosphatase 04/14/2021 41  40 - 150 U/L Final     ALT 04/14/2021 58  0 - 70 U/L Final     AST 04/14/2021 63* 0 - 45 U/L Final     TSH 04/14/2021 6.50* 0.40 - 4.00 mU/L Final     T4 Free 04/14/2021 0.81  0.76 - 1.46 ng/dL Final     Vitamin B1 Whole Blood Level 04/14/2021 198* 70 - 180 nmol/L Final    Comment: (Note)  INTERPRETIVE INFORMATION: Vitamin B1, Whole Blood  This assay measures the concentration of thiamine   diphosphate (TDP), the primary active form of vitamin B1.   Approximately 90 percent of vitamin B1 present in whole   blood is TDP. Thiamine and thiamine monophosphate, which   comprise the remaining 10 percent, are not  measured.  This test was developed and its performance characteristics   determined by [facility]. It has not been cleared or   approved by the US Food and Drug Administration. This test   was performed in a CLIA certified laboratory and is   intended for clinical purposes.  Performed By: [facility]  [address] [place] [id number]  : Mackenzie Espinal MD       Glucose 04/14/2021 92  70 - 99 mg/dL Final     Glucose 04/15/2021 103* 70 - 99 mg/dL Final     Interpretation ECG 04/15/2021 Click View Image link to view waveform and result   Final

## 2021-04-26 NOTE — TELEPHONE ENCOUNTER
Edvin Norton is requesting a refill of:    Pending Prescriptions:                       Disp   Refills    atorvastatin (LIPITOR) 40 MG tablet       90 tab*1            Sig: Take 1 tablet (40 mg) by mouth daily    sertraline (ZOLOFT) 100 MG tablet         135 ta*1            Sig: Take 1.5 tablets (150 mg) by mouth daily    fenofibrate (TRIGLIDE/LOFIBRA) 160 MG tab*90 tab*1            Sig: Take 1 tablet (160 mg) by mouth daily    propranolol ER (INDERAL LA) 60 MG 24 hr c*90 cap*1            Sig: Take 1 capsule (60 mg) by mouth daily    Please close encounter if RX was sent. Thanks, Gita    Needs sent to mail order

## 2021-04-26 NOTE — NURSING NOTE
Edvin Norton is here for a referral for a sleep study.    Questioned patient about current smoking habits.  Pt. has never smoked.  PULSE regular  My Chart: actiuve  CLASSIFICATION OF OVERWEIGHT AND OBESITY BY BMI                        Obesity Class           BMI(kg/m2)  Underweight                                    < 18.5  Normal                                         18.5-24.9  Overweight                                     25.0-29.9  OBESITY                     I                  30.0-34.9                             II                 35.0-39.9  EXTREME OBESITY             III                >40                            Patient's  BMI Body mass index is 32.39 kg/m .  http://hin.nhlbi.nih.gov/menuplanner/menu.cgi  Pre-visit planning  Immunizations - needs pneumococcal 23  Colonoscopy - is up to date  Mammogram -   Asthma -   PHQ9 -    JORGE-7 -

## 2021-06-04 ENCOUNTER — TRANSFERRED RECORDS (OUTPATIENT)
Dept: FAMILY MEDICINE | Facility: CLINIC | Age: 66
End: 2021-06-04

## 2021-07-08 DIAGNOSIS — E78.2 MIXED HYPERLIPIDEMIA: ICD-10-CM

## 2021-07-08 RX ORDER — ATORVASTATIN CALCIUM 40 MG/1
TABLET, FILM COATED ORAL
Qty: 90 TABLET | Refills: 0 | Status: SHIPPED | OUTPATIENT
Start: 2021-07-08 | End: 2021-10-21

## 2021-07-08 NOTE — TELEPHONE ENCOUNTER
Edvin Norton is requesting a refill of:    Pending Prescriptions:                       Disp   Refills    atorvastatin (LIPITOR) 40 MG tablet [Phar*90 tab*0            Sig: TAKE 1 TABLET BY MOUTH EVERY DAY    Recent Labs   Lab Test 04/13/21  0946 03/01/21  1340 09/25/20  1258   CHOL 252* 222* 236*   HDL 59 57 57   * 118 129   TRIG 155* 237* 251*   CHOLHDLRATIO  --  4 4     Last OV on 4/26/21 to F/U in 3-6 months

## 2021-07-11 NOTE — LETTER
8/20/2020         RE: Edvin Norton  [address] [place] [address]        Dear Colleague,    Thank you for referring your patient, Edvin Norton, to the [facility]. Please see a copy of my visit note below.    Spine Surgical Hx:  07/10/2020 - 3-level ACDF with ant plate fixation C4-C7; Right ant ICBG harvest (Sembrano).  [Implants: Medtronic Zevo plate; PEEK cages 05b56pm].      TELEPHONE VISIT:  Patient consented to today's telephone visit.    Time:  1:20pm to 1:32pm  Person spoken to:  Patient      Reason for Visit:  Chief Complaint   Patient presents with     Surgical Followup     DOS 7/10/20 S/P Anterior cervical diskectomy and fusion with anterior plate fixation cervical 4-7       S>  65/m, 6 wks postop; 1st postop visit.    Per patient, he is doing very well.  Happy with surgery.  Preop symptoms resolved.  Wearing collar full time.  Been walking 2.5 miles a day; uses walking stick.  Starting to get his balance back.  Feet also feel better (still with some numbness in his toes).  Still having some swallowing difficulties.  Feels like his esophagus has gotten smaller; needs to take smaller bites.    Off opioids.  Per patient, never took any.       Neck Disability Index (NDI) Questionnaire    Neck Disability Index (NDI) 8/20/2020   Neck Disability Index: Count 10   NDI: Total Score = SUM (points for all 10 findings) 4   Neck Disability in Percent = (Total Score) / 50 * 100 8 (%)   Some recent data might be hidden      Preop NDI 31.11%  6 wks  8%       O>   This was a telephone visit.  There is no direct contact with nor physical examination of the patient was performed.  Over the phone, patient sounded alert, oriented x3 and cooperative.  Speech was coherent.  Answered questions appropriately.  Per patient, surgical wound healing well or already fully healed.    Imaging:     No new x-rays.  Reviewed discharge x-rays from 7/11/20.  Showed stable 3-level ACDF with ant plate  fixation C4-C7.    A>   - 6 wks postop, doing very well, with improve preop symptoms, with some postop dysphagia.    P>    Congratulated and reassured patient.  Swallowing difficulties are likely to improve over time.  - Internal PT order placed.    RTC 6 wks (10/6/20), with cervical ap-lat x-rays.          Dane Anderson MD    Orthopaedic Spine Surgery  Dept Orthopaedic Surgery, [facility]  [phone] office, [phone] pager  [email or web address]     stretcher

## 2021-08-20 DIAGNOSIS — I10 ESSENTIAL HYPERTENSION, BENIGN: ICD-10-CM

## 2021-08-20 DIAGNOSIS — E78.2 MIXED HYPERLIPIDEMIA: ICD-10-CM

## 2021-08-20 DIAGNOSIS — K22.719 BARRETT'S ESOPHAGUS WITH DYSPLASIA: ICD-10-CM

## 2021-08-20 DIAGNOSIS — F41.1 GAD (GENERALIZED ANXIETY DISORDER): ICD-10-CM

## 2021-08-20 RX ORDER — PROPRANOLOL HYDROCHLORIDE 40 MG/1
TABLET ORAL
Qty: 90 TABLET | Refills: 1 | COMMUNITY
Start: 2021-08-20

## 2021-08-20 RX ORDER — FENOFIBRATE 160 MG/1
160 TABLET ORAL DAILY
Qty: 90 TABLET | Refills: 1 | COMMUNITY
Start: 2021-08-20

## 2021-08-20 RX ORDER — OMEPRAZOLE 40 MG/1
40 CAPSULE, DELAYED RELEASE ORAL EVERY MORNING
Qty: 90 CAPSULE | Refills: 1 | COMMUNITY
Start: 2021-08-20

## 2021-08-20 NOTE — TELEPHONE ENCOUNTER
Last OV on 4/26/21 to F/U in 3-6 months     Refused Prescriptions:                       Disp   Refills    omeprazole (PRILOSEC) 40 MG DR capsule [Ph*90 cap*1        Sig: TAKE 1 CAPSULE (40 MG) BY MOUTH EVERY MORNING  Refused By: VALERIE CARRILLO  Reason for Refusal: Patient needs appointment    fenofibrate (TRIGLIDE/LOFIBRA) 160 MG tabl*90 tab*1        Sig: TAKE 1 TABLET (160 MG) BY MOUTH DAILY  Refused By: VALERIE CARRILLO  Reason for Refusal: Patient needs appointment    propranolol (INDERAL) 40 MG tablet [Pharma*90 tab*1        Sig: TAKE 1 TABLET BY MOUTH EVERY DAY  Refused By: VALERIE CARRILLO  Reason for Refusal: Patient needs appointment

## 2021-08-28 DIAGNOSIS — F41.1 GAD (GENERALIZED ANXIETY DISORDER): ICD-10-CM

## 2021-08-28 DIAGNOSIS — I10 ESSENTIAL HYPERTENSION, BENIGN: ICD-10-CM

## 2021-08-30 DIAGNOSIS — I10 ESSENTIAL HYPERTENSION, BENIGN: ICD-10-CM

## 2021-08-30 DIAGNOSIS — F41.1 GAD (GENERALIZED ANXIETY DISORDER): ICD-10-CM

## 2021-08-30 RX ORDER — PROPRANOLOL HYDROCHLORIDE 40 MG/1
TABLET ORAL
Qty: 90 TABLET | Refills: 1 | COMMUNITY
Start: 2021-08-30

## 2021-08-30 RX ORDER — PROPRANOLOL HYDROCHLORIDE 40 MG/1
40 TABLET ORAL DAILY PRN
COMMUNITY
Start: 2021-08-30

## 2021-08-30 NOTE — TELEPHONE ENCOUNTER
Received refill request for  Pending Prescriptions:                       Disp   Refills    propranolol (INDERAL) 40 MG tablet [Pharm*90 tab*1            Sig: TAKE 1 TABLET BY MOUTH EVERY DAY    Last filled and seen 4/26/2021 - should have fills on file.

## 2021-08-30 NOTE — TELEPHONE ENCOUNTER
Edvin Norotn is requesting a refill of:    Refused Prescriptions:                       Disp   Refills    propranolol (INDERAL) 40 MG tablet [Pharma*                Sig: Take 1 tablet (40 mg) by mouth daily as needed  Refused By: IFRAH PURVIS  Reason for Refusal: OTHER  Reason for Refusal Comment: Sent to Mail order on 4/26/21

## 2021-09-04 ENCOUNTER — HEALTH MAINTENANCE LETTER (OUTPATIENT)
Age: 66
End: 2021-09-04

## 2021-09-14 ENCOUNTER — TRANSFERRED RECORDS (OUTPATIENT)
Dept: FAMILY MEDICINE | Facility: CLINIC | Age: 66
End: 2021-09-14

## 2021-09-20 ENCOUNTER — TRANSFERRED RECORDS (OUTPATIENT)
Dept: FAMILY MEDICINE | Facility: CLINIC | Age: 66
End: 2021-09-20

## 2021-09-28 ENCOUNTER — TRANSFERRED RECORDS (OUTPATIENT)
Dept: FAMILY MEDICINE | Facility: CLINIC | Age: 66
End: 2021-09-28

## 2021-10-14 ENCOUNTER — HOSPITAL ENCOUNTER (EMERGENCY)
Facility: CLINIC | Age: 66
Discharge: HOME | End: 2021-10-14
Attending: EMERGENCY MEDICINE | Admitting: EMERGENCY MEDICINE
Payer: MEDICARE

## 2021-10-14 ENCOUNTER — APPOINTMENT (OUTPATIENT)
Dept: MRI IMAGING | Facility: CLINIC | Age: 66
End: 2021-10-14
Payer: MEDICARE

## 2021-10-14 VITALS
HEART RATE: 67 BPM | SYSTOLIC BLOOD PRESSURE: 153 MMHG | OXYGEN SATURATION: 97 % | RESPIRATION RATE: 18 BRPM | DIASTOLIC BLOOD PRESSURE: 100 MMHG | TEMPERATURE: 96.7 F

## 2021-10-14 DIAGNOSIS — M54.16 SPINAL STENOSIS OF LUMBAR REGION WITH RADICULOPATHY: ICD-10-CM

## 2021-10-14 DIAGNOSIS — E78.2 MIXED HYPERLIPIDEMIA: ICD-10-CM

## 2021-10-14 DIAGNOSIS — M48.061 SPINAL STENOSIS OF LUMBAR REGION WITH RADICULOPATHY: ICD-10-CM

## 2021-10-14 LAB
ANION GAP SERPL CALCULATED.3IONS-SCNC: 9 MMOL/L (ref 3–14)
BUN SERPL-MCNC: 17 MG/DL (ref 7–30)
CALCIUM SERPL-MCNC: 9.2 MG/DL (ref 8.5–10.1)
CHLORIDE BLD-SCNC: 105 MMOL/L (ref 94–109)
CO2 SERPL-SCNC: 24 MMOL/L (ref 20–32)
CREAT SERPL-MCNC: 0.91 MG/DL (ref 0.66–1.25)
GFR SERPL CREATININE-BSD FRML MDRD: 88 ML/MIN/1.73M2
GLUCOSE BLD-MCNC: 102 MG/DL (ref 70–99)
POTASSIUM BLD-SCNC: 3.8 MMOL/L (ref 3.4–5.3)
SODIUM SERPL-SCNC: 138 MMOL/L (ref 133–144)

## 2021-10-14 PROCEDURE — 36415 COLL VENOUS BLD VENIPUNCTURE: CPT | Performed by: STUDENT IN AN ORGANIZED HEALTH CARE EDUCATION/TRAINING PROGRAM

## 2021-10-14 PROCEDURE — 99284 EMERGENCY DEPT VISIT MOD MDM: CPT | Mod: 25

## 2021-10-14 PROCEDURE — G1004 CDSM NDSC: HCPCS

## 2021-10-14 PROCEDURE — 250N000013 HC RX MED GY IP 250 OP 250 PS 637: Performed by: STUDENT IN AN ORGANIZED HEALTH CARE EDUCATION/TRAINING PROGRAM

## 2021-10-14 PROCEDURE — 72148 MRI LUMBAR SPINE W/O DYE: CPT | Mod: MF

## 2021-10-14 PROCEDURE — 80048 BASIC METABOLIC PNL TOTAL CA: CPT | Performed by: STUDENT IN AN ORGANIZED HEALTH CARE EDUCATION/TRAINING PROGRAM

## 2021-10-14 RX ORDER — DIAZEPAM 5 MG
5 TABLET ORAL ONCE
Status: COMPLETED | OUTPATIENT
Start: 2021-10-14 | End: 2021-10-14

## 2021-10-14 RX ORDER — HYDROCODONE BITARTRATE AND ACETAMINOPHEN 5; 325 MG/1; MG/1
2 TABLET ORAL ONCE
Status: COMPLETED | OUTPATIENT
Start: 2021-10-14 | End: 2021-10-14

## 2021-10-14 RX ORDER — METHYLPREDNISOLONE 4 MG
4 TABLET, DOSE PACK ORAL DAILY
Qty: 21 TABLET | Refills: 0 | Status: SHIPPED | OUTPATIENT
Start: 2021-10-14 | End: 2021-10-21

## 2021-10-14 RX ORDER — ATORVASTATIN CALCIUM 40 MG/1
TABLET, FILM COATED ORAL
Qty: 90 TABLET | Refills: 0 | COMMUNITY
Start: 2021-10-14

## 2021-10-14 RX ADMIN — DIAZEPAM 5 MG: 5 TABLET ORAL at 20:08

## 2021-10-14 RX ADMIN — HYDROCODONE BITARTRATE AND ACETAMINOPHEN 2 TABLET: 5; 325 TABLET ORAL at 20:08

## 2021-10-14 ASSESSMENT — ENCOUNTER SYMPTOMS
PSYCHIATRIC NEGATIVE: 1
FEVER: 0
FACIAL ASYMMETRY: 0
WEAKNESS: 1
HEADACHES: 0
NUMBNESS: 1
BACK PAIN: 1
DIZZINESS: 0
LIGHT-HEADEDNESS: 0
SHORTNESS OF BREATH: 0
DIFFICULTY URINATING: 0
CHILLS: 0

## 2021-10-14 NOTE — ED PROVIDER NOTES
History     Chief Complaint:  Back Pain     HPI   Edvin Norton is a 66 year old male with history of spondylolisthesis of the thoracic region, cervical fusion, vitamin b12 deficiency, and chronic numbness and tingling who presents with right lower back pain that radiates down his right buttocks to his ankle that started today. Describes as throbbing, rated 10/10 in severity. Came into the ED due to pain and difficulty moving. On Monday (3 days ago), he noticed increasing tightening in his back while on a walk (4.5 miles with walking sticks). Denies urinary or bowel incontinence, fever, chills, or saddle anesthesia.     Review of Systems   Constitutional: Negative for chills and fever.   Respiratory: Negative for shortness of breath.    Cardiovascular: Negative for chest pain.   Genitourinary: Negative for difficulty urinating.   Musculoskeletal: Positive for back pain.   Neurological: Positive for weakness and numbness. Negative for dizziness, syncope, facial asymmetry, light-headedness and headaches.   Psychiatric/Behavioral: Negative.        Allergies:  The patient has no known allergies.     Medications:  Lipitor  Fenofibrate  Omeprazole  Propanolol  Zoloft  Aspirin 81 mg    Past Medical History:     Anxiety  Natarajan esophagus  Hypertension  Hyperlipidemia  Obesity  Vitamin D deficiency   Spondylolisthesis  Prolonged QT interval  Atherosclerosis  GERD  Cataracts    Past Surgical History:    Cataract surgery  Esophagus surgery x5  Finger tendon surgery  Cervical fusion  Bone graft  Upper GI endoscopy  Colonoscopy  EGD x3  Polypectomy    Family History:    Mother: hypertension, anxiety  Father: anxiety, lung cancer  Brother: esophageal cancer, psoriasis   Sister: anxiety, hyperlipidemia, brain aneurysm    Social History  Lives in two story with wife    Physical Exam     Patient Vitals for the past 24 hrs:   BP Temp Temp src Pulse Resp SpO2   10/14/21 2256 -- -- -- -- -- 97 %   10/14/21 2245 (!) 153/100 -- --  67 -- 95 %   10/14/21 2230 (!) 177/98 -- -- 61 -- 95 %   10/14/21 2217 (!) 182/100 -- -- 66 -- 98 %   10/14/21 2015 -- -- -- -- -- 97 %   10/14/21 1845 (!) 158/97 -- -- 68 -- 98 %   10/14/21 1752 (!) 186/118 (!) 96.7  F (35.9  C) Temporal 71 18 97 %       Physical Exam  Constitutional:       General: He is not in acute distress.     Appearance: Normal appearance. He is normal weight. He is not ill-appearing or diaphoretic.   HENT:      Head: Normocephalic and atraumatic.   Eyes:      Conjunctiva/sclera: Conjunctivae normal.   Cardiovascular:      Rate and Rhythm: Normal rate and regular rhythm.      Heart sounds: Normal heart sounds. No murmur heard.     Pulmonary:      Effort: Pulmonary effort is normal. No respiratory distress.      Breath sounds: Normal breath sounds. No wheezing.   Abdominal:      General: Bowel sounds are normal.      Palpations: Abdomen is soft.      Tenderness: There is no abdominal tenderness. There is no guarding.   Musculoskeletal:      Cervical back: Normal range of motion. No rigidity.      Comments: +Straight leg R; +Crossed straight leg   Neurological:      Mental Status: He is alert and oriented to person, place, and time. Mental status is at baseline.      Cranial Nerves: No cranial nerve deficit.      Sensory: No sensory deficit.      Motor: No weakness.      Coordination: Romberg sign positive. Coordination normal.   Psychiatric:         Mood and Affect: Mood normal.         Behavior: Behavior normal.         Thought Content: Thought content normal.         Judgment: Judgment normal.       Emergency Department Course     Imaging:  Lumbar spine MRI w/o contrast   Final Result   IMPRESSION:   1.  Alignment and vertebral body heights maintained.      2.  At the L3-L4 disc space level there is right lateral recess free fragment measuring 8 mm x 5 mm to right lateral recess narrowing but there is no significant neural foraminal narrowing. There is also mild canal compromise.      3.  At  the L4-L5 disc space level there is minimal canal compromise.        Report per radiology    Laboratory:  Labs Ordered and Resulted from Time of ED Arrival Up to the Time of Departure from the ED   BASIC METABOLIC PANEL - Abnormal; Notable for the following components:       Result Value    Glucose 102 (*)     All other components within normal limits        Emergency Department Course:  Reviewed:  I reviewed nursing notes, vitals, past medical history and Care Everywhere    Assessments:  1930 I obtained history and examined the patient as noted above.   2240 I rechecked the patient and explained findings.    Interventions:  2008 Norco 5-325 mg 2 tablet PO  2008 Valium 5 mg PO    Consults:  2237 Discussed case with Marzena Gonzalez with neurosurgery. No urgent intervention at this time. Recommending medrol dosepak, pain control, and follow in neurosurgery clinic.     Disposition:  The patient was discharged to home.     Impression & Plan     Medical Decision Making:  Edvin Norton is a 66 year old male who is here for back pain radiating down right leg. Likely spine related given patient history of positive straight leg. Differential includes spondylolisthesis, fracture, herniation, DJD, acute muscle strain, spinal stenosis. Plan for MRI lumbar and norco and valium for pain control.     MRI with L3-L4 disc space narrowing with a free floating fragment measuring 8 mm x 5 mm. Discussed case with neurosurgery: urgent surgical intervention not indicated. Recommend medrol dosepak, pain management, and follow appointment in clinic. Plan to discharge patient home with medrol, norco, and follow up with neurosurgery. Patient able to sleep in the first floor of his home will he is feeling more unsteady on his feet.    Diagnosis:    ICD-10-CM    1. Spinal stenosis of lumbar region with radiculopathy  M48.061     M54.16        Discharge Medications:  New Prescriptions    METHYLPREDNISOLONE (MEDROL DOSEPAK) 4 MG TABLET THERAPY  PACK    Take 1 tablet (4 mg) by mouth daily Follow Package Directions       Scribe Disclosure:  I, Shruti Terrance, am serving as a scribe at 6:52 PM on 10/14/2021 to document services personally performed by Dylan Cantu MD and Trish Jerry MD based on my observations and the provider's statements to me.      Trish Jerry MD  Resident  10/14/21 2320       Trish Jerry MD  Resident  10/14/21 8018

## 2021-10-14 NOTE — TELEPHONE ENCOUNTER
Received refill request for  Pending Prescriptions:                       Disp   Refills    atorvastatin (LIPITOR) 40 MG tablet [Phar*90 tab*0            Sig: TAKE 1 TABLET BY MOUTH EVERY DAY    Pt needs fasting OV for refills

## 2021-10-14 NOTE — ED TRIAGE NOTES
Pt arrives with c/o right sided low back pain. Pt reports he has a history of back pain but nothing like this. ABCs intact.

## 2021-10-15 NOTE — PROGRESS NOTES
Conacted regadring 65 yo male presenting with low back/right leg pain. Right L3-L4 HNP on MRI                                                                  IMPRESSION:  1.  Alignment and vertebral body heights maintained.     2.  At the L3-L4 disc space level there is right lateral recess free fragment measuring 8 mm x 5 mm to right lateral recess narrowing but there is no significant neural foraminal narrowing. There is also mild canal compromise.     3.  At the L4-L5 disc space level there is minimal canal compromise    RECOMMENDATIONS:  Okay to discharge from ER   Medrol dose pack.  NS office appt scheduled for next Tuesday Oct. 19.    KEVIN Trejo  [facility] Neurosurgery  [address] [address]    Tel [phone]  Pager [phone]

## 2021-10-18 ENCOUNTER — CARE COORDINATION (OUTPATIENT)
Dept: FAMILY MEDICINE | Facility: CLINIC | Age: 66
End: 2021-10-18

## 2021-10-18 NOTE — PROGRESS NOTES
Care Coordination Initial Assessment    The patient was seen at [facility] on 10/14/2021 for spinal stenosis of lumbar region with radiculopathy. He was discharged with instructions to follow up with PCP.    PCP: Woody Cintron    Referral Source:  ED/IP List    Utilization:   Last PCP Appt.: 4/26/21  Upcoming Appt.: Has appt with Dr. Booker     Health Maintenance Reviewed: Yes    Current Medical Health Concerns:  See patients current medical problem list.     Patient/Caregiver Understanding: YEs    Medication Management:   Patient has understanding of regimen and is adherent:  Yes    Functional Status:   Independent with all ADL/IADL's    Current Behavioral Health Concerns:   No concerns    Patient/Caregiver Understanding:  Yes    Psychosocial:  No concerns    Gaps:    N/A    Resources Given:    N/A    Plan:   The patient was able to get scheduled with Dr. Booker on 10/21/2021.

## 2021-10-19 ENCOUNTER — OFFICE VISIT (OUTPATIENT)
Dept: NEUROSURGERY | Facility: CLINIC | Age: 66
End: 2021-10-19
Attending: PHYSICIAN ASSISTANT
Payer: MEDICARE

## 2021-10-19 VITALS — HEART RATE: 75 BPM | OXYGEN SATURATION: 100 % | DIASTOLIC BLOOD PRESSURE: 90 MMHG | SYSTOLIC BLOOD PRESSURE: 130 MMHG

## 2021-10-19 DIAGNOSIS — M51.26 HERNIATED NUCLEUS PULPOSUS, L3-4: Primary | ICD-10-CM

## 2021-10-19 PROCEDURE — 99203 OFFICE O/P NEW LOW 30 MIN: CPT | Performed by: PHYSICIAN ASSISTANT

## 2021-10-19 PROCEDURE — G0463 HOSPITAL OUTPT CLINIC VISIT: HCPCS

## 2021-10-19 ASSESSMENT — PAIN SCALES - GENERAL: PAINLEVEL: SEVERE PAIN (6)

## 2021-10-19 NOTE — PROGRESS NOTES
NEUROSURGERY CLINIC CONSULT NOTE     DATE OF VISIT: 10/19/2021     SUBJECTIVE:     Edvin Norton is a pleasant 66 year old male who presents to the clinic today for consultation on lumbar spine pain with right lower extremity radiculopathy. Pertinent medical history consists of an 4-7 ACDF performed by Dr. Anderson on 07/10/2021 for myelopathy.   Today, he describes a constant dull, aching pain that initiates in the right low lumbar region and radiates distally in what sounds like the right L4 distribution. This pain is accompanied by paresthesia and perceived weakness in the same distribution. Prolonged walking and sitting seems to aggravate the symptoms, while alleviation is obtained by standing and positional changes. No mechanism of injury such as trauma or a fall is associated with the onset of the pain. There are no bowel or bladder changes. He denies saddle anesthesia. He denies changes in gait, instability, or falling episodes. He has participated in conservative therapies to include physical therapy, NSAIDs, a Medrol Dosepak and L5-S1 nerve root injections. None of these modalities have provided any significant long term relief.          Current Outpatient Medications:      acetaminophen (TYLENOL) 325 MG tablet, Take 2 tablets (650 mg) by mouth every 4 hours as needed for mild pain Max tylenol 4 gm per day, Disp: 50 tablet, Rfl: 0     ASPIRIN PO, Take 81 mg by mouth every morning , Disp: , Rfl:      atorvastatin (LIPITOR) 40 MG tablet, TAKE 1 TABLET BY MOUTH EVERY DAY, Disp: 90 tablet, Rfl: 0     B Complex-Folic Acid (SUPER B COMPLEX MAXI) TABS, Take 1 tablet by mouth daily, Disp: 90 tablet, Rfl: 3     fenofibrate (TRIGLIDE/LOFIBRA) 160 MG tablet, Take 1 tablet (160 mg) by mouth daily, Disp: 90 tablet, Rfl: 1     L-Methylfolate-B6-B12 (FOLTX) 1.13-25-2 MG TABS, Take 1 Dose by mouth daily, Disp: 90 tablet, Rfl: 1     methylPREDNISolone (MEDROL DOSEPAK) 4 MG tablet therapy pack, Take 1 tablet (4 mg) by  mouth daily Follow Package Directions, Disp: 21 tablet, Rfl: 0     Omega-3 Fatty Acids (OMEGA-3 FISH OIL PO), Take 1 g by mouth every morning , Disp: , Rfl:      omeprazole (PRILOSEC) 40 MG DR capsule, Take 1 capsule (40 mg) by mouth every morning, Disp: 90 capsule, Rfl: 1     propranolol ER (INDERAL LA) 60 MG 24 hr capsule, Take 1 capsule (60 mg) by mouth daily, Disp: 90 capsule, Rfl: 1     sertraline (ZOLOFT) 100 MG tablet, Take 1.5 tablets (150 mg) by mouth daily, Disp: 135 tablet, Rfl: 1     thiamine (B-1) 100 MG tablet, Take 1 tablet (100 mg) by mouth daily, Disp: 14 tablet, Rfl: 0     No Known Allergies     Past Medical History:   Diagnosis Date     Anxiety state 9/26/2013     Problem list name updated by automated process. 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     Essential hypertension, benign (HTN) 10/27/2014     Hyperlipidemia 1995     Obesity due to excess calories, unspecified obesity severity 3/24/2016        ROS: 10 point review of symptoms are negative other than the symptoms noted above in the HPI.     Family History has been reviewed with the patient, there are no pertinent findings to presenting concern.     Past Surgical History:   Procedure Laterality Date     CATARACT IOL, RT/LT       ESOPHAGUS SURGERY      Halo procedure x 5     FINGER SURGERY      tendon     FUSION CERVICAL ANTERIOR THREE+ LEVELS N/A 7/10/2020    Procedure: Anterior cervical diskectomy and fusion with anterior plate fixation cervical 4-7;  Surgeon: Dane Anderson MD;  Location: [facility] OR     GRAFT BONE FROM ILIAC CREST Right 7/10/2020    Procedure: Right Anterior iliac crest bone graft harvest;  Surgeon: Dane Anderson MD;  Location: [facility] OR     UPPER GI ENDOSCOPY          Social History     Tobacco Use     Smoking status: Never Smoker     Smokeless tobacco: Former User     Types: Chew   Substance Use Topics     Alcohol use: Yes     Alcohol/week: 14.0 standard drinks     Types: 14 Standard drinks  or equivalent per week     Drug use: No        OBJECTIVE:   BP (!) 130/90   Pulse 75   SpO2 100%    There is no height or weight on file to calculate BMI.     Imaging:     MR LUMBAR SPINE W/O CONTRAST - 10/14/2021 9:13 PM    Findings, per radiologist read, notable for:      1.  Alignment and vertebral body heights maintained.  2.  At the L3-L4 disc space level there is right lateral recess free fragment measuring 8 mm x 5 mm to right lateral recess narrowing but there is no significant neural foraminal narrowing. There is also mild canal compromise.  3.  At the L4-L5 disc space level there is minimal canal compromise.    Full radiological report in chart. Imaging was reviewed with with patient today.     Exam:     Patient appears comfortable, conversational, and in no apparent distress.   Head: Normocephalic, without obvious abnormality, atraumatic, no facial asymmetry.   Eyes: conjunctivae/corneas clear. PERRL, EOM's intact.   Throat: lips, mucosa, and tongue normal; teeth and gums normal.   Neck: supple, symmetrical, trachea midline, no adenopathy and thyroid: not enlarged, symmetric, no tenderness/mass/nodules.   Lungs: clear to auscultation bilaterally.   Heart: regular rate and rhythm.   Abdomen: soft, non-tender; bowel sounds normal; no masses, no organomegaly.   Pulses: 2+ and symmetric.   Skin: Skin color, texture, turgor normal. No rashes or lesions.     CN II-XII grossly intact, alert and appropriate with conversation and following commands.   Gait is non-antalgic, but unsteady. Unable to walk on toes and heels without difficulty.   Cervical spine is non tender to palpation. Appropriate range of motion of neck, not concerning for lhermitte's phenomenon.   Bilateral bicep 2/4 and tricep reflexes 1/4. Sensation intact throughout upper extremities.     UE muscle strength  Right  Left    Deltoid  5/5  5/5    Biceps  5/5  5/5    Triceps  5/5  5/5    Hand intrinsics  5/5  5/5    Hand grasp  5/5  5/5    Finn  signs  neg  neg      Lumbar spine is tender to palpation at the right L4 region.  Intact sensation throughout lower extremities.   Bilateral patellar 1/4 and achilles reflex 1/4. Negative for pain with straight leg raise.     LE muscle strength  Right  Left    Iliopsoas (hip flexion)  5/5  5/5    Quad (knee extension)  5-/5  5/5    Hamstring (knee flexion)  5/5  5/5    Gastrocnemius (PF)  5-/5  5/5    Tibialis Ant. (DF)  5-/5  5/5    EHL  5/5  5/5      Negative Babinski bilaterally. Negative for clonus.   Calves are soft and non-tender bilaterally.     ASSESSMENT/PLAN:     Edvin Norton is a 66 year old male who presents to the clinic for consultation on a constant dull, aching pain that initiates in the right low lumbar region and radiates distally in what sounds like the right L4 distribution. This pain is accompanied by paresthesia and perceived weakness in the same distribution. The patient's most recent imaging was reviewed with him today. It was explained that images show, at the L3-L4 disc space level, there is right lateral recess free fragment measuring 8 mm x 5 mm to right lateral recess narrowing but there is no significant neural foraminal narrowing. There is also mild canal compromise. which correlates to today's clinical examination. On exam, he is noted to have slightly diminished right lower extremity strength, but appropriate sensation and range of motion. He has attempted conservative management with physical therapy, NSAIDs, a Medrol Dosepak and L5-S1 nerve root injections, without resolution of symptoms.     Mr. Norton is not interested in any type of surgical intervention at this time.     Based on this, we feel that it would be in his best interest to continue a conservative approach by participating in a program initiated by our colleagues in physical therapy. He did inquire about possible treatment options that they may consider so we briefly discussed core stretching and strengthening  exercises in conjunction with lumbar traction as possibilities. He prefers going to [facility].    We also discussed the possibility of obtaining an epidural steroid injection, which he would to proceed with. Referral was sent to Dr. Camarillo.    We would like to see him back as needed to further discuss other conservative options or possible surgical interventions.    We did review the images together and we explained his condition and the recommended treatment. We also discussed signs of a worsening problem that he should seek being evaluated.           Respectfully,     KEVIN Booth, PABLAZE  [facility] Neurosurgery  [facility]  Tel: [phone]      Exam, imaging, and plan reviewed by Dr. Rojas.

## 2021-10-19 NOTE — LETTER
10/19/2021         RE: Edvin Norton  [id number] [address] [facility] [id number]        Dear Colleague,    Thank you for referring your patient, Edvin Norton, to the [facility] NEUROS[facility]GERY [facility]INIC [place]. Please see a copy of my visit note below.    NEUROSURGERY CLINIC CONSULT NOTE     DATE OF VISIT: 10/19/2021     SUBJECTIVE:     Edvin Norton is a pleasant 66 year old male who presents to the clinic today for consultation on lumbar spine pain with right lower extremity radiculopathy. Pertinent medical history consists of an 4-7 ACDF performed by Dr. Anderson on 07/10/2021 for myelopathy.   Today, he describes a constant dull, aching pain that initiates in the right low lumbar region and radiates distally in what sounds like the right L4 distribution. This pain is accompanied by paresthesia and perceived weakness in the same distribution. Prolonged walking and sitting seems to aggravate the symptoms, while alleviation is obtained by standing and positional changes. No mechanism of injury such as trauma or a fall is associated with the onset of the pain. There are no bowel or bladder changes. He denies saddle anesthesia. He denies changes in gait, instability, or falling episodes. He has participated in conservative therapies to include physical therapy, NSAIDs, a Medrol Dosepak and L5-S1 nerve root injections. None of these modalities have provided any significant long term relief.          Current Outpatient Medications:      acetaminophen (TYLENOL) 325 MG tablet, Take 2 tablets (650 mg) by mouth every 4 hours as needed for mild pain Max tylenol 4 gm per day, Disp: 50 tablet, Rfl: 0     ASPIRIN PO, Take 81 mg by mouth every morning , Disp: , Rfl:      atorvastatin (LIPITOR) 40 MG tablet, TAKE 1 TABLET BY MOUTH EVERY DAY, Disp: 90 tablet, Rfl: 0     B Complex-Folic Acid (SUPER B COMPLEX MAXI) TABS, Take 1 tablet by mouth daily, Disp: 90 tablet, Rfl: 3     fenofibrate  (TRIGLIDE/LOFIBRA) 160 MG tablet, Take 1 tablet (160 mg) by mouth daily, Disp: 90 tablet, Rfl: 1     L-Methylfolate-B6-B12 (FOLTX) 1.13-25-2 MG TABS, Take 1 Dose by mouth daily, Disp: 90 tablet, Rfl: 1     methylPREDNISolone (MEDROL DOSEPAK) 4 MG tablet therapy pack, Take 1 tablet (4 mg) by mouth daily Follow Package Directions, Disp: 21 tablet, Rfl: 0     Omega-3 Fatty Acids (OMEGA-3 FISH OIL PO), Take 1 g by mouth every morning , Disp: , Rfl:      omeprazole (PRILOSEC) 40 MG DR capsule, Take 1 capsule (40 mg) by mouth every morning, Disp: 90 capsule, Rfl: 1     propranolol ER (INDERAL LA) 60 MG 24 hr capsule, Take 1 capsule (60 mg) by mouth daily, Disp: 90 capsule, Rfl: 1     sertraline (ZOLOFT) 100 MG tablet, Take 1.5 tablets (150 mg) by mouth daily, Disp: 135 tablet, Rfl: 1     thiamine (B-1) 100 MG tablet, Take 1 tablet (100 mg) by mouth daily, Disp: 14 tablet, Rfl: 0     No Known Allergies     Past Medical History:   Diagnosis Date     Anxiety state 9/26/2013     Problem list name updated by automated process. 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     Essential hypertension, benign (HTN) 10/27/2014     Hyperlipidemia 1995     Obesity due to excess calories, unspecified obesity severity 3/24/2016        ROS: 10 point review of symptoms are negative other than the symptoms noted above in the HPI.     Family History has been reviewed with the patient, there are no pertinent findings to presenting concern.     Past Surgical History:   Procedure Laterality Date     CATARACT IOL, RT/LT       ESOPHAGUS SURGERY      Halo procedure x 5     FINGER SURGERY      tendon     FUSION CERVICAL ANTERIOR THREE+ LEVELS N/A 7/10/2020    Procedure: Anterior cervical diskectomy and fusion with anterior plate fixation cervical 4-7;  Surgeon: Dane Anderson MD;  Location: [facility] OR     GRAFT BONE FROM ILIAC CREST Right 7/10/2020    Procedure: Right Anterior iliac crest bone graft harvest;  Surgeon: Justin  Dane Helms MD;  Location: [facility] OR     UPPER GI ENDOSCOPY          Social History     Tobacco Use     Smoking status: Never Smoker     Smokeless tobacco: Former User     Types: Chew   Substance Use Topics     Alcohol use: Yes     Alcohol/week: 14.0 standard drinks     Types: 14 Standard drinks or equivalent per week     Drug use: No        OBJECTIVE:   BP (!) 130/90   Pulse 75   SpO2 100%    There is no height or weight on file to calculate BMI.     Imaging:     MR LUMBAR SPINE W/O CONTRAST - 10/14/2021 9:13 PM    Findings, per radiologist read, notable for:      1.  Alignment and vertebral body heights maintained.  2.  At the L3-L4 disc space level there is right lateral recess free fragment measuring 8 mm x 5 mm to right lateral recess narrowing but there is no significant neural foraminal narrowing. There is also mild canal compromise.  3.  At the L4-L5 disc space level there is minimal canal compromise.    Full radiological report in chart. Imaging was reviewed with with patient today.     Exam:     Patient appears comfortable, conversational, and in no apparent distress.   Head: Normocephalic, without obvious abnormality, atraumatic, no facial asymmetry.   Eyes: conjunctivae/corneas clear. PERRL, EOM's intact.   Throat: lips, mucosa, and tongue normal; teeth and gums normal.   Neck: supple, symmetrical, trachea midline, no adenopathy and thyroid: not enlarged, symmetric, no tenderness/mass/nodules.   Lungs: clear to auscultation bilaterally.   Heart: regular rate and rhythm.   Abdomen: soft, non-tender; bowel sounds normal; no masses, no organomegaly.   Pulses: 2+ and symmetric.   Skin: Skin color, texture, turgor normal. No rashes or lesions.     CN II-XII grossly intact, alert and appropriate with conversation and following commands.   Gait is non-antalgic, but unsteady. Unable to walk on toes and heels without difficulty.   Cervical spine is non tender to palpation. Appropriate range of motion of neck,  not concerning for lhermitte's phenomenon.   Bilateral bicep 2/4 and tricep reflexes 1/4. Sensation intact throughout upper extremities.     UE muscle strength  Right  Left    Deltoid  5/5  5/5    Biceps  5/5  5/5    Triceps  5/5  5/5    Hand intrinsics  5/5  5/5    Hand grasp  5/5  5/5    Finn signs  neg  neg      Lumbar spine is tender to palpation at the right L4 region.  Intact sensation throughout lower extremities.   Bilateral patellar 1/4 and achilles reflex 1/4. Negative for pain with straight leg raise.     LE muscle strength  Right  Left    Iliopsoas (hip flexion)  5/5  5/5    Quad (knee extension)  5-/5  5/5    Hamstring (knee flexion)  5/5  5/5    Gastrocnemius (PF)  5-/5  5/5    Tibialis Ant. (DF)  5-/5  5/5    EHL  5/5  5/5      Negative Babinski bilaterally. Negative for clonus.   Calves are soft and non-tender bilaterally.     ASSESSMENT/PLAN:     Edvin Norton is a 66 year old male who presents to the clinic for consultation on a constant dull, aching pain that initiates in the right low lumbar region and radiates distally in what sounds like the right L4 distribution. This pain is accompanied by paresthesia and perceived weakness in the same distribution. The patient's most recent imaging was reviewed with him today. It was explained that images show, at the L3-L4 disc space level, there is right lateral recess free fragment measuring 8 mm x 5 mm to right lateral recess narrowing but there is no significant neural foraminal narrowing. There is also mild canal compromise. which correlates to today's clinical examination. On exam, he is noted to have slightly diminished right lower extremity strength, but appropriate sensation and range of motion. He has attempted conservative management with physical therapy, NSAIDs, a Medrol Dosepak and L5-S1 nerve root injections, without resolution of symptoms.     Mr. Norton is not interested in any type of surgical intervention at this time.     Based on  this, we feel that it would be in his best interest to continue a conservative approach by participating in a program initiated by our colleagues in physical therapy. He did inquire about possible treatment options that they may consider so we briefly discussed core stretching and strengthening exercises in conjunction with lumbar traction as possibilities. He prefers going to [facility].    We also discussed the possibility of obtaining an epidural steroid injection, which he would to proceed with. Referral was sent to Dr. Camarillo.    We would like to see him back as needed to further discuss other conservative options or possible surgical interventions.    We did review the images together and we explained his condition and the recommended treatment. We also discussed signs of a worsening problem that he should seek being evaluated.           Respectfully,     KEVIN Booth PA-C  [facility]  [facility]  Tel: [phone]      Exam, imaging, and plan reviewed by Dr. Rojas.       Again, thank you for allowing me to participate in the care of your patient.        Sincerely,        Larry Monroy PA-C

## 2021-10-21 ENCOUNTER — OFFICE VISIT (OUTPATIENT)
Dept: FAMILY MEDICINE | Facility: CLINIC | Age: 66
End: 2021-10-21

## 2021-10-21 VITALS
HEART RATE: 78 BPM | SYSTOLIC BLOOD PRESSURE: 122 MMHG | RESPIRATION RATE: 20 BRPM | HEIGHT: 68 IN | BODY MASS INDEX: 32.49 KG/M2 | TEMPERATURE: 97.9 F | WEIGHT: 214.4 LBS | DIASTOLIC BLOOD PRESSURE: 82 MMHG | OXYGEN SATURATION: 96 %

## 2021-10-21 DIAGNOSIS — F41.1 GAD (GENERALIZED ANXIETY DISORDER): ICD-10-CM

## 2021-10-21 DIAGNOSIS — Z79.899 ENCOUNTER FOR LONG-TERM (CURRENT) USE OF MEDICATIONS: ICD-10-CM

## 2021-10-21 DIAGNOSIS — M48.02 CERVICAL STENOSIS OF SPINE: ICD-10-CM

## 2021-10-21 DIAGNOSIS — I10 ESSENTIAL HYPERTENSION, BENIGN: ICD-10-CM

## 2021-10-21 DIAGNOSIS — E66.811 OBESITY (BMI 30.0-34.9): ICD-10-CM

## 2021-10-21 DIAGNOSIS — G47.33 OBSTRUCTIVE SLEEP APNEA SYNDROME: ICD-10-CM

## 2021-10-21 DIAGNOSIS — E78.2 MIXED HYPERLIPIDEMIA: ICD-10-CM

## 2021-10-21 DIAGNOSIS — M51.26 LUMBAR DISC HERNIATION: Primary | ICD-10-CM

## 2021-10-21 DIAGNOSIS — K22.719 BARRETT'S ESOPHAGUS WITH DYSPLASIA: ICD-10-CM

## 2021-10-21 PROBLEM — K64.9 HEMORRHOIDS: Status: ACTIVE | Noted: 2021-03-12

## 2021-10-21 PROCEDURE — 99215 OFFICE O/P EST HI 40 MIN: CPT | Performed by: FAMILY MEDICINE

## 2021-10-21 RX ORDER — ATORVASTATIN CALCIUM 40 MG/1
40 TABLET, FILM COATED ORAL DAILY
Qty: 30 TABLET | Refills: 0 | Status: SHIPPED | OUTPATIENT
Start: 2021-10-21 | End: 2021-11-24

## 2021-10-21 RX ORDER — FENOFIBRATE 160 MG/1
160 TABLET ORAL DAILY
Qty: 30 TABLET | Refills: 0 | Status: SHIPPED | OUTPATIENT
Start: 2021-10-21 | End: 2021-11-24

## 2021-10-21 RX ORDER — SERTRALINE HYDROCHLORIDE 100 MG/1
150 TABLET, FILM COATED ORAL DAILY
Qty: 45 TABLET | Refills: 0 | Status: SHIPPED | OUTPATIENT
Start: 2021-10-21 | End: 2021-11-24

## 2021-10-21 RX ORDER — PROPRANOLOL HCL 60 MG
60 CAPSULE, EXTENDED RELEASE 24HR ORAL DAILY
Qty: 30 CAPSULE | Refills: 0 | Status: SHIPPED | OUTPATIENT
Start: 2021-10-21 | End: 2022-01-03

## 2021-10-21 ASSESSMENT — ANXIETY QUESTIONNAIRES
2. NOT BEING ABLE TO STOP OR CONTROL WORRYING: NOT AT ALL
6. BECOMING EASILY ANNOYED OR IRRITABLE: MORE THAN HALF THE DAYS
3. WORRYING TOO MUCH ABOUT DIFFERENT THINGS: NOT AT ALL
5. BEING SO RESTLESS THAT IT IS HARD TO SIT STILL: NOT AT ALL
1. FEELING NERVOUS, ANXIOUS, OR ON EDGE: NOT AT ALL
IF YOU CHECKED OFF ANY PROBLEMS ON THIS QUESTIONNAIRE, HOW DIFFICULT HAVE THESE PROBLEMS MADE IT FOR YOU TO DO YOUR WORK, TAKE CARE OF THINGS AT HOME, OR GET ALONG WITH OTHER PEOPLE: NOT DIFFICULT AT ALL
GAD7 TOTAL SCORE: 2
7. FEELING AFRAID AS IF SOMETHING AWFUL MIGHT HAPPEN: NOT AT ALL

## 2021-10-21 ASSESSMENT — MIFFLIN-ST. JEOR: SCORE: 1732.01

## 2021-10-21 ASSESSMENT — PATIENT HEALTH QUESTIONNAIRE - PHQ9
SUM OF ALL RESPONSES TO PHQ QUESTIONS 1-9: 5
5. POOR APPETITE OR OVEREATING: NOT AT ALL

## 2021-10-21 NOTE — PROGRESS NOTES
3 issues    1. ER visit/ doing PT with [facility] for lumbar disc    Needs cholesterol, blood pressure and anxiety medications refilled.  See 2 other notes    Assessment & Plan     (M51.26) Lumbar disc herniation  (primary encounter diagnosis)  Comment: reviewed neurosurgery plan  Plan: Back to PT    (M48.02) Cervical stenosis of spine  Plan: I have reviewed the patient's medical history in detail and updated the computerized patient record.      (E78.2) Mixed hyperlipidemia  Comment: return for fasting labs  Plan: Lipid Panel (BFP), fenofibrate         (TRIGLIDE/LOFIBRA) 160 MG tablet, atorvastatin         (LIPITOR) 40 MG tablet        1)  Medication: continue current medication regimen unchanged  2)  Low fat, low cholesterol diet  3)  Regular aerobic exercise  4)  Recheck in 1 month, sooner should new symptoms or   problems arise.    Patient Education: Reviewed risks of elevated lipids and principles   of treatment.        (I10) Essential hypertension, benign (HTN)  Plan: Comprehensive Metobolic Panel (BFP),         propranolol ER (INDERAL LA) 60 MG 24 hr capsule        1)  Medication: continue current medication regimen unchanged  2)  Dietary sodium restriction  3)  Regular aerobic exercise  4)  Recheck in 1 month, sooner should new symptoms or   problems arise.    Patient Education: Reviewed risks of hypertension and principles of   treatment.        (F41.1) JORGE (generalized anxiety disorder)  Comment: see note below  Plan: sertraline (ZOLOFT) 100 MG tablet        I've explained to him that drugs of the SSRI class can have side effects such as weight gain, sexual dysfunction, insomnia, headache, nausea. These medications are generally effective at alleviating symptoms of anxiety and/or depression. Let me know if significant side effects do occur.      (K22.719) Natarajan's esophagus with dysplasia  Plan: HEMOGRAM PLATELET DIFF (BFP)        I have reviewed the patient's medical history in detail and updated the  "computerized patient record.   to YANNI LINDSAY    (Z79.899) Encounter for long-term (current) use of medications  Plan: Lipid Panel (BFP), Comprehensive Metobolic         Panel (BFP), HEMOGRAM PLATELET DIFF (BFP)            (G47.33) Obstructive sleep apnea syndrome  Plan: I have reviewed the patient's medical history in detail and updated the computerized patient record.      Review of prior external note(s) from - ER  40 minutes spent on the date of the encounter doing chart review, history and exam, documentation and further activities per the note       BMI:   Estimated body mass index is 32.6 kg/m  as calculated from the following:    Height as of this encounter: 1.727 m (5' 8\").    Weight as of this encounter: 97.3 kg (214 lb 6.4 oz).   Weight management plan: Discussed healthy diet and exercise guidelines    CONSULTATION/REFERRAL to Sleep MD/ PT for back  FUTURE LABS:       - Schedule a fasting blood draw next month  SELF MONITORING:       - Please check blood pressure readings several times per week       - check weight  Work on weight loss  Regular exercise  Recheck with YANNI Quintana MD  [facility]    Hai Pardo is a 66 year old who presents for the following health issues     HPI recent ER visit with MRI of the lumbar spine showing a L3-L4 free fragment right side/ see neurosurgery note and plan    ED/UC Followup:    Facility:  [facility]  Date of visit: 10/14/21  Reason for visit: Low back pain radiating down buttocks and legs   Current Status: Pain is under control, started doing PT yesterday            Review of Systems   Constitutional, HEENT, cardiovascular, pulmonary, gi and gu systems are negative, except as otherwise noted.      Objective    /82 (BP Location: Left arm, Patient Position: Sitting, Cuff Size: Adult Large)   Pulse 78   Temp 97.9  F (36.6  C) (Temporal)   Ht 1.727 m (5' 8\")   Wt 97.3 kg (214 lb 6.4 oz)   SpO2 96%   BMI 32.60 kg/m  "   Body mass index is 32.6 kg/m .  Physical Exam   GENERAL: healthy, alert and no distress  NECK: no adenopathy, no asymmetry, masses, or scars and thyroid normal to palpation  RESP: lungs clear to auscultation - no rales, rhonchi or wheezes  CV: regular rate and rhythm, normal S1 S2, no S3 or S4, no murmur, click or rub, no peripheral edema and peripheral pulses strong  ABDOMEN: soft, nontender, no hepatosplenomegaly, no masses and bowel sounds normal  MS: no gross musculoskeletal defects noted, no edema    MRI reviewed              2.   SUBJECTIVE:  Edvin Norton is an 66 year old male who presents for evaluation of   Hyperlipidemia and hypertension. He has been diagnosed in the past as having   combined hyperlipidemia. He indicates that he is feeling well   and denies any symptoms of cardiovascular disease. Specifically   denies chest pain, palpitations, dyspnea, orthopnea, PND,   claudication or peripheral edema. Treatment modalities employed to   this point include diet, regular aerobic exercise, Lipitor and fenofibrate. Current medication   regimen is as listed below. Patient denies any side effects of   medication.    Family history: positive for hypertension, cardiovascular disease and elevated cholesterol  Age at diagnosis of hyperlipidemia: early 50's, performance anxiety 2013 and hypertension at same time  Cardiovascular risk factors: previous smoker, family history, lipids, hypertension, obesity, sedentary life style and obstructive sleep apnea    Current Outpatient Medications   Medication     acetaminophen (TYLENOL) 325 MG tablet     ASPIRIN PO     atorvastatin (LIPITOR) 40 MG tablet     B Complex-Folic Acid (SUPER B COMPLEX MAXI) TABS     fenofibrate (TRIGLIDE/LOFIBRA) 160 MG tablet     L-Methylfolate-B6-B12 (FOLTX) 1.13-25-2 MG TABS     Omega-3 Fatty Acids (OMEGA-3 FISH OIL PO)     omeprazole (PRILOSEC) 40 MG DR capsule     propranolol ER (INDERAL LA) 60 MG 24 hr capsule     sertraline (ZOLOFT) 100  "MG tablet     thiamine (B-1) 100 MG tablet     No current facility-administered medications for this visit.     No Known Allergies    Social History     Tobacco Use     Smoking status: Never Smoker     Smokeless tobacco: Former User     Types: Chew   Substance Use Topics     Alcohol use: Yes     Alcohol/week: 14.0 standard drinks     Types: 14 Standard drinks or equivalent per week       OBJECTIVE:  /82 (BP Location: Left arm, Patient Position: Sitting, Cuff Size: Adult Large)   Pulse 78   Temp 97.9  F (36.6  C) (Temporal)   Ht 1.727 m (5' 8\")   Wt 97.3 kg (214 lb 6.4 oz)   SpO2 96%   BMI 32.60 kg/m    Repeat BP R arm seated = 122/82  with X-large size cuff.  Skin: negative  Fundi: deferred  Lungs: negative, Percussion normal. Good diaphragmatic excursion. Lungs clear  Heart: negative, PMI normal. No lifts, heaves, or thrills. RRR. No murmurs, clicks gallops or rub  Peripheral pulses: radial=4/4, femoral=4/4, popliteal=4/4, dorsalis pedis=4/4,    3.   SUBJECTIVE:  Edvin Norton is an 66 year old male who presents for follow-up   of anxiety disorder with mild depressive symptoms.  Initially evaluated 2013.  However used Xanax and propranolol for performance anxiety for work.  Notes from that visit reviewed.  Current symptoms include   none. Symptoms that have subjectively improved include diminished interest or pleasure in activities, decreased appetite, psychomotor agitation (restless and /or feeling on edge), fatigue, difficulty with concentration, anxiety, irritablility.  Previous and current treatment modalities   employed include individual therapy and medication(s) Zoloft (sertraline) and Xanax/propranolol.     Organic causes of depression present: strong family history mother,father and sister    Current Outpatient Medications   Medication     acetaminophen (TYLENOL) 325 MG tablet     ASPIRIN PO     atorvastatin (LIPITOR) 40 MG tablet     B Complex-Folic Acid (SUPER B COMPLEX MAXI) TABS     " "fenofibrate (TRIGLIDE/LOFIBRA) 160 MG tablet     L-Methylfolate-B6-B12 (FOLTX) 1.13-25-2 MG TABS     Omega-3 Fatty Acids (OMEGA-3 FISH OIL PO)     omeprazole (PRILOSEC) 40 MG DR capsule     propranolol ER (INDERAL LA) 60 MG 24 hr capsule     sertraline (ZOLOFT) 100 MG tablet     thiamine (B-1) 100 MG tablet     No current facility-administered medications for this visit.     No Known Allergies  Side effects of medication: none    Social History     Tobacco Use     Smoking status: Never Smoker     Smokeless tobacco: Former User     Types: Chew   Substance Use Topics     Alcohol use: Yes     Alcohol/week: 14.0 standard drinks     Types: 14 Standard drinks or equivalent per week         Neurologic: lumbar back pain/ disc  Psychiatric: excessive stress-long time figuring out/ PT going well  Endocrine: negative    OBJECTIVE:  /82 (BP Location: Left arm, Patient Position: Sitting, Cuff Size: Adult Large)   Pulse 78   Temp 97.9  F (36.6  C) (Temporal)   Ht 1.727 m (5' 8\")   Wt 97.3 kg (214 lb 6.4 oz)   SpO2 96%   BMI 32.60 kg/m    Mental Status Examination  Posture and motor behavior: negative  Dress, grooming, personal hygiene: negative  Facial expression: negative  Speech: negative  Mood: negative  Coherency and relevance of thought: negative  Thought content: negative  Perceptions: negative  Orientation: negative  Attention and concentration: negative  Memory: : negative  Information: negative  Vocabulary: negative  Abstract reasoning: negative  Judgment: negative    General appearance: healthy, alert, no distress, cooperative, smiling and over weight  Eyes: conjunctivae/corneas clear. PERRL, EOM's intact. Fundi benign  Ears: negative  Oropharynx: Lips, mucosa, and tongue normal. Teeth and gums normal.  Neck: Neck supple. No adenopathy. Thyroid symmetric, normal size,, Carotids without bruits.  Lungs: negative, Percussion normal. Good diaphragmatic excursion. Lungs clear  Heart: negative, PMI normal. No " lifts, heaves, or thrills. RRR. No murmurs, clicks gallops or rub  Abdomen: Abdomen soft, non-tender. BS normal. No masses, organomegaly  Neuro: Gait normal. Reflexes normal and symmetric. Sensation grossly WNL.

## 2021-10-21 NOTE — PATIENT INSTRUCTIONS
Come back fasting for your blood work    I sent 30 days of each of your medications    Recheck in 3-4 weeks with Dr Cintron to review

## 2021-10-21 NOTE — NURSING NOTE
Chief Complaint   Patient presents with     ER F/U     [facility] on 10/14/21 for spinal stenosis

## 2021-10-22 RX ORDER — SERTRALINE HYDROCHLORIDE 100 MG/1
TABLET, FILM COATED ORAL
Qty: 135 TABLET | Refills: 1 | COMMUNITY
Start: 2021-10-22

## 2021-10-22 ASSESSMENT — ANXIETY QUESTIONNAIRES: GAD7 TOTAL SCORE: 2

## 2021-10-22 NOTE — TELEPHONE ENCOUNTER
Edvin Norton is requesting a refill of:    Refused Prescriptions:                       Disp   Refills    sertraline (ZOLOFT) 100 MG tablet [Pharmac*135 ta*1        Sig: TAKE 1.5 TABLETS BY MOUTH DAILY  Refused By: IFRAH PURVIS  Reason for Refusal: Should already have refills on file

## 2021-10-23 DIAGNOSIS — K22.719 BARRETT'S ESOPHAGUS WITH DYSPLASIA: ICD-10-CM

## 2021-10-25 RX ORDER — OMEPRAZOLE 40 MG/1
40 CAPSULE, DELAYED RELEASE ORAL EVERY MORNING
Qty: 90 CAPSULE | Refills: 1 | Status: SHIPPED | OUTPATIENT
Start: 2021-10-25 | End: 2022-04-27

## 2021-10-25 NOTE — TELEPHONE ENCOUNTER
Received incoming refill request for  Pending Prescriptions:                       Disp   Refills    omeprazole (PRILOSEC) 40 MG DR capsule [P*90 cap*1            Sig: TAKE 1 CAPSULE (40 MG) BY MOUTH EVERY MORNING    Patient last had a refill of this medication on 3/1/21 for a  6 month supply. Routing to Dr. Cintron for approval or denial of refill request as patient was seen by Dr. Sinha on 10/21/21 for medication check appt.

## 2021-10-26 DIAGNOSIS — Z79.899 ENCOUNTER FOR LONG-TERM (CURRENT) USE OF MEDICATIONS: ICD-10-CM

## 2021-10-26 DIAGNOSIS — E66.811 OBESITY (BMI 30.0-34.9): ICD-10-CM

## 2021-10-26 DIAGNOSIS — I10 ESSENTIAL HYPERTENSION, BENIGN: ICD-10-CM

## 2021-10-26 DIAGNOSIS — F41.1 GAD (GENERALIZED ANXIETY DISORDER): ICD-10-CM

## 2021-10-26 DIAGNOSIS — K22.719 BARRETT'S ESOPHAGUS WITH DYSPLASIA: ICD-10-CM

## 2021-10-26 DIAGNOSIS — G47.33 OBSTRUCTIVE SLEEP APNEA SYNDROME: ICD-10-CM

## 2021-10-26 DIAGNOSIS — E78.2 MIXED HYPERLIPIDEMIA: ICD-10-CM

## 2021-10-26 LAB
% GRANULOCYTES: 57.5 %
ALBUMIN SERPL-MCNC: 4.6 G/DL (ref 3.6–5.1)
ALBUMIN/GLOB SERPL: 1.8 {RATIO} (ref 1–2.5)
ALP SERPL-CCNC: 50 U/L (ref 33–130)
ALT 1742-6: 42 U/L (ref 0–32)
AST 1920-8: 37 U/L (ref 0–35)
BILIRUB SERPL-MCNC: 0.7 MG/DL (ref 0.2–1.2)
BUN SERPL-MCNC: 23 MG/DL (ref 7–25)
BUN/CREATININE RATIO: 20.9 (ref 6–22)
CALCIUM SERPL-MCNC: 9.5 MG/DL (ref 8.6–10.3)
CHLORIDE SERPLBLD-SCNC: 101.3 MMOL/L (ref 98–110)
CHOLEST SERPL-MCNC: 208 MG/DL (ref 0–199)
CHOLEST/HDLC SERPL: 4 {RATIO} (ref 0–5)
CO2 SERPL-SCNC: 30.2 MMOL/L (ref 20–32)
CREAT SERPL-MCNC: 1.1 MG/DL (ref 0.6–1.3)
GLOBULIN, CALCULATED - QUEST: 2.5 (ref 1.9–3.7)
GLUCOSE SERPL-MCNC: 104 MG/DL (ref 60–99)
HCT VFR BLD AUTO: 45.5 % (ref 40–53)
HDLC SERPL-MCNC: 58 MG/DL (ref 40–150)
HEMOGLOBIN: 14.9 G/DL (ref 13.3–17.7)
LDLC SERPL CALC-MCNC: 106 MG/DL (ref 0–130)
LYMPHOCYTES NFR BLD AUTO: 27.6 %
MCH RBC QN AUTO: 31.2 PG (ref 26–33)
MCHC RBC AUTO-ENTMCNC: 32.7 G/DL (ref 31–36)
MCV RBC AUTO: 95.1 FL (ref 78–100)
MONOCYTES NFR BLD AUTO: 14.9 %
PLATELET COUNT - QUEST: 235 10^9/L (ref 150–375)
POTASSIUM SERPL-SCNC: 5.01 MMOL/L (ref 3.5–5.3)
PROT SERPL-MCNC: 7.4 G/DL (ref 6.1–8.1)
RBC # BLD AUTO: 4.7 10*12/L (ref 4.4–5.9)
SODIUM SERPL-SCNC: 138.6 MMOL/L (ref 135–146)
TRIGL SERPL-MCNC: 220 MG/DL (ref 0–149)
WBC # BLD AUTO: 7.5 10*9/L (ref 4–11)

## 2021-10-26 PROCEDURE — 80061 LIPID PANEL: CPT | Performed by: FAMILY MEDICINE

## 2021-10-26 PROCEDURE — 36415 COLL VENOUS BLD VENIPUNCTURE: CPT | Performed by: FAMILY MEDICINE

## 2021-10-26 PROCEDURE — 85025 COMPLETE CBC W/AUTO DIFF WBC: CPT | Performed by: FAMILY MEDICINE

## 2021-10-26 PROCEDURE — 80053 COMPREHEN METABOLIC PANEL: CPT | Performed by: FAMILY MEDICINE

## 2021-10-28 DIAGNOSIS — Z11.59 ENCOUNTER FOR SCREENING FOR OTHER VIRAL DISEASES: ICD-10-CM

## 2021-11-13 DIAGNOSIS — I10 ESSENTIAL HYPERTENSION, BENIGN: ICD-10-CM

## 2021-11-13 DIAGNOSIS — E78.2 MIXED HYPERLIPIDEMIA: ICD-10-CM

## 2021-11-15 RX ORDER — PROPRANOLOL HCL 60 MG
CAPSULE, EXTENDED RELEASE 24HR ORAL
Qty: 30 CAPSULE | Refills: 0 | COMMUNITY
Start: 2021-11-15

## 2021-11-15 RX ORDER — FENOFIBRATE 160 MG/1
160 TABLET ORAL DAILY
Qty: 30 TABLET | Refills: 0 | COMMUNITY
Start: 2021-11-15

## 2021-11-15 NOTE — TELEPHONE ENCOUNTER
Received incoming refill request for  Pending Prescriptions:                       Disp   Refills    fenofibrate (TRIGLIDE/LOFIBRA) 160 MG tab*30 tab*0            Sig: TAKE 1 TABLET (160 MG) BY MOUTH DAILY    propranolol ER (INDERAL LA) 60 MG 24 hr c*30 cap*0            Sig: TAKE 1 CAPSULE BY MOUTH EVERY DAY    Patient last had a refill of these medications on 10/21/21 for a 30 day supply.

## 2021-11-24 ENCOUNTER — OFFICE VISIT (OUTPATIENT)
Dept: FAMILY MEDICINE | Facility: CLINIC | Age: 66
End: 2021-11-24

## 2021-11-24 VITALS
OXYGEN SATURATION: 98 % | BODY MASS INDEX: 32.74 KG/M2 | TEMPERATURE: 98.2 F | WEIGHT: 216 LBS | SYSTOLIC BLOOD PRESSURE: 138 MMHG | HEART RATE: 76 BPM | DIASTOLIC BLOOD PRESSURE: 82 MMHG | HEIGHT: 68 IN

## 2021-11-24 DIAGNOSIS — M51.26 LUMBAR DISC HERNIATION: ICD-10-CM

## 2021-11-24 DIAGNOSIS — E78.2 MIXED HYPERLIPIDEMIA: Primary | ICD-10-CM

## 2021-11-24 DIAGNOSIS — F41.1 GAD (GENERALIZED ANXIETY DISORDER): ICD-10-CM

## 2021-11-24 DIAGNOSIS — Z23 NEED FOR VACCINATION: ICD-10-CM

## 2021-11-24 DIAGNOSIS — I10 ESSENTIAL HYPERTENSION, BENIGN: ICD-10-CM

## 2021-11-24 DIAGNOSIS — M48.02 CERVICAL STENOSIS OF SPINE: ICD-10-CM

## 2021-11-24 PROCEDURE — 99214 OFFICE O/P EST MOD 30 MIN: CPT | Mod: 25 | Performed by: FAMILY MEDICINE

## 2021-11-24 PROCEDURE — 90732 PPSV23 VACC 2 YRS+ SUBQ/IM: CPT | Performed by: FAMILY MEDICINE

## 2021-11-24 PROCEDURE — G0009 ADMIN PNEUMOCOCCAL VACCINE: HCPCS | Performed by: FAMILY MEDICINE

## 2021-11-24 RX ORDER — ATORVASTATIN CALCIUM 40 MG/1
40 TABLET, FILM COATED ORAL DAILY
Qty: 90 TABLET | Refills: 1 | Status: SHIPPED | OUTPATIENT
Start: 2021-11-24 | End: 2022-05-03

## 2021-11-24 RX ORDER — SERTRALINE HYDROCHLORIDE 100 MG/1
150 TABLET, FILM COATED ORAL DAILY
Qty: 135 TABLET | Refills: 1 | Status: SHIPPED | OUTPATIENT
Start: 2021-11-24 | End: 2022-04-27

## 2021-11-24 RX ORDER — FENOFIBRATE 160 MG/1
160 TABLET ORAL DAILY
Qty: 90 TABLET | Refills: 1 | Status: SHIPPED | OUTPATIENT
Start: 2021-11-24 | End: 2022-04-27

## 2021-11-24 ASSESSMENT — ANXIETY QUESTIONNAIRES
IF YOU CHECKED OFF ANY PROBLEMS ON THIS QUESTIONNAIRE, HOW DIFFICULT HAVE THESE PROBLEMS MADE IT FOR YOU TO DO YOUR WORK, TAKE CARE OF THINGS AT HOME, OR GET ALONG WITH OTHER PEOPLE: NOT DIFFICULT AT ALL
1. FEELING NERVOUS, ANXIOUS, OR ON EDGE: NOT AT ALL
GAD7 TOTAL SCORE: 0
5. BEING SO RESTLESS THAT IT IS HARD TO SIT STILL: NOT AT ALL
6. BECOMING EASILY ANNOYED OR IRRITABLE: NOT AT ALL
7. FEELING AFRAID AS IF SOMETHING AWFUL MIGHT HAPPEN: NOT AT ALL
2. NOT BEING ABLE TO STOP OR CONTROL WORRYING: NOT AT ALL
3. WORRYING TOO MUCH ABOUT DIFFERENT THINGS: NOT AT ALL

## 2021-11-24 ASSESSMENT — PATIENT HEALTH QUESTIONNAIRE - PHQ9: 5. POOR APPETITE OR OVEREATING: NOT AT ALL

## 2021-11-24 ASSESSMENT — MIFFLIN-ST. JEOR: SCORE: 1739.27

## 2021-11-24 NOTE — PROGRESS NOTES
Assessment & Plan     Mixed hyperlipidemia  Controlled based on recent labs, continue current medications at current doses   - atorvastatin (LIPITOR) 40 MG tablet  Dispense: 90 tablet; Refill: 1  - fenofibrate (TRIGLIDE/LOFIBRA) 160 MG tablet  Dispense: 90 tablet; Refill: 1    Essential hypertension, benign (HTN)  Well controlled, continue current medications at current doses     JORGE (generalized anxiety disorder)  Well controlled,continue current medications at current doses   - sertraline (ZOLOFT) 100 MG tablet  Dispense: 135 tablet; Refill: 1    Lumbar disc herniation  Pt doing better with PT at [facility], continue exercises    Cervical stenosis of spine  As above    Need for vaccination    - PNEUMOCOCCAL VACCINE,ADULT,SQ OR IM      Review of the result(s) of each unique test - labs  Ordering of each unique test  Prescription drug management         FUTURE APPOINTMENTS:       - Follow-up visit in 6 mo  Regular exercise    No follow-ups on file.    Woody Cintron MD  [facility]    Hai Pardo is a 66 year old who presents for the following health issues     HPI     Hyperlipidemia Follow-Up      Are you regularly taking any medication or supplement to lower your cholesterol?   Yes- atorvastatin    Are you having muscle aches or other side effects that you think could be caused by your cholesterol lowering medication?  No    Hypertension Follow-up      Do you check your blood pressure regularly outside of the clinic? Yes     Are you following a low salt diet? No    Are your blood pressures ever more than 140 on the top number (systolic) OR more   than 90 on the bottom number (diastolic), for example 140/90? No    Anxiety Follow-Up    How are you doing with your anxiety since your last visit? Improved     Are you having other symptoms that might be associated with anxiety? No    Have you had a significant life event? No     Are you feeling depressed? No    Do you have any concerns  "with your use of alcohol or other drugs? No    Social History     Tobacco Use     Smoking status: Never Smoker     Smokeless tobacco: Former User     Types: Chew   Substance Use Topics     Alcohol use: Yes     Alcohol/week: 14.0 standard drinks     Types: 14 Standard drinks or equivalent per week     Drug use: No     JORGE-7 SCORE 9/25/2020 3/1/2021 10/21/2021   Total Score - - -   Total Score 0 0 2     PHQ 9/25/2020 3/1/2021 10/21/2021   PHQ-9 Total Score 0 2 5   Q9: Thoughts of better off dead/self-harm past 2 weeks Not at all Not at all Not at all     See screeners    Chronic/Recurring Back Pain Follow Up      Where is your back pain located? (Select all that apply) low back bilateral and neck bilateral    How would you describe your back pain?  dull ache    Where does your back pain spread?     Since your last clinic visit for back pain, how has your pain changed? gradually improving    Does your back pain interfere with your job? Not applicable    Since your last visit, have you tried any new treatment? Yes -  Physical Therapy      How many servings of fruits and vegetables do you eat daily?  2-3    On average, how many sweetened beverages do you drink each day (Examples: soda, juice, sweet tea, etc.  Do NOT count diet or artificially sweetened beverages)?   1    How many days per week do you exercise enough to make your heart beat faster? 7    How many minutes a day do you exercise enough to make your heart beat faster? 20 - 29    How many days per week do you miss taking your medication? 0        Review of Systems   Constitutional, HEENT, cardiovascular, pulmonary, gi and gu systems are negative, except as otherwise noted.      Objective    /82 (BP Location: Right arm, Patient Position: Sitting, Cuff Size: Adult Large)   Pulse 76   Temp 98.2  F (36.8  C) (Temporal)   Ht 1.727 m (5' 8\")   Wt 98 kg (216 lb)   SpO2 98%   BMI 32.84 kg/m    Body mass index is 32.84 kg/m .  Physical Exam   GENERAL: " healthy, alert and no distress  EYES: Eyes grossly normal to inspection, PERRL and conjunctivae and sclerae normal  HENT: ear canals and TM's normal, nose and mouth without ulcers or lesions  NECK: no adenopathy, no asymmetry, masses, or scars and thyroid normal to palpation  RESP: lungs clear to auscultation - no rales, rhonchi or wheezes  CV: regular rate and rhythm, normal S1 S2, no S3 or S4, no murmur, click or rub, no peripheral edema and peripheral pulses strong  ABDOMEN: soft, nontender, no hepatosplenomegaly, no masses and bowel sounds normal  MS: no gross musculoskeletal defects noted, no edema  NEURO: Normal strength and tone, mentation intact and speech normal  PSYCH: mentation appears normal, affect normal/bright    Orders Only on 10/26/2021   Component Date Value Ref Range Status     WBC 10/26/2021 7.5  4.0 - 11 10*9/L Final     RBC Count 10/26/2021 4.7  4.4 - 5.9 10*12/L Final     Hemoglobin 10/26/2021 14.9  13.3 - 17.7 g/dL Final     Hematocrit 10/26/2021 45.5  40.0 - 53.0 % Final     MCV 10/26/2021 95.1  78 - 100 fL Final     MCH 10/26/2021 31.2  26 - 33 pg Final     MCHC 10/26/2021 32.7  31 - 36 g/dL Final     Platelet Count 10/26/2021 235  150 - 375 10^9/L Final     % Granulocytes 10/26/2021 57.5  % Final     % Lymphocytes 10/26/2021 27.6  % Final     % Monocytes 10/26/2021 14.9  % Final     Carbon Dioxide 10/26/2021 30.2  20 - 32 mmol/L Final     Creatinine 10/26/2021 1.10  0.60 - 1.30 mg/dL Final     Glucose 10/26/2021 104* 60 - 99 mg/dL Final     Sodium 10/26/2021 138.6  135 - 146 mmol/L Final     Potassium 10/26/2021 5.01  3.5 - 5.3 mmol/L Final     Chloride 10/26/2021 101.3  98 - 110 mmol/L Final     Protein Total 10/26/2021 7.4  6.1 - 8.1 g/dL Final     Albumin 10/26/2021 4.6  3.6 - 5.1 g/dL Final     Alkaline Phosphatase 10/26/2021 50  33 - 130 U/L Final     ALT 10/26/2021 42* 0 - 32 U/L Final     AST 10/26/2021 37* 0 - 35 U/L Final     Bilirubin Total 10/26/2021 0.7  0.2 - 1.2 mg/dL Final      Urea Nitrogen 10/26/2021 23  7 - 25 mg/dL Final     Calcium 10/26/2021 9.5  8.6 - 10.3 mg/dL Final     BUN/Creatinine Ratio 10/26/2021 20.9  6 - 22 Final     Globulin Calculated 10/26/2021 2.5  1.9 - 3.7 Final     A/G Ratio 10/26/2021 1.8  1 - 2.5 Final     Cholesterol 10/26/2021 208* 0 - 199 mg/dL Final     Triglycerides 10/26/2021 220* 0 - 149 mg/dL Final     HDL Cholesterol 10/26/2021 58  40 - 150 mg/dL Final     LDL Cholesterol Direct 10/26/2021 106  0 - 130 mg/dL Final     Cholesterol/HDL Ratio 10/26/2021 4  0 - 5 Final

## 2021-11-24 NOTE — NURSING NOTE
Chief Complaint   Patient presents with     Recheck Medication          Pre-visit Screening:  Immunizations:  up to date  Colonoscopy:  UTD  Mammogram: NA  Asthma Action Test/Plan:  NA  PHQ9:  Done today  GAD7:  Done today  Questioned patient about current smoking habits Pt. has never smoked.  Ok to leave detailed message on voice mail for today's visit only Yes, phone # [phone]

## 2021-11-25 ASSESSMENT — ANXIETY QUESTIONNAIRES: GAD7 TOTAL SCORE: 0

## 2021-11-25 ASSESSMENT — PATIENT HEALTH QUESTIONNAIRE - PHQ9: SUM OF ALL RESPONSES TO PHQ QUESTIONS 1-9: 0

## 2021-11-29 ENCOUNTER — LAB (OUTPATIENT)
Dept: LAB | Facility: CLINIC | Age: 66
End: 2021-11-29
Payer: MEDICARE

## 2021-11-29 DIAGNOSIS — Z11.59 ENCOUNTER FOR SCREENING FOR OTHER VIRAL DISEASES: ICD-10-CM

## 2021-11-29 LAB — SARS-COV-2 RNA RESP QL NAA+PROBE: NEGATIVE

## 2021-11-29 PROCEDURE — U0005 INFEC AGEN DETEC AMPLI PROBE: HCPCS

## 2021-11-29 PROCEDURE — U0003 INFECTIOUS AGENT DETECTION BY NUCLEIC ACID (DNA OR RNA); SEVERE ACUTE RESPIRATORY SYNDROME CORONAVIRUS 2 (SARS-COV-2) (CORONAVIRUS DISEASE [COVID-19]), AMPLIFIED PROBE TECHNIQUE, MAKING USE OF HIGH THROUGHPUT TECHNOLOGIES AS DESCRIBED BY CMS-2020-01-R: HCPCS

## 2022-01-03 DIAGNOSIS — I10 ESSENTIAL HYPERTENSION, BENIGN: ICD-10-CM

## 2022-01-03 RX ORDER — PROPRANOLOL HCL 60 MG
CAPSULE, EXTENDED RELEASE 24HR ORAL
Qty: 30 CAPSULE | Refills: 0 | Status: SHIPPED | OUTPATIENT
Start: 2022-01-03 | End: 2022-02-01

## 2022-01-03 NOTE — TELEPHONE ENCOUNTER
Last seen 11/24/21. Last refilled 10/21/21 by [facility]. Is pt still on this??    Edvin Norton is requesting a refill of:    Pending Prescriptions:                       Disp   Refills    propranolol ER (INDERAL LA) 60 MG 24 hr c*                    Sig: TAKE 1 CAPSULE BY MOUTH EVERY DAY

## 2022-01-06 ENCOUNTER — TRANSFERRED RECORDS (OUTPATIENT)
Dept: FAMILY MEDICINE | Facility: CLINIC | Age: 67
End: 2022-01-06

## 2022-02-01 DIAGNOSIS — I10 ESSENTIAL HYPERTENSION, BENIGN: ICD-10-CM

## 2022-02-01 NOTE — TELEPHONE ENCOUNTER
Received incoming refill request for  Pending Prescriptions:                       Disp   Refills    propranolol ER (INDERAL LA) 60 MG 24 hr c*30 cap*0            Sig: TAKE 1 CAPSULE BY MOUTH EVERY DAY    Patient last had a refill of this medication on 1/3/22 but for only 30 tablets. Routing to Dr. Cintron for review, should patient be taking this daily with his other medications?   Or is it just as needed if blood pressure is high?

## 2022-02-02 RX ORDER — PROPRANOLOL HCL 60 MG
CAPSULE, EXTENDED RELEASE 24HR ORAL
Qty: 90 CAPSULE | Refills: 0 | Status: SHIPPED | OUTPATIENT
Start: 2022-02-02 | End: 2022-04-27

## 2022-02-09 ENCOUNTER — TELEPHONE (OUTPATIENT)
Dept: OTHER | Facility: CLINIC | Age: 67
End: 2022-02-09
Payer: MEDICARE

## 2022-02-09 NOTE — TELEPHONE ENCOUNTER
[facility]    Who is the name of the provider?:  New      What is the location you see this provider at?: [place]    Reason for call:  Self referring for poor balance and pain and numbness in feet.     Can we leave a detailed message on this number?  YES

## 2022-02-10 NOTE — TELEPHONE ENCOUNTER
Appt Note:  Self referred to [facility] for poor balance and pain and numbness in feet.   Patient has history of hyperlipidemia, elevated triglycerides, impaired fasting glucose and hypertension (also Herniated nucleus pulposus, L3-4).    Pt needs to be scheduled for new patient consult with Vascular medicine.  (No imaging prior) Will route to scheduling to coordinate an appointment at next available.    Selina Johnson, BENN, RN  [facility] [place]

## 2022-02-12 DIAGNOSIS — I10 ESSENTIAL HYPERTENSION, BENIGN: ICD-10-CM

## 2022-02-12 DIAGNOSIS — K22.719 BARRETT'S ESOPHAGUS WITH DYSPLASIA: ICD-10-CM

## 2022-02-14 RX ORDER — PROPRANOLOL HCL 60 MG
CAPSULE, EXTENDED RELEASE 24HR ORAL
Qty: 90 CAPSULE | Refills: 0 | COMMUNITY
Start: 2022-02-14

## 2022-02-14 RX ORDER — OMEPRAZOLE 40 MG/1
CAPSULE, DELAYED RELEASE ORAL
Qty: 90 CAPSULE | Refills: 1 | COMMUNITY
Start: 2022-02-14

## 2022-02-14 NOTE — TELEPHONE ENCOUNTER
Received incoming refill request for  Pending Prescriptions:                       Disp   Refills    propranolol ER (INDERAL LA) 60 MG 24 hr c*90 cap*0            Sig: TAKE 1 CAPSULE BY MOUTH EVERY DAY    omeprazole (PRILOSEC) 40 MG DR capsule [P*90 cap*1            Sig: TAKE 1 CAPSULE BY MOUTH EVERY MORNING.    Patient last had a refill of these medications on

## 2022-02-15 DIAGNOSIS — Z78.9 ALCOHOL USE: ICD-10-CM

## 2022-02-15 NOTE — TELEPHONE ENCOUNTER
Edvin Norton is requesting a refill of:    Pending Prescriptions:                       Disp   Refills    thiamine (B-1) 100 MG tablet              90 tab*0            Sig: Take 1 tablet (100 mg) by mouth daily    Please close encounter if RX was sent. Thanks, Gita

## 2022-02-16 RX ORDER — LANOLIN ALCOHOL/MO/W.PET/CERES
100 CREAM (GRAM) TOPICAL DAILY
Qty: 90 TABLET | Refills: 0 | Status: SHIPPED | OUTPATIENT
Start: 2022-02-16 | End: 2022-04-27

## 2022-02-22 ENCOUNTER — OFFICE VISIT (OUTPATIENT)
Dept: SURGERY | Facility: CLINIC | Age: 67
End: 2022-02-22
Payer: MEDICARE

## 2022-02-22 VITALS
RESPIRATION RATE: 16 BRPM | WEIGHT: 214 LBS | HEART RATE: 113 BPM | BODY MASS INDEX: 32.43 KG/M2 | DIASTOLIC BLOOD PRESSURE: 80 MMHG | SYSTOLIC BLOOD PRESSURE: 136 MMHG | HEIGHT: 68 IN | OXYGEN SATURATION: 96 %

## 2022-02-22 DIAGNOSIS — R20.2 PARESTHESIAS: Primary | ICD-10-CM

## 2022-02-22 PROCEDURE — 99204 OFFICE O/P NEW MOD 45 MIN: CPT | Performed by: INTERNAL MEDICINE

## 2022-02-22 NOTE — PROGRESS NOTES
INITIAL VASCULAR MEDICINE ASSESSMENT  REFERRING SOURCE: sELF  REASON FOR CONSULT: poor balance and pain and numbness in feet    HPI: Edvin Norton ia  67 year old male w/ a h/o htn, HLD, IFG, obesity(Body mass index is 32.54 kg/m .), PADILLA who reports a history of right lower extremity radiculopathy. He had a  an 4-7 ACDF performed by Dr. Anderson on 07/10/2021 for myelopathy. He notes a  a constant dull, aching pain that initiates in the right low lumbar region and radiates distally in what sounds like the right L4 distribution. This pain is accompanied by paresthesia and perceived weakness in the same distribution. Prolonged walking and sitting seems to aggravate the symptoms, while alleviation is obtained by standing and positional changes. No mechanism of injury such as trauma or a fall is associated with the onset of the pain. There are no bowel or bladder changes. He denies saddle anesthesia. He denies changes in gait, instability, or falling episodes. He has participated in conservative therapies to include physical therapy, NSAIDs, a Medrol Dosepak and L5-S1 nerve root injections. None of these modalities have provided any significant long term relief. recent neurosurgical visit of 10/19/21 was reviewed. The patient's most recent imaging was reviewed showing right lateral recess free fragment measuring 8 mm x 5 mm to right lateral recess narrowing w/o significant neural foraminal narrowing, at the L3-L4 disc space level, there is . There was also mild canal compromise.    He was not interested in any type of surgical intervention at that time.      Based on this, it was felt best to continue a conservative approach by participating in physical therapy. He was also given a referral for a LESI, which he has had two of, with mild improvement in sxs only.     He denies traditional vasculogenic claudication with absence of exercise or walking induced calf or thigh cramping. He denies nocturnal ischemic rest pain  sxs. He has no LE tissue loss hx. He states primarily that his sxs of pain and tingling burning occuring a stocking like distribution up to his toes. .       Review Of Systems  Skin: negative  Eyes: negative  Ears/Nose/Throat: negative  Respiratory: No shortness of breath, dyspnea on exertion, cough, or hemoptysis  Cardiovascular: negative  Gastrointestinal: negative  Genitourinary: negative  Musculoskeletal: negative  Neurologic: as above  Psychiatric: negative  Hematologic/Lymphatic/Immunologic: negative  Endocrine: negative      PAST MEDICAL HISTORY:                  Past Medical History:   Diagnosis Date     Anxiety state 9/26/2013     Problem list name updated by automated process. 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     Essential hypertension, benign (HTN) 10/27/2014     Hyperlipidemia 1995     Obesity due to excess calories, unspecified obesity severity 3/24/2016       PAST SURGICAL HISTORY:                  Past Surgical History:   Procedure Laterality Date     CATARACT IOL, RT/LT       ESOPHAGUS SURGERY      Halo procedure x 5     FINGER SURGERY      tendon     FUSION CERVICAL ANTERIOR THREE+ LEVELS N/A 7/10/2020    Procedure: Anterior cervical diskectomy and fusion with anterior plate fixation cervical 4-7;  Surgeon: Dane Anderson MD;  Location: [facility] OR     GRAFT BONE FROM ILIAC CREST Right 7/10/2020    Procedure: Right Anterior iliac crest bone graft harvest;  Surgeon: Dane Anderson MD;  Location: [facility] OR     UPPER GI ENDOSCOPY         CURRENT MEDICATIONS:                  Current Outpatient Medications   Medication Sig Dispense Refill     acetaminophen (TYLENOL) 325 MG tablet Take 2 tablets (650 mg) by mouth every 4 hours as needed for mild pain Max tylenol 4 gm per day 50 tablet 0     ASPIRIN PO Take 81 mg by mouth every morning        atorvastatin (LIPITOR) 40 MG tablet Take 1 tablet (40 mg) by mouth daily 90 tablet 1     B Complex-Folic Acid (SUPER B COMPLEX MAXI)  TABS Take 1 tablet by mouth daily 90 tablet 3     fenofibrate (TRIGLIDE/LOFIBRA) 160 MG tablet Take 1 tablet (160 mg) by mouth daily 90 tablet 1     L-Methylfolate-B6-B12 (FOLTX) 1.13-25-2 MG TABS Take 1 Dose by mouth daily 90 tablet 1     Omega-3 Fatty Acids (OMEGA-3 FISH OIL PO) Take 1 g by mouth every morning        omeprazole (PRILOSEC) 40 MG DR capsule TAKE 1 CAPSULE (40 MG) BY MOUTH EVERY MORNING 90 capsule 1     propranolol ER (INDERAL LA) 60 MG 24 hr capsule TAKE 1 CAPSULE BY MOUTH EVERY DAY 90 capsule 0     sertraline (ZOLOFT) 100 MG tablet Take 1.5 tablets (150 mg) by mouth daily 135 tablet 1     thiamine (B-1) 100 MG tablet Take 1 tablet (100 mg) by mouth daily 90 tablet 0       ALLERGIES:                No Known Allergies    SOCIAL HISTORY:                  Social History     Socioeconomic History     Marital status:      Spouse name: Eileen     Number of children: 2     Years of education: Not on file     Highest education level: Not on file   Occupational History     Occupation: retired   Tobacco Use     Smoking status: Never Smoker     Smokeless tobacco: Former User     Types: Chew   Substance and Sexual Activity     Alcohol use: Yes     Alcohol/week: 14.0 standard drinks     Types: 14 Standard drinks or equivalent per week     Drug use: No     Sexual activity: Yes     Partners: Male     Birth control/protection: Post-menopausal   Other Topics Concern      Service No     Blood Transfusions No     Caffeine Concern Yes     Occupational Exposure Yes     Comment: travels     Hobby Hazards No     Sleep Concern No     Stress Concern Yes     Comment: travels for work     Weight Concern Yes     Special Diet No     Back Care Not Asked     Exercise Yes     Bike Helmet Not Asked     Seat Belt Yes     Self-Exams Not Asked   Social History Narrative     Not on file     Social Determinants of Health     Financial Resource Strain: Not on file   Food Insecurity: Not on file   Transportation Needs: Not  on file   Physical Activity: Not on file   Stress: Not on file   Social Connections: Not on file   Intimate Partner Violence: Not on file   Housing Stability: Not on file       FAMILY HISTORY:                   Family History   Problem Relation Age of Onset     Hypertension Mother         passed  age 96     Anxiety Disorder Mother      Anxiety Disorder Father      Lung Cancer Father      Esophageal Cancer Brother      Psoriasis Brother      Hyperlipidemia Sister      Anxiety Disorder Sister      Esophageal Cancer Brother      Aneurysm Sister         brain     Thyroid Disease Brother      Neurologic Disorder Brother         unspecified - jerking motions at night     C.A.D. No family hx of      Diabetes No family hx of      Breast Cancer No family hx of      Cancer - colorectal No family hx of      Prostate Cancer No family hx of          Physical exam Reveals:    O/P: WNL  HEENT: WNL  NECK: No JVD, thyromegaly, or lymphadenopathy  HEART: RRR, no murmurs, gallops, or rubs  LUNGS: CTA bilaterally without rales, wheezes, or rhonchi  GI: NABS, nondistended, nontender, soft  EXT:without cyanosis, clubbing, or edema  NEURO: nonfocal  : no flank tenderness    Rt femoral:   3 plus palpable  Rt popliteal:   3 plus palpable  Rt DP:   3 plus palpable  Rt PT:   3 plus palpable    Lt femoral:   3 plus palpable  Lt popliteal:   3 plus palpable  Lt DP:   3 plus palpable  Lt PT:   3 plus palpable      Component      Latest Ref Rng & Units 10/14/2021 10/26/2021   Carbon Dioxide      20 - 32 mmol/L 24 30.2   Creatinine      0.60 - 1.30 mg/dL 0.91 1.10   Glucose      60 - 99 mg/dL 102 (H) 104 (A)   Sodium      135 - 146 mmol/L 138 138.6   Potassium      3.5 - 5.3 mmol/L 3.8 5.01   Chloride      98 - 110 mmol/L 105 101.3   Protein Total      6.1 - 8.1 g/dL  7.4   Albumin      3.6 - 5.1 g/dL  4.6   Alkaline Phosphatase      33 - 130 U/L  50   ALT      0 - 32 U/L  42 (A)   AST      0 - 35 U/L  37 (A)   Bilirubin Total      0.2 - 1.2  mg/dL  0.7   Urea Nitrogen      7 - 25 mg/dL 17 23   Calcium      8.6 - 10.3 mg/dL 9.2 9.5   BUN/Creatinine Ratio      6 - 22  20.9   Globulin Calculated      1.9 - 3.7  2.5   A/G Ratio      1 - 2.5  1.8   Anion Gap      3 - 14 mmol/L 9    GFR Estimate      >60 mL/min/1.73m2 88    Cholesterol      0 - 199 mg/dL  208 (A)   Triglycerides      0 - 149 mg/dL  220 (A)   HDL Cholesterol      40 - 150 mg/dL  58   LDL Cholesterol Direct      0 - 130 mg/dL  106   Cholesterol/HDL Ratio      0 - 5  4         A/P:    (R20.2) Paresthesias  (primary encounter diagnosis)  Comment: these are occuring primarily in the toes in a stocking like distribution. He lacks sxs suggestive of classical vascularly induced claudication. He has been thoroughly neurologically evaluated and found to not have a large or small fiber neuropathy. I empathetically explained to the patient that whatever the etiology of his sxs is, it is not vascular in nature given his sxs as delineated above, and exam with easily palpable peripheral and distal LE pulses.  Plan: RTC prn.       45 minutes total care on today's date.

## 2022-03-09 ENCOUNTER — TRANSFERRED RECORDS (OUTPATIENT)
Dept: FAMILY MEDICINE | Facility: CLINIC | Age: 67
End: 2022-03-09

## 2022-03-18 ENCOUNTER — HOSPITAL ENCOUNTER (OUTPATIENT)
Dept: MRI IMAGING | Facility: CLINIC | Age: 67
Discharge: HOME | End: 2022-03-18
Attending: PSYCHIATRY & NEUROLOGY | Admitting: PSYCHIATRY & NEUROLOGY
Payer: MEDICARE

## 2022-03-18 DIAGNOSIS — M54.2 CERVICALGIA: ICD-10-CM

## 2022-03-18 PROCEDURE — 72141 MRI NECK SPINE W/O DYE: CPT

## 2022-04-16 ENCOUNTER — HEALTH MAINTENANCE LETTER (OUTPATIENT)
Age: 67
End: 2022-04-16

## 2022-04-27 DIAGNOSIS — F41.1 GAD (GENERALIZED ANXIETY DISORDER): ICD-10-CM

## 2022-04-27 DIAGNOSIS — E78.2 MIXED HYPERLIPIDEMIA: ICD-10-CM

## 2022-04-27 DIAGNOSIS — Z78.9 ALCOHOL USE: ICD-10-CM

## 2022-04-27 DIAGNOSIS — I10 ESSENTIAL HYPERTENSION, BENIGN: ICD-10-CM

## 2022-04-27 DIAGNOSIS — E72.12 METHYLENETETRAHYDROFOLATE REDUCTASE (MTHFR) DEFICIENCY (H): ICD-10-CM

## 2022-04-27 DIAGNOSIS — K22.719 BARRETT'S ESOPHAGUS WITH DYSPLASIA: ICD-10-CM

## 2022-04-27 DIAGNOSIS — R79.89 ELEVATED HOMOCYSTEINE: ICD-10-CM

## 2022-04-27 RX ORDER — SERTRALINE HYDROCHLORIDE 100 MG/1
150 TABLET, FILM COATED ORAL DAILY
Qty: 45 TABLET | Refills: 0 | Status: SHIPPED | OUTPATIENT
Start: 2022-04-27 | End: 2022-05-03

## 2022-04-27 RX ORDER — PROPRANOLOL HCL 60 MG
CAPSULE, EXTENDED RELEASE 24HR ORAL
Qty: 30 CAPSULE | Refills: 0 | Status: SHIPPED | OUTPATIENT
Start: 2022-04-27 | End: 2022-05-03

## 2022-04-27 RX ORDER — OMEPRAZOLE 40 MG/1
CAPSULE, DELAYED RELEASE ORAL
Qty: 30 CAPSULE | Refills: 0 | Status: SHIPPED | OUTPATIENT
Start: 2022-04-27 | End: 2022-05-03

## 2022-04-27 RX ORDER — GAUZE BANDAGE 2" X 2"
BANDAGE TOPICAL
Qty: 30 TABLET | Refills: 0 | Status: SHIPPED | OUTPATIENT
Start: 2022-04-27 | End: 2022-05-03

## 2022-04-27 RX ORDER — FENOFIBRATE 160 MG/1
160 TABLET ORAL DAILY
Qty: 30 TABLET | Refills: 0 | Status: SHIPPED | OUTPATIENT
Start: 2022-04-27 | End: 2022-05-03

## 2022-04-27 NOTE — TELEPHONE ENCOUNTER
Edvin Norton is requesting a refill of:    Pending Prescriptions:                       Disp   Refills    omeprazole (PRILOSEC) 40 MG DR capsule [P*30 cap*0            Sig: TAKE 1 CAPSULE BY MOUTH EVERY MORNING.    propranolol ER (INDERAL LA) 60 MG 24 hr c*30 cap*0            Sig: TAKE 1 CAPSULE BY MOUTH EVERY DAY    vitamin B1 (THIAMINE) 100 MG tablet [Phar*30 tab*0            Sig: TAKE 1 TABLET BY MOUTH EVERY DAY    sertraline (ZOLOFT) 100 MG tablet [Pharma*45 tab*0            Sig: Take 1.5 tablets (150 mg) by mouth daily    fenofibrate (TRIGLIDE/LOFIBRA) 160 MG tab*30 tab*0            Sig: TAKE 1 TABLET (160 MG) BY MOUTH DAILY    B Complex-Folic Acid (SUPER B COMPLEX MAX*30 tab*0            Sig: Take 1 tablets by mouth every day    Pt needs fasting OV for refills

## 2022-05-03 ENCOUNTER — OFFICE VISIT (OUTPATIENT)
Dept: FAMILY MEDICINE | Facility: CLINIC | Age: 67
End: 2022-05-03

## 2022-05-03 VITALS
SYSTOLIC BLOOD PRESSURE: 142 MMHG | HEART RATE: 60 BPM | DIASTOLIC BLOOD PRESSURE: 88 MMHG | TEMPERATURE: 98 F | RESPIRATION RATE: 20 BRPM | BODY MASS INDEX: 34.07 KG/M2 | WEIGHT: 224.8 LBS | HEIGHT: 68 IN

## 2022-05-03 DIAGNOSIS — E78.2 MIXED HYPERLIPIDEMIA: Primary | ICD-10-CM

## 2022-05-03 DIAGNOSIS — K22.719 BARRETT'S ESOPHAGUS WITH DYSPLASIA: ICD-10-CM

## 2022-05-03 DIAGNOSIS — Z78.9 ALCOHOL USE: ICD-10-CM

## 2022-05-03 DIAGNOSIS — M48.02 CERVICAL STENOSIS OF SPINE: ICD-10-CM

## 2022-05-03 DIAGNOSIS — F41.1 GAD (GENERALIZED ANXIETY DISORDER): ICD-10-CM

## 2022-05-03 DIAGNOSIS — Z12.5 SPECIAL SCREENING FOR MALIGNANT NEOPLASM OF PROSTATE: ICD-10-CM

## 2022-05-03 DIAGNOSIS — I10 ESSENTIAL HYPERTENSION, BENIGN: ICD-10-CM

## 2022-05-03 LAB
ALBUMIN SERPL-MCNC: 4.7 G/DL (ref 3.6–5.1)
ALBUMIN/GLOB SERPL: 1.8 {RATIO} (ref 1–2.5)
ALP SERPL-CCNC: 39 U/L (ref 33–130)
ALT 1742-6: 45 U/L (ref 0–32)
AST 1920-8: 48 U/L (ref 0–35)
BILIRUB SERPL-MCNC: 0.9 MG/DL (ref 0.2–1.2)
BUN SERPL-MCNC: 19 MG/DL (ref 7–25)
BUN/CREATININE RATIO: 16.8 (ref 6–22)
CALCIUM SERPL-MCNC: 9.4 MG/DL (ref 8.6–10.3)
CHLORIDE SERPLBLD-SCNC: 103.4 MMOL/L (ref 98–110)
CHOLEST SERPL-MCNC: 258 MG/DL (ref 0–199)
CHOLEST/HDLC SERPL: 5 {RATIO} (ref 0–5)
CREAT SERPL-MCNC: 1.13 MG/DL (ref 0.6–1.3)
GLOBULIN, CALCULATED - QUEST: 2.6 (ref 1.9–3.7)
GLUCOSE SERPL-MCNC: 93 MG/DL (ref 60–99)
HDLC SERPL-MCNC: 52 MG/DL (ref 40–150)
LDLC SERPL CALC-MCNC: 137 MG/DL (ref 0–130)
POTASSIUM SERPL-SCNC: 4.39 MMOL/L (ref 3.5–5.3)
PROT SERPL-MCNC: 7.3 G/DL (ref 6.1–8.1)
SODIUM SERPL-SCNC: 138.4 MMOL/L (ref 135–146)
TRIGL SERPL-MCNC: 344 MG/DL (ref 0–149)

## 2022-05-03 PROCEDURE — 80061 LIPID PANEL: CPT | Performed by: FAMILY MEDICINE

## 2022-05-03 PROCEDURE — 80053 COMPREHEN METABOLIC PANEL: CPT | Performed by: FAMILY MEDICINE

## 2022-05-03 PROCEDURE — 36415 COLL VENOUS BLD VENIPUNCTURE: CPT | Performed by: FAMILY MEDICINE

## 2022-05-03 PROCEDURE — 99214 OFFICE O/P EST MOD 30 MIN: CPT | Performed by: FAMILY MEDICINE

## 2022-05-03 RX ORDER — ATORVASTATIN CALCIUM 40 MG/1
40 TABLET, FILM COATED ORAL DAILY
Qty: 90 TABLET | Refills: 1 | Status: SHIPPED | OUTPATIENT
Start: 2022-05-03 | End: 2022-11-16

## 2022-05-03 RX ORDER — GAUZE BANDAGE 2" X 2"
100 BANDAGE TOPICAL DAILY
Qty: 90 TABLET | Refills: 1 | Status: SHIPPED | OUTPATIENT
Start: 2022-05-03 | End: 2022-05-25

## 2022-05-03 RX ORDER — FENOFIBRATE 160 MG/1
160 TABLET ORAL DAILY
Qty: 90 TABLET | Refills: 1 | Status: SHIPPED | OUTPATIENT
Start: 2022-05-03 | End: 2022-11-16

## 2022-05-03 RX ORDER — OMEPRAZOLE 40 MG/1
CAPSULE, DELAYED RELEASE ORAL
Qty: 90 CAPSULE | Refills: 1 | Status: SHIPPED | OUTPATIENT
Start: 2022-05-03 | End: 2022-11-11

## 2022-05-03 RX ORDER — PROPRANOLOL HCL 60 MG
CAPSULE, EXTENDED RELEASE 24HR ORAL
Qty: 90 CAPSULE | Refills: 1 | Status: SHIPPED | OUTPATIENT
Start: 2022-05-03 | End: 2022-11-16

## 2022-05-03 RX ORDER — SERTRALINE HYDROCHLORIDE 100 MG/1
150 TABLET, FILM COATED ORAL DAILY
Qty: 135 TABLET | Refills: 1 | Status: SHIPPED | OUTPATIENT
Start: 2022-05-03 | End: 2022-11-16

## 2022-05-03 NOTE — PROGRESS NOTES
"  Assessment & Plan     Mixed hyperlipidemia  Control uncertain, weight increased, continue current medications at current doses   - atorvastatin (LIPITOR) 40 MG tablet  Dispense: 90 tablet; Refill: 1  - fenofibrate (TRIGLIDE/LOFIBRA) 160 MG tablet  Dispense: 90 tablet; Refill: 1  - Lipid Panel (BFP)  - Comprehensive Metobolic Panel (BFP)  - VENOUS COLLECTION    JORGE (generalized anxiety disorder)  Stable, health issues with neurology affecting anxiety, continue current medications at current doses   - sertraline (ZOLOFT) 100 MG tablet  Dispense: 135 tablet; Refill: 1    Essential hypertension, benign (HTN)  Borderline high today, Check blood pressure readings outside of the clinic several times per week, write down values, and follow up if elevated within the next several weeks. Blood pressure can be checked at the firestation, drugstore,  or any valid site.   - propranolol ER (INDERAL LA) 60 MG 24 hr capsule  Dispense: 90 capsule; Refill: 1  - Comprehensive Metobolic Panel (BFP)  - VENOUS COLLECTION    Natarajan's esophagus with dysplasia  Well controlled on daily PPI, continue current medications at current doses   - omeprazole (PRILOSEC) 40 MG DR capsule  Dispense: 90 capsule; Refill: 1    Cervical stenosis of spine  Patient is having persistent symptoms despite previous therapies. -working with neurology on issues    Alcohol use  2-3 drinks daily, light beer or white wine  - vitamin B1 (THIAMINE) 100 MG tablet  Dispense: 90 tablet; Refill: 1    Special screening for malignant neoplasm of prostate    - VENOUS COLLECTION  - PSA Total ([facility])      Review of external notes as documented elsewhere in note  Review of the result(s) of each unique test - labs  Ordering of each unique test  Prescription drug management         BMI:   Estimated body mass index is 34.18 kg/m  as calculated from the following:    Height as of this encounter: 1.727 m (5' 8\").    Weight as of this encounter: 102 kg (224 lb 12.8 oz).   Weight " management plan: Discussed healthy diet and exercise guidelines    FUTURE APPOINTMENTS:       - Follow-up visit in 6 mo  Work on weight loss  Regular exercise    No follow-ups on file.    Woody Cintron MD  [facility]    Hai Pardo is a 67 year old who presents for the following health issues     HPI     Hyperlipidemia Follow-Up      Are you regularly taking any medication or supplement to lower your cholesterol?   Yes- atorvastatin    Are you having muscle aches or other side effects that you think could be caused by your cholesterol lowering medication?  No    Hypertension Follow-up      Do you check your blood pressure regularly outside of the clinic? No     Are you following a low salt diet? No    Are your blood pressures ever more than 140 on the top number (systolic) OR more   than 90 on the bottom number (diastolic), for example 140/90? Yes    Anxiety Follow-Up    How are you doing with your anxiety since your last visit? No change    Are you having other symptoms that might be associated with anxiety? No    Have you had a significant life event? Health Concerns     Are you feeling depressed? No    Do you have any concerns with your use of alcohol or other drugs? No    Social History     Tobacco Use     Smoking status: Never Smoker     Smokeless tobacco: Former User     Types: Chew   Substance Use Topics     Alcohol use: Yes     Alcohol/week: 14.0 standard drinks     Types: 14 Standard drinks or equivalent per week     Drug use: No     JORGE-7 SCORE 3/1/2021 10/21/2021 11/24/2021   Total Score - - -   Total Score 0 2 0     PHQ 3/1/2021 10/21/2021 11/24/2021   PHQ-9 Total Score 2 5 0   Q9: Thoughts of better off dead/self-harm past 2 weeks Not at all Not at all Not at all     Last PHQ-9 11/24/2021   1.  Little interest or pleasure in doing things 0   2.  Feeling down, depressed, or hopeless 0   3.  Trouble falling or staying asleep, or sleeping too much 0   4.  Feeling tired  "or having little energy 0   5.  Poor appetite or overeating 0   6.  Feeling bad about yourself 0   7.  Trouble concentrating 0   8.  Moving slowly or restless 0   Q9: Thoughts of better off dead/self-harm past 2 weeks 0   PHQ-9 Total Score 0   Difficulty at work, home, or with people Not difficult at all     JORGE-7  11/24/2021   1. Feeling nervous, anxious, or on edge 0   2. Not being able to stop or control worrying 0   3. Worrying too much about different things 0   4. Trouble relaxing 0   5. Being so restless that it is hard to sit still 0   6. Becoming easily annoyed or irritable 0   7. Feeling afraid, as if something awful might happen 0   JORGE-7 Total Score 0   If you checked any problems, how difficult have they made it for you to do your work, take care of things at home, or get along with other people? Not difficult at all       Cervical stenosis, gait instability/balance issue- no better, reviewed notes from neurology 3/22, is having more MRIs-pt seeing Dr Cervantes again in a week    How many servings of fruits and vegetables do you eat daily?  2-3    On average, how many sweetened beverages do you drink each day (Examples: soda, juice, sweet tea, etc.  Do NOT count diet or artificially sweetened beverages)?   1    How many days per week do you exercise enough to make your heart beat faster? 6    How many minutes a day do you exercise enough to make your heart beat faster? 30 - 60    How many days per week do you miss taking your medication? 0        Review of Systems   Constitutional, HEENT, cardiovascular, pulmonary, gi and gu systems are negative, except as otherwise noted.      Objective    BP (!) 142/88 (BP Location: Right arm, Patient Position: Chair, Cuff Size: Adult Large)   Pulse 60   Temp 98  F (36.7  C)   Resp 20   Ht 1.727 m (5' 8\")   Wt 102 kg (224 lb 12.8 oz)   BMI 34.18 kg/m    Body mass index is 34.18 kg/m .  Physical Exam   GENERAL: healthy, alert and no distress  EYES: Eyes grossly " normal to inspection, PERRL and conjunctivae and sclerae normal  HENT: ear canals and TM's normal, nose and mouth without ulcers or lesions  NECK: no adenopathy, no asymmetry, masses, or scars and thyroid normal to palpation  RESP: lungs clear to auscultation - no rales, rhonchi or wheezes  CV: regular rate and rhythm, normal S1 S2, no S3 or S4, no murmur, click or rub, no peripheral edema and peripheral pulses strong  ABDOMEN: soft, nontender, no hepatosplenomegaly, no masses and bowel sounds normal  MS: no gross musculoskeletal defects noted, no edema  PSYCH: mentation appears normal, affect normal/bright    GERD- doing well omeprazole daily due to hx Barretts    Lab on 11/29/2021   Component Date Value Ref Range Status     SARS CoV2 PCR 11/29/2021 Negative  Negative, Testing sent to reference lab. Results will be returned via unsolicited result Final    NEGATIVE: SARS-CoV-2 (COVID-19) RNA not detected, presumed negative.

## 2022-05-04 LAB — ABBOTT PSA - QUEST: 0.84 NG/ML

## 2022-05-25 DIAGNOSIS — E72.12 METHYLENETETRAHYDROFOLATE REDUCTASE DEFICIENCY (H): ICD-10-CM

## 2022-05-25 DIAGNOSIS — R79.89 OTHER SPECIFIED ABNORMAL FINDINGS OF BLOOD CHEMISTRY: ICD-10-CM

## 2022-05-25 DIAGNOSIS — Z78.9 ALCOHOL USE: ICD-10-CM

## 2022-05-25 RX ORDER — CHOLECALCIFEROL (VITAMIN D3) 25 MCG
TABLET,CHEWABLE ORAL
Qty: 90 TABLET | Refills: 1 | Status: SHIPPED | OUTPATIENT
Start: 2022-05-25 | End: 2022-11-16

## 2022-05-25 RX ORDER — CALCIUM CARBONATE/VITAMIN D3 600 MG-10
TABLET ORAL
Qty: 90 TABLET | Refills: 1 | Status: SHIPPED | OUTPATIENT
Start: 2022-05-25 | End: 2022-11-16

## 2022-05-25 NOTE — TELEPHONE ENCOUNTER
Edvin Norton is requesting a refill of:    Pending Prescriptions:                       Disp   Refills    Thiamine Mononitrate (B1) 100 MG TABS [Ph*90 tab*1            Sig: TAKE 1 TABLET BY MOUTH EVERY DAY    B Complex-C-Folic Acid (SUPER B-COMPLEX/V*90 tab*1            Sig: TAKE 1 TABLET BY MOUTH EVERY DAY    Please close encounter if RX was sent. Thanks, Gita

## 2022-06-28 ENCOUNTER — OFFICE VISIT (OUTPATIENT)
Dept: FAMILY MEDICINE | Facility: CLINIC | Age: 67
End: 2022-06-28

## 2022-06-28 VITALS
RESPIRATION RATE: 22 BRPM | BODY MASS INDEX: 28.89 KG/M2 | SYSTOLIC BLOOD PRESSURE: 116 MMHG | HEART RATE: 73 BPM | TEMPERATURE: 98.1 F | OXYGEN SATURATION: 93 % | WEIGHT: 190 LBS | DIASTOLIC BLOOD PRESSURE: 80 MMHG

## 2022-06-28 DIAGNOSIS — L03.116 CELLULITIS OF LEFT LOWER EXTREMITY: Primary | ICD-10-CM

## 2022-06-28 PROCEDURE — 99213 OFFICE O/P EST LOW 20 MIN: CPT | Performed by: PHYSICIAN ASSISTANT

## 2022-06-28 RX ORDER — PREDNISONE 10 MG/1
TABLET ORAL
COMMUNITY
Start: 2022-06-28 | End: 2022-11-16

## 2022-06-28 RX ORDER — CEPHALEXIN 500 MG/1
500 CAPSULE ORAL 2 TIMES DAILY
Qty: 40 CAPSULE | Refills: 0 | Status: SHIPPED | OUTPATIENT
Start: 2022-06-28 | End: 2022-11-16

## 2022-06-28 NOTE — PROGRESS NOTES
Assessment & Plan     Cellulitis of left lower extremity  Start abx  Be seen in ER if erythema worsens or systemic sx like fever develop  - cephALEXin (KEFLEX) 500 MG capsule  Dispense: 40 capsule; Refill: 0      ETHAN Sierra  [facility]    Subjective     Nursing Notes:   Raysa Crabtree CMA  6/28/2022  3:18 PM  Signed  Chief Complaint   Patient presents with     Laceration     Laceration on left leg, wants to start antibiotics                Edvin Norton is a 67 year old male who presents to clinic today for the following health issues:     HPI     Dr. Cervantes - neurologist - just seen and sent here with concerns for leg infection  2 weeks     Prednisone rx'd but Dr. Cervantes wants him on abx before prednisone    10-14 days - injury - not sure what happened          Objective    /80 (BP Location: Right arm, Patient Position: Sitting, Cuff Size: Adult Large)   Pulse 73   Temp 98.1  F (36.7  C) (Temporal)   Resp 22   Wt 86.2 kg (190 lb)   SpO2 93%   BMI 28.89 kg/m    Body mass index is 28.89 kg/m .  Physical Exam   GENERAL: healthy, alert and no distress  Skin:  Lower left leg - 1x2 cm skin ulceration with small scabbing, mild surrounding erythema

## 2022-06-28 NOTE — NURSING NOTE
Chief Complaint   Patient presents with     Laceration     Laceration on left leg, wants to start antibiotics

## 2022-07-01 ENCOUNTER — HOSPITAL ENCOUNTER (OUTPATIENT)
Dept: MRI IMAGING | Facility: CLINIC | Age: 67
Discharge: HOME | End: 2022-07-01
Attending: PSYCHIATRY & NEUROLOGY | Admitting: PSYCHIATRY & NEUROLOGY
Payer: MEDICARE

## 2022-07-01 DIAGNOSIS — M47.14 THORACIC MYELOPATHY: ICD-10-CM

## 2022-07-01 PROCEDURE — 72146 MRI CHEST SPINE W/O DYE: CPT

## 2022-08-04 ENCOUNTER — TRANSFERRED RECORDS (OUTPATIENT)
Dept: FAMILY MEDICINE | Facility: CLINIC | Age: 67
End: 2022-08-04

## 2022-10-16 ENCOUNTER — HEALTH MAINTENANCE LETTER (OUTPATIENT)
Age: 67
End: 2022-10-16

## 2022-11-07 DIAGNOSIS — F41.1 GAD (GENERALIZED ANXIETY DISORDER): ICD-10-CM

## 2022-11-07 DIAGNOSIS — E78.2 MIXED HYPERLIPIDEMIA: ICD-10-CM

## 2022-11-08 RX ORDER — SERTRALINE HYDROCHLORIDE 100 MG/1
TABLET, FILM COATED ORAL
Qty: 135 TABLET | Refills: 1 | COMMUNITY
Start: 2022-11-08

## 2022-11-08 RX ORDER — ATORVASTATIN CALCIUM 40 MG/1
TABLET, FILM COATED ORAL
Qty: 90 TABLET | Refills: 1 | COMMUNITY
Start: 2022-11-08

## 2022-11-08 NOTE — TELEPHONE ENCOUNTER
Edvin Norton is requesting a refill of:    Refused Prescriptions:                       Disp   Refills    sertraline (ZOLOFT) 100 MG tablet [Pharmac*135 ta*1        Sig: TAKE 1.5 TABLETS BY MOUTH DAILY  Refused By: IFRAH PURVIS  Reason for Refusal: Patient needs appointment    atorvastatin (LIPITOR) 40 MG tablet [Pharm*90 tab*1        Sig: TAKE 1 TABLET BY MOUTH EVERY DAY  Refused By: IFRAH PURVIS  Reason for Refusal: Patient needs appointment    Needs fasting OV

## 2022-11-09 DIAGNOSIS — K22.719 BARRETT'S ESOPHAGUS WITH DYSPLASIA: ICD-10-CM

## 2022-11-11 RX ORDER — OMEPRAZOLE 40 MG/1
CAPSULE, DELAYED RELEASE ORAL
Qty: 90 CAPSULE | Refills: 0 | Status: SHIPPED | OUTPATIENT
Start: 2022-11-11 | End: 2022-11-16

## 2022-11-11 NOTE — TELEPHONE ENCOUNTER
Edvin Norton is requesting a refill of:    Pending Prescriptions:                       Disp   Refills    omeprazole (PRILOSEC) 40 MG DR capsule [P*90 cap*0            Sig: TAKE 1 CAPSULE BY MOUTH EVERY DAY IN THE MORNING    Has OV on 11/16/22

## 2022-11-16 ENCOUNTER — OFFICE VISIT (OUTPATIENT)
Dept: FAMILY MEDICINE | Facility: CLINIC | Age: 67
End: 2022-11-16

## 2022-11-16 VITALS
RESPIRATION RATE: 20 BRPM | HEIGHT: 68 IN | TEMPERATURE: 97 F | DIASTOLIC BLOOD PRESSURE: 76 MMHG | HEART RATE: 68 BPM | WEIGHT: 192 LBS | BODY MASS INDEX: 29.1 KG/M2 | SYSTOLIC BLOOD PRESSURE: 122 MMHG

## 2022-11-16 DIAGNOSIS — E72.12 METHYLENETETRAHYDROFOLATE REDUCTASE DEFICIENCY (H): ICD-10-CM

## 2022-11-16 DIAGNOSIS — M48.02 CERVICAL STENOSIS OF SPINE: ICD-10-CM

## 2022-11-16 DIAGNOSIS — I10 ESSENTIAL HYPERTENSION, BENIGN: ICD-10-CM

## 2022-11-16 DIAGNOSIS — Z78.9 ALCOHOL USE: ICD-10-CM

## 2022-11-16 DIAGNOSIS — K22.719 BARRETT'S ESOPHAGUS WITH DYSPLASIA: ICD-10-CM

## 2022-11-16 DIAGNOSIS — E78.2 MIXED HYPERLIPIDEMIA: ICD-10-CM

## 2022-11-16 DIAGNOSIS — R79.89 OTHER SPECIFIED ABNORMAL FINDINGS OF BLOOD CHEMISTRY: ICD-10-CM

## 2022-11-16 DIAGNOSIS — E78.2 MIXED HYPERLIPIDEMIA: Primary | ICD-10-CM

## 2022-11-16 DIAGNOSIS — Z23 NEED FOR PNEUMOCOCCAL VACCINE: ICD-10-CM

## 2022-11-16 DIAGNOSIS — F41.1 GAD (GENERALIZED ANXIETY DISORDER): ICD-10-CM

## 2022-11-16 LAB
ALBUMIN SERPL-MCNC: 4.7 G/DL (ref 3.6–5.1)
ALBUMIN/GLOB SERPL: 2.1 {RATIO} (ref 1–2.5)
ALP SERPL-CCNC: 36 U/L (ref 33–130)
ALT 1742-6: 29 U/L (ref 0–32)
AST 1920-8: 31 U/L (ref 0–35)
BILIRUB SERPL-MCNC: 0.9 MG/DL (ref 0.2–1.2)
BUN SERPL-MCNC: 17 MG/DL (ref 7–25)
BUN/CREATININE RATIO: 16 (ref 6–22)
CALCIUM SERPL-MCNC: 9.8 MG/DL (ref 8.6–10.3)
CHLORIDE SERPLBLD-SCNC: 103.2 MMOL/L (ref 98–110)
CHOLEST SERPL-MCNC: 171 MG/DL (ref 0–199)
CHOLEST/HDLC SERPL: 4 {RATIO} (ref 0–5)
CO2 SERPL-SCNC: 27.4 MMOL/L (ref 20–32)
CREAT SERPL-MCNC: 1.06 MG/DL (ref 0.6–1.3)
GFR SERPL CREATININE-BSD FRML MDRD: 77 ML/MIN/1.73M2
GLOBULIN, CALCULATED - QUEST: 2.2 (ref 1.9–3.7)
GLUCOSE SERPL-MCNC: 89 MG/DL (ref 60–99)
HDLC SERPL-MCNC: 44 MG/DL (ref 40–150)
LDLC SERPL CALC-MCNC: 99 MG/DL (ref 0–130)
POTASSIUM SERPL-SCNC: 4.74 MMOL/L (ref 3.5–5.3)
PROT SERPL-MCNC: 6.9 G/DL (ref 6.1–8.1)
SODIUM SERPL-SCNC: 139.3 MMOL/L (ref 135–146)
TRIGL SERPL-MCNC: 140 MG/DL (ref 0–149)

## 2022-11-16 PROCEDURE — G0009 ADMIN PNEUMOCOCCAL VACCINE: HCPCS | Performed by: FAMILY MEDICINE

## 2022-11-16 PROCEDURE — 90677 PCV20 VACCINE IM: CPT | Performed by: FAMILY MEDICINE

## 2022-11-16 PROCEDURE — 80053 COMPREHEN METABOLIC PANEL: CPT | Performed by: FAMILY MEDICINE

## 2022-11-16 PROCEDURE — 99214 OFFICE O/P EST MOD 30 MIN: CPT | Mod: 25 | Performed by: FAMILY MEDICINE

## 2022-11-16 PROCEDURE — 80061 LIPID PANEL: CPT | Performed by: FAMILY MEDICINE

## 2022-11-16 PROCEDURE — 36415 COLL VENOUS BLD VENIPUNCTURE: CPT | Performed by: FAMILY MEDICINE

## 2022-11-16 RX ORDER — CHOLECALCIFEROL (VITAMIN D3) 25 MCG
1 TABLET,CHEWABLE ORAL DAILY
Qty: 90 TABLET | Refills: 1 | Status: SHIPPED | OUTPATIENT
Start: 2022-11-16 | End: 2023-06-15

## 2022-11-16 RX ORDER — PROPRANOLOL HCL 60 MG
CAPSULE, EXTENDED RELEASE 24HR ORAL
Qty: 90 CAPSULE | Refills: 1 | COMMUNITY
Start: 2022-11-16

## 2022-11-16 RX ORDER — FENOFIBRATE 160 MG/1
TABLET ORAL
Qty: 90 TABLET | Refills: 1 | COMMUNITY
Start: 2022-11-16

## 2022-11-16 RX ORDER — CALCIUM CARBONATE/VITAMIN D3 600 MG-10
1 TABLET ORAL DAILY
Qty: 90 TABLET | Refills: 1 | Status: SHIPPED | OUTPATIENT
Start: 2022-11-16 | End: 2023-04-26

## 2022-11-16 RX ORDER — SERTRALINE HYDROCHLORIDE 100 MG/1
150 TABLET, FILM COATED ORAL DAILY
Qty: 135 TABLET | Refills: 1 | Status: SHIPPED | OUTPATIENT
Start: 2022-11-16 | End: 2023-06-06

## 2022-11-16 RX ORDER — OMEPRAZOLE 40 MG/1
CAPSULE, DELAYED RELEASE ORAL
Qty: 90 CAPSULE | Refills: 1 | Status: SHIPPED | OUTPATIENT
Start: 2022-11-16 | End: 2023-06-15

## 2022-11-16 RX ORDER — FENOFIBRATE 160 MG/1
160 TABLET ORAL DAILY
Qty: 90 TABLET | Refills: 1 | Status: SHIPPED | OUTPATIENT
Start: 2022-11-16 | End: 2023-06-15

## 2022-11-16 RX ORDER — ATORVASTATIN CALCIUM 40 MG/1
40 TABLET, FILM COATED ORAL DAILY
Qty: 90 TABLET | Refills: 1 | Status: SHIPPED | OUTPATIENT
Start: 2022-11-16 | End: 2023-06-09

## 2022-11-16 RX ORDER — PROPRANOLOL HCL 60 MG
CAPSULE, EXTENDED RELEASE 24HR ORAL
Qty: 90 CAPSULE | Refills: 1 | Status: SHIPPED | OUTPATIENT
Start: 2022-11-16 | End: 2023-06-15

## 2022-11-16 NOTE — TELEPHONE ENCOUNTER
Edvin Norton is requesting a refill of:    Refused Prescriptions:                       Disp   Refills    fenofibrate (TRIGLIDE/LOFIBRA) 160 MG tabl*90 tab*1        Sig: TAKE 1 TABLET BY MOUTH EVERY DAY  Refused By: IFRAH PURVIS  Reason for Refusal: Duplicate    propranolol ER (INDERAL LA) 60 MG 24 hr ca*90 cap*1        Sig: TAKE 1 CAPSULE BY MOUTH EVERY DAY  Refused By: IFRAH PURVIS  Reason for Refusal: Duplicate    Sent today

## 2022-11-16 NOTE — PROGRESS NOTES
"  Assessment & Plan     Mixed hyperlipidemia  Control uncertain, continue current medications at current doses pending labs    Essential hypertension, benign (HTN)  Well controlled, continue current medications at current doses     JORGE (generalized anxiety disorder)  Well controlled, continue current medications at current doses     Natarajan's esophagus with dysplasia  Well controlled, continue current medications at current doses     Cervical stenosis of spine  Stable, nerve block helpful    Methylenetetrahydrofolate reductase deficiency (H)      Other specified abnormal findings of blood chemistry      Alcohol use  Has weaned down well      Review of external notes as documented elsewhere in note  Review of the result(s) of each unique test - labs  Ordering of each unique test  Prescription drug management         BMI:   Estimated body mass index is 29.19 kg/m  as calculated from the following:    Height as of this encounter: 1.727 m (5' 8\").    Weight as of this encounter: 87.1 kg (192 lb).       FUTURE APPOINTMENTS:       - Follow-up visit in 6 mo  Work on weight loss  Regular exercise    No follow-ups on file.    Woody Cintron MD  [facility]    Hai Pardo is a 67 year old, presenting for the following health issues:  Recheck Medication      HPI     Hyperlipidemia Follow-Up      Are you regularly taking any medication or supplement to lower your cholesterol?   Yes- atorvastatin    Are you having muscle aches or other side effects that you think could be caused by your cholesterol lowering medication?  No    Hypertension Follow-up      Do you check your blood pressure regularly outside of the clinic? Yes     Are you following a low salt diet? No    Are your blood pressures ever more than 140 on the top number (systolic) OR more   than 90 on the bottom number (diastolic), for example 140/90? No    Anxiety Follow-Up    How are you doing with your anxiety since your last visit? " "No change    Are you having other symptoms that might be associated with anxiety? No    Have you had a significant life event? Health Concerns     Are you feeling depressed? No    Do you have any concerns with your use of alcohol or other drugs? No    Social History     Tobacco Use     Smoking status: Never     Smokeless tobacco: Former     Types: Chew   Substance Use Topics     Alcohol use: Yes     Alcohol/week: 14.0 standard drinks     Types: 14 Standard drinks or equivalent per week     Drug use: No     JORGE-7 SCORE 3/1/2021 10/21/2021 11/24/2021   Total Score - - -   Total Score 0 2 0     PHQ 3/1/2021 10/21/2021 11/24/2021   PHQ-9 Total Score 2 5 0   Q9: Thoughts of better off dead/self-harm past 2 weeks Not at all Not at all Not at all     Mood screeners    Pt has cut alcohol back to a few beers per day    How many servings of fruits and vegetables do you eat daily?  2-3    On average, how many sweetened beverages do you drink each day (Examples: soda, juice, sweet tea, etc.  Do NOT count diet or artificially sweetened beverages)?   1    How many days per week do you exercise enough to make your heart beat faster? 6    How many minutes a day do you exercise enough to make your heart beat faster? 60 or more    How many days per week do you miss taking your medication? 0      Pt had nerve block C3 and lumbar epidural last week-numbness better in feet, neck pain seems improved, still right arm tingling    GERD- using PPI daily, No fevers, melena, hematemesis, or weight loss.   Review of Systems   Constitutional, HEENT, cardiovascular, pulmonary, gi and gu systems are negative, except as otherwise noted.      Objective    /76 (BP Location: Right arm, Patient Position: Chair, Cuff Size: Adult Large)   Pulse 68   Temp 97  F (36.1  C) (Temporal)   Resp 20   Ht 1.727 m (5' 8\")   Wt 87.1 kg (192 lb)   BMI 29.19 kg/m    Body mass index is 29.19 kg/m .  Physical Exam   GENERAL: healthy, alert and no " distress  EYES: Eyes grossly normal to inspection, PERRL and conjunctivae and sclerae normal  NECK: no adenopathy, no asymmetry, masses, or scars and thyroid normal to palpation  RESP: lungs clear to auscultation - no rales, rhonchi or wheezes  CV: regular rate and rhythm, normal S1 S2, no S3 or S4, no murmur, click or rub, no peripheral edema and peripheral pulses strong  ABDOMEN: soft, nontender, no hepatosplenomegaly, no masses and bowel sounds normal  MS: no gross musculoskeletal defects noted, no edema  PSYCH: mentation appears normal, affect normal/bright    Office Visit on 05/03/2022   Component Date Value Ref Range Status     Cholesterol 05/03/2022 258 (A)  0 - 199 mg/dL Final     Triglycerides 05/03/2022 344 (A)  0 - 149 mg/dL Final     HDL Cholesterol 05/03/2022 52  40 - 150 mg/dL Final     LDL Cholesterol Direct 05/03/2022 137 (A)  0 - 130 mg/dL Final     Cholesterol/HDL Ratio 05/03/2022 5  0 - 5 Final     Creatinine 05/03/2022 1.13  0.60 - 1.30 mg/dL Final     Glucose 05/03/2022 93  60 - 99 mg/dL Final     Sodium 05/03/2022 138.4  135 - 146 mmol/L Final     Potassium 05/03/2022 4.39  3.5 - 5.3 mmol/L Final     Chloride 05/03/2022 103.4  98 - 110 mmol/L Final     Protein Total 05/03/2022 7.3  6.1 - 8.1 g/dL Final     Albumin 05/03/2022 4.7  3.6 - 5.1 g/dL Final     Alkaline Phosphatase 05/03/2022 39  33 - 130 U/L Final     ALT 05/03/2022 45 (A)  0 - 32 U/L Final     AST 05/03/2022 48 (A)  0 - 35 U/L Final     Bilirubin Total 05/03/2022 0.9  0.2 - 1.2 mg/dL Final     Urea Nitrogen 05/03/2022 19  7 - 25 mg/dL Final     Calcium 05/03/2022 9.4  8.6 - 10.3 mg/dL Final     BUN/Creatinine Ratio 05/03/2022 16.8  6 - 22 Final     Globulin Calculated 05/03/2022 2.6  1.9 - 3.7 Final     A/G Ratio 05/03/2022 1.8  1 - 2.5 Final     Abbott PSA 05/03/2022 0.84  < OR = 4.00 ng/mL Final    Comment: The total PSA value from this assay system is   standardized against the WHO standard. The test   result will be  approximately 20% lower when compared   to the equimolar-standardized total PSA (Sana   Yun). Comparison of serial PSA results should be   interpreted with this fact in mind.     This test was performed using the Siemens   chemiluminescent method. Values obtained from   different assay methods cannot be used  interchangeably. PSA levels, regardless of  value, should not be interpreted as absolute  evidence of the presence or absence of disease.

## 2022-11-16 NOTE — NURSING NOTE
Edvin Norton is here for fasting blood work and medication refill.  Questioned patient about current smoking habits.  Pt. has never smoked.  Body mass index is 33.67 kg/(m^2).  PULSE regular  My Chart: active    Pre-visit planning  Immunizations - up to date  Colonoscopy - is up to date  Mammogram -   Asthma -   PHQ9  JORGE-7    The patient has verbalized that it is ok to leave a detailed voice message on the patient's cell phone with results/recommendations from this visit.

## 2022-11-17 DIAGNOSIS — R79.89 OTHER SPECIFIED ABNORMAL FINDINGS OF BLOOD CHEMISTRY: ICD-10-CM

## 2022-11-17 DIAGNOSIS — E72.12 METHYLENETETRAHYDROFOLATE REDUCTASE DEFICIENCY (H): ICD-10-CM

## 2022-11-21 RX ORDER — CHOLECALCIFEROL (VITAMIN D3) 25 MCG
TABLET,CHEWABLE ORAL
Qty: 90 TABLET | Refills: 1 | COMMUNITY
Start: 2022-11-21

## 2022-11-21 NOTE — TELEPHONE ENCOUNTER
Edvin Norton is requesting a refill of:    Refused Prescriptions:                       Disp   Refills    B Complex-C-Folic Acid (SUPER B-COMPLEX/VI*90 tab*1        Sig: TAKE 1 TABLET BY MOUTH EVERY DAY  Refused By: IFRAH PURVIS  Reason for Refusal: Should already have refills on file    Sent 11/16

## 2022-12-03 DIAGNOSIS — E72.12 METHYLENETETRAHYDROFOLATE REDUCTASE DEFICIENCY (H): ICD-10-CM

## 2022-12-03 DIAGNOSIS — R79.89 OTHER SPECIFIED ABNORMAL FINDINGS OF BLOOD CHEMISTRY: ICD-10-CM

## 2022-12-04 RX ORDER — CHOLECALCIFEROL (VITAMIN D3) 25 MCG
TABLET,CHEWABLE ORAL
Qty: 90 TABLET | Refills: 1 | COMMUNITY
Start: 2022-12-04

## 2022-12-05 NOTE — TELEPHONE ENCOUNTER
Received incoming refill request for  Pending Prescriptions:                       Disp   Refills    B Complex-C-Folic Acid (SUPER B-COMPLEX/V*90 tab*1            Sig: TAKE 1 TABLET BY MOUTH EVERY DAY    A refill was sent 11/16.

## 2023-03-02 DIAGNOSIS — Z78.9 ALCOHOL USE: ICD-10-CM

## 2023-03-02 RX ORDER — LANOLIN ALCOHOL/MO/W.PET/CERES
CREAM (GRAM) TOPICAL
Qty: 90 TABLET | Refills: 0 | Status: SHIPPED | OUTPATIENT
Start: 2023-03-02 | End: 2023-06-15

## 2023-03-02 NOTE — TELEPHONE ENCOUNTER
Edvin Norton is requesting a refill of:    Pending Prescriptions:                       Disp   Refills    thiamine (B-1) 100 MG tablet [Pharmacy Me*90 tab*0            Sig: TAKE 1 TABLET BY MOUTH EVERY DAY    Please close encounter if RX was sent. Thanks, Gita

## 2023-03-09 DIAGNOSIS — R79.89 OTHER SPECIFIED ABNORMAL FINDINGS OF BLOOD CHEMISTRY: ICD-10-CM

## 2023-03-09 DIAGNOSIS — E72.12 METHYLENETETRAHYDROFOLATE REDUCTASE DEFICIENCY (H): ICD-10-CM

## 2023-03-09 RX ORDER — CHOLECALCIFEROL (VITAMIN D3) 25 MCG
TABLET,CHEWABLE ORAL
Qty: 90 TABLET | Refills: 1 | COMMUNITY
Start: 2023-03-09

## 2023-03-09 NOTE — TELEPHONE ENCOUNTER
Edvin Norton is requesting a refill of:    Refused Prescriptions:                       Disp   Refills    B Complex-C-Folic Acid (SUPER B-COMPLEX/VI*90 tab*1        Sig: TAKE 1 TABLET BY MOUTH EVERY DAY  Refused By: LIBRADO MURGUIA  Reason for Refusal: Patient has requested refill too soon  Reason for Refusal Comment: Refills sent on 11/16/22 for 90 with 1 refill    Refill sent on 11/16/22 for 90 with 1 refill, that is enough medication until May

## 2023-04-26 ENCOUNTER — OFFICE VISIT (OUTPATIENT)
Dept: FAMILY MEDICINE | Facility: CLINIC | Age: 68
End: 2023-04-26

## 2023-04-26 DIAGNOSIS — I10 ESSENTIAL HYPERTENSION, BENIGN: ICD-10-CM

## 2023-04-26 DIAGNOSIS — E78.2 MIXED HYPERLIPIDEMIA: ICD-10-CM

## 2023-04-26 DIAGNOSIS — U07.1 SARS-COV-2 POSITIVE: Primary | ICD-10-CM

## 2023-04-26 PROCEDURE — 99214 OFFICE O/P EST MOD 30 MIN: CPT | Mod: 95 | Performed by: FAMILY MEDICINE

## 2023-04-26 NOTE — PROGRESS NOTES
Assessment & Plan   Problem List Items Addressed This Visit        Endocrine    Mixed hyperlipidemia    Relevant Medications    nirmatrelvir and ritonavir (PAXLOVID) 300 mg/100 mg therapy pack       Circulatory    Essential hypertension, benign (HTN)   Other Visit Diagnoses     SARS-CoV-2 positive    -  Primary    Relevant Medications    nirmatrelvir and ritonavir (PAXLOVID) 300 mg/100 mg therapy pack           Telemedicine Visit: The patient's condition can be safely assessed and treated via synchronous audio and visual telemedicine encounter.      Reason for Telemedicine Visit: covid positive    Originating Site (Patient Location): Patient's home        Distant Location (provider location):  On-site    Consent:  The patient/guardian has verbally consented to: the potential risks and benefits of telemedicine (video visit) versus in person care; bill my insurance or make self-payment for services provided; and responsibility for payment of non-covered services.     Mode of Communication:  Video Conference via [employer]    As the provider I attest to compliance with applicable laws and regulations related to telemedicine.    1. SARS-CoV-2 positive  Treat with paxlovid, discussed risks and benefits, including rebound covid. Stop cholesterol medications.  - nirmatrelvir and ritonavir (PAXLOVID) 300 mg/100 mg therapy pack; Take 3 tablets by mouth 2 times daily for 5 days  Dispense: 30 tablet; Refill: 0    2. Mixed hyperlipidemia  Will stop medications while on this medications.  - nirmatrelvir and ritonavir (PAXLOVID) 300 mg/100 mg therapy pack; Take 3 tablets by mouth 2 times daily for 5 days  Dispense: 30 tablet; Refill: 0    3. Essential hypertension, benign (HTN)         FUTURE APPOINTMENTS:       - Follow-up visit as needed.    No follow-ups on file.    Lauren Booker MD  [facility]    Subjective     Nursing Notes:   Cindi Gamble, MIKEY  4/26/2023  4:31 PM  Signed  Chief Complaint   Patient  "presents with     Covid Concern     Virtual visit: Covid positive on 04/25, symptoms started on Saturday, cough, congestion, body aches and headaches, discuss Noah            Edvin Norton is a 68 year old male who presents to clinic today for the following health issues     HPI     Video visit for positive covid.  Patient and wife have covid.    Saturday started with symptoms with chest congestion, runny nose, aching. Thought it was from doing his yardwork. Started taking claritin and mucinex,nothing seemed to work. Last night + covid test at home. Has some shortness of breath, aching and coughing. Feels like his breathing is fine now.      Medication interactions--  lipitor                   4/26/2023     5:42 PM   MASSBP Score   Age Greater than or equal to 65 years 2   BMI greater than or equal to 35 kg/m2 0   Has Diabetes Mellitus 0   Has Chronic Kidney Disease 0   Has Cardiovascular Disease and 55 years or older 0   Has Chronic Respiratory Disease and 55 years or older 0   Has Hypertension and 55 years or older 1   Is Immunocompromised 0   Is Pregnant 0   Member of BIPOC community (Black/, /, ,  or , or  or [place] Native)  0   MASSBP Score 3   Has the patient had a positive COVID test outside our system?  Yes   What day did symptoms start?  4/22/2023       Estimated body mass index is 29.19 kg/m  as calculated from the following:    Height as of 11/16/22: 1.727 m (5' 8\").    Weight as of 11/16/22: 87.1 kg (192 lb).     GFR Estimate   Date Value Ref Range Status   11/16/2022 77 >60 ml/min/1.73m2 Final        FDA Facts Sheet  Lexicomp Drug Interaction review      YES  Medications were reviewed with the patient and held or adjusted where applicable.    Current Outpatient Medications   Medication     ASPIRIN PO     atorvastatin (LIPITOR) 40 MG tablet     B Complex-C-Folic Acid (SUPER B-COMPLEX/VIT C/FA) TABS     fenofibrate " (TRIGLIDE/LOFIBRA) 160 MG tablet     nirmatrelvir and ritonavir (PAXLOVID) 300 mg/100 mg therapy pack     Omega-3 Fatty Acids (OMEGA-3 FISH OIL PO)     omeprazole (PRILOSEC) 40 MG DR capsule     propranolol ER (INDERAL LA) 60 MG 24 hr capsule     sertraline (ZOLOFT) 100 MG tablet     thiamine (B-1) 100 MG tablet     No current facility-administered medications for this visit.                                           Review of Systems   Constitutional, HEENT, cardiovascular, pulmonary, gi and gu systems are negative, except as otherwise noted.      Objective    There were no vitals taken for this visit.  There is no height or weight on file to calculate BMI.  Physical Exam   GENERAL: healthy, alert and no distress  MS: no gross musculoskeletal defects noted, no edema  NEURO: Normal strength and tone, mentation intact and speech normal  PSYCH: mentation appears normal, affect normal/bright    No results found for any visits on 04/26/23.

## 2023-04-26 NOTE — NURSING NOTE
Chief Complaint   Patient presents with     Covid Concern     Virtual visit: Covid positive on 04/25, symptoms started on Saturday, cough, congestion, body aches and headaches, discuss Paxlovid

## 2023-05-17 DIAGNOSIS — F41.1 GAD (GENERALIZED ANXIETY DISORDER): ICD-10-CM

## 2023-05-17 DIAGNOSIS — E78.2 MIXED HYPERLIPIDEMIA: ICD-10-CM

## 2023-05-17 DIAGNOSIS — I10 ESSENTIAL HYPERTENSION, BENIGN: ICD-10-CM

## 2023-05-17 RX ORDER — SERTRALINE HYDROCHLORIDE 100 MG/1
TABLET, FILM COATED ORAL
Qty: 135 TABLET | Refills: 1 | COMMUNITY
Start: 2023-05-17

## 2023-05-17 RX ORDER — ATORVASTATIN CALCIUM 40 MG/1
TABLET, FILM COATED ORAL
Qty: 90 TABLET | Refills: 1 | COMMUNITY
Start: 2023-05-17

## 2023-05-17 RX ORDER — PROPRANOLOL HCL 60 MG
CAPSULE, EXTENDED RELEASE 24HR ORAL
Qty: 90 CAPSULE | Refills: 1 | COMMUNITY
Start: 2023-05-17

## 2023-05-17 RX ORDER — FENOFIBRATE 160 MG/1
TABLET ORAL
Qty: 90 TABLET | Refills: 1 | COMMUNITY
Start: 2023-05-17

## 2023-05-17 NOTE — TELEPHONE ENCOUNTER
Edvin Norton is requesting a refill of:    Refused Prescriptions:                       Disp   Refills    atorvastatin (LIPITOR) 40 MG tablet [Pharm*90 tab*1        Sig: TAKE 1 TABLET BY MOUTH EVERY DAY  Refused By: IFRAH PURVIS  Reason for Refusal: Patient needs appointment    sertraline (ZOLOFT) 100 MG tablet [Pharmac*135 ta*1        Sig: TAKE 1.5 TABLETS BY MOUTH DAILY  Refused By: IFRAH PURVIS  Reason for Refusal: Patient needs appointment    propranolol ER (INDERAL LA) 60 MG 24 hr ca*90 cap*1        Sig: TAKE 1 CAPSULE BY MOUTH EVERY DAY  Refused By: IFRAH PURVIS  Reason for Refusal: Patient needs appointment    fenofibrate (TRIGLIDE/LOFIBRA) 160 MG tabl*90 tab*1        Sig: TAKE 1 TABLET BY MOUTH EVERY DAY  Refused By: IFRAH PURVIS  Reason for Refusal: Patient needs appointment    Needs fasting OV for refills

## 2023-06-01 ENCOUNTER — HEALTH MAINTENANCE LETTER (OUTPATIENT)
Age: 68
End: 2023-06-01

## 2023-06-06 DIAGNOSIS — F41.1 GAD (GENERALIZED ANXIETY DISORDER): ICD-10-CM

## 2023-06-06 RX ORDER — SERTRALINE HYDROCHLORIDE 100 MG/1
TABLET, FILM COATED ORAL
Qty: 15 TABLET | Refills: 0 | Status: SHIPPED | OUTPATIENT
Start: 2023-06-06 | End: 2023-06-15

## 2023-06-06 NOTE — TELEPHONE ENCOUNTER
Pt has appt scheduled for 06/15.    Edvin Norton is requesting a refill of:    Pending Prescriptions:                       Disp   Refills    sertraline (ZOLOFT) 100 MG tablet [Pharma*15 tab*0            Sig: TAKE 1.5 TABLETS BY MOUTH DAILY

## 2023-06-09 DIAGNOSIS — E78.2 MIXED HYPERLIPIDEMIA: ICD-10-CM

## 2023-06-09 RX ORDER — ATORVASTATIN CALCIUM 40 MG/1
TABLET, FILM COATED ORAL
Qty: 7 TABLET | Refills: 0 | Status: SHIPPED | OUTPATIENT
Start: 2023-06-09 | End: 2023-06-15

## 2023-06-09 NOTE — TELEPHONE ENCOUNTER
Edvin Norton is requesting a refill of:    Pending Prescriptions:                       Disp   Refills    atorvastatin (LIPITOR) 40 MG tablet [Phar*7 tabl*0            Sig: TAKE 1 TABLET BY MOUTH EVERY DAY    Pt has OV on 6/15/23 for refills

## 2023-06-15 ENCOUNTER — OFFICE VISIT (OUTPATIENT)
Dept: FAMILY MEDICINE | Facility: CLINIC | Age: 68
End: 2023-06-15

## 2023-06-15 VITALS
DIASTOLIC BLOOD PRESSURE: 82 MMHG | SYSTOLIC BLOOD PRESSURE: 122 MMHG | BODY MASS INDEX: 32.58 KG/M2 | WEIGHT: 215 LBS | TEMPERATURE: 97.8 F | HEART RATE: 70 BPM | OXYGEN SATURATION: 96 % | HEIGHT: 68 IN

## 2023-06-15 DIAGNOSIS — E72.12 METHYLENETETRAHYDROFOLATE REDUCTASE DEFICIENCY (H): ICD-10-CM

## 2023-06-15 DIAGNOSIS — E78.2 MIXED HYPERLIPIDEMIA: Primary | ICD-10-CM

## 2023-06-15 DIAGNOSIS — K22.719 BARRETT'S ESOPHAGUS WITH DYSPLASIA: ICD-10-CM

## 2023-06-15 DIAGNOSIS — I10 ESSENTIAL HYPERTENSION, BENIGN: ICD-10-CM

## 2023-06-15 DIAGNOSIS — F41.1 GAD (GENERALIZED ANXIETY DISORDER): ICD-10-CM

## 2023-06-15 DIAGNOSIS — R79.89 OTHER SPECIFIED ABNORMAL FINDINGS OF BLOOD CHEMISTRY: ICD-10-CM

## 2023-06-15 DIAGNOSIS — M48.02 CERVICAL STENOSIS OF SPINE: ICD-10-CM

## 2023-06-15 DIAGNOSIS — Z78.9 ALCOHOL USE: ICD-10-CM

## 2023-06-15 DIAGNOSIS — Z12.5 SPECIAL SCREENING FOR MALIGNANT NEOPLASM OF PROSTATE: ICD-10-CM

## 2023-06-15 LAB
ALBUMIN SERPL-MCNC: 4.6 G/DL (ref 3.6–5.1)
ALBUMIN/GLOB SERPL: 1.8 {RATIO} (ref 1–2.5)
ALP SERPL-CCNC: 37 U/L (ref 33–130)
ALT 1742-6: 23 U/L (ref 0–32)
AST 1920-8: 25 U/L (ref 0–35)
BILIRUB SERPL-MCNC: 0.6 MG/DL (ref 0.2–1.2)
BUN SERPL-MCNC: 20 MG/DL (ref 7–25)
BUN/CREATININE RATIO: 20 (ref 6–32)
CALCIUM SERPL-MCNC: 9.6 MG/DL (ref 8.6–10.3)
CHLORIDE SERPLBLD-SCNC: 107.1 MMOL/L (ref 98–110)
CHOLEST SERPL-MCNC: 189 MG/DL (ref 0–199)
CHOLEST/HDLC SERPL: 5 {RATIO} (ref 0–5)
CO2 SERPL-SCNC: 26.7 MMOL/L (ref 20–32)
CREAT SERPL-MCNC: 1 MG/DL (ref 0.6–1.3)
GLOBULIN, CALCULATED - QUEST: 2.6 (ref 1.9–3.7)
GLUCOSE SERPL-MCNC: 104 MG/DL (ref 60–99)
HDLC SERPL-MCNC: 42 MG/DL (ref 40–150)
LDLC SERPL CALC-MCNC: 111 MG/DL (ref 0–130)
POTASSIUM SERPL-SCNC: 4.48 MMOL/L (ref 3.5–5.3)
PROT SERPL-MCNC: 7.2 G/DL (ref 6.1–8.1)
SODIUM SERPL-SCNC: 142.1 MMOL/L (ref 135–146)
TRIGL SERPL-MCNC: 182 MG/DL (ref 0–149)

## 2023-06-15 PROCEDURE — 80061 LIPID PANEL: CPT | Performed by: FAMILY MEDICINE

## 2023-06-15 PROCEDURE — 99214 OFFICE O/P EST MOD 30 MIN: CPT | Performed by: FAMILY MEDICINE

## 2023-06-15 PROCEDURE — 80053 COMPREHEN METABOLIC PANEL: CPT | Performed by: FAMILY MEDICINE

## 2023-06-15 PROCEDURE — 36415 COLL VENOUS BLD VENIPUNCTURE: CPT | Performed by: FAMILY MEDICINE

## 2023-06-15 RX ORDER — LANOLIN ALCOHOL/MO/W.PET/CERES
100 CREAM (GRAM) TOPICAL DAILY
Qty: 90 TABLET | Refills: 1 | Status: SHIPPED | OUTPATIENT
Start: 2023-06-15 | End: 2023-12-20

## 2023-06-15 RX ORDER — SERTRALINE HYDROCHLORIDE 100 MG/1
150 TABLET, FILM COATED ORAL DAILY
Qty: 90 TABLET | Refills: 1 | Status: SHIPPED | OUTPATIENT
Start: 2023-06-15 | End: 2023-09-11

## 2023-06-15 RX ORDER — ATORVASTATIN CALCIUM 40 MG/1
40 TABLET, FILM COATED ORAL DAILY
Qty: 90 TABLET | Refills: 1 | Status: SHIPPED | OUTPATIENT
Start: 2023-06-15 | End: 2023-12-20

## 2023-06-15 RX ORDER — OMEPRAZOLE 40 MG/1
CAPSULE, DELAYED RELEASE ORAL
Qty: 90 CAPSULE | Refills: 1 | Status: SHIPPED | OUTPATIENT
Start: 2023-06-15 | End: 2023-12-20

## 2023-06-15 RX ORDER — FENOFIBRATE 160 MG/1
160 TABLET ORAL DAILY
Qty: 90 TABLET | Refills: 1 | Status: SHIPPED | OUTPATIENT
Start: 2023-06-15 | End: 2023-12-20

## 2023-06-15 RX ORDER — PROPRANOLOL HCL 60 MG
CAPSULE, EXTENDED RELEASE 24HR ORAL
Qty: 90 CAPSULE | Refills: 1 | Status: SHIPPED | OUTPATIENT
Start: 2023-06-15 | End: 2023-12-20

## 2023-06-15 RX ORDER — CHOLECALCIFEROL (VITAMIN D3) 25 MCG
1 TABLET,CHEWABLE ORAL DAILY
Qty: 90 TABLET | Refills: 1 | Status: SHIPPED | OUTPATIENT
Start: 2023-06-15 | End: 2023-12-20

## 2023-06-15 NOTE — PROGRESS NOTES
"  Assessment & Plan     Mixed hyperlipidemia  Control uncertain, continue current medications at current doses pending labs  - atorvastatin (LIPITOR) 40 MG tablet  Dispense: 90 tablet; Refill: 1  - fenofibrate (TRIGLIDE/LOFIBRA) 160 MG tablet  Dispense: 90 tablet; Refill: 1  - Lipid Panel (BFP)  - Comprehensive Metobolic Panel (BFP)  - VENOUS COLLECTION    Essential hypertension, benign (HTN)  Well controlled, continue current medications at current doses   - propranolol ER (INDERAL LA) 60 MG 24 hr capsule  Dispense: 90 capsule; Refill: 1  - Comprehensive Metobolic Panel (BFP)  - VENOUS COLLECTION    JORGE (generalized anxiety disorder)  Well controlled, continue current medications at current doses   - sertraline (ZOLOFT) 100 MG tablet  Dispense: 90 tablet; Refill: 1    Natarajan's esophagus with dysplasia  Well controlled, continue current medications at current doses   - omeprazole (PRILOSEC) 40 MG DR capsule  Dispense: 90 capsule; Refill: 1    Cervical stenosis of spine  Seeing neurology, may get another nerve block    Alcohol use  stable  - thiamine (B-1) 100 MG tablet  Dispense: 90 tablet; Refill: 1    Methylenetetrahydrofolate reductase deficiency (H)    - B Complex-C-Folic Acid (SUPER B-COMPLEX/VIT C/FA) TABS  Dispense: 90 tablet; Refill: 1    Other specified abnormal findings of blood chemistry    - B Complex-C-Folic Acid (SUPER B-COMPLEX/VIT C/FA) TABS  Dispense: 90 tablet; Refill: 1    Special screening for malignant neoplasm of prostate    - VENOUS COLLECTION  - PSA Total ([facility])      Review of the result(s) of each unique test - labs  Ordering of each unique test  Prescription drug management         BMI:   Estimated body mass index is 32.69 kg/m  as calculated from the following:    Height as of this encounter: 1.727 m (5' 8\").    Weight as of this encounter: 97.5 kg (215 lb).   Weight management plan: Discussed healthy diet and exercise guidelines    FUTURE APPOINTMENTS:       - Follow-up visit in 6 " mo  Work on weight loss  Regular exercise    No follow-ups on file.    Woody Cintron MD  [facility]    Hai Pardo is a 68 year old, presenting for the following health issues:  Recheck Medication (Fasting today, refill medications)    HPI     Hyperlipidemia Follow-Up      Are you regularly taking any medication or supplement to lower your cholesterol?   Yes- atorvastatin    Are you having muscle aches or other side effects that you think could be caused by your cholesterol lowering medication?  No    Hypertension Follow-up      Do you check your blood pressure regularly outside of the clinic? Yes     Are you following a low salt diet? No    Are your blood pressures ever more than 140 on the top number (systolic) OR more   than 90 on the bottom number (diastolic), for example 140/90? No    Anxiety Follow-Up    How are you doing with your anxiety since your last visit? No change    Are you having other symptoms that might be associated with anxiety? No    Have you had a significant life event? No     Are you feeling depressed? No    Do you have any concerns with your use of alcohol or other drugs? No    Social History     Tobacco Use     Smoking status: Never     Passive exposure: Never     Smokeless tobacco: Former     Types: Chew   Substance Use Topics     Alcohol use: Yes     Alcohol/week: 14.0 standard drinks of alcohol     Types: 14 Standard drinks or equivalent per week     Drug use: No         3/1/2021     1:07 PM 10/21/2021     4:27 PM 11/24/2021     3:54 PM   JORGE-7 SCORE   Total Score 0 2 0         3/1/2021     1:07 PM 10/21/2021     4:27 PM 11/24/2021     3:54 PM   PHQ   PHQ-9 Total Score 2 5 0   Q9: Thoughts of better off dead/self-harm past 2 weeks Not at all Not at all Not at all         11/24/2021     3:54 PM   Last PHQ-9   1.  Little interest or pleasure in doing things 0   2.  Feeling down, depressed, or hopeless 0   3.  Trouble falling or staying asleep, or  "sleeping too much 0   4.  Feeling tired or having little energy 0   5.  Poor appetite or overeating 0   6.  Feeling bad about yourself 0   7.  Trouble concentrating 0   8.  Moving slowly or restless 0   Q9: Thoughts of better off dead/self-harm past 2 weeks 0   PHQ-9 Total Score 0   Difficulty at work, home, or with people Not difficult at all         11/24/2021     3:54 PM   JORGE-7    1. Feeling nervous, anxious, or on edge 0   2. Not being able to stop or control worrying 0   3. Worrying too much about different things 0   4. Trouble relaxing 0   5. Being so restless that it is hard to sit still 0   6. Becoming easily annoyed or irritable 0   7. Feeling afraid, as if something awful might happen 0   JORGE-7 Total Score 0   If you checked any problems, how difficult have they made it for you to do your work, take care of things at home, or get along with other people? Not difficult at all       Barretts, taking PPI daily, EGD last year reviewed    How many servings of fruits and vegetables do you eat daily?  2-3    On average, how many sweetened beverages do you drink each day (Examples: soda, juice, sweet tea, etc.  Do NOT count diet or artificially sweetened beverages)?   1    How many days per week do you exercise enough to make your heart beat faster? 6    How many minutes a day do you exercise enough to make your heart beat faster? 20 - 29    How many days per week do you miss taking your medication? 0    Neck pain ok, some stiffness, sees neurology, may get snother block    Review of Systems   Constitutional, HEENT, cardiovascular, pulmonary, gi and gu systems are negative, except as otherwise noted.      Objective    /82 (BP Location: Right arm, Patient Position: Sitting, Cuff Size: Adult Large)   Pulse 70   Temp 97.8  F (36.6  C) (Temporal)   Ht 1.727 m (5' 8\")   Wt 97.5 kg (215 lb)   SpO2 96%   BMI 32.69 kg/m    Body mass index is 32.69 kg/m .  Physical Exam   GENERAL: healthy, alert and no " distress  EYES: Eyes grossly normal to inspection, PERRL and conjunctivae and sclerae normal  HENT: ear canals and TM's normal, nose and mouth without ulcers or lesions  NECK: no adenopathy, no asymmetry, masses, or scars and thyroid normal to palpation  RESP: lungs clear to auscultation - no rales, rhonchi or wheezes  CV: regular rate and rhythm, normal S1 S2, no S3 or S4, no murmur, click or rub, no peripheral edema and peripheral pulses strong  ABDOMEN: soft, nontender, no hepatosplenomegaly, no masses and bowel sounds normal  MS: no gross musculoskeletal defects noted, no edema  SKIN: no suspicious lesions or rashes  NEURO: Normal strength and tone, mentation intact and speech normal  PSYCH: mentation appears normal, affect normal/bright    Office Visit on 11/16/2022   Component Date Value Ref Range Status     Cholesterol 11/16/2022 171  0 - 199 mg/dL Final     Triglycerides 11/16/2022 140  0 - 149 mg/dL Final     HDL Cholesterol 11/16/2022 44  40 - 150 mg/dL Final     LDL Cholesterol Direct 11/16/2022 99  0 - 130 mg/dL Final     Cholesterol/HDL Ratio 11/16/2022 4  0 - 5 Final     Carbon Dioxide 11/16/2022 27.4  20 - 32 mmol/L Final     Creatinine 11/16/2022 1.06  0.60 - 1.30 mg/dL Final     Glucose 11/16/2022 89  60 - 99 mg/dL Final     Sodium 11/16/2022 139.3  135 - 146 mmol/L Final     Potassium 11/16/2022 4.74  3.5 - 5.3 mmol/L Final     Chloride 11/16/2022 103.2  98 - 110 mmol/L Final     Protein Total 11/16/2022 6.9  6.1 - 8.1 g/dL Final     Albumin 11/16/2022 4.7  3.6 - 5.1 g/dL Final     Alkaline Phosphatase 11/16/2022 36  33 - 130 U/L Final     ALT 11/16/2022 29  0 - 32 U/L Final     AST 11/16/2022 31  0 - 35 U/L Final     Bilirubin Total 11/16/2022 0.9  0.2 - 1.2 mg/dL Final     Urea Nitrogen 11/16/2022 17  7 - 25 mg/dL Final     Calcium 11/16/2022 9.8  8.6 - 10.3 mg/dL Final     BUN/Creatinine Ratio 11/16/2022 16.0  6 - 22 Final     Globulin Calculated 11/16/2022 2.2  1.9 - 3.7 Final     A/G Ratio  11/16/2022 2.1  1 - 2.5 Final     GFR Estimate 11/16/2022 77  >60 ml/min/1.73m2 Final

## 2023-06-15 NOTE — NURSING NOTE
Chief Complaint   Patient presents with     Recheck Medication     Fasting today, refill medications     Pre-visit Screening:  Immunizations:  up to date  Colonoscopy:  is up to date  Mammogram: NA  Asthma Action Test/Plan:  NA  PHQ9:  NA  GAD7:  NA  Questioned patient about current smoking habits Pt. has never smoked.  Ok to leave detailed message on voice mail for today's visit only Yes, phone # [phone]

## 2023-06-16 LAB — ABBOTT PSA - QUEST: 0.77 NG/ML

## 2023-06-24 DIAGNOSIS — E72.12 METHYLENETETRAHYDROFOLATE REDUCTASE DEFICIENCY (H): ICD-10-CM

## 2023-06-24 DIAGNOSIS — R79.89 OTHER SPECIFIED ABNORMAL FINDINGS OF BLOOD CHEMISTRY: ICD-10-CM

## 2023-06-26 RX ORDER — MULTIVITAMIN WITH IRON
TABLET ORAL
Qty: 90 TABLET | Refills: 1 | Status: SHIPPED | OUTPATIENT
Start: 2023-06-26 | End: 2023-12-20

## 2023-06-26 NOTE — TELEPHONE ENCOUNTER
Edvin Norton is requesting a refill of:    Pending Prescriptions:                       Disp   Refills    vitamin C with B complex (B COMPLEX-C) ta*90 tab*1            Sig: TAKE 1 TABLET BY MOUTH EVERY DAY    Please close encounter if RX was sent. Thanks, Gita

## 2023-09-11 DIAGNOSIS — F41.1 GAD (GENERALIZED ANXIETY DISORDER): ICD-10-CM

## 2023-09-11 RX ORDER — SERTRALINE HYDROCHLORIDE 100 MG/1
150 TABLET, FILM COATED ORAL DAILY
Qty: 135 TABLET | Refills: 0 | Status: SHIPPED | OUTPATIENT
Start: 2023-09-11 | End: 2023-12-20

## 2023-09-19 DIAGNOSIS — I10 ESSENTIAL HYPERTENSION, BENIGN: ICD-10-CM

## 2023-09-19 RX ORDER — PROPRANOLOL HCL 60 MG
CAPSULE, EXTENDED RELEASE 24HR ORAL
Qty: 90 CAPSULE | Refills: 1 | COMMUNITY
Start: 2023-09-19

## 2023-09-19 NOTE — TELEPHONE ENCOUNTER
Edvin Norton is requesting a refill of:    Refused Prescriptions:                       Disp   Refills    propranolol ER (INDERAL LA) 60 MG 24 hr ca*90 cap*1        Sig: TAKE 1 CAPSULE BY MOUTH EVERY DAY  Refused By: IFRAH PURVIS  Reason for Refusal: Patient needs appointment    Sent 90 with 1 refill on 6/15/23. Needs OV if taking more

## 2023-09-29 DIAGNOSIS — I10 ESSENTIAL HYPERTENSION, BENIGN: ICD-10-CM

## 2023-09-29 DIAGNOSIS — Z78.9 ALCOHOL USE: ICD-10-CM

## 2023-09-29 DIAGNOSIS — E72.12 METHYLENETETRAHYDROFOLATE REDUCTASE DEFICIENCY (H): ICD-10-CM

## 2023-09-29 DIAGNOSIS — R79.89 OTHER SPECIFIED ABNORMAL FINDINGS OF BLOOD CHEMISTRY: ICD-10-CM

## 2023-09-29 DIAGNOSIS — E78.2 MIXED HYPERLIPIDEMIA: ICD-10-CM

## 2023-09-29 RX ORDER — CHOLECALCIFEROL (VITAMIN D3) 25 MCG
1 TABLET,CHEWABLE ORAL DAILY
Qty: 90 TABLET | Refills: 1 | COMMUNITY
Start: 2023-09-29

## 2023-09-29 RX ORDER — FENOFIBRATE 160 MG/1
160 TABLET ORAL DAILY
Qty: 90 TABLET | Refills: 1 | COMMUNITY
Start: 2023-09-29

## 2023-09-29 RX ORDER — PROPRANOLOL HCL 60 MG
CAPSULE, EXTENDED RELEASE 24HR ORAL
Qty: 90 CAPSULE | Refills: 1 | COMMUNITY
Start: 2023-09-29

## 2023-09-29 RX ORDER — LANOLIN ALCOHOL/MO/W.PET/CERES
100 CREAM (GRAM) TOPICAL DAILY
Qty: 90 TABLET | Refills: 1 | COMMUNITY
Start: 2023-09-29

## 2023-09-29 RX ORDER — ATORVASTATIN CALCIUM 40 MG/1
40 TABLET, FILM COATED ORAL DAILY
Qty: 90 TABLET | Refills: 1 | COMMUNITY
Start: 2023-09-29

## 2023-09-29 NOTE — TELEPHONE ENCOUNTER
Edvin Norton is requesting a refill of:    Refused Prescriptions:                       Disp   Refills    propranolol ER (INDERAL LA) 60 MG 24 hr ca*90 cap*1        Sig: TAKE 1 CAPSULE BY MOUTH EVERY DAY  Refused By: IFRAH PURVIS  Reason for Refusal: Duplicate    thiamine (B-1) 100 MG tablet [Pharmacy Med*90 tab*1        Sig: TAKE 1 TABLET BY MOUTH EVERY DAY  Refused By: IFRAH PURVIS  Reason for Refusal: Duplicate    B Complex-C-Folic Acid (SUPER B-COMPLEX/VI*90 tab*1        Sig: TAKE 1 TABLET BY MOUTH EVERY DAY  Refused By: IFRAH PURVIS  Reason for Refusal: Duplicate    atorvastatin (LIPITOR) 40 MG tablet [Pharm*90 tab*1        Sig: TAKE 1 TABLET BY MOUTH EVERY DAY  Refused By: IFRAH PURVIS  Reason for Refusal: Duplicate    fenofibrate (TRIGLIDE/LOFIBRA) 160 MG tabl*90 tab*1        Sig: TAKE 1 TABLET BY MOUTH EVERY DAY  Refused By: IFRAH PURVIS  Reason for Refusal: Duplicate    Sent for 6 months in [place]

## 2023-12-09 DIAGNOSIS — E72.12 METHYLENETETRAHYDROFOLATE REDUCTASE DEFICIENCY (H): ICD-10-CM

## 2023-12-09 DIAGNOSIS — R79.89 OTHER SPECIFIED ABNORMAL FINDINGS OF BLOOD CHEMISTRY: ICD-10-CM

## 2023-12-11 RX ORDER — CHOLECALCIFEROL (VITAMIN D3) 25 MCG
1 TABLET,CHEWABLE ORAL DAILY
COMMUNITY
Start: 2023-12-11

## 2023-12-11 NOTE — TELEPHONE ENCOUNTER
Edvin Norton is requesting a refill of:    Refused Prescriptions:                       Disp   Refills    B Complex-C-Folic Acid (SUPER B-COMPLEX/VI*                Sig: TAKE 1 TABLET BY MOUTH EVERY DAY  Refused By: IFRAH PURVIS  Reason for Refusal: Patient needs appointment    Needs OV for refills

## 2023-12-20 ENCOUNTER — OFFICE VISIT (OUTPATIENT)
Dept: FAMILY MEDICINE | Facility: CLINIC | Age: 68
End: 2023-12-20

## 2023-12-20 VITALS
HEART RATE: 68 BPM | RESPIRATION RATE: 20 BRPM | SYSTOLIC BLOOD PRESSURE: 126 MMHG | HEIGHT: 68 IN | WEIGHT: 214 LBS | BODY MASS INDEX: 32.43 KG/M2 | TEMPERATURE: 97.9 F | DIASTOLIC BLOOD PRESSURE: 78 MMHG

## 2023-12-20 DIAGNOSIS — I10 ESSENTIAL HYPERTENSION, BENIGN: ICD-10-CM

## 2023-12-20 DIAGNOSIS — E78.2 MIXED HYPERLIPIDEMIA: Primary | ICD-10-CM

## 2023-12-20 DIAGNOSIS — K22.719 BARRETT'S ESOPHAGUS WITH DYSPLASIA: ICD-10-CM

## 2023-12-20 DIAGNOSIS — M48.02 CERVICAL STENOSIS OF SPINE: ICD-10-CM

## 2023-12-20 DIAGNOSIS — E72.12 METHYLENETETRAHYDROFOLATE REDUCTASE DEFICIENCY (H): ICD-10-CM

## 2023-12-20 DIAGNOSIS — R79.89 OTHER SPECIFIED ABNORMAL FINDINGS OF BLOOD CHEMISTRY: ICD-10-CM

## 2023-12-20 DIAGNOSIS — N52.9 ERECTILE DYSFUNCTION, UNSPECIFIED ERECTILE DYSFUNCTION TYPE: ICD-10-CM

## 2023-12-20 DIAGNOSIS — Z00.00 ENCOUNTER FOR MEDICARE ANNUAL WELLNESS EXAM: ICD-10-CM

## 2023-12-20 DIAGNOSIS — F41.1 GAD (GENERALIZED ANXIETY DISORDER): ICD-10-CM

## 2023-12-20 DIAGNOSIS — Z78.9 ALCOHOL USE: ICD-10-CM

## 2023-12-20 LAB
ALBUMIN SERPL-MCNC: 4.5 G/DL (ref 3.6–5.1)
ALBUMIN/GLOB SERPL: 1.7 {RATIO} (ref 1–2.5)
ALP SERPL-CCNC: 36 U/L (ref 33–130)
ALT 1742-6: 24 U/L (ref 0–32)
AST 1920-8: 23 U/L (ref 0–35)
BILIRUB SERPL-MCNC: 1 MG/DL (ref 0.2–1.2)
BUN SERPL-MCNC: 26 MG/DL (ref 7–25)
BUN/CREATININE RATIO: 22 (ref 6–32)
CALCIUM SERPL-MCNC: 10.3 MG/DL (ref 8.6–10.3)
CHLORIDE SERPLBLD-SCNC: 101.7 MMOL/L (ref 98–110)
CHOLEST SERPL-MCNC: 189 MG/DL (ref 0–199)
CHOLEST/HDLC SERPL: 4 {RATIO} (ref 0–5)
CO2 SERPL-SCNC: 28.9 MMOL/L (ref 20–32)
CREAT SERPL-MCNC: 1.18 MG/DL (ref 0.6–1.3)
GLOBULIN, CALCULATED - QUEST: 2.7 (ref 1.9–3.7)
GLUCOSE SERPL-MCNC: 114 MG/DL (ref 60–99)
HDLC SERPL-MCNC: 44 MG/DL (ref 40–150)
LDLC SERPL CALC-MCNC: 111 MG/DL (ref 0–130)
POTASSIUM SERPL-SCNC: 5.15 MMOL/L (ref 3.5–5.3)
PROT SERPL-MCNC: 7.2 G/DL (ref 6.1–8.1)
SODIUM SERPL-SCNC: 139.8 MMOL/L (ref 135–146)
TRIGL SERPL-MCNC: 169 MG/DL (ref 0–149)

## 2023-12-20 PROCEDURE — G0439 PPPS, SUBSEQ VISIT: HCPCS | Performed by: FAMILY MEDICINE

## 2023-12-20 PROCEDURE — 80061 LIPID PANEL: CPT | Performed by: FAMILY MEDICINE

## 2023-12-20 PROCEDURE — 80053 COMPREHEN METABOLIC PANEL: CPT | Performed by: FAMILY MEDICINE

## 2023-12-20 PROCEDURE — 36415 COLL VENOUS BLD VENIPUNCTURE: CPT | Performed by: FAMILY MEDICINE

## 2023-12-20 PROCEDURE — 99214 OFFICE O/P EST MOD 30 MIN: CPT | Mod: 25 | Performed by: FAMILY MEDICINE

## 2023-12-20 RX ORDER — SILDENAFIL 100 MG/1
100 TABLET, FILM COATED ORAL DAILY PRN
Qty: 30 TABLET | Refills: 1 | Status: SHIPPED | OUTPATIENT
Start: 2023-12-20

## 2023-12-20 RX ORDER — SERTRALINE HYDROCHLORIDE 100 MG/1
150 TABLET, FILM COATED ORAL DAILY
Qty: 135 TABLET | Refills: 0 | Status: SHIPPED | OUTPATIENT
Start: 2023-12-20 | End: 2024-03-27

## 2023-12-20 RX ORDER — OMEPRAZOLE 40 MG/1
CAPSULE, DELAYED RELEASE ORAL
Qty: 90 CAPSULE | Refills: 1 | Status: SHIPPED | OUTPATIENT
Start: 2023-12-20 | End: 2024-06-19

## 2023-12-20 RX ORDER — LANOLIN ALCOHOL/MO/W.PET/CERES
100 CREAM (GRAM) TOPICAL DAILY
Qty: 90 TABLET | Refills: 1 | Status: SHIPPED | OUTPATIENT
Start: 2023-12-20 | End: 2024-04-04

## 2023-12-20 RX ORDER — CHOLECALCIFEROL (VITAMIN D3) 25 MCG
1 TABLET,CHEWABLE ORAL DAILY
Qty: 90 TABLET | Refills: 1 | Status: SHIPPED | OUTPATIENT
Start: 2023-12-20 | End: 2024-06-19

## 2023-12-20 RX ORDER — ATORVASTATIN CALCIUM 40 MG/1
40 TABLET, FILM COATED ORAL DAILY
Qty: 90 TABLET | Refills: 1 | Status: SHIPPED | OUTPATIENT
Start: 2023-12-20 | End: 2024-06-19

## 2023-12-20 RX ORDER — MULTIVITAMIN WITH IRON
1 TABLET ORAL DAILY
Qty: 90 TABLET | Refills: 1 | Status: SHIPPED | OUTPATIENT
Start: 2023-12-20 | End: 2024-06-19

## 2023-12-20 RX ORDER — FENOFIBRATE 160 MG/1
160 TABLET ORAL DAILY
Qty: 90 TABLET | Refills: 1 | Status: SHIPPED | OUTPATIENT
Start: 2023-12-20 | End: 2024-06-19

## 2023-12-20 RX ORDER — PROPRANOLOL HCL 60 MG
CAPSULE, EXTENDED RELEASE 24HR ORAL
Qty: 90 CAPSULE | Refills: 1 | Status: SHIPPED | OUTPATIENT
Start: 2023-12-20 | End: 2024-06-19

## 2023-12-20 NOTE — PATIENT INSTRUCTIONS
Patient Education   Personalized Prevention Plan  You are due for the preventive services outlined below.  Your care team is available to assist you in scheduling these services.  If you have already completed any of these items, please share that information with your care team to update in your medical record.  Health Maintenance Due   Topic Date Due     Hepatitis A Vaccine (1 of 2 - Risk 2-dose series) Never done     Hepatitis B Vaccine (1 of 3 - Risk 3-dose series) Never done     RSV VACCINE (Pregnancy & 60+) (1 - 1-dose 60+ series) Never done     Annual Wellness Visit  01/03/2021     AORTIC ANEURYSM SCREENING (SYSTEM ASSIGNED)  Never done

## 2023-12-20 NOTE — NURSING NOTE
Edvin Norton is here for fasting blood work, medication refill and Wellness  Questioned patient about current smoking habits.  Pt. has never smoked.  Body mass index is 33.67 kg/(m^2).  PULSE regular  My Chart: active    Pre-visit planning  Immunizations - up to date  Colonoscopy - is up to date  Mammogram -   Asthma -   PHQ9  JORGE-7    The patient has verbalized that it is ok to leave a detailed voice message on the patient's cell phone with results/recommendations from this visit.

## 2023-12-20 NOTE — PROGRESS NOTES
Edvin Norton is a 68 year old male who presents for Medicare Annual Wellness Visit.    Current providers caring for this patient include:  Patient Care Team:  Woody Cintron MD as PCP - General (Family Practice)  Woody Cintron MD as Assigned PCP    Complete Medical and Social history reviewed with patient, outlined below.    Patient Active Problem List   Diagnosis    Health Care Home    Mixed hyperlipidemia    Vitamin D deficiency    Performance anxiety    JORGE (generalized anxiety disorder)    ACP (advance care planning)    Essential hypertension, benign (HTN)    Obesity due to excess calories, unspecified obesity severity    Natarajan's esophagus with dysplasia    Nonallergic rhinitis    Spondylolisthesis of thoracic region    Confusion    Prolonged QT interval    Obstructive sleep apnea syndrome    Hemorrhoids    Personal history of colonic polyps       Past Medical History:   Diagnosis Date    Anxiety state 9/26/2013     Problem list name updated by automated process. Provider to review    Natarajan esophagus 9/26/2013    Essential hypertension, benign (HTN) 10/27/2014    Hyperlipidemia 1995    Obesity due to excess calories, unspecified obesity severity 3/24/2016       Past Surgical History:   Procedure Laterality Date    CATARACT IOL, RT/LT      ESOPHAGUS SURGERY      Halo procedure x 5    FINGER SURGERY      tendon    FUSION CERVICAL ANTERIOR THREE+ LEVELS N/A 7/10/2020    Procedure: Anterior cervical diskectomy and fusion with anterior plate fixation cervical 4-7;  Surgeon: Dane Anderson MD;  Location: [facility]    GRAFT BONE FROM ILIAC CREST Right 7/10/2020    Procedure: Right Anterior iliac crest bone graft harvest;  Surgeon: Dane Anderson MD;  Location: [facility] OR    UPPER GI ENDOSCOPY         Family History   Problem Relation Age of Onset    Hypertension Mother         passed  age 96    Anxiety Disorder Mother     Anxiety Disorder Father     Lung Cancer Father      "Esophageal Cancer Brother     Psoriasis Brother     Hyperlipidemia Sister     Anxiety Disorder Sister     Esophageal Cancer Brother     Aneurysm Sister         brain    Thyroid Disease Brother     Neurologic Disorder Brother         unspecified - jerking motions at night    C.A.D. No family hx of     Diabetes No family hx of     Breast Cancer No family hx of     Cancer - colorectal No family hx of     Prostate Cancer No family hx of        Social History     Tobacco Use    Smoking status: Never     Passive exposure: Never    Smokeless tobacco: Former     Types: Chew   Substance Use Topics    Alcohol use: Yes     Alcohol/week: 14.0 standard drinks of alcohol     Types: 14 Standard drinks or equivalent per week       Diet: regular, low salt/low fat  Physical Activity: active without specific exercise program  Depression Screen:    Over the past 2 weeks, patient has felt down, depressed, or hopeless:  No    Over the past 2 weeks, patient has felt little interest or pleasure in doing things: No    Functional ability/Safety screen:  Up and go test (able to get up and walk longer than 30 seconds): Passed  Patient needs assistance with: nothing  Patient's home has the following possible safety concerns: none identified  Patient has concerns about his hearing:  No  Cognitive Screen  Patient repeats three objects (ball, flag, tree)      Clock drawing test:   NORMAL  Recalls three objects after 3 minutes (ball,flag,tree):                                                                                               recalls 3 objects (3 points)    Physical Exam:  /78 (BP Location: Right arm, Patient Position: Chair, Cuff Size: Adult Regular)   Pulse 68   Temp 97.9  F (36.6  C) (Temporal)   Resp 20   Ht 1.727 m (5' 8\")   Wt 97.1 kg (214 lb)   BMI 32.54 kg/m     Body mass index is 32.54 kg/m .             End of Life Planning:   Patient currently has an advanced directive: No.  I have verified the patient's ablity to " "prepare an advanced directive/make health care decisions.  Literature was provided to assist patient in preparing an advanced directive.    Education/Counseling:   Based on review of the above information, the following items were addressed:      Obesity - appropriate counseling on diet, exercise, etc.      Elevated blood pressure - follow-up plans made    Appropriate preventive services were discussed with this patient, including applicable screening as appropriate for cardiovascular disease, diabetes, osteopenia/osteoporosis, and glaucoma.  As appropriate for age/gender, discussed screening for colorectal cancer, prostate cancer, breast cancer, and cervical cancer.   Checklist reviewing preventive services available has been given to the patient.               Assessment & Plan     Mixed hyperlipidemia  Control uncertain, continue current medications at current doses pending labs    Essential hypertension, benign (HTN)  Well controlled, continue current medications at current doses     JORGE (generalized anxiety disorder)  Well ocntrolled, continue current medications at current doses     Natarajan's esophagus with dysplasia  Well controlled, next EGD 2025, continue current medications at current doses     Alcohol use  moderate    Cervical stenosis of spine  Doing better, sretching    Methylenetetrahydrofolate reductase deficiency (H24)      Other specified abnormal findings of blood chemistry      Encounter for Medicare annual wellness exam  discussed preventitive healthcare       Review of the result(s) of each unique test - labs  Ordering of each unique test  Prescription drug management         BMI:   Estimated body mass index is 32.54 kg/m  as calculated from the following:    Height as of this encounter: 1.727 m (5' 8\").    Weight as of this encounter: 97.1 kg (214 lb).   Weight management plan: Discussed healthy diet and exercise guidelines    FUTURE APPOINTMENTS:       - Follow-up visit in 6 mo  Work on weight " loss  Regular exercise    Return in about 53 weeks (around 12/25/2024) for Annual Wellness Visit.    Woody Cintron MD  [facility]    Hai Pardo is a 68 year old, presenting for the following health issues:  Recheck Medication and Wellness Visit    HPI       Hyperlipidemia Follow-Up    Are you regularly taking any medication or supplement to lower your cholesterol?   Yes- aorvastatin  Are you having muscle aches or other side effects that you think could be caused by your cholesterol lowering medication?  No    Hypertension Follow-up    Do you check your blood pressure regularly outside of the clinic? Yes   Are you following a low salt diet? No  Are your blood pressures ever more than 140 on the top number (systolic) OR more   than 90 on the bottom number (diastolic), for example 140/90? No    Anxiety Follow-Up  How are you doing with your anxiety since your last visit? No change  Are you having other symptoms that might be associated with anxiety? No  Have you had a significant life event? No   Are you feeling depressed? No  Do you have any concerns with your use of alcohol or other drugs? No- still 2 drinks daily-states he is keepingit to that  GERD/Barretts- using PPI, last EGD 8/22    Social History     Tobacco Use    Smoking status: Never     Passive exposure: Never    Smokeless tobacco: Former     Types: Chew   Substance Use Topics    Alcohol use: Yes     Alcohol/week: 14.0 standard drinks of alcohol     Types: 14 Standard drinks or equivalent per week    Drug use: No         3/1/2021     1:07 PM 10/21/2021     4:27 PM 11/24/2021     3:54 PM   JORGE-7 SCORE   Total Score 0 2 0         3/1/2021     1:07 PM 10/21/2021     4:27 PM 11/24/2021     3:54 PM   PHQ   PHQ-9 Total Score 2 5 0   Q9: Thoughts of better off dead/self-harm past 2 weeks Not at all Not at all Not at all         11/24/2021     3:54 PM   Last PHQ-9   1.  Little interest or pleasure in doing things 0   2.   "Feeling down, depressed, or hopeless 0   3.  Trouble falling or staying asleep, or sleeping too much 0   4.  Feeling tired or having little energy 0   5.  Poor appetite or overeating 0   6.  Feeling bad about yourself 0   7.  Trouble concentrating 0   8.  Moving slowly or restless 0   Q9: Thoughts of better off dead/self-harm past 2 weeks 0   PHQ-9 Total Score 0   Difficulty at work, home, or with people Not difficult at all         11/24/2021     3:54 PM   JORGE-7    1. Feeling nervous, anxious, or on edge 0   2. Not being able to stop or control worrying 0   3. Worrying too much about different things 0   4. Trouble relaxing 0   5. Being so restless that it is hard to sit still 0   6. Becoming easily annoyed or irritable 0   7. Feeling afraid, as if something awful might happen 0   JORGE-7 Total Score 0   If you checked any problems, how difficult have they made it for you to do your work, take care of things at home, or get along with other people? Not difficult at all     How many servings of fruits and vegetables do you eat daily?  2-3  On average, how many sweetened beverages do you drink each day (Examples: soda, juice, sweet tea, etc.  Do NOT count diet or artificially sweetened beverages)?   1  How many days per week do you exercise enough to make your heart beat faster? 6  How many minutes a day do you exercise enough to make your heart beat faster? 30 - 60  How many days per week do you miss taking your medication? 0        Review of Systems   Constitutional, HEENT, cardiovascular, pulmonary, gi and gu systems are negative, except as otherwise noted.      Objective    /78 (BP Location: Right arm, Patient Position: Chair, Cuff Size: Adult Regular)   Pulse 68   Temp 97.9  F (36.6  C) (Temporal)   Resp 20   Ht 1.727 m (5' 8\")   Wt 97.1 kg (214 lb)   BMI 32.54 kg/m    Body mass index is 32.54 kg/m .  Physical Exam   GENERAL: healthy, alert and no distress  EYES: Eyes grossly normal to inspection, " PERRL and conjunctivae and sclerae normal  HENT: ear canals and TM's normal, nose and mouth without ulcers or lesions  NECK: no adenopathy, no asymmetry, masses, or scars and thyroid normal to palpation  RESP: lungs clear to auscultation - no rales, rhonchi or wheezes  CV: regular rate and rhythm, normal S1 S2, no S3 or S4, no murmur, click or rub, no peripheral edema and peripheral pulses strong  ABDOMEN: soft, nontender, no hepatosplenomegaly, no masses and bowel sounds normal  MS: no gross musculoskeletal defects noted, no edema  SKIN: no suspicious lesions or rashes  PSYCH: mentation appears normal, affect normal/bright    Office Visit on 06/15/2023   Component Date Value Ref Range Status    Cholesterol 06/15/2023 189  0 - 199 mg/dL Final    Triglycerides 06/15/2023 182 (A)  0 - 149 mg/dL Final    HDL Cholesterol 06/15/2023 42  40 - 150 mg/dL Final    LDL Cholesterol Direct 06/15/2023 111  0 - 130 mg/dL Final    Cholesterol/HDL Ratio 06/15/2023 5  0 - 5 Final    Carbon Dioxide 06/15/2023 26.7  20 - 32 mmol/L Final    Creatinine 06/15/2023 1.00  0.60 - 1.30 mg/dL Final    Glucose 06/15/2023 104 (A)  60 - 99 mg/dL Final    Sodium 06/15/2023 142.1  135 - 146 mmol/L Final    Potassium 06/15/2023 4.48  3.5 - 5.3 mmol/L Final    Chloride 06/15/2023 107.1  98 - 110 mmol/L Final    Protein Total 06/15/2023 7.2  6.1 - 8.1 g/dL Final    Albumin 06/15/2023 4.6  3.6 - 5.1 g/dL Final    Alkaline Phosphatase 06/15/2023 37  33 - 130 U/L Final    ALT 06/15/2023 23  0 - 32 U/L Final    AST 06/15/2023 25  0 - 35 U/L Final    Bilirubin Total 06/15/2023 0.6  0.2 - 1.2 mg/dL Final    Urea Nitrogen 06/15/2023 20  7 - 25 mg/dL Final    Calcium 06/15/2023 9.6  8.6 - 10.3 mg/dL Final    BUN/Creatinine Ratio 06/15/2023 20.0  6 - 32 Final    Globulin Calculated 06/15/2023 2.6  1.9 - 3.7 Final    A/G Ratio 06/15/2023 1.8  1 - 2.5 Final    Abbott PSA 06/15/2023 0.77  < OR = 4.00 ng/mL Final    Comment: The total PSA value from this assay  system is   standardized against the WHO standard. The test   result will be approximately 20% lower when compared   to the equimolar-standardized total PSA (Sana   Yun). Comparison of serial PSA results should be   interpreted with this fact in mind.     This test was performed using the Siemens   chemiluminescent method. Values obtained from   different assay methods cannot be used  interchangeably. PSA levels, regardless of  value, should not be interpreted as absolute  evidence of the presence or absence of disease.

## 2023-12-21 ENCOUNTER — PATIENT OUTREACH (OUTPATIENT)
Dept: GASTROENTEROLOGY | Facility: CLINIC | Age: 68
End: 2023-12-21
Payer: MEDICARE

## 2024-02-20 ENCOUNTER — PATIENT OUTREACH (OUTPATIENT)
Dept: GASTROENTEROLOGY | Facility: CLINIC | Age: 69
End: 2024-02-20
Payer: MEDICARE

## 2024-03-01 ENCOUNTER — OFFICE VISIT (OUTPATIENT)
Dept: FAMILY MEDICINE | Facility: CLINIC | Age: 69
End: 2024-03-01

## 2024-03-01 VITALS
BODY MASS INDEX: 33.15 KG/M2 | WEIGHT: 218 LBS | DIASTOLIC BLOOD PRESSURE: 84 MMHG | OXYGEN SATURATION: 94 % | SYSTOLIC BLOOD PRESSURE: 134 MMHG | HEART RATE: 79 BPM | TEMPERATURE: 98.4 F

## 2024-03-01 DIAGNOSIS — R05.1 ACUTE COUGH: Primary | ICD-10-CM

## 2024-03-01 DIAGNOSIS — J02.9 SORE THROAT: ICD-10-CM

## 2024-03-01 DIAGNOSIS — J06.9 VIRAL URI WITH COUGH: ICD-10-CM

## 2024-03-01 LAB
COVID-19: NEGATIVE
FLUAV AG UPPER RESP QL IA.RAPID: NORMAL
FLUBV AG UPPER RESP QL IA.RAPID: NORMAL
STREP A: NEGATIVE

## 2024-03-01 PROCEDURE — 87635 SARS-COV-2 COVID-19 AMP PRB: CPT | Performed by: PHYSICIAN ASSISTANT

## 2024-03-01 PROCEDURE — 87651 STREP A DNA AMP PROBE: CPT | Performed by: PHYSICIAN ASSISTANT

## 2024-03-01 PROCEDURE — 99213 OFFICE O/P EST LOW 20 MIN: CPT | Performed by: PHYSICIAN ASSISTANT

## 2024-03-01 PROCEDURE — 87804 INFLUENZA ASSAY W/OPTIC: CPT | Performed by: PHYSICIAN ASSISTANT

## 2024-03-01 NOTE — PROGRESS NOTES
Assessment & Plan     Acute cough-negative influenza/strep/COVID reassuring. Suspect viral given timeframe and lack of severe symptoms, will treat conservatively  -Rest, increase fluids, honey for cough suppression/sore throat  -Add Mucinex and Sudafed D for symptomatic relief  -Ibuprofen/Tylenol as needed for symptomatic relief  Seek emergency care for significant shortness of breath, chest pain, and/or fever >103F that cannot be controlled with antipyretics   Chloraseptic spray  - COVID-19 ([facility])  - Influenza A and B ([facility])    Sore throat    - Strep A ([facility])    Viral URI with cough              Follow up 7-10 days if no improvement or any worsening    No follow-ups on file.    Hai Pardo is a 69 year old, presenting for the following health issues:  URI (Cough and congestion, facial pressure in face/ cheeks, cough is very productive with thick green mucous, wheezing and SOB, sore throat, symptoms started Monday 02/26)    HPI     Hard to swallow, lots of nasal congestion, coughing up green sputum  Pain in sinuses. No fevers/chills.     Acute Illness  Acute illness concerns: nasal congestion, cough  Onset/Duration: 4 days  Symptoms:  Fever: No  Chills/Sweats: No  Headache (location?): No  Sinus Pressure: YES  Conjunctivitis:  No  Ear Pain: no  Rhinorrhea: YES  Congestion: YES  Sore Throat: YES  Cough: YES-productive of green sputum  Wheeze: No  Decreased Appetite: YES  Nausea: No  Vomiting: No  Diarrhea: No  Dysuria/Freq.: No  Dysuria or Hematuria: No  Fatigue/Achiness: YES  Sick/Strep Exposure: YES- sick grandchildren  Therapies tried and outcome: Mucinex, cough drops      Review of Systems  Constitutional, HEENT, cardiovascular, pulmonary, gi and gu systems are negative, except as otherwise noted.      Objective    /84 (BP Location: Right arm, Patient Position: Sitting, Cuff Size: Adult Large)   Pulse 79   Temp 98.4  F (36.9  C) (Temporal)   Wt 98.9 kg (218 lb)   SpO2 94%   BMI 33.15 kg/m     Body mass index is 33.15 kg/m .  Physical Exam   GENERAL: alert and no distress  HENT: normal cephalic/atraumatic, ear canals and TM's normal, nose and mouth without ulcers or lesions, oropharynx clear, oral mucous membranes moist, very erythematous oropharynx without exudate. No sinus tenderness to palpation  NECK: no adenopathy, no asymmetry, masses, or scars  RESP: lungs clear to auscultation - no rales, rhonchi or wheezes  CV: regular rate and rhythm, normal S1 S2, no S3 or S4, no murmur, click or rub, no peripheral edema  ABDOMEN: soft, nontender, no hepatosplenomegaly, no masses and bowel sounds normal  MS: no gross musculoskeletal defects noted, no edema  NEURO: Normal strength and tone, mentation intact and speech normal  PSYCH: mentation appears normal, affect normal/bright    Results for orders placed or performed in visit on 03/01/24 (from the past 24 hour(s))   COVID-19 ([facility])   Result Value Ref Range    COVID-19 Negative    Influenza A and B ([facility])   Result Value Ref Range    Influenza A neg neg    Influenza B neg neg   Strep A ([facility])   Result Value Ref Range    STREP A Negative Negative           Signed Electronically by: Johnny Lopez PA-C

## 2024-03-01 NOTE — NURSING NOTE
Chief Complaint   Patient presents with    URI     Cough and congestion, facial pressure in face/ cheeks, cough is very productive with thick green mucous, wheezing and SOB, sore throat, symptoms started Monday 02/26     Pre-visit Screening:  Immunizations:  up to date  Colonoscopy:  is up to date  Mammogram: is up to date  Asthma Action Test/Plan:  NA  PHQ9:  NA  GAD7:  NA  Questioned patient about current smoking habits Pt. has never smoked.  Ok to leave detailed message on voice mail for today's visit only Yes, phone # [phone]

## 2024-03-26 DIAGNOSIS — F41.1 GAD (GENERALIZED ANXIETY DISORDER): ICD-10-CM

## 2024-03-26 NOTE — TELEPHONE ENCOUNTER
Edvin Norton is requesting a refill of:    Pending Prescriptions:                       Disp   Refills    sertraline (ZOLOFT) 100 MG tablet [Pharma*135 ta*0            Sig: TAKE 1.5 TABLETS BY MOUTH DAILY    Please close encounter if RX was sent. Thanks, Gita

## 2024-03-27 RX ORDER — SERTRALINE HYDROCHLORIDE 100 MG/1
150 TABLET, FILM COATED ORAL DAILY
Qty: 135 TABLET | Refills: 0 | Status: SHIPPED | OUTPATIENT
Start: 2024-03-27 | End: 2024-06-19

## 2024-04-04 DIAGNOSIS — Z78.9 ALCOHOL USE: ICD-10-CM

## 2024-04-04 RX ORDER — LANOLIN ALCOHOL/MO/W.PET/CERES
100 CREAM (GRAM) TOPICAL DAILY
Qty: 90 TABLET | Refills: 0 | Status: SHIPPED | OUTPATIENT
Start: 2024-04-04 | End: 2024-06-19

## 2024-06-12 DIAGNOSIS — E78.2 MIXED HYPERLIPIDEMIA: ICD-10-CM

## 2024-06-12 DIAGNOSIS — I10 ESSENTIAL HYPERTENSION, BENIGN: ICD-10-CM

## 2024-06-12 RX ORDER — FENOFIBRATE 160 MG/1
160 TABLET ORAL DAILY
COMMUNITY
Start: 2024-06-12

## 2024-06-12 RX ORDER — PROPRANOLOL HCL 60 MG
CAPSULE, EXTENDED RELEASE 24HR ORAL
COMMUNITY
Start: 2024-06-12

## 2024-06-12 RX ORDER — ATORVASTATIN CALCIUM 40 MG/1
40 TABLET, FILM COATED ORAL DAILY
COMMUNITY
Start: 2024-06-12

## 2024-06-12 NOTE — TELEPHONE ENCOUNTER
Edvin Norton is requesting a refill of:    Refused Prescriptions:                       Disp   Refills    fenofibrate (TRIGLIDE/LOFIBRA) 160 MG tabl*                Sig: TAKE 1 TABLET BY MOUTH EVERY DAY  Refused By: LIBRADO MURGUIA  Reason for Refusal: Patient needs appointment    atorvastatin (LIPITOR) 40 MG tablet [Pharm*                Sig: TAKE 1 TABLET BY MOUTH EVERY DAY  Refused By: LIBRADO MURGUIA  Reason for Refusal: Patient needs appointment    propranolol ER (INDERAL LA) 60 MG 24 hr ca*                Sig: TAKE 1 CAPSULE BY MOUTH EVERY DAY  Refused By: LIBRADO MURGUIA  Reason for Refusal: Patient needs appointment    Pt due for OV

## 2024-06-19 ENCOUNTER — OFFICE VISIT (OUTPATIENT)
Dept: FAMILY MEDICINE | Facility: CLINIC | Age: 69
End: 2024-06-19

## 2024-06-19 VITALS
HEIGHT: 68 IN | WEIGHT: 214 LBS | DIASTOLIC BLOOD PRESSURE: 68 MMHG | SYSTOLIC BLOOD PRESSURE: 118 MMHG | TEMPERATURE: 97 F | HEART RATE: 60 BPM | RESPIRATION RATE: 20 BRPM | BODY MASS INDEX: 32.43 KG/M2

## 2024-06-19 DIAGNOSIS — R79.89 OTHER SPECIFIED ABNORMAL FINDINGS OF BLOOD CHEMISTRY: ICD-10-CM

## 2024-06-19 DIAGNOSIS — E72.12 METHYLENETETRAHYDROFOLATE REDUCTASE DEFICIENCY (H): ICD-10-CM

## 2024-06-19 DIAGNOSIS — I10 ESSENTIAL HYPERTENSION, BENIGN: ICD-10-CM

## 2024-06-19 DIAGNOSIS — F41.1 GAD (GENERALIZED ANXIETY DISORDER): ICD-10-CM

## 2024-06-19 DIAGNOSIS — E78.2 MIXED HYPERLIPIDEMIA: Primary | ICD-10-CM

## 2024-06-19 DIAGNOSIS — K22.719 BARRETT'S ESOPHAGUS WITH DYSPLASIA: ICD-10-CM

## 2024-06-19 DIAGNOSIS — Z12.5 SPECIAL SCREENING FOR MALIGNANT NEOPLASM OF PROSTATE: ICD-10-CM

## 2024-06-19 DIAGNOSIS — M48.02 CERVICAL STENOSIS OF SPINE: ICD-10-CM

## 2024-06-19 LAB
ALBUMIN SERPL-MCNC: 4.7 G/DL (ref 3.6–5.1)
ALP SERPL-CCNC: 35 U/L (ref 33–130)
ALT 1742-6: 19 U/L (ref 0–32)
AST 1920-8: 22 U/L (ref 0–35)
BILIRUB SERPL-MCNC: 0.9 MG/DL (ref 0.2–1.2)
BUN SERPL-MCNC: 23 MG/DL (ref 7–25)
BUN/CREATININE RATIO: 20 (ref 6–32)
CALCIUM SERPL-MCNC: 9.9 MG/DL (ref 8.6–10.3)
CHLORIDE SERPLBLD-SCNC: 102.4 MMOL/L (ref 98–110)
CHOLEST SERPL-MCNC: 172 MG/DL (ref 0–199)
CHOLEST/HDLC SERPL: 4 {RATIO} (ref 0–5)
CO2 SERPL-SCNC: 28.3 MMOL/L (ref 20–32)
CREAT SERPL-MCNC: 1.16 MG/DL (ref 0.6–1.3)
GLUCOSE SERPL-MCNC: 108 MG/DL (ref 60–99)
HDLC SERPL-MCNC: 44 MG/DL (ref 40–150)
LDLC SERPL CALC-MCNC: 101 MG/DL
POTASSIUM SERPL-SCNC: 5.28 MMOL/L (ref 3.5–5.3)
PROT SERPL-MCNC: 7.2 G/DL (ref 6.1–8.1)
SODIUM SERPL-SCNC: 138 MMOL/L (ref 135–146)
TRIGL SERPL-MCNC: 137 MG/DL (ref 0–149)

## 2024-06-19 PROCEDURE — 80061 LIPID PANEL: CPT | Performed by: FAMILY MEDICINE

## 2024-06-19 PROCEDURE — 99214 OFFICE O/P EST MOD 30 MIN: CPT | Performed by: FAMILY MEDICINE

## 2024-06-19 PROCEDURE — 36415 COLL VENOUS BLD VENIPUNCTURE: CPT | Performed by: FAMILY MEDICINE

## 2024-06-19 PROCEDURE — G2211 COMPLEX E/M VISIT ADD ON: HCPCS | Performed by: FAMILY MEDICINE

## 2024-06-19 PROCEDURE — 80053 COMPREHEN METABOLIC PANEL: CPT | Performed by: FAMILY MEDICINE

## 2024-06-19 RX ORDER — OMEPRAZOLE 40 MG/1
CAPSULE, DELAYED RELEASE ORAL
Qty: 90 CAPSULE | Refills: 1 | Status: SHIPPED | OUTPATIENT
Start: 2024-06-19

## 2024-06-19 RX ORDER — LANOLIN ALCOHOL/MO/W.PET/CERES
100 CREAM (GRAM) TOPICAL DAILY
Qty: 90 TABLET | Refills: 1 | Status: SHIPPED | OUTPATIENT
Start: 2024-06-19

## 2024-06-19 RX ORDER — FENOFIBRATE 160 MG/1
160 TABLET ORAL DAILY
Qty: 90 TABLET | Refills: 1 | Status: SHIPPED | OUTPATIENT
Start: 2024-06-19

## 2024-06-19 RX ORDER — SERTRALINE HYDROCHLORIDE 100 MG/1
150 TABLET, FILM COATED ORAL DAILY
Qty: 135 TABLET | Refills: 1 | Status: SHIPPED | OUTPATIENT
Start: 2024-06-19

## 2024-06-19 RX ORDER — MULTIVITAMIN WITH IRON
1 TABLET ORAL DAILY
Qty: 90 TABLET | Refills: 1 | Status: SHIPPED | OUTPATIENT
Start: 2024-06-19

## 2024-06-19 RX ORDER — PROPRANOLOL HCL 60 MG
CAPSULE, EXTENDED RELEASE 24HR ORAL
Qty: 90 CAPSULE | Refills: 1 | Status: SHIPPED | OUTPATIENT
Start: 2024-06-19

## 2024-06-19 RX ORDER — MULTIVITAMIN WITH IRON
1 TABLET ORAL DAILY
Qty: 90 TABLET | Refills: 1 | Status: CANCELLED | OUTPATIENT
Start: 2024-06-19

## 2024-06-19 RX ORDER — ATORVASTATIN CALCIUM 40 MG/1
40 TABLET, FILM COATED ORAL DAILY
Qty: 90 TABLET | Refills: 1 | Status: SHIPPED | OUTPATIENT
Start: 2024-06-19

## 2024-06-19 RX ORDER — CHOLECALCIFEROL (VITAMIN D3) 25 MCG
1 TABLET,CHEWABLE ORAL DAILY
Qty: 90 TABLET | Refills: 1 | Status: SHIPPED | OUTPATIENT
Start: 2024-06-19

## 2024-06-19 NOTE — NURSING NOTE
Edvin Norton is here for a CPX.    Pre-visit planning  Immunizations -up to date  Colonoscopy -is up to date  Mammogram -  Asthma test --  PHQ9 -  JORGE 7 -      Questioned patient about current smoking habits.  Pt. has never smoked.  Body mass index is 32.54 kg/m .  PULSE regular  My Chart: active  CLASSIFICATION OF OVERWEIGHT AND OBESITY BY BMI                        Obesity Class           BMI(kg/m2)  Underweight                                    < 18.5  Normal                                         18.5-24.9  Overweight                                     25.0-29.9  OBESITY                     I                  30.0-34.9                             II                 35.0-39.9  EXTREME OBESITY             III                >40                            Patient's  BMI Body mass index is 32.54 kg/m .      The patient has verbalized that it is ok to leave a detailed voice message on the patient's cell phone with results/recommendations from this visit.

## 2024-06-19 NOTE — PROGRESS NOTES
"  Assessment & Plan     Mixed hyperlipidemia  Control uncertwain, continue current medications at current doses pending labs    Essential hypertension, benign (HTN)  Well controlled, continue current medications at current doses     JORGE (generalized anxiety disorder)  Well controlled, continue current medications at current doses     Natarajan's esophagus with dysplasia  stable symptomatically continue current medications at current doses     Cervical stenosis of spine  stable symptomatically     Methylenetetrahydrofolate reductase deficiency (H24)      Other specified abnormal findings of blood chemistry              BMI  Estimated body mass index is 32.54 kg/m  as calculated from the following:    Height as of this encounter: 1.727 m (5' 8\").    Weight as of this encounter: 97.1 kg (214 lb).   Weight management plan: Discussed healthy diet and exercise guidelines      FUTURE APPOINTMENTS:       - Follow-up visit in 6 mo  Work on weight loss  Regular exercise    No follow-ups on file.    Hai Pardo is a 69 year old, presenting for the following health issues:  Recheck Medication    HPI       Hyperlipidemia Follow-Up    Are you regularly taking any medication or supplement to lower your cholesterol?   Yes- atorvastatin  Are you having muscle aches or other side effects that you think could be caused by your cholesterol lowering medication?  No    Hypertension Follow-up    Do you check your blood pressure regularly outside of the clinic? Yes   Are you following a low salt diet? No  Are your blood pressures ever more than 140 on the top number (systolic) OR more   than 90 on the bottom number (diastolic), for example 140/90? No    Anxiety   How are you doing with your anxiety since your last visit? No change  Are you having other symptoms that might be associated with anxiety? No  Have you had a significant life event? No   Are you feeling depressed? No  Do you have any concerns with your use of alcohol or " other drugs? No    Social History     Tobacco Use    Smoking status: Never     Passive exposure: Never    Smokeless tobacco: Former     Types: Chew   Substance Use Topics    Alcohol use: Yes     Alcohol/week: 14.0 standard drinks of alcohol     Types: 14 Standard drinks or equivalent per week    Drug use: No         3/1/2021     1:07 PM 10/21/2021     4:27 PM 11/24/2021     3:54 PM   JORGE-7 SCORE   Total Score 0 2 0         3/1/2021     1:07 PM 10/21/2021     4:27 PM 11/24/2021     3:54 PM   PHQ   PHQ-9 Total Score 2 5 0   Q9: Thoughts of better off dead/self-harm past 2 weeks Not at all Not at all Not at all         11/24/2021     3:54 PM   Last PHQ-9   1.  Little interest or pleasure in doing things 0   2.  Feeling down, depressed, or hopeless 0   3.  Trouble falling or staying asleep, or sleeping too much 0   4.  Feeling tired or having little energy 0   5.  Poor appetite or overeating 0   6.  Feeling bad about yourself 0   7.  Trouble concentrating 0   8.  Moving slowly or restless 0   Q9: Thoughts of better off dead/self-harm past 2 weeks 0   PHQ-9 Total Score 0   Difficulty at work, home, or with people Not difficult at all         11/24/2021     3:54 PM   JORGE-7    1. Feeling nervous, anxious, or on edge 0   2. Not being able to stop or control worrying 0   3. Worrying too much about different things 0   4. Trouble relaxing 0   5. Being so restless that it is hard to sit still 0   6. Becoming easily annoyed or irritable 0   7. Feeling afraid, as if something awful might happen 0   JORGE-7 Total Score 0   If you checked any problems, how difficult have they made it for you to do your work, take care of things at home, or get along with other people? Not difficult at all     Barretts- taking PPI daily, No fevers, melena, hematemesis, or weight loss.   How many servings of fruits and vegetables do you eat daily?  2-3  On average, how many sweetened beverages do you drink each day (Examples: soda, juice, sweet tea,  "etc.  Do NOT count diet or artificially sweetened beverages)?   1  How many days per week do you exercise enough to make your heart beat faster? 6  How many minutes a day do you exercise enough to make your heart beat faster? 30 - 60  How many days per week do you miss taking your medication? 0        Review of Systems  Constitutional, HEENT, cardiovascular, pulmonary, gi and gu systems are negative, except as otherwise noted.      Objective    /68 (BP Location: Left arm, Patient Position: Chair, Cuff Size: Adult Large)   Pulse 60   Temp 97  F (36.1  C) (Temporal)   Resp 20   Ht 1.727 m (5' 8\")   Wt 97.1 kg (214 lb)   BMI 32.54 kg/m    Body mass index is 32.54 kg/m .  Physical Exam   GENERAL: alert and no distress  EYES: Eyes grossly normal to inspection, PERRL and conjunctivae and sclerae normal  HENT: ear canals and TM's normal, nose and mouth without ulcers or lesions  NECK: no adenopathy, no asymmetry, masses, or scars  RESP: lungs clear to auscultation - no rales, rhonchi or wheezes  CV: regular rate and rhythm, normal S1 S2, no S3 or S4, no murmur, click or rub, no peripheral edema  ABDOMEN: soft, nontender, no hepatosplenomegaly, no masses and bowel sounds normal  MS: no gross musculoskeletal defects noted, no edema  PSYCH: mentation appears normal, affect normal/bright    Office Visit on 03/01/2024   Component Date Value Ref Range Status    COVID-19 03/01/2024 Negative   Final    Influenza A 03/01/2024 neg  neg Final    Influenza B 03/01/2024 neg  neg Final    STREP A 03/01/2024 Negative  Negative Final           Signed Electronically by: Woody Cintron MD      "

## 2024-06-20 LAB — ABBOTT PSA - QUEST: 0.71 NG/ML

## 2024-07-02 ENCOUNTER — OFFICE VISIT (OUTPATIENT)
Dept: FAMILY MEDICINE | Facility: CLINIC | Age: 69
End: 2024-07-02

## 2024-07-02 VITALS
HEART RATE: 66 BPM | TEMPERATURE: 97.4 F | BODY MASS INDEX: 34.72 KG/M2 | SYSTOLIC BLOOD PRESSURE: 134 MMHG | WEIGHT: 216 LBS | HEIGHT: 66 IN | DIASTOLIC BLOOD PRESSURE: 82 MMHG | OXYGEN SATURATION: 92 %

## 2024-07-02 DIAGNOSIS — M25.512 LEFT SHOULDER PAIN, UNSPECIFIED CHRONICITY: Primary | ICD-10-CM

## 2024-07-02 DIAGNOSIS — M67.912 TENDINOPATHY OF LEFT ROTATOR CUFF: ICD-10-CM

## 2024-07-02 PROCEDURE — 20610 DRAIN/INJ JOINT/BURSA W/O US: CPT | Mod: LT | Performed by: STUDENT IN AN ORGANIZED HEALTH CARE EDUCATION/TRAINING PROGRAM

## 2024-07-02 PROCEDURE — 99213 OFFICE O/P EST LOW 20 MIN: CPT | Mod: 25 | Performed by: STUDENT IN AN ORGANIZED HEALTH CARE EDUCATION/TRAINING PROGRAM

## 2024-07-02 PROCEDURE — 73030 X-RAY EXAM OF SHOULDER: CPT | Mod: LT | Performed by: STUDENT IN AN ORGANIZED HEALTH CARE EDUCATION/TRAINING PROGRAM

## 2024-07-02 RX ORDER — TRIAMCINOLONE ACETONIDE 40 MG/ML
40 INJECTION, SUSPENSION INTRA-ARTICULAR; INTRAMUSCULAR ONCE
Status: COMPLETED | OUTPATIENT
Start: 2024-07-02 | End: 2024-07-02

## 2024-07-02 RX ADMIN — TRIAMCINOLONE ACETONIDE 40 MG: 40 INJECTION, SUSPENSION INTRA-ARTICULAR; INTRAMUSCULAR at 15:34

## 2024-07-02 NOTE — PATIENT INSTRUCTIONS
Physical Therapy  [facility]   [address] [place] [id number]  [phone] -- appt line    If not significantly better in 6-8 weeks would need to get MRI

## 2024-07-02 NOTE — NURSING NOTE
Chief Complaint   Patient presents with    Consult     Right shoulder soreness and pain, hardest movement is a lateral raise when trying to put a hat on. 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 Has been moving it doing walking with ski sticks and elliptical and not helping.     Pre-visit Screening:  Immunizations:  up to date  Colonoscopy:  is up to date  Mammogram: na  Asthma Action Test/Plan:  na  PHQ9:  na  GAD7:  na  Questioned patient about current smoking habits Pt. has never smoked.  Ok to leave detailed message on voice mail for today's visit only yes, phone # [phone] (home)

## 2024-07-02 NOTE — PROGRESS NOTES
ASSESSMENT & PLAN      ICD-10-CM    1. Left shoulder pain, unspecified chronicity  M25.512 XR Shoulder Left G/E 3 Views     Physical Therapy  Referral - To a Non [facility] Location (Use POS/Location)     DRAIN/INJECT LARGE JOINT/BURSA     triamcinolone (KENALOG-40) injection 40 mg      2. Tendinopathy of left rotator cuff  M67.912 Physical Therapy  Referral - To a Non [facility] Location (Use POS/Location)     DRAIN/INJECT LARGE JOINT/BURSA     triamcinolone (KENALOG-40) injection 40 mg         Left shoulder pain, atrumatic  XRs obtained and reviewed with patient.   Hx and exam most c/w RTC tendinopathy/impingement.   Reviewed options for treatment and/or further eval, agreed on subacromial injection today and plan below. Do not suspect significant RTC tear but patient understands cannot exclude without MRI.     Patient Instructions   Physical Therapy  [facility]   [address] [place] [id number]  [phone] -- appt line    If not significantly better in 6-8 weeks would need to get MRI    Johnny Ca MD, [facility]      -----    SUBJECTIVE  Edvin Norton is a/an 69 year old male who is seen for evaluation of     Chief Complaint   Patient presents with    Consult     Left shoulder soreness and pain, hardest movement is a lateral raise when trying to put a hat on. 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 Has been moving it doing walking with ski sticks and elliptical and not helping.     The patient is seen by themselves.  The patient is Right handed    Date of Onset: 6 weeks ago  Mechanism of injury: Reports insidious onset without acute precipitating event. 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      OBJECTIVE:  /82 (BP Location: Left arm, Patient Position: Sitting, Cuff Size: Adult Large)   Pulse 66   Temp 97.4  F (36.3  C) (Temporal)   Ht 1.676 m (5' 6\")   Wt 98 kg (216 lb)   SpO2 92%   BMI 34.86 kg/m     Alert, NAD  NC/AT  Sclerae anicteric  Regular  Resp nonlabored  Skin warm and dry  No focal neuro deficits. Speech intact. Normal gait.  Appropriate affect    L shoulder full and symm AROM, pain limits str testing, CMS intact distally    RADIOLOGY:  Results for orders placed or performed in visit on 07/02/24   XR Shoulder Left G/E 3 Views     Status: None    Narrative    Radiologist Consultation/:   Fax:  [phone]  [phone]  _____________________________________________________________________________________________________________________________________________________________________________________________________________________________________________________________________________________________________________________________________________________________________________________________________________________________________________________________________________________________________  PATIENT NAME: ENERSON, TAMIKA L  YOB: 1955 Age: 69 ACCESSION NUMBER: [id number]  SEX: M ORDERING PROVIDER: Johnny Roby  FACILITY: [facility] PRIMARY PROVIDER:  PATIENT ID: [id number] INTERESTED PARTY:  Page 1 of 1  _________________________________________________________________________________________________________________________________________________________________________________________________________________________________________________________________________________________________________________________________________________________________________________________  EXAM: X-RAY SHOULDER, 2+VWS LEFT  LOCATION: [facility]  DATE: " 7/2/2024  INDICATION: Left shoulder pain.  COMPARISON: None.  IMPRESSION:  Moderate AC joint arthrosis. Mild glenohumeral joint degenerative change. No acute fracture or dislocation.  Prior cervical fusion.  SIGNED BY: Sebastien Pace MD 7/3/2024 10:08 AM        leftShoulder Injection - subacromial space   The patient was informed of the risks and the benefits of the procedure and a written consent was signed.  The patient s L shoulder was prepped with chloraprep in sterile fashion.   40 mg of triamcinolone suspension was drawn up into a 5 mL syringe with 4 mL of 1% lidocaine.  Injection was performed with sub-sterile technique.  A 1.5-inch 25-gauge needle was used to enter the subacromial space at the posterior glenohumeral joint.  There were no complications. The patient tolerated the procedure well. There was negligible bleeding.   The patient was instructed to ice the shoulder upon leaving clinic and refrain from overuse over the next 3 days.   The patient was instructed to call or go to the emergency room with any unusual pain, swelling, redness, or if otherwise concerned.

## 2024-09-10 ENCOUNTER — OFFICE VISIT (OUTPATIENT)
Dept: FAMILY MEDICINE | Facility: CLINIC | Age: 69
End: 2024-09-10

## 2024-09-10 VITALS
HEART RATE: 77 BPM | DIASTOLIC BLOOD PRESSURE: 82 MMHG | BODY MASS INDEX: 34.31 KG/M2 | SYSTOLIC BLOOD PRESSURE: 128 MMHG | TEMPERATURE: 97.4 F | WEIGHT: 212.6 LBS | OXYGEN SATURATION: 95 %

## 2024-09-10 DIAGNOSIS — M25.512 LEFT SHOULDER PAIN, UNSPECIFIED CHRONICITY: Primary | ICD-10-CM

## 2024-09-10 DIAGNOSIS — M25.612 DECREASED RANGE OF MOTION OF LEFT SHOULDER: ICD-10-CM

## 2024-09-10 PROCEDURE — 73030 X-RAY EXAM OF SHOULDER: CPT | Mod: LT | Performed by: STUDENT IN AN ORGANIZED HEALTH CARE EDUCATION/TRAINING PROGRAM

## 2024-09-10 PROCEDURE — G2211 COMPLEX E/M VISIT ADD ON: HCPCS | Performed by: STUDENT IN AN ORGANIZED HEALTH CARE EDUCATION/TRAINING PROGRAM

## 2024-09-10 PROCEDURE — 99213 OFFICE O/P EST LOW 20 MIN: CPT | Performed by: STUDENT IN AN ORGANIZED HEALTH CARE EDUCATION/TRAINING PROGRAM

## 2024-09-10 NOTE — PATIENT INSTRUCTIONS
MRI at [facility]  [phone] -- appt line    Please send me message after MRI, anticipate surgical consult     left upper arm

## 2024-09-10 NOTE — NURSING NOTE
Chief Complaint   Patient presents with    Follow Up     Follow up for L shoulder pain, has not been getting better. Pain has not been getting worse.      Pre-visit Screening:  Immunizations:  Not applicable  Colonoscopy:  is up to date  Mammogram: na  Asthma Action Test/Plan:  na  PHQ9:  na  GAD7:  na  Questioned patient about current smoking habits Pt. has never smoked.  Ok to leave detailed message on voice mail for today's visit only yes, phone # [phone] (home)

## 2024-09-10 NOTE — PROGRESS NOTES
ASSESSMENT & PLAN      ICD-10-CM    1. Left shoulder pain, unspecified chronicity  M25.512 MR Shoulder Left w/o Contrast     Radiology Referral (Affiliate Use Only)      2. Decreased range of motion of left shoulder  M25.612 MR Shoulder Left w/o Contrast     Radiology Referral (Affiliate Use Only)         More ROM limitation than on prior exam, concern for more significant RTC injury +/- frozen shoulder, recommend MRI to further evaluate.     Patient Instructions   MRI at [facility]  [phone] -- appt line    Please send me message after MRI, anticipate surgical consult    25 minutes spent on the date of the encounter doing chart review, history and exam, documentation and further activities per the note.    Johnny Ca MD, [facility]    -----    SUBJECTIVE:  Edvin Norton is a 69 year old male who is seen in follow-up for   Nursing Notes:   Lora Null MA  9/10/2024  3:02 PM  Signed  Chief Complaint   Patient presents with    Follow Up     Follow up for L shoulder pain, has not been getting better. Pain has not been getting worse.      Pre-visit Screening:  Immunizations:  Not applicable  Colonoscopy:  is up to date  Mammogram: na  Asthma Action Test/Plan:  na  PHQ9:  na  GAD7:  na  Questioned patient about current smoking habits Pt. has never smoked.  Ok to leave detailed message on voice mail for today's visit only yes, phone # [phone] (home)        RHD  They were last seen 7/2/2024.   Subacromial injection at that visit, PT at [facility].   Since their last visit reports no improvement in pain. ROM limited intermittently.     They have also tried rest/activity avoidance, home exercises, and PT.      The patient is seen by themselves.    Patient's past medical, surgical, social, and family histories were reviewed today and no changes are noted.      OBJECTIVE:  /82 (BP Location: Left arm, Patient Position: Sitting, Cuff Size: Adult Large)    Pulse 77   Temp 97.4  F (36.3  C) (Temporal)   Wt 96.4 kg (212 lb 9.6 oz)   SpO2 95%   BMI 34.31 kg/m     Alert, NAD  NC/AT  Sclerae anicteric  Regular  Resp nonlabored  Skin warm and dry  No focal neuro deficits. Speech intact. Normal gait.  Appropriate affect    L shoulder no deformity  AROM tentative and painful 130/130/50/waist  Pain with empty can  5/5 abd, ER/IR  CMS intact distally    Imaging:  EXAM: XRAY SHOULDER,2+VWS LEFT   LOCATION: [facility]   DATE: 9/10/2024   INDICATION: Left shoulder pain.   COMPARISON: None.   IMPRESSION: Left shoulder negative for fracture or joint malalignment. Moderate degenerative arthrosis at the   AC joint. Normal glenohumeral joint.   SIGNED BY: Dane Estevez MD 9/11/2024 3:43 PM

## 2024-09-12 ENCOUNTER — TRANSFERRED RECORDS (OUTPATIENT)
Dept: FAMILY MEDICINE | Facility: CLINIC | Age: 69
End: 2024-09-12

## 2024-09-13 ENCOUNTER — MYC MEDICAL ADVICE (OUTPATIENT)
Dept: FAMILY MEDICINE | Facility: CLINIC | Age: 69
End: 2024-09-13

## 2024-09-18 ENCOUNTER — OFFICE VISIT (OUTPATIENT)
Dept: FAMILY MEDICINE | Facility: CLINIC | Age: 69
End: 2024-09-18

## 2024-09-18 VITALS
OXYGEN SATURATION: 95 % | DIASTOLIC BLOOD PRESSURE: 76 MMHG | HEART RATE: 73 BPM | WEIGHT: 212.4 LBS | BODY MASS INDEX: 34.28 KG/M2 | SYSTOLIC BLOOD PRESSURE: 126 MMHG | TEMPERATURE: 97.3 F

## 2024-09-18 DIAGNOSIS — M75.02 ADHESIVE CAPSULITIS OF LEFT SHOULDER: Primary | ICD-10-CM

## 2024-09-18 PROCEDURE — 20611 DRAIN/INJ JOINT/BURSA W/US: CPT | Mod: LT | Performed by: STUDENT IN AN ORGANIZED HEALTH CARE EDUCATION/TRAINING PROGRAM

## 2024-09-18 NOTE — PROGRESS NOTES
Left Glenohumeral Injection - Ultrasound Guided  MRI results reviewed with patient, nature of adhesive capsulitis and tx options reviewed. The patient was informed of the risks and the benefits of the procedure and a written consent was signed.  The patient s L shoulder was prepped with chlorhexidine in sterile fashion.   80 mg of triamcinolone suspension was drawn up into a 10 mL syringe with 8 mL of 1% lidocaine w/o Epi.  Injection was performed using sterile technique.  Under ultrasound guidance a 3.5-inch 22-gauge needle was used to enter the L glenohumeral joint.  Posterior approach was used with the patient in lateral recumbent position, arm in neutral position at the side.  Needle placement was visualized and documented with ultrasound.  Ultrasound visualization was necessary due to the small joint space entered.  Injection performed long axis to the probe.  Injection solution visualized within the joint space.  Images were permanently stored for the patient's record.  There were no complications. The patient tolerated the procedure well. There was negligible bleeding.   Therapy scheduled to follow for mobilization. The patient was instructed to call or go to the emergency room with any unusual pain, swelling, redness, or if otherwise concerned.      ICD-10-CM    1. Adhesive capsulitis of left shoulder  M75.02 Physical Therapy  Referral - To a [facility] Location (Use POS/Location)     NH ARTHROCENTESIS ASPIR&/INJ MAJOR JT/BURSA W/US     triamcinolone (KENALOG-40) injection 80 mg             Johnny Ca MD, [facility]

## 2024-09-18 NOTE — NURSING NOTE
Chief Complaint   Patient presents with    Imm/Inj     L shoulder ultrasound guided injection      Pre-visit Screening:  Immunizations: not up to date  Colonoscopy:  is up to date  Mammogram: na  Asthma Action Test/Plan:  na  PHQ9:  na  GAD7:  na  Questioned patient about current smoking habits Pt. has never smoked.  Ok to leave detailed message on voice mail for today's visit only yes, phone # [phone] (home)

## 2024-09-19 RX ORDER — TRIAMCINOLONE ACETONIDE 40 MG/ML
80 INJECTION, SUSPENSION INTRA-ARTICULAR; INTRAMUSCULAR ONCE
Status: COMPLETED | OUTPATIENT
Start: 2024-09-19 | End: 2024-09-19

## 2024-09-19 RX ADMIN — TRIAMCINOLONE ACETONIDE 80 MG: 40 INJECTION, SUSPENSION INTRA-ARTICULAR; INTRAMUSCULAR at 08:15

## 2024-10-01 PROBLEM — Z86.0100 PERSONAL HISTORY OF COLON POLYPS, UNSPECIFIED: Status: ACTIVE | Noted: 2021-10-21

## 2025-01-08 DIAGNOSIS — E78.2 MIXED HYPERLIPIDEMIA: ICD-10-CM

## 2025-01-08 DIAGNOSIS — I10 ESSENTIAL HYPERTENSION, BENIGN: ICD-10-CM

## 2025-01-08 RX ORDER — PROPRANOLOL HYDROCHLORIDE 60 MG/1
CAPSULE, EXTENDED RELEASE ORAL
COMMUNITY
Start: 2025-01-08

## 2025-01-08 RX ORDER — ATORVASTATIN CALCIUM 40 MG/1
40 TABLET, FILM COATED ORAL DAILY
COMMUNITY
Start: 2025-01-08

## 2025-01-08 NOTE — TELEPHONE ENCOUNTER
Edvin Norton is requesting a refill of:    Refused Prescriptions:                       Disp   Refills    propranolol ER (INDERAL LA) 60 MG 24 hr ca*                Sig: TAKE 1 CAPSULE BY MOUTH EVERY DAY  Refused By: IFRAH PURVIS  Reason for Refusal: Patient needs appointment    atorvastatin (LIPITOR) 40 MG tablet [Pharm*                Sig: TAKE 1 TABLET BY MOUTH EVERY DAY  Refused By: IFRAH PURVIS  Reason for Refusal: Patient needs appointment    Needs OV for refills

## 2025-01-11 DIAGNOSIS — K22.719 BARRETT'S ESOPHAGUS WITH DYSPLASIA: ICD-10-CM

## 2025-01-11 DIAGNOSIS — F41.1 GAD (GENERALIZED ANXIETY DISORDER): ICD-10-CM

## 2025-01-13 RX ORDER — OMEPRAZOLE 40 MG/1
CAPSULE, DELAYED RELEASE ORAL
Qty: 90 CAPSULE | Refills: 1 | OUTPATIENT
Start: 2025-01-13

## 2025-01-13 RX ORDER — SERTRALINE HYDROCHLORIDE 100 MG/1
150 TABLET, FILM COATED ORAL DAILY
Qty: 135 TABLET | Refills: 1 | OUTPATIENT
Start: 2025-01-13

## 2025-01-13 NOTE — TELEPHONE ENCOUNTER
Patient is requesting refills of:    Refused Prescriptions:                       Disp   Refills    sertraline (ZOLOFT) 100 MG tablet [Pharmac*135 ta*1        Sig: TAKE 1.5 TABLETS BY MOUTH DAILY    omeprazole (PRILOSEC) 40 MG DR capsule [Ph*90 cap*1        Sig: TAKE 1 CAPSULE BY MOUTH EVERY DAY IN THE MORNING    Patient is due for OV

## 2025-01-15 ENCOUNTER — OFFICE VISIT (OUTPATIENT)
Dept: FAMILY MEDICINE | Facility: CLINIC | Age: 70
End: 2025-01-15

## 2025-01-15 VITALS
BODY MASS INDEX: 34.23 KG/M2 | RESPIRATION RATE: 20 BRPM | SYSTOLIC BLOOD PRESSURE: 118 MMHG | WEIGHT: 213 LBS | TEMPERATURE: 97.6 F | HEIGHT: 66 IN | HEART RATE: 60 BPM | DIASTOLIC BLOOD PRESSURE: 74 MMHG

## 2025-01-15 DIAGNOSIS — E72.12 METHYLENETETRAHYDROFOLATE REDUCTASE DEFICIENCY: ICD-10-CM

## 2025-01-15 DIAGNOSIS — Z00.00 ENCOUNTER FOR MEDICARE ANNUAL WELLNESS EXAM: ICD-10-CM

## 2025-01-15 DIAGNOSIS — R79.89 OTHER SPECIFIED ABNORMAL FINDINGS OF BLOOD CHEMISTRY: ICD-10-CM

## 2025-01-15 DIAGNOSIS — F41.1 GAD (GENERALIZED ANXIETY DISORDER): ICD-10-CM

## 2025-01-15 DIAGNOSIS — E78.2 MIXED HYPERLIPIDEMIA: Primary | ICD-10-CM

## 2025-01-15 DIAGNOSIS — I10 ESSENTIAL HYPERTENSION, BENIGN: ICD-10-CM

## 2025-01-15 DIAGNOSIS — K22.719 BARRETT'S ESOPHAGUS WITH DYSPLASIA: ICD-10-CM

## 2025-01-15 LAB
ALBUMIN SERPL-MCNC: 4.3 G/DL (ref 3.6–5.1)
ALP SERPL-CCNC: 32 U/L (ref 33–130)
ALT 1742-6: 28 U/L (ref 0–32)
AST 1920-8: 22 U/L (ref 0–35)
BILIRUB SERPL-MCNC: 0.7 MG/DL (ref 0.2–1.2)
BUN SERPL-MCNC: 19 MG/DL (ref 7–25)
BUN/CREATININE RATIO: 18 (ref 6–32)
CALCIUM SERPL-MCNC: 9.6 MG/DL (ref 8.6–10.3)
CHLORIDE SERPLBLD-SCNC: 103.8 MMOL/L (ref 98–110)
CHOLEST SERPL-MCNC: 163 MG/DL (ref 0–199)
CHOLEST/HDLC SERPL: 4 {RATIO} (ref 0–5)
CO2 SERPL-SCNC: 26.4 MMOL/L (ref 20–32)
CREAT SERPL-MCNC: 1.04 MG/DL (ref 0.6–1.3)
GLUCOSE SERPL-MCNC: 102 MG/DL (ref 60–99)
HDLC SERPL-MCNC: 45 MG/DL (ref 40–150)
LDLC SERPL CALC-MCNC: 94 MG/DL (ref 0–129)
POTASSIUM SERPL-SCNC: 4.19 MMOL/L (ref 3.5–5.3)
PROT SERPL-MCNC: 6.6 G/DL (ref 6.1–8.1)
SODIUM SERPL-SCNC: 139.4 MMOL/L (ref 135–146)
TRIGL SERPL-MCNC: 120 MG/DL (ref 0–149)

## 2025-01-15 PROCEDURE — 80053 COMPREHEN METABOLIC PANEL: CPT | Performed by: FAMILY MEDICINE

## 2025-01-15 PROCEDURE — G0439 PPPS, SUBSEQ VISIT: HCPCS | Performed by: FAMILY MEDICINE

## 2025-01-15 PROCEDURE — 36415 COLL VENOUS BLD VENIPUNCTURE: CPT | Performed by: FAMILY MEDICINE

## 2025-01-15 PROCEDURE — 80061 LIPID PANEL: CPT | Performed by: FAMILY MEDICINE

## 2025-01-15 PROCEDURE — 99214 OFFICE O/P EST MOD 30 MIN: CPT | Mod: 25 | Performed by: FAMILY MEDICINE

## 2025-01-15 RX ORDER — PROPRANOLOL HYDROCHLORIDE 60 MG/1
CAPSULE, EXTENDED RELEASE ORAL
Qty: 90 CAPSULE | Refills: 1 | Status: SHIPPED | OUTPATIENT
Start: 2025-01-15

## 2025-01-15 RX ORDER — LANOLIN ALCOHOL/MO/W.PET/CERES
100 CREAM (GRAM) TOPICAL DAILY
Qty: 90 TABLET | Refills: 1 | Status: SHIPPED | OUTPATIENT
Start: 2025-01-15

## 2025-01-15 RX ORDER — ATORVASTATIN CALCIUM 40 MG/1
40 TABLET, FILM COATED ORAL DAILY
Qty: 90 TABLET | Refills: 1 | Status: SHIPPED | OUTPATIENT
Start: 2025-01-15

## 2025-01-15 RX ORDER — SERTRALINE HYDROCHLORIDE 100 MG/1
150 TABLET, FILM COATED ORAL DAILY
Qty: 135 TABLET | Refills: 1 | Status: SHIPPED | OUTPATIENT
Start: 2025-01-15

## 2025-01-15 RX ORDER — FENOFIBRATE 160 MG/1
160 TABLET ORAL DAILY
Qty: 90 TABLET | Refills: 1 | Status: SHIPPED | OUTPATIENT
Start: 2025-01-15

## 2025-01-15 RX ORDER — MULTIVITAMIN WITH IRON
1 TABLET ORAL DAILY
Qty: 90 TABLET | Refills: 1 | Status: SHIPPED | OUTPATIENT
Start: 2025-01-15

## 2025-01-15 RX ORDER — OMEPRAZOLE 40 MG/1
CAPSULE, DELAYED RELEASE ORAL
Qty: 90 CAPSULE | Refills: 1 | Status: SHIPPED | OUTPATIENT
Start: 2025-01-15

## 2025-01-15 RX ORDER — CHOLECALCIFEROL (VITAMIN D3) 25 MCG
1 TABLET,CHEWABLE ORAL DAILY
Qty: 90 TABLET | Refills: 1 | Status: SHIPPED | OUTPATIENT
Start: 2025-01-15

## 2025-01-15 NOTE — PROGRESS NOTES
Edvin Norton is a 70 year old male who presents for Medicare Annual Wellness Visit.    Current providers caring for this patient include:  Patient Care Team:  Woody Cintron MD as PCP - General (Family Practice)  Woody Cintron MD as Assigned PCP    Complete Medical and Social history reviewed with patient, outlined below.    Patient Active Problem List   Diagnosis    Mixed hyperlipidemia    Vitamin D deficiency    Performance anxiety    JORGE (generalized anxiety disorder)    ACP (advance care planning)    Essential hypertension, benign (HTN)    Obesity due to excess calories, unspecified obesity severity    Natarajan's esophagus with dysplasia    Nonallergic rhinitis    Spondylolisthesis of thoracic region    Confusion    Prolonged QT interval    Obstructive sleep apnea syndrome    Hemorrhoids    Personal history of colon polyps, unspecified       Past Medical History:   Diagnosis Date    Anxiety state 9/26/2013     Problem list name updated by automated process. Provider to review    Natarajan esophagus 9/26/2013    Essential hypertension, benign (HTN) 10/27/2014    Hyperlipidemia 1995    Obesity due to excess calories, unspecified obesity severity 3/24/2016       Past Surgical History:   Procedure Laterality Date    CATARACT IOL, RT/LT      ESOPHAGUS SURGERY      Halo procedure x 5    FINGER SURGERY      tendon    FUSION CERVICAL ANTERIOR THREE+ LEVELS N/A 7/10/2020    Procedure: Anterior cervical diskectomy and fusion with anterior plate fixation cervical 4-7;  Surgeon: Dane Anderson MD;  Location: [facility]    GRAFT BONE FROM ILIAC CREST Right 7/10/2020    Procedure: Right Anterior iliac crest bone graft harvest;  Surgeon: Dane Anderson MD;  Location: [facility] OR    UPPER GI ENDOSCOPY         Family History   Problem Relation Age of Onset    Hypertension Mother         passed  age 96    Anxiety Disorder Mother     Anxiety Disorder Father     Lung Cancer Father     Esophageal  "Cancer Brother     Psoriasis Brother     Hyperlipidemia Sister     Anxiety Disorder Sister     Esophageal Cancer Brother     Aneurysm Sister         brain    Thyroid Disease Brother     Neurologic Disorder Brother         unspecified - jerking motions at night    C.A.D. No family hx of     Diabetes No family hx of     Breast Cancer No family hx of     Cancer - colorectal No family hx of     Prostate Cancer No family hx of        Social History     Tobacco Use    Smoking status: Never     Passive exposure: Never    Smokeless tobacco: Former     Types: Chew   Substance Use Topics    Alcohol use: Yes     Alcohol/week: 14.0 standard drinks of alcohol     Types: 14 Standard drinks or equivalent per week       Diet: regular, low salt/low fat  Physical Activity: active without specific exercise program  Depression Screen:    Over the past 2 weeks, patient has felt down, depressed, or hopeless:  No    Over the past 2 weeks, patient has felt little interest or pleasure in doing things: No    Functional ability/Safety screen:  Up and go test (able to get up and walk longer than 30 seconds): Passed  Patient needs assistance with: nothing  Patient's home has the following possible safety concerns: none identified  Patient has concerns about his hearing:  No  Cognitive Screen  Patient repeats three objects (ball, flag, tree)      Clock drawing test:   NORMAL  Recalls three objects after 3 minutes (ball,flag,tree):                                                                                               recalls 3 objects (3 points)    Physical Exam:  /74 (BP Location: Left arm, Patient Position: Chair, Cuff Size: Adult Large)   Pulse 60   Temp 97.6  F (36.4  C) (Temporal)   Resp 20   Ht 1.676 m (5' 6\")   Wt 96.6 kg (213 lb)   BMI 34.38 kg/m     Body mass index is 34.38 kg/m .              End of Life Planning:   Patient currently has an advanced directive: Yes.  Practitioner is supportive of " decision.    Education/Counseling:   Based on review of the above information, the following items were addressed:      Obesity - appropriate counseling on diet, exercise, etc.      Elevated blood pressure - follow-up plans made    Appropriate preventive services were discussed with this patient, including applicable screening as appropriate for cardiovascular disease, diabetes, osteopenia/osteoporosis, and glaucoma.  As appropriate for age/gender, discussed screening for colorectal cancer, prostate cancer, breast cancer, and cervical cancer.   Checklist reviewing preventive services available has been given to the patient.              Assessment & Plan     Mixed hyperlipidemia  Control uncertain, continue current medications at current doses pending labs    Essential hypertension, benign (HTN)  Well controlled, continue current medications at current doses     JORGE (generalized anxiety disorder)  Well controlled, continue current medications at current doses     Natarajan's esophagus with dysplasia  Well controlled, continue current medications at current doses , next EGD 8/25 recommended    Encounter for Medicare annual wellness exam  discussed preventitive healthcare Continue to work on healthy diet and exercise, discussed healthy habits       Personalized Prevention Plan  You are due for the preventive services outlined below.  Your care team is available to assist you in scheduling these services.  If you have already completed any of these items, please share that information with your care team to update in your medical record.  Health Maintenance   Topic Date Due    HEPATITIS A IMMUNIZATION (1 of 2 - Risk 2-dose series) Never done    HEPATITIS B IMMUNIZATION (1 of 3 - Risk 3-dose series) Never done    DTAP/TDAP/TD IMMUNIZATION (2 - Td or Tdap) 06/23/2024    MEDICARE ANNUAL WELLNESS VISIT  12/20/2024    BMP  06/19/2025    LIPID  06/19/2025    FALL RISK ASSESSMENT  01/15/2026    COLORECTAL CANCER SCREENING   "03/12/2026    ADVANCE CARE PLANNING  11/24/2026    GLUCOSE  06/19/2027    HEPATITIS C SCREENING  Completed    PHQ-2 (once per calendar year)  Completed    INFLUENZA VACCINE  Completed    Pneumococcal Vaccine: 50+ Years  Completed    ZOSTER IMMUNIZATION  Completed    RSV VACCINE  Completed    COVID-19 Vaccine  Completed    HPV IMMUNIZATION  Aged Out    MENINGITIS IMMUNIZATION  Aged Out    RSV MONOCLONAL ANTIBODY  Aged Out        Methylenetetrahydrofolate reductase deficiency      Other specified abnormal findings of blood chemistry              BMI  Estimated body mass index is 34.38 kg/m  as calculated from the following:    Height as of this encounter: 1.676 m (5' 6\").    Weight as of this encounter: 96.6 kg (213 lb).   Weight management plan: Discussed healthy diet and exercise guidelines      FUTURE APPOINTMENTS:       - Follow-up visit in 6 mo  Work on weight loss  Regular exercise    No follow-ups on file.    Hai Pardo is a 70 year old, presenting for the following health issues:  Recheck Medication and Wellness Visit    HPI       Hyperlipidemia Follow-Up    Are you regularly taking any medication or supplement to lower your cholesterol?   Yes- atorvastatin  Are you having muscle aches or other side effects that you think could be caused by your cholesterol lowering medication?  No    Hypertension Follow-up    Do you check your blood pressure regularly outside of the clinic? Yes   Are you following a low salt diet? No  Are your blood pressures ever more than 140 on the top number (systolic) OR more   than 90 on the bottom number (diastolic), for example 140/90? No    Anxiety   How are you doing with your anxiety since your last visit? No change  Are you having other symptoms that might be associated with anxiety? No  Have you had a significant life event? No   Are you feeling depressed? No  Do you have any concerns with your use of alcohol or other drugs? No    Social History     Tobacco Use    " Smoking status: Never     Passive exposure: Never    Smokeless tobacco: Former     Types: Chew   Substance Use Topics    Alcohol use: Yes     Alcohol/week: 14.0 standard drinks of alcohol     Types: 14 Standard drinks or equivalent per week    Drug use: No         3/1/2021     1:07 PM 10/21/2021     4:27 PM 11/24/2021     3:54 PM   JORGE-7 SCORE   Total Score 0 2 0         3/1/2021     1:07 PM 10/21/2021     4:27 PM 11/24/2021     3:54 PM   PHQ   PHQ-9 Total Score 2 5 0   Q9: Thoughts of better off dead/self-harm past 2 weeks Not at all Not at all Not at all         11/24/2021     3:54 PM   Last PHQ-9   1.  Little interest or pleasure in doing things 0   2.  Feeling down, depressed, or hopeless 0   3.  Trouble falling or staying asleep, or sleeping too much 0   4.  Feeling tired or having little energy 0   5.  Poor appetite or overeating 0   6.  Feeling bad about yourself 0   7.  Trouble concentrating 0   8.  Moving slowly or restless 0   Q9: Thoughts of better off dead/self-harm past 2 weeks 0   PHQ-9 Total Score 0   Difficulty at work, home, or with people Not difficult at all         11/24/2021     3:54 PM   JORGE-7    1. Feeling nervous, anxious, or on edge 0   2. Not being able to stop or control worrying 0   3. Worrying too much about different things 0   4. Trouble relaxing 0   5. Being so restless that it is hard to sit still 0   6. Becoming easily annoyed or irritable 0   7. Feeling afraid, as if something awful might happen 0   JORGE-7 Total Score 0   If you checked any problems, how difficult have they made it for you to do your work, take care of things at home, or get along with other people? Not difficult at all     How many servings of fruits and vegetables do you eat daily?  2-3  On average, how many sweetened beverages do you drink each day (Examples: soda, juice, sweet tea, etc.  Do NOT count diet or artificially sweetened beverages)?   1  How many days per week do you exercise enough to make your heart  "beat faster? 6  How many minutes a day do you exercise enough to make your heart beat faster? 30 - 60  How many days per week do you miss taking your medication? 0  GERD- PPI daily, No fevers, melena, hematemesis, or weight loss. , last EGD 2022      Review of Systems  Constitutional, HEENT, cardiovascular, pulmonary, gi and gu systems are negative, except as otherwise noted.      Objective    /74 (BP Location: Left arm, Patient Position: Chair, Cuff Size: Adult Large)   Pulse 60   Temp 97.6  F (36.4  C) (Temporal)   Resp 20   Ht 1.676 m (5' 6\")   Wt 96.6 kg (213 lb)   BMI 34.38 kg/m    Body mass index is 34.38 kg/m .  Physical Exam   GENERAL: alert and no distress  NECK: no adenopathy, no asymmetry, masses, or scars  RESP: lungs clear to auscultation - no rales, rhonchi or wheezes  CV: regular rate and rhythm, normal S1 S2, no S3 or S4, no murmur, click or rub, no peripheral edema  ABDOMEN: soft, nontender, no hepatosplenomegaly, no masses and bowel sounds normal  MS: no gross musculoskeletal defects noted, no edema  PSYCH: mentation appears normal, affect normal/bright    Office Visit on 06/19/2024   Component Date Value Ref Range Status    Cholesterol 06/19/2024 172  0 - 199 mg/dL Final    Triglycerides 06/19/2024 137  0 - 149 mg/dL Final    HDL Cholesterol 06/19/2024 44  40 - 150 mg/dL Final    LDL-C 06/19/2024 101  mg/dL Final    Cholesterol/HDL Ratio 06/19/2024 4  0 - 5 Final    Carbon Dioxide 06/19/2024 28.3  20 - 32 mmol/L Final    Creatinine 06/19/2024 1.16  0.60 - 1.30 mg/dL Final    Glucose 06/19/2024 108 (A)  60 - 99 mg/dL Final    Sodium 06/19/2024 138.0  135 - 146 mmol/L Final    Potassium 06/19/2024 5.28  3.5 - 5.3 mmol/L Final    Chloride 06/19/2024 102.4  98 - 110 mmol/L Final    Protein Total 06/19/2024 7.2  6.1 - 8.1 g/dL Final    Albumin 06/19/2024 4.7  3.6 - 5.1 g/dL Final    Alkaline Phosphatase 06/19/2024 35  33 - 130 U/L Final    ALT 06/19/2024 19  0 - 32 U/L Final    AST " 06/19/2024 22  0 - 35 U/L Final    Bilirubin Total 06/19/2024 0.9  0.2 - 1.2 mg/dL Final    Urea Nitrogen 06/19/2024 23  7 - 25 mg/dL Final    Calcium 06/19/2024 9.9  8.6 - 10.3 mg/dL Final    BUN/Creatinine Ratio 06/19/2024 20  6 - 32 Final    Abbott PSA 06/19/2024 0.71  < OR = 4.00 ng/mL Final    Comment: The total PSA value from this assay system is   standardized against the WHO standard. The test   result will be approximately 20% lower when compared   to the equimolar-standardized total PSA (Sana   Yun). Comparison of serial PSA results should be   interpreted with this fact in mind.     This test was performed using the Siemens   chemiluminescent method. Values obtained from   different assay methods cannot be used  interchangeably. PSA levels, regardless of  value, should not be interpreted as absolute  evidence of the presence or absence of disease.             Signed Electronically by: Woody Cintron MD

## 2025-02-23 ENCOUNTER — HEALTH MAINTENANCE LETTER (OUTPATIENT)
Age: 70
End: 2025-02-23

## 2025-04-15 ENCOUNTER — DOCUMENTATION ONLY (OUTPATIENT)
Dept: OTHER | Facility: CLINIC | Age: 70
End: 2025-04-15
Payer: MEDICARE

## 2025-07-09 DIAGNOSIS — E78.2 MIXED HYPERLIPIDEMIA: ICD-10-CM

## 2025-07-09 DIAGNOSIS — K22.719 BARRETT'S ESOPHAGUS WITH DYSPLASIA: ICD-10-CM

## 2025-07-09 DIAGNOSIS — I10 ESSENTIAL HYPERTENSION, BENIGN: ICD-10-CM

## 2025-07-09 DIAGNOSIS — F41.1 GAD (GENERALIZED ANXIETY DISORDER): ICD-10-CM

## 2025-07-09 RX ORDER — FENOFIBRATE 160 MG/1
160 TABLET ORAL DAILY
COMMUNITY
Start: 2025-07-09

## 2025-07-09 RX ORDER — PROPRANOLOL HYDROCHLORIDE 60 MG/1
60 CAPSULE, EXTENDED RELEASE ORAL DAILY
COMMUNITY
Start: 2025-07-09

## 2025-07-09 RX ORDER — OMEPRAZOLE 40 MG/1
40 CAPSULE, DELAYED RELEASE ORAL EVERY MORNING
COMMUNITY
Start: 2025-07-09

## 2025-07-09 RX ORDER — ATORVASTATIN CALCIUM 40 MG/1
40 TABLET, FILM COATED ORAL DAILY
COMMUNITY
Start: 2025-07-09

## 2025-07-09 RX ORDER — SERTRALINE HYDROCHLORIDE 100 MG/1
150 TABLET, FILM COATED ORAL DAILY
COMMUNITY
Start: 2025-07-09

## 2025-07-09 NOTE — TELEPHONE ENCOUNTER
Edvin Norton is requesting a refill of:    Refused Prescriptions:                       Disp   Refills    sertraline (ZOLOFT) 100 MG tablet [Pharmac*                Sig: TAKE 1.5 TABLETS BY MOUTH DAILY  Refused By: IFRAH PURVIS  Reason for Refusal: Patient needs appointment    atorvastatin (LIPITOR) 40 MG tablet [Pharm*                Sig: TAKE 1 TABLET BY MOUTH EVERY DAY  Refused By: IFRAH PURVIS  Reason for Refusal: Patient needs appointment    propranolol ER (INDERAL LA) 60 MG 24 hr ca*                Sig: TAKE 1 CAPSULE BY MOUTH EVERY DAY  Refused By: IFRAH PURVIS  Reason for Refusal: Patient needs appointment    fenofibrate (TRIGLIDE/LOFIBRA) 160 MG tabl*                Sig: TAKE 1 TABLET BY MOUTH EVERY DAY  Refused By: IFRAH PURVIS  Reason for Refusal: Patient needs appointment    omeprazole (PRILOSEC) 40 MG DR capsule [Ph*                Sig: TAKE 1 CAPSULE BY MOUTH EVERY DAY IN THE MORNING  Refused By: IFRAH PURVIS  Reason for Refusal: Patient needs appointment    Needs OV for refills

## 2025-07-29 ENCOUNTER — OFFICE VISIT (OUTPATIENT)
Dept: FAMILY MEDICINE | Facility: CLINIC | Age: 70
End: 2025-07-29

## 2025-07-29 VITALS
DIASTOLIC BLOOD PRESSURE: 72 MMHG | WEIGHT: 213 LBS | TEMPERATURE: 97.8 F | SYSTOLIC BLOOD PRESSURE: 128 MMHG | RESPIRATION RATE: 20 BRPM | BODY MASS INDEX: 34.23 KG/M2 | HEIGHT: 66 IN | HEART RATE: 68 BPM

## 2025-07-29 DIAGNOSIS — F41.1 GAD (GENERALIZED ANXIETY DISORDER): ICD-10-CM

## 2025-07-29 DIAGNOSIS — E78.2 MIXED HYPERLIPIDEMIA: Primary | ICD-10-CM

## 2025-07-29 DIAGNOSIS — R79.89 OTHER SPECIFIED ABNORMAL FINDINGS OF BLOOD CHEMISTRY: ICD-10-CM

## 2025-07-29 DIAGNOSIS — Z12.5 SPECIAL SCREENING FOR MALIGNANT NEOPLASM OF PROSTATE: ICD-10-CM

## 2025-07-29 DIAGNOSIS — I10 ESSENTIAL HYPERTENSION, BENIGN: ICD-10-CM

## 2025-07-29 DIAGNOSIS — E72.12 METHYLENETETRAHYDROFOLATE REDUCTASE DEFICIENCY: ICD-10-CM

## 2025-07-29 DIAGNOSIS — K22.719 BARRETT'S ESOPHAGUS WITH DYSPLASIA: ICD-10-CM

## 2025-07-29 LAB
ALBUMIN SERPL-MCNC: 4.5 G/DL (ref 3.6–5.1)
ALP SERPL-CCNC: 35 U/L (ref 33–130)
ALT 1742-6: 18 U/L (ref 0–32)
AST 1920-8: 20 U/L (ref 0–35)
BILIRUB SERPL-MCNC: 0.7 MG/DL (ref 0.2–1.2)
BUN SERPL-MCNC: 20 MG/DL (ref 7–25)
BUN/CREATININE RATIO: 19 (ref 6–32)
CALCIUM SERPL-MCNC: 9.5 MG/DL (ref 8.6–10.3)
CHLORIDE SERPLBLD-SCNC: 102.8 MMOL/L (ref 98–110)
CHOLEST SERPL-MCNC: 215 MG/DL (ref 0–199)
CHOLEST/HDLC SERPL: 6 {RATIO} (ref 0–5)
CO2 SERPL-SCNC: 25.1 MMOL/L (ref 20–32)
CREAT SERPL-MCNC: 1.07 MG/DL (ref 0.6–1.3)
GLUCOSE SERPL-MCNC: 105 MG/DL (ref 60–99)
HDLC SERPL-MCNC: 39 MG/DL (ref 40–150)
LDLC SERPL CALC-MCNC: 140 MG/DL (ref 0–129)
POTASSIUM SERPL-SCNC: 4.72 MMOL/L (ref 3.5–5.3)
PROT SERPL-MCNC: 6.9 G/DL (ref 6.1–8.1)
SODIUM SERPL-SCNC: 139.6 MMOL/L (ref 135–146)
TRIGL SERPL-MCNC: 182 MG/DL (ref 0–149)

## 2025-07-29 PROCEDURE — 99214 OFFICE O/P EST MOD 30 MIN: CPT | Performed by: FAMILY MEDICINE

## 2025-07-29 PROCEDURE — 36415 COLL VENOUS BLD VENIPUNCTURE: CPT | Performed by: FAMILY MEDICINE

## 2025-07-29 PROCEDURE — G2211 COMPLEX E/M VISIT ADD ON: HCPCS | Performed by: FAMILY MEDICINE

## 2025-07-29 PROCEDURE — 80061 LIPID PANEL: CPT | Performed by: FAMILY MEDICINE

## 2025-07-29 PROCEDURE — 80053 COMPREHEN METABOLIC PANEL: CPT | Performed by: FAMILY MEDICINE

## 2025-07-29 RX ORDER — PROPRANOLOL HYDROCHLORIDE 60 MG/1
CAPSULE, EXTENDED RELEASE ORAL
Qty: 90 CAPSULE | Refills: 1 | Status: SHIPPED | OUTPATIENT
Start: 2025-07-29

## 2025-07-29 RX ORDER — ATORVASTATIN CALCIUM 40 MG/1
40 TABLET, FILM COATED ORAL DAILY
Qty: 90 TABLET | Refills: 1 | Status: SHIPPED | OUTPATIENT
Start: 2025-07-29

## 2025-07-29 RX ORDER — FENOFIBRATE 160 MG/1
160 TABLET ORAL DAILY
Qty: 90 TABLET | Refills: 1 | Status: SHIPPED | OUTPATIENT
Start: 2025-07-29

## 2025-07-29 RX ORDER — CHOLECALCIFEROL (VITAMIN D3) 25 MCG
1 TABLET,CHEWABLE ORAL DAILY
Qty: 90 TABLET | Refills: 1 | Status: SHIPPED | OUTPATIENT
Start: 2025-07-29

## 2025-07-29 RX ORDER — MULTIVITAMIN WITH IRON
1 TABLET ORAL DAILY
Qty: 90 TABLET | Refills: 1 | Status: SHIPPED | OUTPATIENT
Start: 2025-07-29

## 2025-07-29 RX ORDER — LANOLIN ALCOHOL/MO/W.PET/CERES
100 CREAM (GRAM) TOPICAL DAILY
Qty: 90 TABLET | Refills: 1 | Status: SHIPPED | OUTPATIENT
Start: 2025-07-29

## 2025-07-29 RX ORDER — OMEPRAZOLE 40 MG/1
CAPSULE, DELAYED RELEASE ORAL
Qty: 90 CAPSULE | Refills: 1 | Status: SHIPPED | OUTPATIENT
Start: 2025-07-29

## 2025-07-29 RX ORDER — SERTRALINE HYDROCHLORIDE 100 MG/1
150 TABLET, FILM COATED ORAL DAILY
Qty: 135 TABLET | Refills: 1 | Status: SHIPPED | OUTPATIENT
Start: 2025-07-29

## 2025-07-29 NOTE — PROGRESS NOTES
"  Assessment & Plan     Mixed hyperlipidemia  Control uncertain, continue current medications at current doses pending labs    Essential hypertension, benign (HTN)  Well controlled, continue current medications at current doses     JORGE (generalized anxiety disorder)  Well controlled, continue current medications at current doses     Natarajan's esophagus with dysplasia  EGD tomorrow, continue current medications at current doses     Methylenetetrahydrofolate reductase deficiency      Other specified abnormal findings of blood chemistry      Special screening for malignant neoplasm of prostate        BMI  Estimated body mass index is 34.38 kg/m  as calculated from the following:    Height as of 1/15/25: 1.676 m (5' 6\").    Weight as of this encounter: 96.6 kg (213 lb).   Weight management plan: Discussed healthy diet and exercise guidelines    Follow-up 6 mo      Subjective   Edvin is a 70 year old, presenting for the following health issues:  Recheck Medication    HPI        Hyperlipidemia Follow-Up    Are you regularly taking any medication or supplement to lower your cholesterol?   Yes- atorvastatin  Are you having muscle aches or other side effects that you think could be caused by your cholesterol lowering medication?  No    Hypertension Follow-up    Do you check your blood pressure regularly outside of the clinic? Yes   Are you following a low salt diet? No  Are your blood pressures ever more than 140 on the top number (systolic) OR more   than 90 on the bottom number (diastolic), for example 140/90? No    BP Readings from Last 2 Encounters:   07/29/25 128/72   01/15/25 118/74     Anxiety   How are you doing with your anxiety since your last visit? No change  Are you having other symptoms that might be associated with anxiety? No  Have you had a significant life event? No   Are you feeling depressed? No  Do you have any concerns with your use of alcohol or other drugs? No    Social History     Tobacco Use    " Smoking status: Never     Passive exposure: Never    Smokeless tobacco: Former     Types: Chew   Substance Use Topics    Alcohol use: Yes     Alcohol/week: 14.0 standard drinks of alcohol     Types: 14 Standard drinks or equivalent per week    Drug use: No         3/1/2021     1:07 PM 10/21/2021     4:27 PM 11/24/2021     3:54 PM   JORGE-7 SCORE   Total Score 0 2 0         3/1/2021     1:07 PM 10/21/2021     4:27 PM 11/24/2021     3:54 PM   PHQ   PHQ-9 Total Score 2 5 0   Q9: Thoughts of better off dead/self-harm past 2 weeks Not at all Not at all Not at all         11/24/2021     3:54 PM   Last PHQ-9   1.  Little interest or pleasure in doing things 0   2.  Feeling down, depressed, or hopeless 0   3.  Trouble falling or staying asleep, or sleeping too much 0   4.  Feeling tired or having little energy 0   5.  Poor appetite or overeating 0   6.  Feeling bad about yourself 0   7.  Trouble concentrating 0   8.  Moving slowly or restless 0   Q9: Thoughts of better off dead/self-harm past 2 weeks 0   PHQ-9 Total Score 0   Difficulty at work, home, or with people Not difficult at all         11/24/2021     3:54 PM   JORGE-7    1. Feeling nervous, anxious, or on edge 0   2. Not being able to stop or control worrying 0   3. Worrying too much about different things 0   4. Trouble relaxing 0   5. Being so restless that it is hard to sit still 0   6. Becoming easily annoyed or irritable 0   7. Feeling afraid, as if something awful might happen 0   JORGE-7 Total Score 0   If you checked any problems, how difficult have they made it for you to do your work, take care of things at home, or get along with other people? Not difficult at all     Barrets- EGD this week  How many servings of fruits and vegetables do you eat daily?  2-3  On average, how many sweetened beverages do you drink each day (Examples: soda, juice, sweet tea, etc.  Do NOT count diet or artificially sweetened beverages)?   1  How many days per week do you exercise  enough to make your heart beat faster? 5  How many minutes a day do you exercise enough to make your heart beat faster? 60 or more  How many days per week do you miss taking your medication? 0        Review of Systems  Constitutional, HEENT, cardiovascular, pulmonary, gi and gu systems are negative, except as otherwise noted.      Objective    /72 (BP Location: Right arm, Patient Position: Chair, Cuff Size: Adult Large)   Pulse 68   Temp 97.8  F (36.6  C) (Temporal)   Wt 96.6 kg (213 lb)   BMI 34.38 kg/m    Body mass index is 34.38 kg/m .  Physical Exam   GENERAL: alert and no distress  EYES: Eyes grossly normal to inspection, PERRL and conjunctivae and sclerae normal  HENT: ear canals and TM's normal, nose and mouth without ulcers or lesions  NECK: no adenopathy, no asymmetry, masses, or scars  RESP: lungs clear to auscultation - no rales, rhonchi or wheezes  CV: regular rate and rhythm, normal S1 S2, no S3 or S4, no murmur, click or rub, no peripheral edema  ABDOMEN: soft, nontender, no hepatosplenomegaly, no masses and bowel sounds normal  MS: no gross musculoskeletal defects noted, no edema  PSYCH: mentation appears normal, affect normal/bright    Office Visit on 01/15/2025   Component Date Value Ref Range Status    Carbon Dioxide 01/15/2025 26.4  20 - 32 mmol/L Final    Creatinine 01/15/2025 1.04  0.60 - 1.30 mg/dL Final    Glucose 01/15/2025 102 (A)  60 - 99 mg/dL Final    Sodium 01/15/2025 139.4  135 - 146 mmol/L Final    Potassium 01/15/2025 4.19  3.5 - 5.3 mmol/L Final    Chloride 01/15/2025 103.8  98 - 110 mmol/L Final    Protein Total 01/15/2025 6.6  6.1 - 8.1 g/dL Final    Albumin 01/15/2025 4.3  3.6 - 5.1 g/dL Final    Alkaline Phosphatase 01/15/2025 32 (A)  33 - 130 U/L Final    ALT 01/15/2025 28  0 - 32 U/L Final    AST 01/15/2025 22  0 - 35 U/L Final    Bilirubin Total 01/15/2025 0.7  0.2 - 1.2 mg/dL Final    Urea Nitrogen 01/15/2025 19  7 - 25 mg/dL Final    Calcium 01/15/2025 9.6  8.6 -  10.3 mg/dL Final    BUN/Creatinine Ratio 01/15/2025 18  6 - 32 Final    Cholesterol 01/15/2025 163  0 - 199 mg/dL Final    Triglycerides 01/15/2025 120  0 - 149 mg/dL Final    HDL Cholesterol 01/15/2025 45  40 - 150 mg/dL Final    LDL-C 01/15/2025 94  0 - 129 mg/dL Final    Cholesterol/HDL Ratio 01/15/2025 4  0 - 5 Final           Signed Electronically by: Woody Cintron MD

## 2025-07-30 LAB — ABBOTT PSA - QUEST: 0.72 NG/ML

## (undated) DEVICE — SPONGE KITTNER 31001010

## (undated) DEVICE — DRAPE MICROSCOPE LEICA 46X120" AR8033651

## (undated) DEVICE — DRSG KERLIX FLUFFS X5

## (undated) DEVICE — DRAPE IOBAN INCISE 23X17" 6650EZ

## (undated) DEVICE — GOWN XLG DISP 9545

## (undated) DEVICE — DRAPE LAP W/ARMBOARD 29410

## (undated) DEVICE — SU VICRYL 3-0 PS-1 18" UND J683

## (undated) DEVICE — SOL NACL 0.9% IRRIG 1000ML BOTTLE 2F7124

## (undated) DEVICE — PREP CHLORAPREP 26ML TINTED ORANGE  260815

## (undated) DEVICE — SU MONOCRYL 4-0 PS-2 18" UND Y496G

## (undated) DEVICE — SOL WATER IRRIG 1000ML BOTTLE 2F7114

## (undated) DEVICE — GLOVE PROTEXIS MICRO 8.0  2D73PM80

## (undated) DEVICE — STRAP KNEE/BODY 31143004

## (undated) DEVICE — SOL ISOPROPYL RUBBING ALCOHOL USP 70% 4OZ HDX-20 I0020

## (undated) DEVICE — SPONGE SURGIFOAM 100 1974

## (undated) DEVICE — MIDAS REX DISSECTING TOOL  14MH30

## (undated) DEVICE — BASIN SET MAJOR

## (undated) DEVICE — BLADE KNIFE SURG 10 371110

## (undated) DEVICE — NDL SPINAL 18GA 3.5" 405184

## (undated) DEVICE — DRAIN JACKSON PRATT 07FR ROUND SU130-1320

## (undated) DEVICE — DRAIN JACKSON PRATT RESERVOIR 100ML SU130-1305

## (undated) DEVICE — SYR 50ML LL W/O NDL 309653

## (undated) DEVICE — SU VICRYL 3-0 PS-1 18" UND J683G

## (undated) DEVICE — SU MONOCRYL 3-0 PS-2 18" UND Y497G

## (undated) DEVICE — ADH SKIN CLOSURE PREMIERPRO EXOFIN 1.0ML 3470

## (undated) DEVICE — SYR 10ML LL W/O NDL 302995

## (undated) DEVICE — LINEN BACK PACK 5440

## (undated) DEVICE — LINEN TOWEL PACK X5 5464

## (undated) DEVICE — DECANTER TRANSFER DEVICE 2008S

## (undated) DEVICE — DRSG TEGADERM 2 3/8X2 3/4" 1624W

## (undated) DEVICE — PACK SET-UP STD 9102

## (undated) DEVICE — BRUSH SURGICAL SCRUB W/4% CHLORHEXIDINE GLUCONATE SOL 4458A

## (undated) DEVICE — SPECIMEN CONTAINER 5OZ STERILE 2600SA

## (undated) DEVICE — DRAPE C-ARM W/STRAPS 42X72" 07-CA104

## (undated) DEVICE — ESU CORD BIPOLAR GREEN 10-4000

## (undated) DEVICE — DRSG PRIMAPORE 03 1/8X6" 66000318

## (undated) DEVICE — ESU ELEC BLADE 2.75" COATED/INSULATED E1455

## (undated) DEVICE — SPONGE SURGIFOAM 12 1972

## (undated) DEVICE — GLOVE PROTEXIS BLUE W/NEU-THERA 8.5  2D73EB85

## (undated) DEVICE — Device

## (undated) DEVICE — ESU GROUND PAD UNIVERSAL W/O CORD

## (undated) DEVICE — SU VICRYL 0 CT-2 27" J334H

## (undated) DEVICE — CATH TRAY FOLEY SURESTEP 16FR WDRAIN BAG STLK LATEX A300316A

## (undated) DEVICE — SYR BULB IRRIG 50ML LATEX FREE 0035280

## (undated) DEVICE — DRAPE POUCH INSTRUMENT 1018

## (undated) DEVICE — IMM COLLAR CERVICAL MED UNIVERSAL 2.5X24" 79-83520

## (undated) DEVICE — DRAPE STERI TOWEL LG 1010

## (undated) DEVICE — SPONGE KITTNER 30-101

## (undated) DEVICE — BONE WAX 2.5GM W31G

## (undated) DEVICE — SU VICRYL 2-0 CT-2 27" J333H

## (undated) DEVICE — POSITIONER ARMBOARD FOAM 1PAIR LF FP-ARMB1

## (undated) DEVICE — SPONGE COTTONOID 1/2X1" 80-1402

## (undated) DEVICE — TUBING SUCTION MEDI-VAC 1/4"X20' N620A

## (undated) DEVICE — TAPE MEDIPORE 2"X2YD 2862S

## (undated) DEVICE — SU VICRYL 2-0 CT-2 27" UND J269H

## (undated) DEVICE — SU VICRYL 0 CT-1 3X27" J430T

## (undated) DEVICE — LIGHT HANDLE X2

## (undated) DEVICE — BLADE CLIPPER SGL USE 9680

## (undated) RX ORDER — CEFAZOLIN SODIUM 1 G/3ML
INJECTION, POWDER, FOR SOLUTION INTRAMUSCULAR; INTRAVENOUS
Status: DISPENSED
Start: 2020-07-10

## (undated) RX ORDER — HYDROMORPHONE HYDROCHLORIDE 1 MG/ML
INJECTION, SOLUTION INTRAMUSCULAR; INTRAVENOUS; SUBCUTANEOUS
Status: DISPENSED
Start: 2020-07-10

## (undated) RX ORDER — FENTANYL CITRATE 50 UG/ML
INJECTION, SOLUTION INTRAMUSCULAR; INTRAVENOUS
Status: DISPENSED
Start: 2020-07-10

## (undated) RX ORDER — PROPOFOL 10 MG/ML
INJECTION, EMULSION INTRAVENOUS
Status: DISPENSED
Start: 2020-07-10

## (undated) RX ORDER — BUPIVACAINE HYDROCHLORIDE AND EPINEPHRINE 2.5; 5 MG/ML; UG/ML
INJECTION, SOLUTION INFILTRATION; PERINEURAL
Status: DISPENSED
Start: 2020-07-10

## (undated) RX ORDER — VANCOMYCIN HYDROCHLORIDE 1 G/20ML
INJECTION, POWDER, LYOPHILIZED, FOR SOLUTION INTRAVENOUS
Status: DISPENSED
Start: 2020-07-10

## (undated) RX ORDER — LABETALOL 20 MG/4 ML (5 MG/ML) INTRAVENOUS SYRINGE
Status: DISPENSED
Start: 2020-07-10

## (undated) RX ORDER — PHENYLEPHRINE HCL IN 0.9% NACL 1 MG/10 ML
SYRINGE (ML) INTRAVENOUS
Status: DISPENSED
Start: 2020-07-10

## (undated) RX ORDER — CEFAZOLIN SODIUM 2 G/100ML
INJECTION, SOLUTION INTRAVENOUS
Status: DISPENSED
Start: 2020-07-10

## (undated) RX ORDER — EPHEDRINE SULFATE 50 MG/ML
INJECTION, SOLUTION INTRAMUSCULAR; INTRAVENOUS; SUBCUTANEOUS
Status: DISPENSED
Start: 2020-07-10

## (undated) RX ORDER — GABAPENTIN 300 MG/1
CAPSULE ORAL
Status: DISPENSED
Start: 2020-07-10

## (undated) RX ORDER — SODIUM CHLORIDE, SODIUM LACTATE, POTASSIUM CHLORIDE, CALCIUM CHLORIDE 600; 310; 30; 20 MG/100ML; MG/100ML; MG/100ML; MG/100ML
INJECTION, SOLUTION INTRAVENOUS
Status: DISPENSED
Start: 2020-07-10

## (undated) RX ORDER — ACETAMINOPHEN 325 MG/1
TABLET ORAL
Status: DISPENSED
Start: 2020-07-10